# Patient Record
Sex: FEMALE | Race: OTHER | Employment: OTHER | URBAN - METROPOLITAN AREA
[De-identification: names, ages, dates, MRNs, and addresses within clinical notes are randomized per-mention and may not be internally consistent; named-entity substitution may affect disease eponyms.]

---

## 2017-01-26 PROBLEM — J18.9 MULTIFOCAL PNEUMONIA: Status: ACTIVE | Noted: 2017-01-26

## 2017-05-01 ENCOUNTER — HOSPITAL ENCOUNTER (INPATIENT)
Age: 37
LOS: 1 days | Discharge: PSYCHIATRIC OR SUBSTANCE USE FACILITY | DRG: 918 | End: 2017-05-03
Attending: EMERGENCY MEDICINE | Admitting: INTERNAL MEDICINE
Payer: COMMERCIAL

## 2017-05-01 ENCOUNTER — APPOINTMENT (OUTPATIENT)
Dept: GENERAL RADIOLOGY | Age: 37
DRG: 918 | End: 2017-05-01
Payer: COMMERCIAL

## 2017-05-01 DIAGNOSIS — T50.902A DRUG OVERDOSE, INTENTIONAL SELF-HARM, INITIAL ENCOUNTER (HCC): Primary | ICD-10-CM

## 2017-05-01 DIAGNOSIS — R45.89 SUICIDAL BEHAVIOR: ICD-10-CM

## 2017-05-01 LAB
-: ABNORMAL
ABSOLUTE EOS #: 0.2 K/UL (ref 0–0.4)
ABSOLUTE LYMPH #: 3.8 K/UL (ref 1–4.8)
ABSOLUTE MONO #: 0.9 K/UL (ref 0.1–1.3)
AMORPHOUS: ABNORMAL
AMPHETAMINE SCREEN URINE: NEGATIVE
BACTERIA: ABNORMAL
BARBITURATE SCREEN URINE: POSITIVE
BASOPHILS # BLD: 1 %
BASOPHILS ABSOLUTE: 0.1 K/UL (ref 0–0.2)
BENZODIAZEPINE SCREEN, URINE: NEGATIVE
BILIRUBIN URINE: NEGATIVE
BUPRENORPHINE URINE: ABNORMAL
CANNABINOID SCREEN URINE: POSITIVE
CASTS UA: ABNORMAL /LPF
COCAINE METABOLITE, URINE: NEGATIVE
COLOR: YELLOW
COMMENT UA: ABNORMAL
CRYSTALS, UA: ABNORMAL /HPF
DIFFERENTIAL TYPE: ABNORMAL
EOSINOPHILS RELATIVE PERCENT: 1 %
EPITHELIAL CELLS UA: ABNORMAL /HPF
GLUCOSE URINE: NEGATIVE
HCT VFR BLD CALC: 39.5 % (ref 36–46)
HEMOGLOBIN: 12.8 G/DL (ref 12–16)
KETONES, URINE: NEGATIVE
LEUKOCYTE ESTERASE, URINE: ABNORMAL
LYMPHOCYTES # BLD: 29 %
MCH RBC QN AUTO: 28.7 PG (ref 26–34)
MCHC RBC AUTO-ENTMCNC: 32.5 G/DL (ref 31–37)
MCV RBC AUTO: 88.3 FL (ref 80–100)
MDMA URINE: ABNORMAL
METHADONE SCREEN, URINE: NEGATIVE
METHAMPHETAMINE, URINE: ABNORMAL
MONOCYTES # BLD: 7 %
MUCUS: ABNORMAL
NITRITE, URINE: NEGATIVE
OPIATES, URINE: POSITIVE
OTHER OBSERVATIONS UA: ABNORMAL
OXYCODONE SCREEN URINE: NEGATIVE
PDW BLD-RTO: 15.6 % (ref 11.5–14.9)
PH UA: 6 (ref 5–8)
PHENCYCLIDINE, URINE: NEGATIVE
PLATELET # BLD: 307 K/UL (ref 150–450)
PLATELET ESTIMATE: ABNORMAL
PMV BLD AUTO: 7.3 FL (ref 6–12)
PROPOXYPHENE, URINE: ABNORMAL
PROTEIN UA: NEGATIVE
RBC # BLD: 4.47 M/UL (ref 4–5.2)
RBC # BLD: ABNORMAL 10*6/UL
RBC UA: ABNORMAL /HPF
RENAL EPITHELIAL, UA: ABNORMAL /HPF
SEG NEUTROPHILS: 62 %
SEGMENTED NEUTROPHILS ABSOLUTE COUNT: 8.2 K/UL (ref 1.3–9.1)
SPECIFIC GRAVITY UA: 1.01 (ref 1–1.03)
TEST INFORMATION: ABNORMAL
TRICHOMONAS: ABNORMAL
TRICYCLIC ANTIDEP,URINE: POSITIVE
TRICYCLIC ANTIDEPRESSANTS, UR: ABNORMAL
TURBIDITY: ABNORMAL
URINE HGB: NEGATIVE
UROBILINOGEN, URINE: NORMAL
WBC # BLD: 13.2 K/UL (ref 3.5–11)
WBC # BLD: ABNORMAL 10*3/UL
WBC UA: ABNORMAL /HPF
YEAST: ABNORMAL

## 2017-05-01 PROCEDURE — 93005 ELECTROCARDIOGRAM TRACING: CPT

## 2017-05-01 PROCEDURE — 6360000002 HC RX W HCPCS: Performed by: EMERGENCY MEDICINE

## 2017-05-01 PROCEDURE — 36415 COLL VENOUS BLD VENIPUNCTURE: CPT

## 2017-05-01 PROCEDURE — 80307 DRUG TEST PRSMV CHEM ANLYZR: CPT

## 2017-05-01 PROCEDURE — 83735 ASSAY OF MAGNESIUM: CPT

## 2017-05-01 PROCEDURE — 71010 XR CHEST PORTABLE: CPT

## 2017-05-01 PROCEDURE — 96374 THER/PROPH/DIAG INJ IV PUSH: CPT

## 2017-05-01 PROCEDURE — 82140 ASSAY OF AMMONIA: CPT

## 2017-05-01 PROCEDURE — 43753 TX GASTRO INTUB W/ASP: CPT

## 2017-05-01 PROCEDURE — 85025 COMPLETE CBC W/AUTO DIFF WBC: CPT

## 2017-05-01 PROCEDURE — 81001 URINALYSIS AUTO W/SCOPE: CPT

## 2017-05-01 PROCEDURE — 80053 COMPREHEN METABOLIC PANEL: CPT

## 2017-05-01 PROCEDURE — 84484 ASSAY OF TROPONIN QUANT: CPT

## 2017-05-01 PROCEDURE — 74022 RADEX COMPL AQT ABD SERIES: CPT

## 2017-05-01 PROCEDURE — G0480 DRUG TEST DEF 1-7 CLASSES: HCPCS

## 2017-05-01 PROCEDURE — 2580000003 HC RX 258: Performed by: EMERGENCY MEDICINE

## 2017-05-01 PROCEDURE — 99285 EMERGENCY DEPT VISIT HI MDM: CPT

## 2017-05-01 RX ORDER — 0.9 % SODIUM CHLORIDE 0.9 %
1000 INTRAVENOUS SOLUTION INTRAVENOUS ONCE
Status: COMPLETED | OUTPATIENT
Start: 2017-05-01 | End: 2017-05-02

## 2017-05-01 RX ORDER — ACTIVATED CHARCOAL 208 MG/ML
50 SUSPENSION ORAL ONCE
Status: COMPLETED | OUTPATIENT
Start: 2017-05-01 | End: 2017-05-02

## 2017-05-01 RX ORDER — ONDANSETRON 2 MG/ML
4 INJECTION INTRAMUSCULAR; INTRAVENOUS ONCE
Status: COMPLETED | OUTPATIENT
Start: 2017-05-01 | End: 2017-05-01

## 2017-05-01 RX ADMIN — SODIUM CHLORIDE 1000 ML: 9 INJECTION, SOLUTION INTRAVENOUS at 23:46

## 2017-05-01 RX ADMIN — ONDANSETRON 4 MG: 2 INJECTION INTRAMUSCULAR; INTRAVENOUS at 23:46

## 2017-05-01 ASSESSMENT — PAIN DESCRIPTION - ORIENTATION: ORIENTATION: RIGHT

## 2017-05-01 ASSESSMENT — ENCOUNTER SYMPTOMS
NAUSEA: 1
VOMITING: 0
COUGH: 0
RHINORRHEA: 0
TROUBLE SWALLOWING: 0
SORE THROAT: 0
PHOTOPHOBIA: 0
EYE DISCHARGE: 0
EYE PAIN: 0
DIARRHEA: 0
SHORTNESS OF BREATH: 0
ABDOMINAL PAIN: 0

## 2017-05-01 ASSESSMENT — PAIN DESCRIPTION - LOCATION: LOCATION: BACK;LEG

## 2017-05-02 PROBLEM — T50.902A INTENTIONAL DRUG OVERDOSE (HCC): Status: ACTIVE | Noted: 2017-05-02

## 2017-05-02 PROBLEM — T14.91XA SUICIDE ATTEMPT (HCC): Status: ACTIVE | Noted: 2017-05-02

## 2017-05-02 LAB
ABSOLUTE EOS #: 0.2 K/UL (ref 0–0.4)
ABSOLUTE LYMPH #: 2.9 K/UL (ref 1–4.8)
ABSOLUTE MONO #: 0.7 K/UL (ref 0.1–1.3)
ACETAMINOPHEN LEVEL: <10 UG/ML (ref 10–30)
ALBUMIN SERPL-MCNC: 3.4 G/DL (ref 3.5–5.2)
ALBUMIN/GLOBULIN RATIO: ABNORMAL (ref 1–2.5)
ALP BLD-CCNC: 99 U/L (ref 35–104)
ALT SERPL-CCNC: 23 U/L (ref 5–33)
AMMONIA: 44 UMOL/L (ref 11–51)
ANION GAP SERPL CALCULATED.3IONS-SCNC: 12 MMOL/L (ref 9–17)
ANION GAP SERPL CALCULATED.3IONS-SCNC: 15 MMOL/L (ref 9–17)
AST SERPL-CCNC: 14 U/L
BASOPHILS # BLD: 1 %
BASOPHILS ABSOLUTE: 0.1 K/UL (ref 0–0.2)
BILIRUB SERPL-MCNC: <0.15 MG/DL (ref 0.3–1.2)
BUN BLDV-MCNC: 9 MG/DL (ref 6–20)
BUN BLDV-MCNC: 9 MG/DL (ref 6–20)
BUN/CREAT BLD: ABNORMAL (ref 9–20)
BUN/CREAT BLD: ABNORMAL (ref 9–20)
CALCIUM SERPL-MCNC: 8.5 MG/DL (ref 8.6–10.4)
CALCIUM SERPL-MCNC: 8.6 MG/DL (ref 8.6–10.4)
CHLORIDE BLD-SCNC: 101 MMOL/L (ref 98–107)
CHLORIDE BLD-SCNC: 102 MMOL/L (ref 98–107)
CO2: 23 MMOL/L (ref 20–31)
CO2: 24 MMOL/L (ref 20–31)
CREAT SERPL-MCNC: 0.84 MG/DL (ref 0.5–0.9)
CREAT SERPL-MCNC: 0.9 MG/DL (ref 0.5–0.9)
DIFFERENTIAL TYPE: ABNORMAL
EKG ATRIAL RATE: 69 BPM
EKG P AXIS: 36 DEGREES
EKG P-R INTERVAL: 154 MS
EKG Q-T INTERVAL: 426 MS
EKG QRS DURATION: 98 MS
EKG QTC CALCULATION (BAZETT): 456 MS
EKG R AXIS: -19 DEGREES
EKG T AXIS: 6 DEGREES
EKG VENTRICULAR RATE: 69 BPM
EOSINOPHILS RELATIVE PERCENT: 2 %
ETHANOL PERCENT: <0.01 %
ETHANOL: <10 MG/DL
GFR AFRICAN AMERICAN: >60 ML/MIN
GFR AFRICAN AMERICAN: >60 ML/MIN
GFR NON-AFRICAN AMERICAN: >60 ML/MIN
GFR NON-AFRICAN AMERICAN: >60 ML/MIN
GFR SERPL CREATININE-BSD FRML MDRD: ABNORMAL ML/MIN/{1.73_M2}
GLUCOSE BLD-MCNC: 116 MG/DL (ref 70–99)
GLUCOSE BLD-MCNC: 125 MG/DL (ref 70–99)
HCT VFR BLD CALC: 40 % (ref 36–46)
HEMOGLOBIN: 13.1 G/DL (ref 12–16)
LYMPHOCYTES # BLD: 29 %
MAGNESIUM: 1.8 MG/DL (ref 1.6–2.6)
MCH RBC QN AUTO: 29.2 PG (ref 26–34)
MCHC RBC AUTO-ENTMCNC: 32.8 G/DL (ref 31–37)
MCV RBC AUTO: 88.8 FL (ref 80–100)
MONOCYTES # BLD: 7 %
PDW BLD-RTO: 15.2 % (ref 11.5–14.9)
PLATELET # BLD: 322 K/UL (ref 150–450)
PLATELET ESTIMATE: ABNORMAL
PMV BLD AUTO: 7.4 FL (ref 6–12)
POTASSIUM SERPL-SCNC: 3.5 MMOL/L (ref 3.7–5.3)
POTASSIUM SERPL-SCNC: 3.9 MMOL/L (ref 3.7–5.3)
RBC # BLD: 4.5 M/UL (ref 4–5.2)
RBC # BLD: ABNORMAL 10*6/UL
SALICYLATE LEVEL: <1 MG/DL (ref 3–10)
SEG NEUTROPHILS: 61 %
SEGMENTED NEUTROPHILS ABSOLUTE COUNT: 6.3 K/UL (ref 1.3–9.1)
SODIUM BLD-SCNC: 138 MMOL/L (ref 135–144)
SODIUM BLD-SCNC: 139 MMOL/L (ref 135–144)
TOTAL PROTEIN: 6.3 G/DL (ref 6.4–8.3)
TROPONIN INTERP: NORMAL
TROPONIN T: <0.03 NG/ML
WBC # BLD: 10.2 K/UL (ref 3.5–11)
WBC # BLD: ABNORMAL 10*3/UL

## 2017-05-02 PROCEDURE — 6360000002 HC RX W HCPCS: Performed by: NURSE PRACTITIONER

## 2017-05-02 PROCEDURE — 2500000003 HC RX 250 WO HCPCS: Performed by: NURSE PRACTITIONER

## 2017-05-02 PROCEDURE — 85025 COMPLETE CBC W/AUTO DIFF WBC: CPT

## 2017-05-02 PROCEDURE — 36415 COLL VENOUS BLD VENIPUNCTURE: CPT

## 2017-05-02 PROCEDURE — 93005 ELECTROCARDIOGRAM TRACING: CPT

## 2017-05-02 PROCEDURE — 6370000000 HC RX 637 (ALT 250 FOR IP): Performed by: INTERNAL MEDICINE

## 2017-05-02 PROCEDURE — 2060000000 HC ICU INTERMEDIATE R&B

## 2017-05-02 PROCEDURE — 6370000000 HC RX 637 (ALT 250 FOR IP): Performed by: EMERGENCY MEDICINE

## 2017-05-02 PROCEDURE — 99222 1ST HOSP IP/OBS MODERATE 55: CPT | Performed by: INTERNAL MEDICINE

## 2017-05-02 PROCEDURE — 6370000000 HC RX 637 (ALT 250 FOR IP): Performed by: NURSE PRACTITIONER

## 2017-05-02 PROCEDURE — 80048 BASIC METABOLIC PNL TOTAL CA: CPT

## 2017-05-02 PROCEDURE — 2580000003 HC RX 258: Performed by: NURSE PRACTITIONER

## 2017-05-02 RX ORDER — NADOLOL 20 MG/1
40 TABLET ORAL NIGHTLY
Status: DISCONTINUED | OUTPATIENT
Start: 2017-05-02 | End: 2017-05-03 | Stop reason: HOSPADM

## 2017-05-02 RX ORDER — ACETAMINOPHEN 325 MG/1
650 TABLET ORAL EVERY 4 HOURS PRN
Status: DISCONTINUED | OUTPATIENT
Start: 2017-05-02 | End: 2017-05-03 | Stop reason: HOSPADM

## 2017-05-02 RX ORDER — TOPIRAMATE 100 MG/1
100 TABLET, FILM COATED ORAL 2 TIMES DAILY
Status: DISCONTINUED | OUTPATIENT
Start: 2017-05-02 | End: 2017-05-03 | Stop reason: HOSPADM

## 2017-05-02 RX ORDER — POTASSIUM CITRATE 5 MEQ/1
15 TABLET, EXTENDED RELEASE ORAL 2 TIMES DAILY
Status: DISCONTINUED | OUTPATIENT
Start: 2017-05-02 | End: 2017-05-03 | Stop reason: HOSPADM

## 2017-05-02 RX ORDER — FLUTICASONE PROPIONATE 50 MCG
1 SPRAY, SUSPENSION (ML) NASAL DAILY
Status: DISCONTINUED | OUTPATIENT
Start: 2017-05-02 | End: 2017-05-03 | Stop reason: HOSPADM

## 2017-05-02 RX ORDER — NICOTINE POLACRILEX 2 MG
1 GUM BUCCAL DAILY
Status: DISCONTINUED | OUTPATIENT
Start: 2017-05-02 | End: 2017-05-02

## 2017-05-02 RX ORDER — SWAB
1 SWAB, NON-MEDICATED MISCELLANEOUS DAILY
Status: DISCONTINUED | OUTPATIENT
Start: 2017-05-02 | End: 2017-05-03 | Stop reason: HOSPADM

## 2017-05-02 RX ORDER — NICOTINE 21 MG/24HR
1 PATCH, TRANSDERMAL 24 HOURS TRANSDERMAL DAILY
Status: DISCONTINUED | OUTPATIENT
Start: 2017-05-02 | End: 2017-05-03 | Stop reason: HOSPADM

## 2017-05-02 RX ORDER — CLONIDINE HYDROCHLORIDE 0.1 MG/1
0.1 TABLET ORAL NIGHTLY
Status: DISCONTINUED | OUTPATIENT
Start: 2017-05-02 | End: 2017-05-03 | Stop reason: HOSPADM

## 2017-05-02 RX ORDER — SODIUM CHLORIDE 0.9 % (FLUSH) 0.9 %
10 SYRINGE (ML) INJECTION EVERY 12 HOURS SCHEDULED
Status: DISCONTINUED | OUTPATIENT
Start: 2017-05-02 | End: 2017-05-03 | Stop reason: HOSPADM

## 2017-05-02 RX ORDER — SODIUM CHLORIDE 0.9 % (FLUSH) 0.9 %
10 SYRINGE (ML) INJECTION PRN
Status: DISCONTINUED | OUTPATIENT
Start: 2017-05-02 | End: 2017-05-03 | Stop reason: HOSPADM

## 2017-05-02 RX ORDER — FUROSEMIDE 40 MG/1
40 TABLET ORAL DAILY
Status: DISCONTINUED | OUTPATIENT
Start: 2017-05-02 | End: 2017-05-03 | Stop reason: HOSPADM

## 2017-05-02 RX ORDER — SODIUM CHLORIDE 9 MG/ML
INJECTION, SOLUTION INTRAVENOUS CONTINUOUS
Status: DISCONTINUED | OUTPATIENT
Start: 2017-05-02 | End: 2017-05-03 | Stop reason: HOSPADM

## 2017-05-02 RX ORDER — ONDANSETRON 2 MG/ML
4 INJECTION INTRAMUSCULAR; INTRAVENOUS EVERY 6 HOURS PRN
Status: DISCONTINUED | OUTPATIENT
Start: 2017-05-02 | End: 2017-05-03 | Stop reason: HOSPADM

## 2017-05-02 RX ORDER — PANTOPRAZOLE SODIUM 40 MG/1
40 GRANULE, DELAYED RELEASE ORAL
Status: DISCONTINUED | OUTPATIENT
Start: 2017-05-02 | End: 2017-05-03

## 2017-05-02 RX ORDER — CETIRIZINE HYDROCHLORIDE 10 MG/1
10 TABLET ORAL DAILY
Status: DISCONTINUED | OUTPATIENT
Start: 2017-05-02 | End: 2017-05-03 | Stop reason: HOSPADM

## 2017-05-02 RX ORDER — LIDOCAINE 40 MG/G
CREAM TOPICAL PRN
Status: DISCONTINUED | OUTPATIENT
Start: 2017-05-02 | End: 2017-05-03 | Stop reason: HOSPADM

## 2017-05-02 RX ORDER — ALBUTEROL SULFATE 2.5 MG/3ML
2.5 SOLUTION RESPIRATORY (INHALATION) EVERY 6 HOURS PRN
Status: DISCONTINUED | OUTPATIENT
Start: 2017-05-02 | End: 2017-05-03 | Stop reason: HOSPADM

## 2017-05-02 RX ADMIN — SODIUM CHLORIDE: 9 INJECTION, SOLUTION INTRAVENOUS at 08:30

## 2017-05-02 RX ADMIN — TOPIRAMATE 100 MG: 100 TABLET, FILM COATED ORAL at 21:15

## 2017-05-02 RX ADMIN — MICONAZOLE NITRATE: 1.42 POWDER TOPICAL at 21:15

## 2017-05-02 RX ADMIN — ILOPERIDONE 12 MG: 6 TABLET ORAL at 21:14

## 2017-05-02 RX ADMIN — NADOLOL 40 MG: 20 TABLET ORAL at 23:27

## 2017-05-02 RX ADMIN — ENOXAPARIN SODIUM 30 MG: 30 INJECTION SUBCUTANEOUS at 21:15

## 2017-05-02 RX ADMIN — POTASSIUM CITRATE 15 MEQ: 5 TABLET ORAL at 21:15

## 2017-05-02 RX ADMIN — ACETAMINOPHEN 650 MG: 325 TABLET ORAL at 21:11

## 2017-05-02 RX ADMIN — POISON ADSORBENT 50 G: 50 SUSPENSION ORAL at 00:23

## 2017-05-02 RX ADMIN — CLONIDINE HYDROCHLORIDE 0.1 MG: 0.1 TABLET ORAL at 23:28

## 2017-05-02 ASSESSMENT — PAIN SCALES - GENERAL
PAINLEVEL_OUTOF10: 7
PAINLEVEL_OUTOF10: 0
PAINLEVEL_OUTOF10: 8

## 2017-05-02 ASSESSMENT — ENCOUNTER SYMPTOMS
WHEEZING: 0
ORTHOPNEA: 0
CONSTIPATION: 0
SORE THROAT: 0
SPUTUM PRODUCTION: 0
SHORTNESS OF BREATH: 0
ABDOMINAL PAIN: 0
DIARRHEA: 0
COUGH: 0
DOUBLE VISION: 0
NAUSEA: 1
BLURRED VISION: 0
VOMITING: 0
BACK PAIN: 0

## 2017-05-03 ENCOUNTER — HOSPITAL ENCOUNTER (INPATIENT)
Age: 37
LOS: 5 days | Discharge: HOME | DRG: 885 | End: 2017-05-08
Attending: PSYCHIATRY & NEUROLOGY | Admitting: PSYCHIATRY & NEUROLOGY
Payer: COMMERCIAL

## 2017-05-03 VITALS
DIASTOLIC BLOOD PRESSURE: 76 MMHG | SYSTOLIC BLOOD PRESSURE: 128 MMHG | WEIGHT: 293 LBS | OXYGEN SATURATION: 100 % | BODY MASS INDEX: 47.09 KG/M2 | TEMPERATURE: 97 F | HEART RATE: 71 BPM | RESPIRATION RATE: 18 BRPM | HEIGHT: 66 IN

## 2017-05-03 LAB
ABSOLUTE EOS #: 0.1 K/UL (ref 0–0.4)
ABSOLUTE LYMPH #: 2.4 K/UL (ref 1–4.8)
ABSOLUTE MONO #: 0.6 K/UL (ref 0.1–1.3)
ANION GAP SERPL CALCULATED.3IONS-SCNC: 10 MMOL/L (ref 9–17)
BASOPHILS # BLD: 1 %
BASOPHILS ABSOLUTE: 0.1 K/UL (ref 0–0.2)
BUN BLDV-MCNC: 9 MG/DL (ref 6–20)
BUN/CREAT BLD: ABNORMAL (ref 9–20)
CALCIUM SERPL-MCNC: 7.9 MG/DL (ref 8.6–10.4)
CHLORIDE BLD-SCNC: 105 MMOL/L (ref 98–107)
CO2: 26 MMOL/L (ref 20–31)
CREAT SERPL-MCNC: 0.6 MG/DL (ref 0.5–0.9)
DIFFERENTIAL TYPE: ABNORMAL
EKG ATRIAL RATE: 76 BPM
EKG P AXIS: 47 DEGREES
EKG P-R INTERVAL: 172 MS
EKG Q-T INTERVAL: 412 MS
EKG QRS DURATION: 96 MS
EKG QTC CALCULATION (BAZETT): 463 MS
EKG R AXIS: -17 DEGREES
EKG T AXIS: 24 DEGREES
EKG VENTRICULAR RATE: 76 BPM
EOSINOPHILS RELATIVE PERCENT: 1 %
GFR AFRICAN AMERICAN: >60 ML/MIN
GFR NON-AFRICAN AMERICAN: >60 ML/MIN
GFR SERPL CREATININE-BSD FRML MDRD: ABNORMAL ML/MIN/{1.73_M2}
GFR SERPL CREATININE-BSD FRML MDRD: ABNORMAL ML/MIN/{1.73_M2}
GLUCOSE BLD-MCNC: 123 MG/DL (ref 70–99)
HCT VFR BLD CALC: 36.8 % (ref 36–46)
HEMOGLOBIN: 12.2 G/DL (ref 12–16)
LYMPHOCYTES # BLD: 29 %
MCH RBC QN AUTO: 29.2 PG (ref 26–34)
MCHC RBC AUTO-ENTMCNC: 33.1 G/DL (ref 31–37)
MCV RBC AUTO: 88.2 FL (ref 80–100)
MONOCYTES # BLD: 7 %
PDW BLD-RTO: 15 % (ref 11.5–14.9)
PLATELET # BLD: 277 K/UL (ref 150–450)
PLATELET ESTIMATE: ABNORMAL
PMV BLD AUTO: 7.4 FL (ref 6–12)
POTASSIUM SERPL-SCNC: 3.9 MMOL/L (ref 3.7–5.3)
RBC # BLD: 4.17 M/UL (ref 4–5.2)
RBC # BLD: ABNORMAL 10*6/UL
SEG NEUTROPHILS: 62 %
SEGMENTED NEUTROPHILS ABSOLUTE COUNT: 5 K/UL (ref 1.3–9.1)
SODIUM BLD-SCNC: 141 MMOL/L (ref 135–144)
WBC # BLD: 8.2 K/UL (ref 3.5–11)
WBC # BLD: ABNORMAL 10*3/UL

## 2017-05-03 PROCEDURE — 6360000002 HC RX W HCPCS: Performed by: NURSE PRACTITIONER

## 2017-05-03 PROCEDURE — 6370000000 HC RX 637 (ALT 250 FOR IP): Performed by: NURSE PRACTITIONER

## 2017-05-03 PROCEDURE — 80048 BASIC METABOLIC PNL TOTAL CA: CPT

## 2017-05-03 PROCEDURE — 85025 COMPLETE CBC W/AUTO DIFF WBC: CPT

## 2017-05-03 PROCEDURE — 1240000000 HC EMOTIONAL WELLNESS R&B

## 2017-05-03 PROCEDURE — 99238 HOSP IP/OBS DSCHRG MGMT 30/<: CPT | Performed by: INTERNAL MEDICINE

## 2017-05-03 PROCEDURE — 36415 COLL VENOUS BLD VENIPUNCTURE: CPT

## 2017-05-03 PROCEDURE — 6370000000 HC RX 637 (ALT 250 FOR IP): Performed by: INTERNAL MEDICINE

## 2017-05-03 RX ORDER — FUROSEMIDE 40 MG/1
40 TABLET ORAL DAILY
Status: CANCELLED | OUTPATIENT
Start: 2017-05-04

## 2017-05-03 RX ORDER — LIDOCAINE 40 MG/G
CREAM TOPICAL PRN
Status: CANCELLED | OUTPATIENT
Start: 2017-05-03

## 2017-05-03 RX ORDER — TOPIRAMATE 100 MG/1
100 TABLET, FILM COATED ORAL 2 TIMES DAILY
Status: CANCELLED | OUTPATIENT
Start: 2017-05-03

## 2017-05-03 RX ORDER — CLONIDINE HYDROCHLORIDE 0.1 MG/1
0.1 TABLET ORAL NIGHTLY
Status: CANCELLED | OUTPATIENT
Start: 2017-05-03

## 2017-05-03 RX ORDER — FLUTICASONE PROPIONATE 50 MCG
1 SPRAY, SUSPENSION (ML) NASAL DAILY
Status: DISCONTINUED | OUTPATIENT
Start: 2017-05-04 | End: 2017-05-08 | Stop reason: HOSPADM

## 2017-05-03 RX ORDER — ACETAMINOPHEN 325 MG/1
650 TABLET ORAL EVERY 4 HOURS PRN
Status: DISCONTINUED | OUTPATIENT
Start: 2017-05-03 | End: 2017-05-08 | Stop reason: HOSPADM

## 2017-05-03 RX ORDER — PANTOPRAZOLE SODIUM 40 MG/1
40 TABLET, DELAYED RELEASE ORAL
Status: CANCELLED | OUTPATIENT
Start: 2017-05-04

## 2017-05-03 RX ORDER — PANTOPRAZOLE SODIUM 40 MG/1
40 TABLET, DELAYED RELEASE ORAL DAILY
COMMUNITY
End: 2020-11-13

## 2017-05-03 RX ORDER — PANTOPRAZOLE SODIUM 40 MG/1
40 TABLET, DELAYED RELEASE ORAL
Status: DISCONTINUED | OUTPATIENT
Start: 2017-05-04 | End: 2017-05-04

## 2017-05-03 RX ORDER — NADOLOL 20 MG/1
40 TABLET ORAL NIGHTLY
Status: DISCONTINUED | OUTPATIENT
Start: 2017-05-03 | End: 2017-05-08 | Stop reason: HOSPADM

## 2017-05-03 RX ORDER — CETIRIZINE HYDROCHLORIDE 10 MG/1
10 TABLET ORAL DAILY
Status: CANCELLED | OUTPATIENT
Start: 2017-05-04

## 2017-05-03 RX ORDER — NADOLOL 40 MG/1
40 TABLET ORAL NIGHTLY
Status: CANCELLED | OUTPATIENT
Start: 2017-05-03

## 2017-05-03 RX ORDER — TOPIRAMATE 100 MG/1
100 TABLET, FILM COATED ORAL 2 TIMES DAILY
Status: DISCONTINUED | OUTPATIENT
Start: 2017-05-03 | End: 2017-05-08 | Stop reason: HOSPADM

## 2017-05-03 RX ORDER — CLONIDINE HYDROCHLORIDE 0.1 MG/1
0.1 TABLET ORAL NIGHTLY
Status: DISCONTINUED | OUTPATIENT
Start: 2017-05-03 | End: 2017-05-08 | Stop reason: HOSPADM

## 2017-05-03 RX ORDER — PANTOPRAZOLE SODIUM 40 MG/1
40 TABLET, DELAYED RELEASE ORAL
Status: DISCONTINUED | OUTPATIENT
Start: 2017-05-04 | End: 2017-05-03 | Stop reason: HOSPADM

## 2017-05-03 RX ORDER — LIDOCAINE 40 MG/G
CREAM TOPICAL PRN
Status: DISCONTINUED | OUTPATIENT
Start: 2017-05-03 | End: 2017-05-08 | Stop reason: HOSPADM

## 2017-05-03 RX ORDER — FUROSEMIDE 40 MG/1
40 TABLET ORAL DAILY
Status: DISCONTINUED | OUTPATIENT
Start: 2017-05-04 | End: 2017-05-08 | Stop reason: HOSPADM

## 2017-05-03 RX ORDER — FLUTICASONE PROPIONATE 50 MCG
1 SPRAY, SUSPENSION (ML) NASAL DAILY
Status: CANCELLED | OUTPATIENT
Start: 2017-05-04

## 2017-05-03 RX ORDER — CETIRIZINE HYDROCHLORIDE 10 MG/1
10 TABLET ORAL DAILY
Status: DISCONTINUED | OUTPATIENT
Start: 2017-05-04 | End: 2017-05-08 | Stop reason: HOSPADM

## 2017-05-03 RX ORDER — ACETAMINOPHEN 325 MG/1
650 TABLET ORAL EVERY 4 HOURS PRN
Status: CANCELLED | OUTPATIENT
Start: 2017-05-03

## 2017-05-03 RX ADMIN — TOPIRAMATE 100 MG: 100 TABLET, FILM COATED ORAL at 21:27

## 2017-05-03 RX ADMIN — CETIRIZINE HYDROCHLORIDE 10 MG: 10 TABLET, FILM COATED ORAL at 09:36

## 2017-05-03 RX ADMIN — FLUTICASONE PROPIONATE 1 SPRAY: 50 SPRAY, METERED NASAL at 09:35

## 2017-05-03 RX ADMIN — Medication 1 TABLET: at 09:36

## 2017-05-03 RX ADMIN — ENOXAPARIN SODIUM 30 MG: 30 INJECTION SUBCUTANEOUS at 09:35

## 2017-05-03 RX ADMIN — TOPIRAMATE 100 MG: 100 TABLET, FILM COATED ORAL at 09:37

## 2017-05-03 RX ADMIN — POTASSIUM CITRATE 15 MEQ: 5 TABLET ORAL at 09:36

## 2017-05-03 RX ADMIN — CLONIDINE HYDROCHLORIDE 0.1 MG: 0.1 TABLET ORAL at 21:27

## 2017-05-03 RX ADMIN — ILOPERIDONE 12 MG: 6 TABLET ORAL at 09:36

## 2017-05-03 RX ADMIN — PANTOPRAZOLE SODIUM 40 MG: 40 GRANULE, DELAYED RELEASE ORAL at 09:35

## 2017-05-03 ASSESSMENT — SLEEP AND FATIGUE QUESTIONNAIRES
DIFFICULTY STAYING ASLEEP: YES
DO YOU USE A SLEEP AID: YES
SLEEP PATTERN: DIFFICULTY FALLING ASLEEP
AVERAGE NUMBER OF SLEEP HOURS: 5
RESTFUL SLEEP: NO
DO YOU HAVE DIFFICULTY SLEEPING: YES
DIFFICULTY FALLING ASLEEP: YES
DIFFICULTY ARISING: NO

## 2017-05-03 ASSESSMENT — PAIN SCALES - GENERAL: PAINLEVEL_OUTOF10: 7

## 2017-05-03 ASSESSMENT — LIFESTYLE VARIABLES: HISTORY_ALCOHOL_USE: NO

## 2017-05-03 ASSESSMENT — PATIENT HEALTH QUESTIONNAIRE - PHQ9: SUM OF ALL RESPONSES TO PHQ QUESTIONS 1-9: 27

## 2017-05-03 ASSESSMENT — PAIN DESCRIPTION - PAIN TYPE: TYPE: CHRONIC PAIN

## 2017-05-04 PROCEDURE — 6370000000 HC RX 637 (ALT 250 FOR IP): Performed by: PSYCHIATRY & NEUROLOGY

## 2017-05-04 PROCEDURE — 2500000003 HC RX 250 WO HCPCS: Performed by: INTERNAL MEDICINE

## 2017-05-04 PROCEDURE — 1240000000 HC EMOTIONAL WELLNESS R&B

## 2017-05-04 PROCEDURE — 6370000000 HC RX 637 (ALT 250 FOR IP): Performed by: INTERNAL MEDICINE

## 2017-05-04 RX ORDER — PANTOPRAZOLE SODIUM 40 MG/1
40 TABLET, DELAYED RELEASE ORAL
Status: DISCONTINUED | OUTPATIENT
Start: 2017-05-04 | End: 2017-05-08 | Stop reason: HOSPADM

## 2017-05-04 RX ADMIN — PANTOPRAZOLE SODIUM 40 MG: 40 TABLET, DELAYED RELEASE ORAL at 11:17

## 2017-05-04 RX ADMIN — CETIRIZINE HYDROCHLORIDE 10 MG: 10 TABLET, FILM COATED ORAL at 11:17

## 2017-05-04 RX ADMIN — MICONAZOLE NITRATE: 1.42 POWDER TOPICAL at 21:09

## 2017-05-04 RX ADMIN — TOPIRAMATE 100 MG: 100 TABLET, FILM COATED ORAL at 11:17

## 2017-05-04 RX ADMIN — CLONIDINE HYDROCHLORIDE 0.1 MG: 0.1 TABLET ORAL at 21:07

## 2017-05-04 RX ADMIN — TOPIRAMATE 100 MG: 100 TABLET, FILM COATED ORAL at 21:08

## 2017-05-04 RX ADMIN — FLUTICASONE PROPIONATE 1 SPRAY: 50 SPRAY, METERED NASAL at 11:20

## 2017-05-04 RX ADMIN — FUROSEMIDE 40 MG: 40 TABLET ORAL at 11:18

## 2017-05-04 RX ADMIN — NADOLOL 40 MG: 20 TABLET ORAL at 21:07

## 2017-05-04 ASSESSMENT — PAIN SCALES - GENERAL
PAINLEVEL_OUTOF10: 8
PAINLEVEL_OUTOF10: 8

## 2017-05-04 ASSESSMENT — PAIN DESCRIPTION - PAIN TYPE: TYPE: CHRONIC PAIN

## 2017-05-05 PROCEDURE — 6370000000 HC RX 637 (ALT 250 FOR IP): Performed by: INTERNAL MEDICINE

## 2017-05-05 PROCEDURE — 1240000000 HC EMOTIONAL WELLNESS R&B

## 2017-05-05 PROCEDURE — 6370000000 HC RX 637 (ALT 250 FOR IP): Performed by: PSYCHIATRY & NEUROLOGY

## 2017-05-05 RX ORDER — NICOTINE 21 MG/24HR
1 PATCH, TRANSDERMAL 24 HOURS TRANSDERMAL DAILY
Status: DISCONTINUED | OUTPATIENT
Start: 2017-05-05 | End: 2017-05-08 | Stop reason: HOSPADM

## 2017-05-05 RX ADMIN — FUROSEMIDE 40 MG: 40 TABLET ORAL at 08:45

## 2017-05-05 RX ADMIN — TOPIRAMATE 100 MG: 100 TABLET, FILM COATED ORAL at 21:09

## 2017-05-05 RX ADMIN — TOPIRAMATE 100 MG: 100 TABLET, FILM COATED ORAL at 08:45

## 2017-05-05 RX ADMIN — MICONAZOLE NITRATE: 1.42 POWDER TOPICAL at 21:50

## 2017-05-05 RX ADMIN — CETIRIZINE HYDROCHLORIDE 10 MG: 10 TABLET, FILM COATED ORAL at 08:45

## 2017-05-05 RX ADMIN — CLONIDINE HYDROCHLORIDE 0.1 MG: 0.1 TABLET ORAL at 21:09

## 2017-05-05 RX ADMIN — ACETAMINOPHEN 650 MG: 325 TABLET, FILM COATED ORAL at 19:38

## 2017-05-05 RX ADMIN — ACETAMINOPHEN 650 MG: 325 TABLET, FILM COATED ORAL at 14:24

## 2017-05-05 RX ADMIN — PANTOPRAZOLE SODIUM 40 MG: 40 TABLET, DELAYED RELEASE ORAL at 08:45

## 2017-05-05 RX ADMIN — FLUTICASONE PROPIONATE 1 SPRAY: 50 SPRAY, METERED NASAL at 08:45

## 2017-05-05 ASSESSMENT — PAIN SCALES - GENERAL
PAINLEVEL_OUTOF10: 8
PAINLEVEL_OUTOF10: 6
PAINLEVEL_OUTOF10: 3
PAINLEVEL_OUTOF10: 3

## 2017-05-06 PROCEDURE — 1240000000 HC EMOTIONAL WELLNESS R&B

## 2017-05-06 PROCEDURE — 6370000000 HC RX 637 (ALT 250 FOR IP): Performed by: PSYCHIATRY & NEUROLOGY

## 2017-05-06 PROCEDURE — 6370000000 HC RX 637 (ALT 250 FOR IP): Performed by: INTERNAL MEDICINE

## 2017-05-06 RX ADMIN — FLUTICASONE PROPIONATE 1 SPRAY: 50 SPRAY, METERED NASAL at 10:21

## 2017-05-06 RX ADMIN — TOPIRAMATE 100 MG: 100 TABLET, FILM COATED ORAL at 21:56

## 2017-05-06 RX ADMIN — ILOPERIDONE 6 MG: 2 TABLET ORAL at 21:56

## 2017-05-06 RX ADMIN — MICONAZOLE NITRATE: 1.42 POWDER TOPICAL at 22:04

## 2017-05-06 RX ADMIN — ACETAMINOPHEN 650 MG: 325 TABLET, FILM COATED ORAL at 14:45

## 2017-05-06 RX ADMIN — NADOLOL 40 MG: 20 TABLET ORAL at 21:56

## 2017-05-06 RX ADMIN — CETIRIZINE HYDROCHLORIDE 10 MG: 10 TABLET, FILM COATED ORAL at 10:03

## 2017-05-06 RX ADMIN — ACETAMINOPHEN 650 MG: 325 TABLET, FILM COATED ORAL at 18:56

## 2017-05-06 RX ADMIN — TOPIRAMATE 100 MG: 100 TABLET, FILM COATED ORAL at 10:04

## 2017-05-06 RX ADMIN — ILOPERIDONE 6 MG: 2 TABLET ORAL at 10:03

## 2017-05-06 RX ADMIN — FUROSEMIDE 40 MG: 40 TABLET ORAL at 10:03

## 2017-05-06 RX ADMIN — LIDOCAINE: 40 CREAM TOPICAL at 17:13

## 2017-05-06 RX ADMIN — CLONIDINE HYDROCHLORIDE 0.1 MG: 0.1 TABLET ORAL at 21:56

## 2017-05-06 RX ADMIN — PANTOPRAZOLE SODIUM 40 MG: 40 TABLET, DELAYED RELEASE ORAL at 10:03

## 2017-05-06 ASSESSMENT — PAIN DESCRIPTION - LOCATION: LOCATION: BACK

## 2017-05-06 ASSESSMENT — PAIN SCALES - GENERAL
PAINLEVEL_OUTOF10: 9
PAINLEVEL_OUTOF10: 4
PAINLEVEL_OUTOF10: 8
PAINLEVEL_OUTOF10: 9
PAINLEVEL_OUTOF10: 8

## 2017-05-07 PROCEDURE — 6370000000 HC RX 637 (ALT 250 FOR IP): Performed by: INTERNAL MEDICINE

## 2017-05-07 PROCEDURE — 6370000000 HC RX 637 (ALT 250 FOR IP): Performed by: PSYCHIATRY & NEUROLOGY

## 2017-05-07 PROCEDURE — 1240000000 HC EMOTIONAL WELLNESS R&B

## 2017-05-07 RX ORDER — SUMATRIPTAN 25 MG/1
25 TABLET, FILM COATED ORAL 3 TIMES DAILY PRN
Status: DISCONTINUED | OUTPATIENT
Start: 2017-05-07 | End: 2017-05-08 | Stop reason: HOSPADM

## 2017-05-07 RX ORDER — HYDROXYZINE 50 MG/1
50 TABLET, FILM COATED ORAL 3 TIMES DAILY PRN
Qty: 45 TABLET | Refills: 0 | Status: SHIPPED | OUTPATIENT
Start: 2017-05-07 | End: 2017-05-10 | Stop reason: ALTCHOICE

## 2017-05-07 RX ORDER — NICOTINE 21 MG/24HR
1 PATCH, TRANSDERMAL 24 HOURS TRANSDERMAL DAILY
Qty: 30 PATCH | Refills: 0
Start: 2017-05-07 | End: 2017-06-12 | Stop reason: ALTCHOICE

## 2017-05-07 RX ORDER — HYDROXYZINE HYDROCHLORIDE 25 MG/1
50 TABLET, FILM COATED ORAL 3 TIMES DAILY PRN
Status: DISCONTINUED | OUTPATIENT
Start: 2017-05-07 | End: 2017-05-08 | Stop reason: HOSPADM

## 2017-05-07 RX ADMIN — PANTOPRAZOLE SODIUM 40 MG: 40 TABLET, DELAYED RELEASE ORAL at 10:36

## 2017-05-07 RX ADMIN — CLONIDINE HYDROCHLORIDE 0.1 MG: 0.1 TABLET ORAL at 22:41

## 2017-05-07 RX ADMIN — NADOLOL 40 MG: 20 TABLET ORAL at 22:41

## 2017-05-07 RX ADMIN — FUROSEMIDE 40 MG: 40 TABLET ORAL at 10:36

## 2017-05-07 RX ADMIN — HYDROXYZINE HYDROCHLORIDE 50 MG: 25 TABLET, FILM COATED ORAL at 20:23

## 2017-05-07 RX ADMIN — CETIRIZINE HYDROCHLORIDE 10 MG: 10 TABLET, FILM COATED ORAL at 10:36

## 2017-05-07 RX ADMIN — ILOPERIDONE 6 MG: 2 TABLET ORAL at 10:36

## 2017-05-07 RX ADMIN — ACETAMINOPHEN 650 MG: 325 TABLET, FILM COATED ORAL at 10:36

## 2017-05-07 RX ADMIN — TOPIRAMATE 100 MG: 100 TABLET, FILM COATED ORAL at 22:41

## 2017-05-07 RX ADMIN — MICONAZOLE NITRATE: 1.42 POWDER TOPICAL at 22:44

## 2017-05-07 RX ADMIN — FLUTICASONE PROPIONATE 1 SPRAY: 50 SPRAY, METERED NASAL at 10:36

## 2017-05-07 RX ADMIN — ACETAMINOPHEN 650 MG: 325 TABLET, FILM COATED ORAL at 20:23

## 2017-05-07 RX ADMIN — TOPIRAMATE 100 MG: 100 TABLET, FILM COATED ORAL at 10:36

## 2017-05-07 RX ADMIN — ILOPERIDONE 6 MG: 2 TABLET ORAL at 22:41

## 2017-05-07 RX ADMIN — SUMATRIPTAN SUCCINATE 25 MG: 25 TABLET ORAL at 20:23

## 2017-05-07 ASSESSMENT — PAIN SCALES - GENERAL
PAINLEVEL_OUTOF10: 10
PAINLEVEL_OUTOF10: 5
PAINLEVEL_OUTOF10: 3
PAINLEVEL_OUTOF10: 1

## 2017-05-07 ASSESSMENT — PAIN DESCRIPTION - PAIN TYPE: TYPE: CHRONIC PAIN

## 2017-05-08 VITALS
SYSTOLIC BLOOD PRESSURE: 117 MMHG | DIASTOLIC BLOOD PRESSURE: 81 MMHG | BODY MASS INDEX: 47.09 KG/M2 | TEMPERATURE: 97.8 F | WEIGHT: 293 LBS | OXYGEN SATURATION: 97 % | HEIGHT: 66 IN | HEART RATE: 95 BPM | RESPIRATION RATE: 14 BRPM

## 2017-05-08 PROCEDURE — 6370000000 HC RX 637 (ALT 250 FOR IP): Performed by: INTERNAL MEDICINE

## 2017-05-08 PROCEDURE — 6370000000 HC RX 637 (ALT 250 FOR IP): Performed by: PSYCHIATRY & NEUROLOGY

## 2017-05-08 RX ADMIN — CETIRIZINE HYDROCHLORIDE 10 MG: 10 TABLET, FILM COATED ORAL at 09:34

## 2017-05-08 RX ADMIN — ILOPERIDONE 6 MG: 2 TABLET ORAL at 09:34

## 2017-05-08 RX ADMIN — TOPIRAMATE 100 MG: 100 TABLET, FILM COATED ORAL at 09:34

## 2017-05-08 RX ADMIN — PANTOPRAZOLE SODIUM 40 MG: 40 TABLET, DELAYED RELEASE ORAL at 09:35

## 2017-05-08 RX ADMIN — FUROSEMIDE 40 MG: 40 TABLET ORAL at 09:34

## 2017-05-08 RX ADMIN — FLUTICASONE PROPIONATE 1 SPRAY: 50 SPRAY, METERED NASAL at 09:35

## 2017-05-10 ENCOUNTER — OFFICE VISIT (OUTPATIENT)
Dept: UROLOGY | Age: 37
End: 2017-05-10
Payer: COMMERCIAL

## 2017-05-10 VITALS
HEART RATE: 88 BPM | WEIGHT: 293 LBS | TEMPERATURE: 98 F | DIASTOLIC BLOOD PRESSURE: 81 MMHG | BODY MASS INDEX: 47.09 KG/M2 | HEIGHT: 66 IN | OXYGEN SATURATION: 95 % | SYSTOLIC BLOOD PRESSURE: 122 MMHG

## 2017-05-10 DIAGNOSIS — R35.0 FREQUENCY OF MICTURITION: ICD-10-CM

## 2017-05-10 DIAGNOSIS — R35.1 NOCTURIA: ICD-10-CM

## 2017-05-10 DIAGNOSIS — N39.41 URGE INCONTINENCE: Primary | ICD-10-CM

## 2017-05-10 PROCEDURE — 99214 OFFICE O/P EST MOD 30 MIN: CPT | Performed by: UROLOGY

## 2017-05-10 RX ORDER — HYDROXYZINE PAMOATE 25 MG/1
50 CAPSULE ORAL 3 TIMES DAILY
COMMUNITY
Start: 2017-03-27 | End: 2017-11-27

## 2017-05-10 RX ORDER — RIZATRIPTAN BENZOATE 10 MG/1
10 TABLET, ORALLY DISINTEGRATING ORAL
Status: ON HOLD | COMMUNITY
Start: 2017-03-14 | End: 2018-01-29 | Stop reason: HOSPADM

## 2017-05-10 RX ORDER — CHLORPROMAZINE HYDROCHLORIDE 25 MG/1
TABLET, FILM COATED ORAL 3 TIMES DAILY
Status: ON HOLD | COMMUNITY
End: 2018-01-29 | Stop reason: HOSPADM

## 2017-05-10 RX ORDER — HYDROXYZINE PAMOATE 50 MG/1
CAPSULE ORAL
COMMUNITY
Start: 2017-04-30 | End: 2017-05-10 | Stop reason: SDUPTHER

## 2017-05-10 RX ORDER — HYDROCODONE BITARTRATE AND ACETAMINOPHEN 5; 325 MG/1; MG/1
1 TABLET ORAL EVERY 6 HOURS PRN
COMMUNITY
End: 2017-06-12 | Stop reason: ALTCHOICE

## 2017-05-10 RX ORDER — BUSPIRONE HYDROCHLORIDE 15 MG/1
15 TABLET ORAL 4 TIMES DAILY
Status: ON HOLD | COMMUNITY
End: 2018-01-29

## 2017-05-10 RX ORDER — CLONAZEPAM 0.5 MG/1
TABLET ORAL 2 TIMES DAILY
COMMUNITY
End: 2017-11-27

## 2017-05-10 RX ORDER — DULOXETIN HYDROCHLORIDE 30 MG/1
60 CAPSULE, DELAYED RELEASE ORAL NIGHTLY
Status: ON HOLD | COMMUNITY
End: 2018-01-29

## 2017-05-10 ASSESSMENT — ENCOUNTER SYMPTOMS
DIARRHEA: 0
SORE THROAT: 0
RHINORRHEA: 0
COUGH: 0
WHEEZING: 0
VOMITING: 0
BACK PAIN: 0
ABDOMINAL PAIN: 0
EYE REDNESS: 0
EYE PAIN: 0
COLOR CHANGE: 0
SHORTNESS OF BREATH: 0

## 2017-05-11 ENCOUNTER — HOSPITAL ENCOUNTER (EMERGENCY)
Facility: CLINIC | Age: 37
Discharge: HOME | End: 2017-05-11
Attending: EMERGENCY MEDICINE
Payer: COMMERCIAL

## 2017-05-11 VITALS
OXYGEN SATURATION: 95 % | RESPIRATION RATE: 19 BRPM | SYSTOLIC BLOOD PRESSURE: 143 MMHG | DIASTOLIC BLOOD PRESSURE: 101 MMHG | BODY MASS INDEX: 47.09 KG/M2 | HEIGHT: 66 IN | HEART RATE: 115 BPM | WEIGHT: 293 LBS | TEMPERATURE: 97.9 F

## 2017-05-11 DIAGNOSIS — L73.9 FOLLICULITIS: Primary | ICD-10-CM

## 2017-05-11 PROCEDURE — 6360000002 HC RX W HCPCS: Performed by: EMERGENCY MEDICINE

## 2017-05-11 PROCEDURE — 10060 I&D ABSCESS SIMPLE/SINGLE: CPT

## 2017-05-11 PROCEDURE — 99282 EMERGENCY DEPT VISIT SF MDM: CPT

## 2017-05-11 PROCEDURE — 96372 THER/PROPH/DIAG INJ SC/IM: CPT

## 2017-05-11 RX ORDER — CEPHALEXIN 500 MG/1
500 CAPSULE ORAL 4 TIMES DAILY
Qty: 27 CAPSULE | Refills: 0 | Status: SHIPPED | OUTPATIENT
Start: 2017-05-11 | End: 2017-06-06

## 2017-05-11 RX ORDER — SULFAMETHOXAZOLE AND TRIMETHOPRIM 800; 160 MG/1; MG/1
1 TABLET ORAL 2 TIMES DAILY
Qty: 14 TABLET | Refills: 0 | Status: SHIPPED | OUTPATIENT
Start: 2017-05-11 | End: 2017-05-18

## 2017-05-11 RX ORDER — KETOROLAC TROMETHAMINE 30 MG/ML
30 INJECTION, SOLUTION INTRAMUSCULAR; INTRAVENOUS ONCE
Status: COMPLETED | OUTPATIENT
Start: 2017-05-11 | End: 2017-05-11

## 2017-05-11 RX ORDER — FLUCONAZOLE 150 MG/1
150 TABLET ORAL ONCE
Qty: 1 TABLET | Refills: 0 | Status: SHIPPED | OUTPATIENT
Start: 2017-05-11 | End: 2017-05-11

## 2017-05-11 RX ADMIN — KETOROLAC TROMETHAMINE 30 MG: 30 INJECTION, SOLUTION INTRAMUSCULAR at 20:12

## 2017-05-11 ASSESSMENT — ENCOUNTER SYMPTOMS
DIARRHEA: 0
VOMITING: 0

## 2017-05-11 ASSESSMENT — PAIN DESCRIPTION - FREQUENCY: FREQUENCY: CONTINUOUS

## 2017-05-11 ASSESSMENT — PAIN DESCRIPTION - PROGRESSION: CLINICAL_PROGRESSION: GRADUALLY WORSENING

## 2017-05-11 ASSESSMENT — PAIN SCALES - GENERAL
PAINLEVEL_OUTOF10: 10
PAINLEVEL_OUTOF10: 10

## 2017-05-11 ASSESSMENT — PAIN DESCRIPTION - ORIENTATION: ORIENTATION: LOWER

## 2017-05-11 ASSESSMENT — PAIN DESCRIPTION - PAIN TYPE: TYPE: ACUTE PAIN

## 2017-05-11 ASSESSMENT — PAIN DESCRIPTION - LOCATION: LOCATION: ABDOMEN

## 2017-05-11 ASSESSMENT — PAIN DESCRIPTION - ONSET: ONSET: GRADUAL

## 2017-05-15 ENCOUNTER — HOSPITAL ENCOUNTER (EMERGENCY)
Facility: CLINIC | Age: 37
Discharge: HOME | End: 2017-05-15
Attending: EMERGENCY MEDICINE
Payer: COMMERCIAL

## 2017-05-15 VITALS
HEART RATE: 87 BPM | DIASTOLIC BLOOD PRESSURE: 74 MMHG | WEIGHT: 293 LBS | BODY MASS INDEX: 47.09 KG/M2 | TEMPERATURE: 97.8 F | HEIGHT: 66 IN | OXYGEN SATURATION: 94 % | SYSTOLIC BLOOD PRESSURE: 98 MMHG | RESPIRATION RATE: 16 BRPM

## 2017-05-15 DIAGNOSIS — L02.91 ABSCESS: Primary | ICD-10-CM

## 2017-05-15 LAB
ABSOLUTE EOS #: 0.1 K/UL (ref 0–0.4)
ABSOLUTE LYMPH #: 2.7 K/UL (ref 1–4.8)
ABSOLUTE MONO #: 0.6 K/UL (ref 0.1–1.2)
ANION GAP SERPL CALCULATED.3IONS-SCNC: 15 MMOL/L (ref 9–17)
BASOPHILS # BLD: 1 %
BASOPHILS ABSOLUTE: 0.1 K/UL (ref 0–0.2)
BUN BLDV-MCNC: 8 MG/DL (ref 6–20)
BUN/CREAT BLD: ABNORMAL (ref 9–20)
CALCIUM SERPL-MCNC: 8.8 MG/DL (ref 8.6–10.4)
CHLORIDE BLD-SCNC: 103 MMOL/L (ref 98–107)
CO2: 21 MMOL/L (ref 20–31)
CREAT SERPL-MCNC: 0.9 MG/DL (ref 0.5–0.9)
DIFFERENTIAL TYPE: NORMAL
EOSINOPHILS RELATIVE PERCENT: 1 %
GFR AFRICAN AMERICAN: >60 ML/MIN
GFR NON-AFRICAN AMERICAN: >60 ML/MIN
GFR SERPL CREATININE-BSD FRML MDRD: ABNORMAL ML/MIN/{1.73_M2}
GFR SERPL CREATININE-BSD FRML MDRD: ABNORMAL ML/MIN/{1.73_M2}
GLUCOSE BLD-MCNC: 103 MG/DL (ref 70–99)
HCG QUALITATIVE: NEGATIVE
HCT VFR BLD CALC: 38.8 % (ref 36–46)
HEMOGLOBIN: 12.6 G/DL (ref 12–16)
LIPASE: 14 U/L (ref 13–60)
LYMPHOCYTES # BLD: 27 %
MCH RBC QN AUTO: 28.6 PG (ref 26–34)
MCHC RBC AUTO-ENTMCNC: 32.5 G/DL (ref 31–37)
MCV RBC AUTO: 88 FL (ref 80–100)
MONOCYTES # BLD: 6 %
PDW BLD-RTO: 14.5 % (ref 12.5–15.4)
PLATELET # BLD: 337 K/UL (ref 140–450)
PLATELET ESTIMATE: NORMAL
PMV BLD AUTO: 7.5 FL (ref 6–12)
POTASSIUM SERPL-SCNC: 3.8 MMOL/L (ref 3.7–5.3)
RBC # BLD: 4.41 M/UL (ref 4–5.2)
RBC # BLD: NORMAL 10*6/UL
SEG NEUTROPHILS: 65 %
SEGMENTED NEUTROPHILS ABSOLUTE COUNT: 6.5 K/UL (ref 1.8–7.7)
SODIUM BLD-SCNC: 139 MMOL/L (ref 135–144)
WBC # BLD: 10.1 K/UL (ref 3.5–11)
WBC # BLD: NORMAL 10*3/UL

## 2017-05-15 PROCEDURE — 36415 COLL VENOUS BLD VENIPUNCTURE: CPT

## 2017-05-15 PROCEDURE — 85025 COMPLETE CBC W/AUTO DIFF WBC: CPT

## 2017-05-15 PROCEDURE — 6360000002 HC RX W HCPCS: Performed by: EMERGENCY MEDICINE

## 2017-05-15 PROCEDURE — 6370000000 HC RX 637 (ALT 250 FOR IP): Performed by: EMERGENCY MEDICINE

## 2017-05-15 PROCEDURE — 83690 ASSAY OF LIPASE: CPT

## 2017-05-15 PROCEDURE — 99283 EMERGENCY DEPT VISIT LOW MDM: CPT

## 2017-05-15 PROCEDURE — 10060 I&D ABSCESS SIMPLE/SINGLE: CPT

## 2017-05-15 PROCEDURE — 80048 BASIC METABOLIC PNL TOTAL CA: CPT

## 2017-05-15 PROCEDURE — 2580000003 HC RX 258: Performed by: EMERGENCY MEDICINE

## 2017-05-15 PROCEDURE — 96375 TX/PRO/DX INJ NEW DRUG ADDON: CPT

## 2017-05-15 PROCEDURE — 96374 THER/PROPH/DIAG INJ IV PUSH: CPT

## 2017-05-15 PROCEDURE — 96361 HYDRATE IV INFUSION ADD-ON: CPT

## 2017-05-15 PROCEDURE — 84703 CHORIONIC GONADOTROPIN ASSAY: CPT

## 2017-05-15 RX ORDER — FENTANYL CITRATE 50 UG/ML
25 INJECTION, SOLUTION INTRAMUSCULAR; INTRAVENOUS ONCE
Status: COMPLETED | OUTPATIENT
Start: 2017-05-15 | End: 2017-05-15

## 2017-05-15 RX ORDER — 0.9 % SODIUM CHLORIDE 0.9 %
1000 INTRAVENOUS SOLUTION INTRAVENOUS ONCE
Status: COMPLETED | OUTPATIENT
Start: 2017-05-15 | End: 2017-05-15

## 2017-05-15 RX ORDER — GINSENG 100 MG
CAPSULE ORAL 3 TIMES DAILY
Status: DISCONTINUED | OUTPATIENT
Start: 2017-05-16 | End: 2017-05-16 | Stop reason: HOSPADM

## 2017-05-15 RX ORDER — ONDANSETRON 2 MG/ML
4 INJECTION INTRAMUSCULAR; INTRAVENOUS ONCE
Status: COMPLETED | OUTPATIENT
Start: 2017-05-15 | End: 2017-05-15

## 2017-05-15 RX ADMIN — ONDANSETRON 4 MG: 2 INJECTION INTRAMUSCULAR; INTRAVENOUS at 21:51

## 2017-05-15 RX ADMIN — BACITRACIN: 500 OINTMENT TOPICAL at 23:37

## 2017-05-15 RX ADMIN — FENTANYL CITRATE 25 MCG: 50 INJECTION INTRAMUSCULAR; INTRAVENOUS at 21:51

## 2017-05-15 RX ADMIN — SODIUM CHLORIDE 1000 ML: 9 INJECTION, SOLUTION INTRAVENOUS at 21:51

## 2017-05-15 ASSESSMENT — PAIN SCALES - GENERAL
PAINLEVEL_OUTOF10: 8
PAINLEVEL_OUTOF10: 8
PAINLEVEL_OUTOF10: 7
PAINLEVEL_OUTOF10: 0

## 2017-05-15 ASSESSMENT — PAIN DESCRIPTION - PAIN TYPE: TYPE: ACUTE PAIN

## 2017-05-15 ASSESSMENT — PAIN DESCRIPTION - LOCATION: LOCATION: ABDOMEN;HEAD;VAGINA

## 2017-05-16 ENCOUNTER — HOSPITAL ENCOUNTER (EMERGENCY)
Facility: CLINIC | Age: 37
Discharge: HOME | End: 2017-05-16
Attending: EMERGENCY MEDICINE
Payer: COMMERCIAL

## 2017-05-16 VITALS
HEART RATE: 84 BPM | SYSTOLIC BLOOD PRESSURE: 133 MMHG | TEMPERATURE: 98.5 F | DIASTOLIC BLOOD PRESSURE: 88 MMHG | WEIGHT: 293 LBS | HEIGHT: 66 IN | OXYGEN SATURATION: 99 % | RESPIRATION RATE: 16 BRPM | BODY MASS INDEX: 47.09 KG/M2

## 2017-05-16 DIAGNOSIS — L73.9 FOLLICULITIS: Primary | ICD-10-CM

## 2017-05-16 LAB
ABSOLUTE EOS #: 0.2 K/UL (ref 0–0.4)
ABSOLUTE LYMPH #: 2.4 K/UL (ref 1–4.8)
ABSOLUTE MONO #: 0.7 K/UL (ref 0.1–1.2)
ANION GAP SERPL CALCULATED.3IONS-SCNC: 15 MMOL/L (ref 9–17)
BASOPHILS # BLD: 1 %
BASOPHILS ABSOLUTE: 0.1 K/UL (ref 0–0.2)
BUN BLDV-MCNC: 6 MG/DL (ref 6–20)
BUN/CREAT BLD: ABNORMAL (ref 9–20)
CALCIUM SERPL-MCNC: 8.8 MG/DL (ref 8.6–10.4)
CHLORIDE BLD-SCNC: 102 MMOL/L (ref 98–107)
CO2: 21 MMOL/L (ref 20–31)
CREAT SERPL-MCNC: 0.8 MG/DL (ref 0.5–0.9)
DIFFERENTIAL TYPE: NORMAL
EOSINOPHILS RELATIVE PERCENT: 2 %
GFR AFRICAN AMERICAN: >60 ML/MIN
GFR NON-AFRICAN AMERICAN: >60 ML/MIN
GFR SERPL CREATININE-BSD FRML MDRD: ABNORMAL ML/MIN/{1.73_M2}
GFR SERPL CREATININE-BSD FRML MDRD: ABNORMAL ML/MIN/{1.73_M2}
GLUCOSE BLD-MCNC: 102 MG/DL (ref 70–99)
HCT VFR BLD CALC: 37.6 % (ref 36–46)
HEMOGLOBIN: 12.3 G/DL (ref 12–16)
LYMPHOCYTES # BLD: 25 %
MCH RBC QN AUTO: 28.7 PG (ref 26–34)
MCHC RBC AUTO-ENTMCNC: 32.8 G/DL (ref 31–37)
MCV RBC AUTO: 87.6 FL (ref 80–100)
MONOCYTES # BLD: 7 %
PDW BLD-RTO: 14.4 % (ref 12.5–15.4)
PLATELET # BLD: 349 K/UL (ref 140–450)
PLATELET ESTIMATE: NORMAL
PMV BLD AUTO: 7.3 FL (ref 6–12)
POTASSIUM SERPL-SCNC: 3.8 MMOL/L (ref 3.7–5.3)
RBC # BLD: 4.29 M/UL (ref 4–5.2)
RBC # BLD: NORMAL 10*6/UL
SEG NEUTROPHILS: 65 %
SEGMENTED NEUTROPHILS ABSOLUTE COUNT: 6.3 K/UL (ref 1.8–7.7)
SODIUM BLD-SCNC: 138 MMOL/L (ref 135–144)
WBC # BLD: 9.7 K/UL (ref 3.5–11)
WBC # BLD: NORMAL 10*3/UL

## 2017-05-16 PROCEDURE — 2580000003 HC RX 258: Performed by: EMERGENCY MEDICINE

## 2017-05-16 PROCEDURE — 96375 TX/PRO/DX INJ NEW DRUG ADDON: CPT

## 2017-05-16 PROCEDURE — 96365 THER/PROPH/DIAG IV INF INIT: CPT

## 2017-05-16 PROCEDURE — 36415 COLL VENOUS BLD VENIPUNCTURE: CPT

## 2017-05-16 PROCEDURE — 80048 BASIC METABOLIC PNL TOTAL CA: CPT

## 2017-05-16 PROCEDURE — 99283 EMERGENCY DEPT VISIT LOW MDM: CPT

## 2017-05-16 PROCEDURE — 85025 COMPLETE CBC W/AUTO DIFF WBC: CPT

## 2017-05-16 PROCEDURE — 6360000002 HC RX W HCPCS: Performed by: EMERGENCY MEDICINE

## 2017-05-16 RX ORDER — DIPHENHYDRAMINE HYDROCHLORIDE 50 MG/ML
25 INJECTION INTRAMUSCULAR; INTRAVENOUS ONCE
Status: COMPLETED | OUTPATIENT
Start: 2017-05-16 | End: 2017-05-16

## 2017-05-16 RX ORDER — ONDANSETRON 2 MG/ML
4 INJECTION INTRAMUSCULAR; INTRAVENOUS ONCE
Status: COMPLETED | OUTPATIENT
Start: 2017-05-16 | End: 2017-05-16

## 2017-05-16 RX ORDER — KETOROLAC TROMETHAMINE 15 MG/ML
15 INJECTION, SOLUTION INTRAMUSCULAR; INTRAVENOUS ONCE
Status: COMPLETED | OUTPATIENT
Start: 2017-05-16 | End: 2017-05-16

## 2017-05-16 RX ADMIN — KETOROLAC TROMETHAMINE 15 MG: 15 INJECTION, SOLUTION INTRAMUSCULAR; INTRAVENOUS at 19:28

## 2017-05-16 RX ADMIN — CEFTRIAXONE SODIUM 1 G: 1 INJECTION, POWDER, FOR SOLUTION INTRAMUSCULAR; INTRAVENOUS at 19:17

## 2017-05-16 RX ADMIN — ONDANSETRON 4 MG: 2 INJECTION INTRAMUSCULAR; INTRAVENOUS at 19:17

## 2017-05-16 RX ADMIN — DIPHENHYDRAMINE HYDROCHLORIDE 25 MG: 50 INJECTION, SOLUTION INTRAMUSCULAR; INTRAVENOUS at 19:29

## 2017-05-16 ASSESSMENT — PAIN SCALES - GENERAL
PAINLEVEL_OUTOF10: 9

## 2017-05-16 ASSESSMENT — PAIN DESCRIPTION - PAIN TYPE
TYPE: ACUTE PAIN
TYPE: ACUTE PAIN

## 2017-05-16 ASSESSMENT — PAIN DESCRIPTION - FREQUENCY
FREQUENCY: CONTINUOUS
FREQUENCY: CONTINUOUS

## 2017-05-16 ASSESSMENT — PAIN DESCRIPTION - PROGRESSION
CLINICAL_PROGRESSION: NOT CHANGED

## 2017-05-16 ASSESSMENT — ENCOUNTER SYMPTOMS
NAUSEA: 1
ABDOMINAL PAIN: 1

## 2017-05-16 ASSESSMENT — PAIN - FUNCTIONAL ASSESSMENT: PAIN_FUNCTIONAL_ASSESSMENT: 0-10

## 2017-05-16 ASSESSMENT — PAIN DESCRIPTION - LOCATION
LOCATION: ABDOMEN
LOCATION: ABDOMEN

## 2017-05-22 ENCOUNTER — TELEPHONE (OUTPATIENT)
Dept: UROLOGY | Age: 37
End: 2017-05-22

## 2017-06-01 ENCOUNTER — HOSPITAL ENCOUNTER (OUTPATIENT)
Age: 37
Setting detail: SPECIMEN
Discharge: HOME | End: 2017-06-01
Payer: COMMERCIAL

## 2017-06-01 DIAGNOSIS — N18.2 CKD (CHRONIC KIDNEY DISEASE) STAGE 2, GFR 60-89 ML/MIN: ICD-10-CM

## 2017-06-01 DIAGNOSIS — N28.1 CYST OF LEFT KIDNEY: ICD-10-CM

## 2017-06-01 LAB
ABSOLUTE EOS #: 0.1 K/UL (ref 0–0.4)
ABSOLUTE LYMPH #: 2.7 K/UL (ref 1–4.8)
ABSOLUTE MONO #: 0.6 K/UL (ref 0.1–1.2)
ANION GAP SERPL CALCULATED.3IONS-SCNC: 15 MMOL/L (ref 9–17)
BASOPHILS # BLD: 1 %
BASOPHILS ABSOLUTE: 0 K/UL (ref 0–0.2)
BUN BLDV-MCNC: 10 MG/DL (ref 6–20)
BUN/CREAT BLD: ABNORMAL (ref 9–20)
CALCIUM SERPL-MCNC: 9.4 MG/DL (ref 8.6–10.4)
CHLORIDE BLD-SCNC: 104 MMOL/L (ref 98–107)
CO2: 22 MMOL/L (ref 20–31)
CREAT SERPL-MCNC: 0.78 MG/DL (ref 0.5–0.9)
DIFFERENTIAL TYPE: NORMAL
EOSINOPHILS RELATIVE PERCENT: 1 %
GFR AFRICAN AMERICAN: >60 ML/MIN
GFR NON-AFRICAN AMERICAN: >60 ML/MIN
GFR SERPL CREATININE-BSD FRML MDRD: ABNORMAL ML/MIN/{1.73_M2}
GFR SERPL CREATININE-BSD FRML MDRD: ABNORMAL ML/MIN/{1.73_M2}
GLUCOSE BLD-MCNC: 102 MG/DL (ref 70–99)
HCT VFR BLD CALC: 40.3 % (ref 36–46)
HEMOGLOBIN: 13.3 G/DL (ref 12–16)
LYMPHOCYTES # BLD: 30 %
MAGNESIUM: 2 MG/DL (ref 1.6–2.6)
MCH RBC QN AUTO: 28.9 PG (ref 26–34)
MCHC RBC AUTO-ENTMCNC: 33 G/DL (ref 31–37)
MCV RBC AUTO: 87.5 FL (ref 80–100)
MONOCYTES # BLD: 7 %
PDW BLD-RTO: 14.7 % (ref 12.5–15.4)
PHOSPHORUS: 2.9 MG/DL (ref 2.6–4.5)
PLATELET # BLD: 372 K/UL (ref 140–450)
PLATELET ESTIMATE: NORMAL
PMV BLD AUTO: 8.3 FL (ref 6–12)
POTASSIUM SERPL-SCNC: 3.8 MMOL/L (ref 3.7–5.3)
RBC # BLD: 4.6 M/UL (ref 4–5.2)
RBC # BLD: NORMAL 10*6/UL
SEG NEUTROPHILS: 61 %
SEGMENTED NEUTROPHILS ABSOLUTE COUNT: 5.6 K/UL (ref 1.8–7.7)
SODIUM BLD-SCNC: 141 MMOL/L (ref 135–144)
WBC # BLD: 9.1 K/UL (ref 3.5–11)
WBC # BLD: NORMAL 10*3/UL

## 2017-06-05 ENCOUNTER — HOSPITAL ENCOUNTER (EMERGENCY)
Age: 37
Discharge: HOME | End: 2017-06-06
Attending: EMERGENCY MEDICINE
Payer: COMMERCIAL

## 2017-06-05 ENCOUNTER — APPOINTMENT (OUTPATIENT)
Dept: GENERAL RADIOLOGY | Age: 37
End: 2017-06-05
Payer: COMMERCIAL

## 2017-06-05 VITALS
SYSTOLIC BLOOD PRESSURE: 98 MMHG | OXYGEN SATURATION: 98 % | RESPIRATION RATE: 16 BRPM | TEMPERATURE: 98.2 F | BODY MASS INDEX: 47.09 KG/M2 | HEIGHT: 66 IN | WEIGHT: 293 LBS | DIASTOLIC BLOOD PRESSURE: 57 MMHG | HEART RATE: 106 BPM

## 2017-06-05 DIAGNOSIS — J44.1 COPD EXACERBATION (HCC): Primary | ICD-10-CM

## 2017-06-05 PROCEDURE — 71010 XR CHEST PORTABLE: CPT

## 2017-06-05 PROCEDURE — 6370000000 HC RX 637 (ALT 250 FOR IP): Performed by: NURSE PRACTITIONER

## 2017-06-05 PROCEDURE — 6360000002 HC RX W HCPCS: Performed by: NURSE PRACTITIONER

## 2017-06-05 PROCEDURE — 94640 AIRWAY INHALATION TREATMENT: CPT

## 2017-06-05 PROCEDURE — 94150 VITAL CAPACITY TEST: CPT

## 2017-06-05 PROCEDURE — 94760 N-INVAS EAR/PLS OXIMETRY 1: CPT

## 2017-06-05 PROCEDURE — 99284 EMERGENCY DEPT VISIT MOD MDM: CPT

## 2017-06-05 PROCEDURE — 2700000000 HC OXYGEN THERAPY PER DAY

## 2017-06-05 PROCEDURE — 96372 THER/PROPH/DIAG INJ SC/IM: CPT

## 2017-06-05 RX ORDER — OXYCODONE HYDROCHLORIDE AND ACETAMINOPHEN 5; 325 MG/1; MG/1
1 TABLET ORAL ONCE
Status: COMPLETED | OUTPATIENT
Start: 2017-06-05 | End: 2017-06-05

## 2017-06-05 RX ORDER — IPRATROPIUM BROMIDE AND ALBUTEROL SULFATE 2.5; .5 MG/3ML; MG/3ML
1 SOLUTION RESPIRATORY (INHALATION)
Status: DISCONTINUED | OUTPATIENT
Start: 2017-06-05 | End: 2017-06-05

## 2017-06-05 RX ORDER — METHYLPREDNISOLONE SODIUM SUCCINATE 125 MG/2ML
125 INJECTION, POWDER, LYOPHILIZED, FOR SOLUTION INTRAMUSCULAR; INTRAVENOUS ONCE
Status: DISCONTINUED | OUTPATIENT
Start: 2017-06-05 | End: 2017-06-05

## 2017-06-05 RX ORDER — ALBUTEROL SULFATE 90 UG/1
2 AEROSOL, METERED RESPIRATORY (INHALATION)
Status: DISCONTINUED | OUTPATIENT
Start: 2017-06-05 | End: 2017-06-05

## 2017-06-05 RX ORDER — METHYLPREDNISOLONE SODIUM SUCCINATE 125 MG/2ML
125 INJECTION, POWDER, LYOPHILIZED, FOR SOLUTION INTRAMUSCULAR; INTRAVENOUS ONCE
Status: COMPLETED | OUTPATIENT
Start: 2017-06-05 | End: 2017-06-05

## 2017-06-05 RX ORDER — ALBUTEROL SULFATE 2.5 MG/3ML
5 SOLUTION RESPIRATORY (INHALATION)
Status: DISCONTINUED | OUTPATIENT
Start: 2017-06-05 | End: 2017-06-05

## 2017-06-05 RX ADMIN — OXYCODONE HYDROCHLORIDE AND ACETAMINOPHEN 1 TABLET: 5; 325 TABLET ORAL at 22:58

## 2017-06-05 RX ADMIN — ALBUTEROL SULFATE 5 MG: 5 SOLUTION RESPIRATORY (INHALATION) at 22:50

## 2017-06-05 RX ADMIN — METHYLPREDNISOLONE SODIUM SUCCINATE 125 MG: 125 INJECTION, POWDER, FOR SOLUTION INTRAMUSCULAR; INTRAVENOUS at 22:58

## 2017-06-05 ASSESSMENT — PAIN SCALES - GENERAL
PAINLEVEL_OUTOF10: 8
PAINLEVEL_OUTOF10: 8

## 2017-06-05 ASSESSMENT — PAIN DESCRIPTION - PAIN TYPE: TYPE: ACUTE PAIN

## 2017-06-05 ASSESSMENT — PAIN DESCRIPTION - DESCRIPTORS: DESCRIPTORS: BURNING

## 2017-06-05 ASSESSMENT — PAIN DESCRIPTION - ORIENTATION: ORIENTATION: LOWER;LEFT

## 2017-06-05 ASSESSMENT — PAIN DESCRIPTION - FREQUENCY: FREQUENCY: CONTINUOUS

## 2017-06-05 ASSESSMENT — PAIN DESCRIPTION - LOCATION: LOCATION: BACK

## 2017-06-06 PROCEDURE — 6360000002 HC RX W HCPCS: Performed by: EMERGENCY MEDICINE

## 2017-06-06 RX ORDER — KETOROLAC TROMETHAMINE 30 MG/ML
30 INJECTION, SOLUTION INTRAMUSCULAR; INTRAVENOUS ONCE
Status: COMPLETED | OUTPATIENT
Start: 2017-06-06 | End: 2017-06-06

## 2017-06-06 RX ORDER — CEFDINIR 300 MG/1
300 CAPSULE ORAL 2 TIMES DAILY
Qty: 14 CAPSULE | Refills: 0 | Status: SHIPPED | OUTPATIENT
Start: 2017-06-06 | End: 2017-06-12 | Stop reason: ALTCHOICE

## 2017-06-06 RX ORDER — PREDNISONE 20 MG/1
TABLET ORAL
Qty: 10 TABLET | Refills: 0 | Status: SHIPPED | OUTPATIENT
Start: 2017-06-06 | End: 2017-06-12 | Stop reason: ALTCHOICE

## 2017-06-06 RX ADMIN — KETOROLAC TROMETHAMINE 30 MG: 30 INJECTION, SOLUTION INTRAMUSCULAR at 00:22

## 2017-06-06 ASSESSMENT — ENCOUNTER SYMPTOMS
CONSTIPATION: 0
SHORTNESS OF BREATH: 1
DIARRHEA: 0
COLOR CHANGE: 0
COUGH: 1
RHINORRHEA: 0
ABDOMINAL PAIN: 0
SORE THROAT: 0
WHEEZING: 1
VOMITING: 0
SINUS PRESSURE: 0
NAUSEA: 0

## 2017-06-06 ASSESSMENT — PAIN SCALES - GENERAL: PAINLEVEL_OUTOF10: 8

## 2017-06-12 ENCOUNTER — HOSPITAL ENCOUNTER (OUTPATIENT)
Dept: PREADMISSION TESTING | Age: 37
Discharge: HOME | End: 2017-06-12
Payer: COMMERCIAL

## 2017-06-12 VITALS
HEIGHT: 66 IN | TEMPERATURE: 98.5 F | DIASTOLIC BLOOD PRESSURE: 82 MMHG | HEART RATE: 84 BPM | RESPIRATION RATE: 16 BRPM | WEIGHT: 293 LBS | OXYGEN SATURATION: 95 % | BODY MASS INDEX: 47.09 KG/M2 | SYSTOLIC BLOOD PRESSURE: 116 MMHG

## 2017-06-12 PROCEDURE — 87086 URINE CULTURE/COLONY COUNT: CPT

## 2017-06-12 RX ORDER — SODIUM CHLORIDE, SODIUM LACTATE, POTASSIUM CHLORIDE, CALCIUM CHLORIDE 600; 310; 30; 20 MG/100ML; MG/100ML; MG/100ML; MG/100ML
1000 INJECTION, SOLUTION INTRAVENOUS CONTINUOUS
Status: CANCELLED | OUTPATIENT
Start: 2017-06-12

## 2017-06-12 RX ORDER — TRAZODONE HYDROCHLORIDE 100 MG/1
150 TABLET ORAL NIGHTLY
Status: ON HOLD | COMMUNITY
End: 2018-01-29

## 2017-06-12 RX ORDER — BUDESONIDE AND FORMOTEROL FUMARATE DIHYDRATE 160; 4.5 UG/1; UG/1
2 AEROSOL RESPIRATORY (INHALATION) 2 TIMES DAILY
COMMUNITY
End: 2020-10-05

## 2017-06-12 RX ORDER — CYCLOBENZAPRINE HCL 10 MG
10 TABLET ORAL 3 TIMES DAILY PRN
COMMUNITY
End: 2017-11-27

## 2017-06-12 RX ORDER — PHENAZOPYRIDINE HYDROCHLORIDE 100 MG/1
100 TABLET, FILM COATED ORAL 3 TIMES DAILY PRN
Status: ON HOLD | COMMUNITY
End: 2018-01-29 | Stop reason: HOSPADM

## 2017-06-12 RX ORDER — MIRTAZAPINE 30 MG/1
30 TABLET, FILM COATED ORAL NIGHTLY
Status: ON HOLD | COMMUNITY
End: 2018-01-29

## 2017-06-12 RX ORDER — BUTALBITAL, ACETAMINOPHEN, CAFFEINE AND CODEINE PHOSPHATE 300; 50; 40; 30 MG/1; MG/1; MG/1; MG/1
1 CAPSULE ORAL EVERY 8 HOURS PRN
Refills: 0 | Status: ON HOLD | COMMUNITY
Start: 2017-03-15 | End: 2018-01-21

## 2017-06-12 ASSESSMENT — PAIN DESCRIPTION - ORIENTATION: ORIENTATION: POSTERIOR;LOWER

## 2017-06-12 ASSESSMENT — PAIN DESCRIPTION - PROGRESSION: CLINICAL_PROGRESSION: GRADUALLY WORSENING

## 2017-06-12 ASSESSMENT — PAIN DESCRIPTION - DESCRIPTORS: DESCRIPTORS: STABBING

## 2017-06-12 ASSESSMENT — PAIN DESCRIPTION - PAIN TYPE: TYPE: CHRONIC PAIN

## 2017-06-12 ASSESSMENT — PAIN DESCRIPTION - FREQUENCY: FREQUENCY: CONTINUOUS

## 2017-06-12 ASSESSMENT — PAIN DESCRIPTION - LOCATION: LOCATION: BACK

## 2017-06-12 ASSESSMENT — PAIN SCALES - GENERAL: PAINLEVEL_OUTOF10: 7

## 2017-06-12 ASSESSMENT — PAIN DESCRIPTION - ONSET: ONSET: ON-GOING

## 2017-06-13 LAB
CULTURE: NORMAL
CULTURE: NORMAL
Lab: NORMAL
SPECIMEN DESCRIPTION: NORMAL
STATUS: NORMAL

## 2017-06-20 ENCOUNTER — TELEPHONE (OUTPATIENT)
Dept: UROLOGY | Age: 37
End: 2017-06-20

## 2017-06-21 ENCOUNTER — HOSPITAL ENCOUNTER (OUTPATIENT)
Age: 37
Setting detail: OUTPATIENT SURGERY
Discharge: HOME | End: 2017-06-21
Attending: UROLOGY | Admitting: UROLOGY
Payer: COMMERCIAL

## 2017-06-21 ENCOUNTER — ANESTHESIA (OUTPATIENT)
Dept: OPERATING ROOM | Age: 37
End: 2017-06-21
Payer: COMMERCIAL

## 2017-06-21 ENCOUNTER — APPOINTMENT (OUTPATIENT)
Dept: GENERAL RADIOLOGY | Age: 37
End: 2017-06-21
Attending: UROLOGY
Payer: COMMERCIAL

## 2017-06-21 ENCOUNTER — ANESTHESIA EVENT (OUTPATIENT)
Dept: OPERATING ROOM | Age: 37
End: 2017-06-21
Payer: COMMERCIAL

## 2017-06-21 VITALS — SYSTOLIC BLOOD PRESSURE: 118 MMHG | TEMPERATURE: 87.4 F | DIASTOLIC BLOOD PRESSURE: 88 MMHG | OXYGEN SATURATION: 100 %

## 2017-06-21 VITALS
SYSTOLIC BLOOD PRESSURE: 118 MMHG | HEART RATE: 90 BPM | DIASTOLIC BLOOD PRESSURE: 70 MMHG | BODY MASS INDEX: 47.09 KG/M2 | WEIGHT: 293 LBS | HEIGHT: 66 IN | RESPIRATION RATE: 18 BRPM | TEMPERATURE: 98.2 F | OXYGEN SATURATION: 98 %

## 2017-06-21 LAB — POC POTASSIUM: 4.9 MMOL/L (ref 3.5–4.5)

## 2017-06-21 PROCEDURE — 6360000002 HC RX W HCPCS: Performed by: UROLOGY

## 2017-06-21 PROCEDURE — C1883 ADAPT/EXT, PACING/NEURO LEAD: HCPCS | Performed by: UROLOGY

## 2017-06-21 PROCEDURE — 7100000001 HC PACU RECOVERY - ADDTL 15 MIN: Performed by: UROLOGY

## 2017-06-21 PROCEDURE — 6370000000 HC RX 637 (ALT 250 FOR IP): Performed by: NURSE ANESTHETIST, CERTIFIED REGISTERED

## 2017-06-21 PROCEDURE — 3600000004 HC SURGERY LEVEL 4 BASE: Performed by: UROLOGY

## 2017-06-21 PROCEDURE — 2500000003 HC RX 250 WO HCPCS: Performed by: UROLOGY

## 2017-06-21 PROCEDURE — 84132 ASSAY OF SERUM POTASSIUM: CPT

## 2017-06-21 PROCEDURE — C1778 LEAD, NEUROSTIMULATOR: HCPCS | Performed by: UROLOGY

## 2017-06-21 PROCEDURE — 6360000002 HC RX W HCPCS

## 2017-06-21 PROCEDURE — 6360000002 HC RX W HCPCS: Performed by: NURSE ANESTHETIST, CERTIFIED REGISTERED

## 2017-06-21 PROCEDURE — 72220 X-RAY EXAM SACRUM TAILBONE: CPT

## 2017-06-21 PROCEDURE — 6360000002 HC RX W HCPCS: Performed by: ANESTHESIOLOGY

## 2017-06-21 PROCEDURE — 3700000001 HC ADD 15 MINUTES (ANESTHESIA): Performed by: UROLOGY

## 2017-06-21 PROCEDURE — 3700000000 HC ANESTHESIA ATTENDED CARE: Performed by: UROLOGY

## 2017-06-21 PROCEDURE — C1894 INTRO/SHEATH, NON-LASER: HCPCS | Performed by: UROLOGY

## 2017-06-21 PROCEDURE — 6370000000 HC RX 637 (ALT 250 FOR IP): Performed by: ANESTHESIOLOGY

## 2017-06-21 PROCEDURE — 3600000014 HC SURGERY LEVEL 4 ADDTL 15MIN: Performed by: UROLOGY

## 2017-06-21 PROCEDURE — 7100000000 HC PACU RECOVERY - FIRST 15 MIN: Performed by: UROLOGY

## 2017-06-21 PROCEDURE — 7100000011 HC PHASE II RECOVERY - ADDTL 15 MIN: Performed by: UROLOGY

## 2017-06-21 PROCEDURE — 7100000010 HC PHASE II RECOVERY - FIRST 15 MIN: Performed by: UROLOGY

## 2017-06-21 PROCEDURE — 2500000003 HC RX 250 WO HCPCS: Performed by: NURSE ANESTHETIST, CERTIFIED REGISTERED

## 2017-06-21 PROCEDURE — C1767 GENERATOR, NEURO NON-RECHARG: HCPCS | Performed by: UROLOGY

## 2017-06-21 PROCEDURE — 2580000003 HC RX 258: Performed by: ANESTHESIOLOGY

## 2017-06-21 DEVICE — KIT NEUROSTIMULATOR LD L28CM DIA1.27MM ELECTRD SPC 1.5MM: Type: IMPLANTABLE DEVICE | Site: BACK | Status: FUNCTIONAL

## 2017-06-21 DEVICE — IMPLANTABLE DEVICE: Type: IMPLANTABLE DEVICE | Site: BACK | Status: FUNCTIONAL

## 2017-06-21 RX ORDER — FENTANYL CITRATE 50 UG/ML
25 INJECTION, SOLUTION INTRAMUSCULAR; INTRAVENOUS EVERY 5 MIN PRN
Status: DISCONTINUED | OUTPATIENT
Start: 2017-06-21 | End: 2017-06-21 | Stop reason: HOSPADM

## 2017-06-21 RX ORDER — WATER 1000 ML/1000ML
INJECTION, SOLUTION INTRAVENOUS PRN
Status: DISCONTINUED | OUTPATIENT
Start: 2017-06-21 | End: 2017-06-21 | Stop reason: HOSPADM

## 2017-06-21 RX ORDER — PROPOFOL 10 MG/ML
INJECTION, EMULSION INTRAVENOUS PRN
Status: DISCONTINUED | OUTPATIENT
Start: 2017-06-21 | End: 2017-06-21 | Stop reason: SDUPTHER

## 2017-06-21 RX ORDER — SODIUM CHLORIDE, SODIUM LACTATE, POTASSIUM CHLORIDE, CALCIUM CHLORIDE 600; 310; 30; 20 MG/100ML; MG/100ML; MG/100ML; MG/100ML
1000 INJECTION, SOLUTION INTRAVENOUS CONTINUOUS
Status: DISCONTINUED | OUTPATIENT
Start: 2017-06-21 | End: 2017-06-21 | Stop reason: HOSPADM

## 2017-06-21 RX ORDER — CEPHALEXIN 500 MG/1
500 CAPSULE ORAL 3 TIMES DAILY
Qty: 15 CAPSULE | Refills: 0 | Status: SHIPPED | OUTPATIENT
Start: 2017-06-21 | End: 2017-06-26

## 2017-06-21 RX ORDER — ALBUTEROL SULFATE 2.5 MG/3ML
2.5 SOLUTION RESPIRATORY (INHALATION) EVERY 6 HOURS PRN
Status: COMPLETED | OUTPATIENT
Start: 2017-06-21 | End: 2017-06-21

## 2017-06-21 RX ORDER — MIDAZOLAM HYDROCHLORIDE 1 MG/ML
2 INJECTION INTRAMUSCULAR; INTRAVENOUS ONCE
Status: COMPLETED | OUTPATIENT
Start: 2017-06-21 | End: 2017-06-21

## 2017-06-21 RX ORDER — BUPIVACAINE HYDROCHLORIDE AND EPINEPHRINE 2.5; 5 MG/ML; UG/ML
INJECTION, SOLUTION EPIDURAL; INFILTRATION; INTRACAUDAL; PERINEURAL PRN
Status: DISCONTINUED | OUTPATIENT
Start: 2017-06-21 | End: 2017-06-21 | Stop reason: HOSPADM

## 2017-06-21 RX ORDER — FENTANYL CITRATE 50 UG/ML
50 INJECTION, SOLUTION INTRAMUSCULAR; INTRAVENOUS EVERY 5 MIN PRN
Status: DISCONTINUED | OUTPATIENT
Start: 2017-06-21 | End: 2017-06-21 | Stop reason: HOSPADM

## 2017-06-21 RX ORDER — MORPHINE SULFATE 2 MG/ML
2 INJECTION, SOLUTION INTRAMUSCULAR; INTRAVENOUS EVERY 5 MIN PRN
Status: DISCONTINUED | OUTPATIENT
Start: 2017-06-21 | End: 2017-06-21 | Stop reason: HOSPADM

## 2017-06-21 RX ORDER — OXYCODONE HYDROCHLORIDE AND ACETAMINOPHEN 5; 325 MG/1; MG/1
2 TABLET ORAL PRN
Status: COMPLETED | OUTPATIENT
Start: 2017-06-21 | End: 2017-06-21

## 2017-06-21 RX ORDER — GENTAMICIN SULFATE 80 MG/50ML
80 INJECTION, SOLUTION INTRAVENOUS
Status: COMPLETED | OUTPATIENT
Start: 2017-06-21 | End: 2017-06-21

## 2017-06-21 RX ORDER — FLUCONAZOLE 100 MG/1
100 TABLET ORAL DAILY
Qty: 5 TABLET | Refills: 0 | Status: SHIPPED | OUTPATIENT
Start: 2017-06-21 | End: 2017-06-26

## 2017-06-21 RX ORDER — FENTANYL CITRATE 50 UG/ML
INJECTION, SOLUTION INTRAMUSCULAR; INTRAVENOUS PRN
Status: DISCONTINUED | OUTPATIENT
Start: 2017-06-21 | End: 2017-06-21 | Stop reason: SDUPTHER

## 2017-06-21 RX ORDER — MEPERIDINE HYDROCHLORIDE 50 MG/ML
12.5 INJECTION INTRAMUSCULAR; INTRAVENOUS; SUBCUTANEOUS EVERY 5 MIN PRN
Status: DISCONTINUED | OUTPATIENT
Start: 2017-06-21 | End: 2017-06-21 | Stop reason: HOSPADM

## 2017-06-21 RX ORDER — DEXAMETHASONE SODIUM PHOSPHATE 10 MG/ML
INJECTION INTRAMUSCULAR; INTRAVENOUS PRN
Status: DISCONTINUED | OUTPATIENT
Start: 2017-06-21 | End: 2017-06-21 | Stop reason: SDUPTHER

## 2017-06-21 RX ORDER — DIPHENHYDRAMINE HYDROCHLORIDE 50 MG/ML
12.5 INJECTION INTRAMUSCULAR; INTRAVENOUS
Status: DISCONTINUED | OUTPATIENT
Start: 2017-06-21 | End: 2017-06-21 | Stop reason: HOSPADM

## 2017-06-21 RX ORDER — LABETALOL HYDROCHLORIDE 5 MG/ML
5 INJECTION, SOLUTION INTRAVENOUS EVERY 10 MIN PRN
Status: DISCONTINUED | OUTPATIENT
Start: 2017-06-21 | End: 2017-06-21 | Stop reason: HOSPADM

## 2017-06-21 RX ORDER — SODIUM CHLORIDE, SODIUM LACTATE, POTASSIUM CHLORIDE, CALCIUM CHLORIDE 600; 310; 30; 20 MG/100ML; MG/100ML; MG/100ML; MG/100ML
INJECTION, SOLUTION INTRAVENOUS CONTINUOUS
Status: CANCELLED | OUTPATIENT
Start: 2017-06-21

## 2017-06-21 RX ORDER — OXYCODONE HYDROCHLORIDE AND ACETAMINOPHEN 5; 325 MG/1; MG/1
1 TABLET ORAL PRN
Status: COMPLETED | OUTPATIENT
Start: 2017-06-21 | End: 2017-06-21

## 2017-06-21 RX ORDER — ONDANSETRON 2 MG/ML
INJECTION INTRAMUSCULAR; INTRAVENOUS PRN
Status: DISCONTINUED | OUTPATIENT
Start: 2017-06-21 | End: 2017-06-21 | Stop reason: SDUPTHER

## 2017-06-21 RX ORDER — SUCCINYLCHOLINE CHLORIDE 20 MG/ML
INJECTION INTRAMUSCULAR; INTRAVENOUS PRN
Status: DISCONTINUED | OUTPATIENT
Start: 2017-06-21 | End: 2017-06-21 | Stop reason: SDUPTHER

## 2017-06-21 RX ORDER — ONDANSETRON 2 MG/ML
4 INJECTION INTRAMUSCULAR; INTRAVENOUS
Status: DISCONTINUED | OUTPATIENT
Start: 2017-06-21 | End: 2017-06-21 | Stop reason: HOSPADM

## 2017-06-21 RX ORDER — ALBUTEROL SULFATE 2.5 MG/3ML
SOLUTION RESPIRATORY (INHALATION)
Status: COMPLETED
Start: 2017-06-21 | End: 2017-06-21

## 2017-06-21 RX ORDER — DOCUSATE SODIUM 100 MG/1
100 CAPSULE, LIQUID FILLED ORAL 2 TIMES DAILY PRN
Qty: 30 CAPSULE | Refills: 1 | Status: ON HOLD | OUTPATIENT
Start: 2017-06-21 | End: 2018-01-29 | Stop reason: HOSPADM

## 2017-06-21 RX ORDER — LIDOCAINE HYDROCHLORIDE 10 MG/ML
INJECTION, SOLUTION INFILTRATION; PERINEURAL PRN
Status: DISCONTINUED | OUTPATIENT
Start: 2017-06-21 | End: 2017-06-21 | Stop reason: SDUPTHER

## 2017-06-21 RX ORDER — OXYCODONE HYDROCHLORIDE AND ACETAMINOPHEN 5; 325 MG/1; MG/1
1-2 TABLET ORAL EVERY 4 HOURS PRN
Qty: 30 TABLET | Refills: 0 | Status: SHIPPED | OUTPATIENT
Start: 2017-06-21 | End: 2017-07-25

## 2017-06-21 RX ORDER — HYDRALAZINE HYDROCHLORIDE 20 MG/ML
5 INJECTION INTRAMUSCULAR; INTRAVENOUS EVERY 10 MIN PRN
Status: DISCONTINUED | OUTPATIENT
Start: 2017-06-21 | End: 2017-06-21 | Stop reason: HOSPADM

## 2017-06-21 RX ADMIN — SUCCINYLCHOLINE CHLORIDE 100 MG: 20 INJECTION, SOLUTION INTRAMUSCULAR; INTRAVENOUS at 09:52

## 2017-06-21 RX ADMIN — MIDAZOLAM HYDROCHLORIDE 1 MG: 1 INJECTION, SOLUTION INTRAMUSCULAR; INTRAVENOUS at 09:37

## 2017-06-21 RX ADMIN — FENTANYL CITRATE 25 MCG: 50 INJECTION, SOLUTION INTRAMUSCULAR; INTRAVENOUS at 11:51

## 2017-06-21 RX ADMIN — Medication 3 G: at 10:04

## 2017-06-21 RX ADMIN — ALBUTEROL SULFATE 2.5 MG: 2.5 SOLUTION RESPIRATORY (INHALATION) at 12:13

## 2017-06-21 RX ADMIN — FENTANYL CITRATE 50 MCG: 50 INJECTION INTRAMUSCULAR; INTRAVENOUS at 10:51

## 2017-06-21 RX ADMIN — LIDOCAINE HYDROCHLORIDE 50 MG: 10 INJECTION, SOLUTION INFILTRATION; PERINEURAL at 09:51

## 2017-06-21 RX ADMIN — ONDANSETRON 4 MG: 2 INJECTION, SOLUTION INTRAMUSCULAR; INTRAVENOUS at 10:24

## 2017-06-21 RX ADMIN — OXYCODONE HYDROCHLORIDE AND ACETAMINOPHEN 2 TABLET: 5; 325 TABLET ORAL at 11:51

## 2017-06-21 RX ADMIN — LIDOCAINE HYDROCHLORIDE 30 MG: 20 JELLY TOPICAL at 09:52

## 2017-06-21 RX ADMIN — SODIUM CHLORIDE, POTASSIUM CHLORIDE, SODIUM LACTATE AND CALCIUM CHLORIDE 1000 ML: 600; 310; 30; 20 INJECTION, SOLUTION INTRAVENOUS at 09:27

## 2017-06-21 RX ADMIN — FENTANYL CITRATE 50 MCG: 50 INJECTION, SOLUTION INTRAMUSCULAR; INTRAVENOUS at 11:20

## 2017-06-21 RX ADMIN — DEXAMETHASONE SODIUM PHOSPHATE 10 MG: 10 INJECTION INTRAMUSCULAR; INTRAVENOUS at 10:12

## 2017-06-21 RX ADMIN — GENTAMICIN SULFATE 80 MG: 80 INJECTION, SOLUTION INTRAVENOUS at 10:17

## 2017-06-21 RX ADMIN — FENTANYL CITRATE 50 MCG: 50 INJECTION INTRAMUSCULAR; INTRAVENOUS at 09:51

## 2017-06-21 RX ADMIN — PROPOFOL 200 MG: 10 INJECTION, EMULSION INTRAVENOUS at 09:51

## 2017-06-21 RX ADMIN — FENTANYL CITRATE 25 MCG: 50 INJECTION, SOLUTION INTRAMUSCULAR; INTRAVENOUS at 12:23

## 2017-06-21 ASSESSMENT — PAIN SCALES - GENERAL
PAINLEVEL_OUTOF10: 9
PAINLEVEL_OUTOF10: 6
PAINLEVEL_OUTOF10: 8
PAINLEVEL_OUTOF10: 9
PAINLEVEL_OUTOF10: 8
PAINLEVEL_OUTOF10: 9
PAINLEVEL_OUTOF10: 5

## 2017-06-21 ASSESSMENT — PAIN DESCRIPTION - LOCATION
LOCATION: BACK

## 2017-06-21 ASSESSMENT — PAIN DESCRIPTION - DESCRIPTORS
DESCRIPTORS: BURNING;THROBBING
DESCRIPTORS: STABBING
DESCRIPTORS: STABBING

## 2017-06-21 ASSESSMENT — PAIN DESCRIPTION - ORIENTATION
ORIENTATION: LOWER
ORIENTATION: LOWER

## 2017-06-21 ASSESSMENT — COPD QUESTIONNAIRES: CAT_SEVERITY: MILD

## 2017-06-21 ASSESSMENT — PAIN DESCRIPTION - PAIN TYPE
TYPE: SURGICAL PAIN

## 2017-06-21 ASSESSMENT — PAIN - FUNCTIONAL ASSESSMENT: PAIN_FUNCTIONAL_ASSESSMENT: 0-10

## 2017-06-26 ENCOUNTER — TELEPHONE (OUTPATIENT)
Dept: UROLOGY | Age: 37
End: 2017-06-26

## 2017-06-27 ENCOUNTER — TELEPHONE (OUTPATIENT)
Dept: UROLOGY | Age: 37
End: 2017-06-27

## 2017-06-30 ENCOUNTER — TELEPHONE (OUTPATIENT)
Dept: UROLOGY | Age: 37
End: 2017-06-30

## 2017-07-05 ENCOUNTER — TELEPHONE (OUTPATIENT)
Dept: UROLOGY | Age: 37
End: 2017-07-05

## 2017-07-05 ENCOUNTER — ANESTHESIA (OUTPATIENT)
Dept: OPERATING ROOM | Age: 37
End: 2017-07-05
Payer: COMMERCIAL

## 2017-07-05 ENCOUNTER — HOSPITAL ENCOUNTER (EMERGENCY)
Facility: CLINIC | Age: 37
Discharge: HOME | End: 2017-07-05
Attending: EMERGENCY MEDICINE
Payer: COMMERCIAL

## 2017-07-05 ENCOUNTER — ANESTHESIA EVENT (OUTPATIENT)
Dept: OPERATING ROOM | Age: 37
End: 2017-07-05
Payer: COMMERCIAL

## 2017-07-05 ENCOUNTER — APPOINTMENT (OUTPATIENT)
Dept: GENERAL RADIOLOGY | Facility: CLINIC | Age: 37
End: 2017-07-05
Payer: COMMERCIAL

## 2017-07-05 ENCOUNTER — HOSPITAL ENCOUNTER (OUTPATIENT)
Age: 37
Setting detail: OUTPATIENT SURGERY
Discharge: HOME | End: 2017-07-05
Attending: UROLOGY | Admitting: UROLOGY
Payer: COMMERCIAL

## 2017-07-05 VITALS
TEMPERATURE: 97.9 F | DIASTOLIC BLOOD PRESSURE: 64 MMHG | HEART RATE: 89 BPM | SYSTOLIC BLOOD PRESSURE: 96 MMHG | BODY MASS INDEX: 47.09 KG/M2 | OXYGEN SATURATION: 92 % | WEIGHT: 293 LBS | HEIGHT: 66 IN | RESPIRATION RATE: 18 BRPM

## 2017-07-05 VITALS
BODY MASS INDEX: 47.09 KG/M2 | OXYGEN SATURATION: 95 % | DIASTOLIC BLOOD PRESSURE: 70 MMHG | HEART RATE: 79 BPM | RESPIRATION RATE: 20 BRPM | TEMPERATURE: 97.5 F | HEIGHT: 66 IN | WEIGHT: 293 LBS | SYSTOLIC BLOOD PRESSURE: 130 MMHG

## 2017-07-05 VITALS — SYSTOLIC BLOOD PRESSURE: 133 MMHG | DIASTOLIC BLOOD PRESSURE: 77 MMHG | OXYGEN SATURATION: 94 %

## 2017-07-05 DIAGNOSIS — Z48.89 ENCOUNTER FOR POSTOPERATIVE WOUND CHECK: Primary | ICD-10-CM

## 2017-07-05 LAB — POC POTASSIUM: 3.7 MMOL/L (ref 3.5–4.5)

## 2017-07-05 PROCEDURE — 3600000013 HC SURGERY LEVEL 3 ADDTL 15MIN: Performed by: UROLOGY

## 2017-07-05 PROCEDURE — 2580000003 HC RX 258: Performed by: ANESTHESIOLOGY

## 2017-07-05 PROCEDURE — 6360000002 HC RX W HCPCS: Performed by: EMERGENCY MEDICINE

## 2017-07-05 PROCEDURE — 3700000001 HC ADD 15 MINUTES (ANESTHESIA): Performed by: UROLOGY

## 2017-07-05 PROCEDURE — 6360000002 HC RX W HCPCS: Performed by: UROLOGY

## 2017-07-05 PROCEDURE — 3700000000 HC ANESTHESIA ATTENDED CARE: Performed by: UROLOGY

## 2017-07-05 PROCEDURE — 7100000011 HC PHASE II RECOVERY - ADDTL 15 MIN: Performed by: UROLOGY

## 2017-07-05 PROCEDURE — 84132 ASSAY OF SERUM POTASSIUM: CPT

## 2017-07-05 PROCEDURE — 6370000000 HC RX 637 (ALT 250 FOR IP): Performed by: ANESTHESIOLOGY

## 2017-07-05 PROCEDURE — 3600000003 HC SURGERY LEVEL 3 BASE: Performed by: UROLOGY

## 2017-07-05 PROCEDURE — 2500000003 HC RX 250 WO HCPCS: Performed by: NURSE ANESTHETIST, CERTIFIED REGISTERED

## 2017-07-05 PROCEDURE — 99284 EMERGENCY DEPT VISIT MOD MDM: CPT

## 2017-07-05 PROCEDURE — 96372 THER/PROPH/DIAG INJ SC/IM: CPT

## 2017-07-05 PROCEDURE — C1767 GENERATOR, NEURO NON-RECHARG: HCPCS | Performed by: UROLOGY

## 2017-07-05 PROCEDURE — 7100000010 HC PHASE II RECOVERY - FIRST 15 MIN: Performed by: UROLOGY

## 2017-07-05 PROCEDURE — 6360000002 HC RX W HCPCS: Performed by: ANESTHESIOLOGY

## 2017-07-05 PROCEDURE — 2500000003 HC RX 250 WO HCPCS: Performed by: UROLOGY

## 2017-07-05 PROCEDURE — 6360000002 HC RX W HCPCS: Performed by: NURSE ANESTHETIST, CERTIFIED REGISTERED

## 2017-07-05 PROCEDURE — 71101 X-RAY EXAM UNILAT RIBS/CHEST: CPT

## 2017-07-05 PROCEDURE — 2580000003 HC RX 258: Performed by: NURSE ANESTHETIST, CERTIFIED REGISTERED

## 2017-07-05 DEVICE — PROGRAMMER NEUROSTIMULATOR PT FOR URIN CTRL INTERSTIM ICON: Type: IMPLANTABLE DEVICE | Status: FUNCTIONAL

## 2017-07-05 DEVICE — Z DUP USE 2628873 GENERATOR NEUROSTIMULATOR H1.7XL2IN THK3IN TORQ WRNCH PROD: Type: IMPLANTABLE DEVICE | Status: FUNCTIONAL

## 2017-07-05 DEVICE — ANTENNA PT PRGMR EXT DETACH RECHRG DBS LEGEND: Type: IMPLANTABLE DEVICE | Status: FUNCTIONAL

## 2017-07-05 RX ORDER — CEPHALEXIN 500 MG/1
500 CAPSULE ORAL 3 TIMES DAILY
Qty: 15 CAPSULE | Refills: 0 | Status: SHIPPED | OUTPATIENT
Start: 2017-07-05 | End: 2017-07-10

## 2017-07-05 RX ORDER — GENTAMICIN SULFATE 80 MG/50ML
80 INJECTION, SOLUTION INTRAVENOUS
Status: COMPLETED | OUTPATIENT
Start: 2017-07-05 | End: 2017-07-05

## 2017-07-05 RX ORDER — LABETALOL HYDROCHLORIDE 5 MG/ML
5 INJECTION, SOLUTION INTRAVENOUS EVERY 10 MIN PRN
Status: DISCONTINUED | OUTPATIENT
Start: 2017-07-05 | End: 2017-07-05 | Stop reason: HOSPADM

## 2017-07-05 RX ORDER — ONDANSETRON 2 MG/ML
4 INJECTION INTRAMUSCULAR; INTRAVENOUS
Status: DISCONTINUED | OUTPATIENT
Start: 2017-07-05 | End: 2017-07-05 | Stop reason: HOSPADM

## 2017-07-05 RX ORDER — MIDAZOLAM HYDROCHLORIDE 1 MG/ML
INJECTION INTRAMUSCULAR; INTRAVENOUS PRN
Status: DISCONTINUED | OUTPATIENT
Start: 2017-07-05 | End: 2017-07-05 | Stop reason: SDUPTHER

## 2017-07-05 RX ORDER — LIDOCAINE HYDROCHLORIDE 10 MG/ML
INJECTION, SOLUTION EPIDURAL; INFILTRATION; INTRACAUDAL; PERINEURAL PRN
Status: DISCONTINUED | OUTPATIENT
Start: 2017-07-05 | End: 2017-07-05 | Stop reason: SDUPTHER

## 2017-07-05 RX ORDER — PROMETHAZINE HYDROCHLORIDE 25 MG/1
TABLET ORAL
Refills: 0 | COMMUNITY
Start: 2017-05-31 | End: 2020-11-13

## 2017-07-05 RX ORDER — FENTANYL CITRATE 50 UG/ML
INJECTION, SOLUTION INTRAMUSCULAR; INTRAVENOUS PRN
Status: DISCONTINUED | OUTPATIENT
Start: 2017-07-05 | End: 2017-07-05 | Stop reason: SDUPTHER

## 2017-07-05 RX ORDER — FENTANYL CITRATE 50 UG/ML
25 INJECTION, SOLUTION INTRAMUSCULAR; INTRAVENOUS EVERY 5 MIN PRN
Status: DISCONTINUED | OUTPATIENT
Start: 2017-07-05 | End: 2017-07-05 | Stop reason: HOSPADM

## 2017-07-05 RX ORDER — KETAMINE HYDROCHLORIDE 10 MG/ML
INJECTION, SOLUTION INTRAMUSCULAR; INTRAVENOUS PRN
Status: DISCONTINUED | OUTPATIENT
Start: 2017-07-05 | End: 2017-07-05 | Stop reason: SDUPTHER

## 2017-07-05 RX ORDER — FLUCONAZOLE 100 MG/1
100 TABLET ORAL DAILY
Qty: 5 TABLET | Refills: 0 | Status: SHIPPED | OUTPATIENT
Start: 2017-07-05 | End: 2017-07-12

## 2017-07-05 RX ORDER — ONDANSETRON 2 MG/ML
INJECTION INTRAMUSCULAR; INTRAVENOUS PRN
Status: DISCONTINUED | OUTPATIENT
Start: 2017-07-05 | End: 2017-07-05 | Stop reason: SDUPTHER

## 2017-07-05 RX ORDER — OXYCODONE HYDROCHLORIDE AND ACETAMINOPHEN 5; 325 MG/1; MG/1
1 TABLET ORAL
Status: COMPLETED | OUTPATIENT
Start: 2017-07-05 | End: 2017-07-05

## 2017-07-05 RX ORDER — SODIUM CHLORIDE, SODIUM LACTATE, POTASSIUM CHLORIDE, CALCIUM CHLORIDE 600; 310; 30; 20 MG/100ML; MG/100ML; MG/100ML; MG/100ML
INJECTION, SOLUTION INTRAVENOUS CONTINUOUS
Status: DISCONTINUED | OUTPATIENT
Start: 2017-07-05 | End: 2017-07-05 | Stop reason: HOSPADM

## 2017-07-05 RX ORDER — FENTANYL CITRATE 50 UG/ML
50 INJECTION, SOLUTION INTRAMUSCULAR; INTRAVENOUS ONCE
Status: COMPLETED | OUTPATIENT
Start: 2017-07-05 | End: 2017-07-05

## 2017-07-05 RX ORDER — OXYCODONE HYDROCHLORIDE AND ACETAMINOPHEN 5; 325 MG/1; MG/1
1-2 TABLET ORAL EVERY 4 HOURS PRN
Qty: 45 TABLET | Refills: 0 | Status: SHIPPED | OUTPATIENT
Start: 2017-07-05 | End: 2017-07-25

## 2017-07-05 RX ORDER — PROPOFOL 10 MG/ML
INJECTION, EMULSION INTRAVENOUS PRN
Status: DISCONTINUED | OUTPATIENT
Start: 2017-07-05 | End: 2017-07-05 | Stop reason: SDUPTHER

## 2017-07-05 RX ORDER — MIDAZOLAM HYDROCHLORIDE 1 MG/ML
2 INJECTION INTRAMUSCULAR; INTRAVENOUS
Status: DISCONTINUED | OUTPATIENT
Start: 2017-07-05 | End: 2017-07-05 | Stop reason: HOSPADM

## 2017-07-05 RX ORDER — SODIUM CHLORIDE, SODIUM LACTATE, POTASSIUM CHLORIDE, CALCIUM CHLORIDE 600; 310; 30; 20 MG/100ML; MG/100ML; MG/100ML; MG/100ML
1000 INJECTION, SOLUTION INTRAVENOUS CONTINUOUS
Status: DISCONTINUED | OUTPATIENT
Start: 2017-07-05 | End: 2017-07-05 | Stop reason: HOSPADM

## 2017-07-05 RX ORDER — WATER 1000 ML/1000ML
INJECTION, SOLUTION INTRAVENOUS PRN
Status: DISCONTINUED | OUTPATIENT
Start: 2017-07-05 | End: 2017-07-05 | Stop reason: HOSPADM

## 2017-07-05 RX ORDER — BUPIVACAINE HYDROCHLORIDE AND EPINEPHRINE 2.5; 5 MG/ML; UG/ML
INJECTION, SOLUTION EPIDURAL; INFILTRATION; INTRACAUDAL; PERINEURAL PRN
Status: DISCONTINUED | OUTPATIENT
Start: 2017-07-05 | End: 2017-07-05 | Stop reason: HOSPADM

## 2017-07-05 RX ORDER — SODIUM CHLORIDE, SODIUM LACTATE, POTASSIUM CHLORIDE, CALCIUM CHLORIDE 600; 310; 30; 20 MG/100ML; MG/100ML; MG/100ML; MG/100ML
INJECTION, SOLUTION INTRAVENOUS CONTINUOUS PRN
Status: DISCONTINUED | OUTPATIENT
Start: 2017-07-05 | End: 2017-07-05 | Stop reason: SDUPTHER

## 2017-07-05 RX ADMIN — GENTAMICIN SULFATE 80 MG: 80 INJECTION, SOLUTION INTRAVENOUS at 08:33

## 2017-07-05 RX ADMIN — LIDOCAINE HYDROCHLORIDE 40 MG: 10 INJECTION, SOLUTION EPIDURAL; INFILTRATION; INTRACAUDAL; PERINEURAL at 08:17

## 2017-07-05 RX ADMIN — OXYCODONE HYDROCHLORIDE AND ACETAMINOPHEN 1 TABLET: 5; 325 TABLET ORAL at 09:13

## 2017-07-05 RX ADMIN — FENTANYL CITRATE 25 MCG: 50 INJECTION INTRAMUSCULAR; INTRAVENOUS at 08:25

## 2017-07-05 RX ADMIN — KETAMINE HYDROCHLORIDE 25 MG: 10 INJECTION INTRAMUSCULAR; INTRAVENOUS at 08:21

## 2017-07-05 RX ADMIN — MIDAZOLAM HYDROCHLORIDE 1 MG: 1 INJECTION, SOLUTION INTRAMUSCULAR; INTRAVENOUS at 08:21

## 2017-07-05 RX ADMIN — PROPOFOL 20 MG: 10 INJECTION, EMULSION INTRAVENOUS at 08:36

## 2017-07-05 RX ADMIN — SODIUM CHLORIDE, POTASSIUM CHLORIDE, SODIUM LACTATE AND CALCIUM CHLORIDE: 600; 310; 30; 20 INJECTION, SOLUTION INTRAVENOUS at 08:08

## 2017-07-05 RX ADMIN — PROPOFOL 20 MG: 10 INJECTION, EMULSION INTRAVENOUS at 08:15

## 2017-07-05 RX ADMIN — MIDAZOLAM HYDROCHLORIDE 2 MG: 1 INJECTION, SOLUTION INTRAMUSCULAR; INTRAVENOUS at 08:12

## 2017-07-05 RX ADMIN — Medication 3 G: at 08:22

## 2017-07-05 RX ADMIN — FENTANYL CITRATE 25 MCG: 50 INJECTION INTRAMUSCULAR; INTRAVENOUS at 08:30

## 2017-07-05 RX ADMIN — PROPOFOL 20 MG: 10 INJECTION, EMULSION INTRAVENOUS at 08:30

## 2017-07-05 RX ADMIN — PROPOFOL 20 MG: 10 INJECTION, EMULSION INTRAVENOUS at 08:24

## 2017-07-05 RX ADMIN — SODIUM CHLORIDE, POTASSIUM CHLORIDE, SODIUM LACTATE AND CALCIUM CHLORIDE: 600; 310; 30; 20 INJECTION, SOLUTION INTRAVENOUS at 07:58

## 2017-07-05 RX ADMIN — FENTANYL CITRATE 50 MCG: 50 INJECTION INTRAMUSCULAR; INTRAVENOUS at 08:20

## 2017-07-05 RX ADMIN — FENTANYL CITRATE 25 MCG: 50 INJECTION INTRAMUSCULAR; INTRAVENOUS at 08:35

## 2017-07-05 RX ADMIN — MIDAZOLAM HYDROCHLORIDE 1 MG: 1 INJECTION, SOLUTION INTRAMUSCULAR; INTRAVENOUS at 08:18

## 2017-07-05 RX ADMIN — FENTANYL CITRATE 50 MCG: 50 INJECTION INTRAMUSCULAR; INTRAVENOUS at 20:44

## 2017-07-05 RX ADMIN — FENTANYL CITRATE 25 MCG: 50 INJECTION, SOLUTION INTRAMUSCULAR; INTRAVENOUS at 08:55

## 2017-07-05 RX ADMIN — GENTAMICIN SULFATE 80 MG: 80 INJECTION, SOLUTION INTRAVENOUS at 08:24

## 2017-07-05 RX ADMIN — ONDANSETRON 4 MG: 2 INJECTION, SOLUTION INTRAMUSCULAR; INTRAVENOUS at 08:40

## 2017-07-05 ASSESSMENT — PAIN DESCRIPTION - ORIENTATION
ORIENTATION_2: LEFT
ORIENTATION: RIGHT
ORIENTATION_2: LEFT
ORIENTATION: RIGHT

## 2017-07-05 ASSESSMENT — PAIN DESCRIPTION - INTENSITY
RATING_2: 10
RATING_2: 10
RATING_2: 9

## 2017-07-05 ASSESSMENT — PAIN DESCRIPTION - PAIN TYPE
TYPE_2: ACUTE PAIN
TYPE: SURGICAL PAIN
TYPE_2: ACUTE PAIN
TYPE: ACUTE PAIN
TYPE: ACUTE PAIN

## 2017-07-05 ASSESSMENT — ENCOUNTER SYMPTOMS
EYES NEGATIVE: 1
RESPIRATORY NEGATIVE: 1
GASTROINTESTINAL NEGATIVE: 1

## 2017-07-05 ASSESSMENT — PAIN SCALES - GENERAL
PAINLEVEL_OUTOF10: 6
PAINLEVEL_OUTOF10: 9
PAINLEVEL_OUTOF10: 5
PAINLEVEL_OUTOF10: 5
PAINLEVEL_OUTOF10: 6
PAINLEVEL_OUTOF10: 9
PAINLEVEL_OUTOF10: 5
PAINLEVEL_OUTOF10: 10
PAINLEVEL_OUTOF10: 10

## 2017-07-05 ASSESSMENT — PAIN DESCRIPTION - LOCATION
LOCATION_2: RIB CAGE
LOCATION: BUTTOCKS
LOCATION: BUTTOCKS
LOCATION_2: RIB CAGE

## 2017-07-05 ASSESSMENT — PAIN DESCRIPTION - DURATION: DURATION_2: CONTINUOUS

## 2017-07-05 ASSESSMENT — PAIN DESCRIPTION - DESCRIPTORS
DESCRIPTORS: ACHING
DESCRIPTORS_2: TENDER;THROBBING
DESCRIPTORS: BURNING;STABBING;THROBBING

## 2017-07-05 ASSESSMENT — PAIN - FUNCTIONAL ASSESSMENT: PAIN_FUNCTIONAL_ASSESSMENT: 0-10

## 2017-07-05 ASSESSMENT — PAIN DESCRIPTION - FREQUENCY: FREQUENCY: CONTINUOUS

## 2017-07-12 ENCOUNTER — OFFICE VISIT (OUTPATIENT)
Dept: UROLOGY | Age: 37
End: 2017-07-12

## 2017-07-12 VITALS
HEIGHT: 66 IN | HEART RATE: 89 BPM | DIASTOLIC BLOOD PRESSURE: 65 MMHG | WEIGHT: 293 LBS | SYSTOLIC BLOOD PRESSURE: 101 MMHG | TEMPERATURE: 97.9 F | BODY MASS INDEX: 47.09 KG/M2

## 2017-07-12 DIAGNOSIS — N39.41 URGE INCONTINENCE: Primary | ICD-10-CM

## 2017-07-12 PROCEDURE — 99024 POSTOP FOLLOW-UP VISIT: CPT | Performed by: UROLOGY

## 2017-07-12 ASSESSMENT — ENCOUNTER SYMPTOMS
COUGH: 1
WHEEZING: 1
ABDOMINAL PAIN: 1
VOMITING: 1
NAUSEA: 1
EYE REDNESS: 0
BACK PAIN: 1
COLOR CHANGE: 0
EYE PAIN: 0
SHORTNESS OF BREATH: 1

## 2017-07-19 ENCOUNTER — TELEPHONE (OUTPATIENT)
Dept: UROLOGY | Age: 37
End: 2017-07-19

## 2017-07-25 ENCOUNTER — TELEPHONE (OUTPATIENT)
Dept: UROLOGY | Age: 37
End: 2017-07-25

## 2017-07-25 ENCOUNTER — HOSPITAL ENCOUNTER (EMERGENCY)
Age: 37
Discharge: HOME | End: 2017-07-25
Attending: EMERGENCY MEDICINE
Payer: COMMERCIAL

## 2017-07-25 ENCOUNTER — APPOINTMENT (OUTPATIENT)
Dept: ULTRASOUND IMAGING | Age: 37
End: 2017-07-25
Payer: COMMERCIAL

## 2017-07-25 VITALS
HEIGHT: 66 IN | WEIGHT: 293 LBS | HEART RATE: 90 BPM | RESPIRATION RATE: 18 BRPM | BODY MASS INDEX: 47.09 KG/M2 | TEMPERATURE: 98.2 F | DIASTOLIC BLOOD PRESSURE: 86 MMHG | SYSTOLIC BLOOD PRESSURE: 148 MMHG | OXYGEN SATURATION: 96 %

## 2017-07-25 DIAGNOSIS — B49 FUNGAL INFECTION: ICD-10-CM

## 2017-07-25 DIAGNOSIS — M54.50 RIGHT-SIDED LOW BACK PAIN WITHOUT SCIATICA, UNSPECIFIED CHRONICITY: Primary | ICD-10-CM

## 2017-07-25 LAB
-: ABNORMAL
ABSOLUTE EOS #: 0.1 K/UL (ref 0–0.4)
ABSOLUTE LYMPH #: 2.4 K/UL (ref 1–4.8)
ABSOLUTE MONO #: 0.7 K/UL (ref 0.1–1.2)
ALBUMIN SERPL-MCNC: 4 G/DL (ref 3.5–5.2)
ALBUMIN SERPL-MCNC: NORMAL G/DL (ref 3.5–5.2)
ALBUMIN/GLOBULIN RATIO: 1.3 (ref 1–2.5)
ALBUMIN/GLOBULIN RATIO: NORMAL (ref 1–2.5)
ALP BLD-CCNC: 88 U/L (ref 35–104)
ALP BLD-CCNC: NORMAL U/L (ref 35–104)
ALT SERPL-CCNC: 24 U/L (ref 5–33)
ALT SERPL-CCNC: NORMAL U/L (ref 5–33)
AMORPHOUS: ABNORMAL
ANION GAP SERPL CALCULATED.3IONS-SCNC: 16 MMOL/L (ref 9–17)
AST SERPL-CCNC: 24 U/L
AST SERPL-CCNC: NORMAL U/L
BACTERIA: ABNORMAL
BASOPHILS # BLD: 1 %
BASOPHILS ABSOLUTE: 0.1 K/UL (ref 0–0.2)
BILIRUB SERPL-MCNC: 0.2 MG/DL (ref 0.3–1.2)
BILIRUB SERPL-MCNC: NORMAL MG/DL (ref 0.3–1.2)
BILIRUBIN DIRECT: <0.08 MG/DL
BILIRUBIN DIRECT: NORMAL MG/DL
BILIRUBIN URINE: NEGATIVE
BILIRUBIN, INDIRECT: ABNORMAL MG/DL (ref 0–1)
BILIRUBIN, INDIRECT: NORMAL MG/DL (ref 0–1)
BUN BLDV-MCNC: 7 MG/DL (ref 6–20)
BUN/CREAT BLD: NORMAL (ref 9–20)
CALCIUM SERPL-MCNC: 8.8 MG/DL (ref 8.6–10.4)
CASTS UA: ABNORMAL /LPF (ref 0–8)
CHLORIDE BLD-SCNC: 105 MMOL/L (ref 98–107)
CO2: 22 MMOL/L (ref 20–31)
COLOR: YELLOW
CREAT SERPL-MCNC: 0.72 MG/DL (ref 0.5–0.9)
CRYSTALS, UA: ABNORMAL /HPF
DIFFERENTIAL TYPE: NORMAL
EOSINOPHILS RELATIVE PERCENT: 2 %
EPITHELIAL CELLS UA: ABNORMAL /HPF (ref 0–5)
GFR AFRICAN AMERICAN: >60 ML/MIN
GFR NON-AFRICAN AMERICAN: >60 ML/MIN
GFR SERPL CREATININE-BSD FRML MDRD: NORMAL ML/MIN/{1.73_M2}
GFR SERPL CREATININE-BSD FRML MDRD: NORMAL ML/MIN/{1.73_M2}
GLOBULIN: ABNORMAL G/DL (ref 1.5–3.8)
GLOBULIN: NORMAL G/DL (ref 1.5–3.8)
GLUCOSE BLD-MCNC: 79 MG/DL (ref 70–99)
GLUCOSE URINE: NEGATIVE
HCG QUALITATIVE: NEGATIVE
HCT VFR BLD CALC: 43.1 % (ref 36–46)
HEMOGLOBIN: 14.4 G/DL (ref 12–16)
KETONES, URINE: NEGATIVE
LEUKOCYTE ESTERASE, URINE: ABNORMAL
LIPASE: 18 U/L (ref 13–60)
LIPASE: NORMAL U/L (ref 13–60)
LYMPHOCYTES # BLD: 24 %
MCH RBC QN AUTO: 28.2 PG (ref 26–34)
MCHC RBC AUTO-ENTMCNC: 33.3 G/DL (ref 31–37)
MCV RBC AUTO: 84.6 FL (ref 80–100)
MONOCYTES # BLD: 7 %
MUCUS: ABNORMAL
NITRITE, URINE: NEGATIVE
OTHER OBSERVATIONS UA: ABNORMAL
PDW BLD-RTO: 15.3 % (ref 12.5–15.4)
PH UA: 7.5 (ref 5–8)
PLATELET # BLD: 346 K/UL (ref 140–450)
PLATELET ESTIMATE: NORMAL
PMV BLD AUTO: 8.6 FL (ref 6–12)
POTASSIUM SERPL-SCNC: 3.7 MMOL/L (ref 3.7–5.3)
PROTEIN UA: NEGATIVE
RBC # BLD: 5.09 M/UL (ref 4–5.2)
RBC # BLD: NORMAL 10*6/UL
RBC UA: ABNORMAL /HPF (ref 0–4)
RENAL EPITHELIAL, UA: ABNORMAL /HPF
SEG NEUTROPHILS: 66 %
SEGMENTED NEUTROPHILS ABSOLUTE COUNT: 6.5 K/UL (ref 1.8–7.7)
SODIUM BLD-SCNC: 143 MMOL/L (ref 135–144)
SPECIFIC GRAVITY UA: 1.01 (ref 1–1.03)
TOTAL PROTEIN: 7.2 G/DL (ref 6.4–8.3)
TOTAL PROTEIN: NORMAL G/DL (ref 6.4–8.3)
TRICHOMONAS: ABNORMAL
TURBIDITY: CLEAR
URINE HGB: NEGATIVE
UROBILINOGEN, URINE: NORMAL
WBC # BLD: 9.7 K/UL (ref 3.5–11)
WBC # BLD: NORMAL 10*3/UL
WBC UA: ABNORMAL /HPF (ref 0–5)
YEAST: ABNORMAL

## 2017-07-25 PROCEDURE — 80048 BASIC METABOLIC PNL TOTAL CA: CPT

## 2017-07-25 PROCEDURE — 84703 CHORIONIC GONADOTROPIN ASSAY: CPT

## 2017-07-25 PROCEDURE — 76705 ECHO EXAM OF ABDOMEN: CPT

## 2017-07-25 PROCEDURE — 85025 COMPLETE CBC W/AUTO DIFF WBC: CPT

## 2017-07-25 PROCEDURE — 80076 HEPATIC FUNCTION PANEL: CPT

## 2017-07-25 PROCEDURE — 81001 URINALYSIS AUTO W/SCOPE: CPT

## 2017-07-25 PROCEDURE — 96374 THER/PROPH/DIAG INJ IV PUSH: CPT

## 2017-07-25 PROCEDURE — 2580000003 HC RX 258: Performed by: PHYSICIAN ASSISTANT

## 2017-07-25 PROCEDURE — 6370000000 HC RX 637 (ALT 250 FOR IP): Performed by: PHYSICIAN ASSISTANT

## 2017-07-25 PROCEDURE — 83690 ASSAY OF LIPASE: CPT

## 2017-07-25 PROCEDURE — 99284 EMERGENCY DEPT VISIT MOD MDM: CPT

## 2017-07-25 PROCEDURE — 96375 TX/PRO/DX INJ NEW DRUG ADDON: CPT

## 2017-07-25 PROCEDURE — 6360000002 HC RX W HCPCS: Performed by: PHYSICIAN ASSISTANT

## 2017-07-25 RX ORDER — ONDANSETRON 2 MG/ML
4 INJECTION INTRAMUSCULAR; INTRAVENOUS ONCE
Status: COMPLETED | OUTPATIENT
Start: 2017-07-25 | End: 2017-07-25

## 2017-07-25 RX ORDER — OXYCODONE HYDROCHLORIDE AND ACETAMINOPHEN 5; 325 MG/1; MG/1
1-2 TABLET ORAL EVERY 6 HOURS PRN
Qty: 7 TABLET | Refills: 0 | Status: SHIPPED | OUTPATIENT
Start: 2017-07-25 | End: 2017-11-27

## 2017-07-25 RX ORDER — FLUCONAZOLE 50 MG/1
150 TABLET ORAL ONCE
Status: COMPLETED | OUTPATIENT
Start: 2017-07-25 | End: 2017-07-25

## 2017-07-25 RX ORDER — OXYCODONE HYDROCHLORIDE AND ACETAMINOPHEN 5; 325 MG/1; MG/1
2 TABLET ORAL ONCE
Status: COMPLETED | OUTPATIENT
Start: 2017-07-25 | End: 2017-07-25

## 2017-07-25 RX ORDER — 0.9 % SODIUM CHLORIDE 0.9 %
1000 INTRAVENOUS SOLUTION INTRAVENOUS ONCE
Status: COMPLETED | OUTPATIENT
Start: 2017-07-25 | End: 2017-07-25

## 2017-07-25 RX ORDER — OXYCODONE HYDROCHLORIDE AND ACETAMINOPHEN 5; 325 MG/1; MG/1
1 TABLET ORAL ONCE
Status: COMPLETED | OUTPATIENT
Start: 2017-07-25 | End: 2017-07-25

## 2017-07-25 RX ADMIN — OXYCODONE HYDROCHLORIDE AND ACETAMINOPHEN 1 TABLET: 5; 325 TABLET ORAL at 20:56

## 2017-07-25 RX ADMIN — SODIUM CHLORIDE 1000 ML: 9 INJECTION, SOLUTION INTRAVENOUS at 15:59

## 2017-07-25 RX ADMIN — OXYCODONE HYDROCHLORIDE AND ACETAMINOPHEN 2 TABLET: 5; 325 TABLET ORAL at 17:13

## 2017-07-25 RX ADMIN — HYDROMORPHONE HYDROCHLORIDE 1 MG: 1 INJECTION, SOLUTION INTRAMUSCULAR; INTRAVENOUS; SUBCUTANEOUS at 15:57

## 2017-07-25 RX ADMIN — ONDANSETRON 4 MG: 2 INJECTION INTRAMUSCULAR; INTRAVENOUS at 15:58

## 2017-07-25 RX ADMIN — FLUCONAZOLE 150 MG: 50 TABLET ORAL at 20:55

## 2017-07-25 ASSESSMENT — ENCOUNTER SYMPTOMS
SHORTNESS OF BREATH: 0
BACK PAIN: 1
COUGH: 0
VOMITING: 1
COLOR CHANGE: 0
DIARRHEA: 1
ABDOMINAL PAIN: 1
NAUSEA: 1

## 2017-07-25 ASSESSMENT — PAIN DESCRIPTION - FREQUENCY: FREQUENCY: CONTINUOUS

## 2017-07-25 ASSESSMENT — PAIN DESCRIPTION - LOCATION: LOCATION: ABDOMEN;BACK

## 2017-07-25 ASSESSMENT — PAIN SCALES - GENERAL
PAINLEVEL_OUTOF10: 10

## 2017-07-25 ASSESSMENT — PAIN DESCRIPTION - DESCRIPTORS: DESCRIPTORS: CRAMPING

## 2017-07-25 ASSESSMENT — PAIN DESCRIPTION - ORIENTATION: ORIENTATION: RIGHT

## 2017-07-25 ASSESSMENT — PAIN DESCRIPTION - PAIN TYPE: TYPE: ACUTE PAIN

## 2017-08-01 ENCOUNTER — HOSPITAL ENCOUNTER (EMERGENCY)
Facility: CLINIC | Age: 37
Discharge: HOME | End: 2017-08-01
Attending: EMERGENCY MEDICINE
Payer: COMMERCIAL

## 2017-08-01 VITALS
HEART RATE: 118 BPM | SYSTOLIC BLOOD PRESSURE: 138 MMHG | TEMPERATURE: 97.7 F | OXYGEN SATURATION: 94 % | BODY MASS INDEX: 47.09 KG/M2 | HEIGHT: 66 IN | RESPIRATION RATE: 18 BRPM | DIASTOLIC BLOOD PRESSURE: 88 MMHG | WEIGHT: 293 LBS

## 2017-08-01 DIAGNOSIS — R10.11 ABDOMINAL PAIN, RIGHT UPPER QUADRANT: Primary | ICD-10-CM

## 2017-08-01 LAB
-: ABNORMAL
ABSOLUTE EOS #: 0.2 K/UL (ref 0–0.4)
ABSOLUTE LYMPH #: 2.4 K/UL (ref 1–4.8)
ABSOLUTE MONO #: 0.8 K/UL (ref 0.1–1.2)
ALBUMIN SERPL-MCNC: 3.3 G/DL (ref 3.5–5.2)
ALBUMIN/GLOBULIN RATIO: 1 (ref 1–2.5)
ALP BLD-CCNC: 87 U/L (ref 35–104)
ALT SERPL-CCNC: 24 U/L (ref 5–33)
AMORPHOUS: ABNORMAL
ANION GAP SERPL CALCULATED.3IONS-SCNC: 14 MMOL/L (ref 9–17)
AST SERPL-CCNC: 22 U/L
BACTERIA: ABNORMAL
BASOPHILS # BLD: 1 %
BASOPHILS ABSOLUTE: 0.1 K/UL (ref 0–0.2)
BILIRUB SERPL-MCNC: 0.2 MG/DL (ref 0.3–1.2)
BILIRUBIN URINE: NEGATIVE
BUN BLDV-MCNC: 9 MG/DL (ref 6–20)
BUN/CREAT BLD: ABNORMAL (ref 9–20)
CALCIUM SERPL-MCNC: 8.6 MG/DL (ref 8.6–10.4)
CASTS UA: ABNORMAL /LPF (ref 0–2)
CHLORIDE BLD-SCNC: 106 MMOL/L (ref 98–107)
CO2: 22 MMOL/L (ref 20–31)
COLOR: YELLOW
COMMENT UA: ABNORMAL
CREAT SERPL-MCNC: 0.7 MG/DL (ref 0.5–0.9)
CRYSTALS, UA: ABNORMAL /HPF
DIFFERENTIAL TYPE: NORMAL
EOSINOPHILS RELATIVE PERCENT: 2 %
EPITHELIAL CELLS UA: ABNORMAL /HPF (ref 0–5)
GFR AFRICAN AMERICAN: >60 ML/MIN
GFR NON-AFRICAN AMERICAN: >60 ML/MIN
GFR SERPL CREATININE-BSD FRML MDRD: ABNORMAL ML/MIN/{1.73_M2}
GFR SERPL CREATININE-BSD FRML MDRD: ABNORMAL ML/MIN/{1.73_M2}
GLUCOSE BLD-MCNC: 101 MG/DL (ref 70–99)
GLUCOSE URINE: NEGATIVE
HCT VFR BLD CALC: 38.7 % (ref 36–46)
HEMOGLOBIN: 12.6 G/DL (ref 12–16)
KETONES, URINE: NEGATIVE
LEUKOCYTE ESTERASE, URINE: ABNORMAL
LIPASE: 19 U/L (ref 13–60)
LYMPHOCYTES # BLD: 22 %
MCH RBC QN AUTO: 28.1 PG (ref 26–34)
MCHC RBC AUTO-ENTMCNC: 32.6 G/DL (ref 31–37)
MCV RBC AUTO: 86.2 FL (ref 80–100)
MONOCYTES # BLD: 7 %
MUCUS: ABNORMAL
NITRITE, URINE: NEGATIVE
OTHER OBSERVATIONS UA: ABNORMAL
PDW BLD-RTO: 15 % (ref 12.5–15.4)
PH UA: 5.5 (ref 5–8)
PLATELET # BLD: 297 K/UL (ref 140–450)
PLATELET ESTIMATE: NORMAL
PMV BLD AUTO: 7.6 FL (ref 6–12)
POTASSIUM SERPL-SCNC: 3.4 MMOL/L (ref 3.7–5.3)
PROTEIN UA: NEGATIVE
RBC # BLD: 4.49 M/UL (ref 4–5.2)
RBC # BLD: NORMAL 10*6/UL
RBC UA: ABNORMAL /HPF (ref 0–2)
RENAL EPITHELIAL, UA: ABNORMAL /HPF
SEG NEUTROPHILS: 68 %
SEGMENTED NEUTROPHILS ABSOLUTE COUNT: 7.5 K/UL (ref 1.8–7.7)
SODIUM BLD-SCNC: 142 MMOL/L (ref 135–144)
SPECIFIC GRAVITY UA: 1.01 (ref 1–1.03)
TOTAL PROTEIN: 6.6 G/DL (ref 6.4–8.3)
TRICHOMONAS: ABNORMAL
TURBIDITY: ABNORMAL
URINE HGB: NEGATIVE
UROBILINOGEN, URINE: NORMAL
WBC # BLD: 11 K/UL (ref 3.5–11)
WBC # BLD: NORMAL 10*3/UL
WBC UA: ABNORMAL /HPF (ref 0–5)
YEAST: ABNORMAL

## 2017-08-01 PROCEDURE — 80053 COMPREHEN METABOLIC PANEL: CPT

## 2017-08-01 PROCEDURE — 6360000002 HC RX W HCPCS: Performed by: EMERGENCY MEDICINE

## 2017-08-01 PROCEDURE — 99284 EMERGENCY DEPT VISIT MOD MDM: CPT

## 2017-08-01 PROCEDURE — 36415 COLL VENOUS BLD VENIPUNCTURE: CPT

## 2017-08-01 PROCEDURE — 6370000000 HC RX 637 (ALT 250 FOR IP): Performed by: EMERGENCY MEDICINE

## 2017-08-01 PROCEDURE — 96372 THER/PROPH/DIAG INJ SC/IM: CPT

## 2017-08-01 PROCEDURE — 81001 URINALYSIS AUTO W/SCOPE: CPT

## 2017-08-01 PROCEDURE — 87086 URINE CULTURE/COLONY COUNT: CPT

## 2017-08-01 PROCEDURE — 85025 COMPLETE CBC W/AUTO DIFF WBC: CPT

## 2017-08-01 PROCEDURE — 83690 ASSAY OF LIPASE: CPT

## 2017-08-01 RX ORDER — DICYCLOMINE HYDROCHLORIDE 10 MG/ML
20 INJECTION INTRAMUSCULAR ONCE
Status: COMPLETED | OUTPATIENT
Start: 2017-08-01 | End: 2017-08-01

## 2017-08-01 RX ORDER — ONDANSETRON 4 MG/1
4 TABLET, ORALLY DISINTEGRATING ORAL ONCE
Status: COMPLETED | OUTPATIENT
Start: 2017-08-01 | End: 2017-08-01

## 2017-08-01 RX ORDER — SULFAMETHOXAZOLE AND TRIMETHOPRIM 800; 160 MG/1; MG/1
1 TABLET ORAL 2 TIMES DAILY
Qty: 6 TABLET | Refills: 0 | Status: SHIPPED | OUTPATIENT
Start: 2017-08-01 | End: 2017-08-04

## 2017-08-01 RX ORDER — KETOROLAC TROMETHAMINE 30 MG/ML
30 INJECTION, SOLUTION INTRAMUSCULAR; INTRAVENOUS ONCE
Status: COMPLETED | OUTPATIENT
Start: 2017-08-01 | End: 2017-08-01

## 2017-08-01 RX ORDER — SULFAMETHOXAZOLE AND TRIMETHOPRIM 800; 160 MG/1; MG/1
1 TABLET ORAL ONCE
Status: COMPLETED | OUTPATIENT
Start: 2017-08-01 | End: 2017-08-01

## 2017-08-01 RX ORDER — ONDANSETRON 4 MG/1
4 TABLET, FILM COATED ORAL ONCE
Status: DISCONTINUED | OUTPATIENT
Start: 2017-08-01 | End: 2017-08-01

## 2017-08-01 RX ADMIN — KETOROLAC TROMETHAMINE 30 MG: 30 INJECTION, SOLUTION INTRAMUSCULAR at 22:29

## 2017-08-01 RX ADMIN — ONDANSETRON 4 MG: 4 TABLET, ORALLY DISINTEGRATING ORAL at 22:28

## 2017-08-01 RX ADMIN — DICYCLOMINE HYDROCHLORIDE 20 MG: 20 INJECTION, SOLUTION INTRAMUSCULAR at 23:13

## 2017-08-01 RX ADMIN — SULFAMETHOXAZOLE AND TRIMETHOPRIM 1 TABLET: 800; 160 TABLET ORAL at 23:27

## 2017-08-01 RX ADMIN — FLUCONAZOLE 150 MG: 100 TABLET ORAL at 23:27

## 2017-08-01 ASSESSMENT — PAIN DESCRIPTION - LOCATION
LOCATION: FLANK;ABDOMEN
LOCATION: FLANK

## 2017-08-01 ASSESSMENT — PAIN SCALES - GENERAL
PAINLEVEL_OUTOF10: 9
PAINLEVEL_OUTOF10: 9
PAINLEVEL_OUTOF10: 8

## 2017-08-01 ASSESSMENT — PAIN DESCRIPTION - ONSET: ONSET: SUDDEN

## 2017-08-01 ASSESSMENT — PAIN DESCRIPTION - ORIENTATION: ORIENTATION: RIGHT;LOWER;UPPER

## 2017-08-01 ASSESSMENT — PAIN DESCRIPTION - PROGRESSION: CLINICAL_PROGRESSION: GRADUALLY WORSENING

## 2017-08-01 ASSESSMENT — PAIN DESCRIPTION - PAIN TYPE
TYPE: ACUTE PAIN
TYPE: ACUTE PAIN

## 2017-08-01 ASSESSMENT — PAIN DESCRIPTION - FREQUENCY: FREQUENCY: CONTINUOUS

## 2017-08-01 ASSESSMENT — PAIN DESCRIPTION - DESCRIPTORS: DESCRIPTORS: STABBING

## 2017-08-03 LAB
CULTURE: NO GROWTH
CULTURE: NORMAL
Lab: NORMAL
SPECIMEN DESCRIPTION: NORMAL
SPECIMEN DESCRIPTION: NORMAL
STATUS: NORMAL

## 2017-08-08 ENCOUNTER — HOSPITAL ENCOUNTER (EMERGENCY)
Facility: CLINIC | Age: 37
Discharge: HOME | End: 2017-08-08
Attending: EMERGENCY MEDICINE
Payer: COMMERCIAL

## 2017-08-08 VITALS
SYSTOLIC BLOOD PRESSURE: 123 MMHG | BODY MASS INDEX: 47.09 KG/M2 | OXYGEN SATURATION: 99 % | HEIGHT: 66 IN | DIASTOLIC BLOOD PRESSURE: 85 MMHG | HEART RATE: 79 BPM | TEMPERATURE: 98.7 F | RESPIRATION RATE: 20 BRPM | WEIGHT: 293 LBS
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
## 2017-08-08 PROCEDURE — 6360000002 HC RX W HCPCS: Performed by: EMERGENCY MEDICINE

## 2017-08-08 PROCEDURE — 6370000000 HC RX 637 (ALT 250 FOR IP): Performed by: EMERGENCY MEDICINE

## 2017-08-08 PROCEDURE — 99282 EMERGENCY DEPT VISIT SF MDM: CPT

## 2017-08-08 PROCEDURE — 90471 IMMUNIZATION ADMIN: CPT | Performed by: EMERGENCY MEDICINE

## 2017-08-08 PROCEDURE — 90715 TDAP VACCINE 7 YRS/> IM: CPT | Performed by: EMERGENCY MEDICINE

## 2017-08-08 RX ORDER — GINSENG 100 MG
CAPSULE ORAL 3 TIMES DAILY
Status: DISCONTINUED | OUTPATIENT
Start: 2017-08-08 | End: 2017-08-08 | Stop reason: HOSPADM

## 2017-08-08 RX ADMIN — BACITRACIN: 500 OINTMENT TOPICAL at 15:10

## 2017-08-08 RX ADMIN — TETANUS TOXOID, REDUCED DIPHTHERIA TOXOID AND ACELLULAR PERTUSSIS VACCINE, ADSORBED 0.5 ML: 5; 2.5; 8; 8; 2.5 SUSPENSION INTRAMUSCULAR at 15:08

## 2017-08-08 ASSESSMENT — PAIN SCALES - GENERAL: PAINLEVEL_OUTOF10: 8

## 2017-08-08 ASSESSMENT — PAIN DESCRIPTION - LOCATION: LOCATION: FINGER (COMMENT WHICH ONE)

## 2017-08-08 ASSESSMENT — PAIN DESCRIPTION - PAIN TYPE: TYPE: ACUTE PAIN

## 2017-08-09 ENCOUNTER — APPOINTMENT (OUTPATIENT)
Dept: GENERAL RADIOLOGY | Facility: CLINIC | Age: 37
End: 2017-08-09
Payer: COMMERCIAL

## 2017-08-09 ENCOUNTER — HOSPITAL ENCOUNTER (EMERGENCY)
Facility: CLINIC | Age: 37
Discharge: HOME | End: 2017-08-09
Attending: EMERGENCY MEDICINE
Payer: COMMERCIAL

## 2017-08-09 ENCOUNTER — APPOINTMENT (OUTPATIENT)
Dept: CT IMAGING | Facility: CLINIC | Age: 37
End: 2017-08-09
Payer: COMMERCIAL

## 2017-08-09 ENCOUNTER — OFFICE VISIT (OUTPATIENT)
Dept: UROLOGY | Age: 37
End: 2017-08-09

## 2017-08-09 VITALS
WEIGHT: 293 LBS | DIASTOLIC BLOOD PRESSURE: 65 MMHG | SYSTOLIC BLOOD PRESSURE: 97 MMHG | TEMPERATURE: 99.2 F | BODY MASS INDEX: 47.09 KG/M2 | HEIGHT: 66 IN | HEART RATE: 102 BPM

## 2017-08-09 VITALS
TEMPERATURE: 98 F | DIASTOLIC BLOOD PRESSURE: 73 MMHG | WEIGHT: 293 LBS | HEART RATE: 74 BPM | BODY MASS INDEX: 47.09 KG/M2 | RESPIRATION RATE: 17 BRPM | HEIGHT: 66 IN | SYSTOLIC BLOOD PRESSURE: 120 MMHG | OXYGEN SATURATION: 99 %

## 2017-08-09 DIAGNOSIS — R06.89 DYSPNEA AND RESPIRATORY ABNORMALITIES: ICD-10-CM

## 2017-08-09 DIAGNOSIS — R35.1 NOCTURIA: ICD-10-CM

## 2017-08-09 DIAGNOSIS — R79.89 ELEVATED D-DIMER: ICD-10-CM

## 2017-08-09 DIAGNOSIS — R35.0 FREQUENCY OF MICTURITION: ICD-10-CM

## 2017-08-09 DIAGNOSIS — R39.89 BLADDER PAIN: ICD-10-CM

## 2017-08-09 DIAGNOSIS — N39.0 URINARY TRACT INFECTION WITH HEMATURIA, SITE UNSPECIFIED: Primary | ICD-10-CM

## 2017-08-09 DIAGNOSIS — R06.00 DYSPNEA AND RESPIRATORY ABNORMALITIES: ICD-10-CM

## 2017-08-09 DIAGNOSIS — R31.9 URINARY TRACT INFECTION WITH HEMATURIA, SITE UNSPECIFIED: Primary | ICD-10-CM

## 2017-08-09 DIAGNOSIS — N39.41 URGE INCONTINENCE: Primary | ICD-10-CM

## 2017-08-09 DIAGNOSIS — R07.9 CHEST PAIN, UNSPECIFIED TYPE: ICD-10-CM

## 2017-08-09 LAB
-: ABNORMAL
ABSOLUTE EOS #: 0.2 K/UL (ref 0–0.4)
ABSOLUTE LYMPH #: 2.4 K/UL (ref 1–4.8)
ABSOLUTE MONO #: 0.6 K/UL (ref 0.1–1.2)
ALBUMIN SERPL-MCNC: 3.6 G/DL (ref 3.5–5.2)
ALBUMIN/GLOBULIN RATIO: 1.1 (ref 1–2.5)
ALP BLD-CCNC: 79 U/L (ref 35–104)
ALT SERPL-CCNC: 33 U/L (ref 5–33)
AMORPHOUS: ABNORMAL
ANION GAP SERPL CALCULATED.3IONS-SCNC: 15 MMOL/L (ref 9–17)
AST SERPL-CCNC: 28 U/L
BACTERIA: ABNORMAL
BASOPHILS # BLD: 1 %
BASOPHILS ABSOLUTE: 0.1 K/UL (ref 0–0.2)
BILIRUB SERPL-MCNC: 0.3 MG/DL (ref 0.3–1.2)
BILIRUBIN URINE: ABNORMAL
BNP INTERPRETATION: NORMAL
BUN BLDV-MCNC: 11 MG/DL (ref 6–20)
BUN/CREAT BLD: ABNORMAL (ref 9–20)
CALCIUM SERPL-MCNC: 8.6 MG/DL (ref 8.6–10.4)
CASTS UA: ABNORMAL /LPF (ref 0–2)
CHLORIDE BLD-SCNC: 105 MMOL/L (ref 98–107)
CK MB: <1 NG/ML
CO2: 20 MMOL/L (ref 20–31)
COLOR: YELLOW
COMMENT UA: ABNORMAL
CREAT SERPL-MCNC: 0.7 MG/DL (ref 0.5–0.9)
CRYSTALS, UA: ABNORMAL /HPF
D-DIMER QUANTITATIVE: 1.39 MG/L FEU
DIFFERENTIAL TYPE: NORMAL
EOSINOPHILS RELATIVE PERCENT: 3 %
EPITHELIAL CELLS UA: ABNORMAL /HPF (ref 0–5)
GFR AFRICAN AMERICAN: >60 ML/MIN
GFR NON-AFRICAN AMERICAN: >60 ML/MIN
GFR SERPL CREATININE-BSD FRML MDRD: ABNORMAL ML/MIN/{1.73_M2}
GFR SERPL CREATININE-BSD FRML MDRD: ABNORMAL ML/MIN/{1.73_M2}
GLUCOSE BLD-MCNC: 109 MG/DL (ref 70–99)
GLUCOSE URINE: NEGATIVE
HCG QUALITATIVE: NEGATIVE
HCT VFR BLD CALC: 40.6 % (ref 36–46)
HEMOGLOBIN: 13.1 G/DL (ref 12–16)
INR BLD: 1
KETONES, URINE: ABNORMAL
LACTIC ACID: 0.6 MMOL/L (ref 0.5–2.2)
LEUKOCYTE ESTERASE, URINE: ABNORMAL
LIPASE: 15 U/L (ref 13–60)
LYMPHOCYTES # BLD: 29 %
MAGNESIUM: 1.7 MG/DL (ref 1.6–2.6)
MCH RBC QN AUTO: 27.8 PG (ref 26–34)
MCHC RBC AUTO-ENTMCNC: 32.1 G/DL (ref 31–37)
MCV RBC AUTO: 86.5 FL (ref 80–100)
MONOCYTES # BLD: 7 %
MUCUS: ABNORMAL
MYOGLOBIN: 22 NG/ML (ref 25–58)
NITRITE, URINE: NEGATIVE
OTHER OBSERVATIONS UA: ABNORMAL
PARTIAL THROMBOPLASTIN TIME: 28.8 SEC (ref 21.3–31.3)
PDW BLD-RTO: 15 % (ref 12.5–15.4)
PH UA: 6 (ref 5–8)
PLATELET # BLD: 325 K/UL (ref 140–450)
PLATELET ESTIMATE: NORMAL
PMV BLD AUTO: 7.5 FL (ref 6–12)
POTASSIUM SERPL-SCNC: 3.6 MMOL/L (ref 3.7–5.3)
PRO-BNP: 88 PG/ML
PROTEIN UA: ABNORMAL
PROTHROMBIN TIME: 10.4 SEC (ref 9.4–12.6)
RBC # BLD: 4.7 M/UL (ref 4–5.2)
RBC # BLD: NORMAL 10*6/UL
RBC UA: ABNORMAL /HPF (ref 0–2)
RENAL EPITHELIAL, UA: ABNORMAL /HPF
SEG NEUTROPHILS: 60 %
SEGMENTED NEUTROPHILS ABSOLUTE COUNT: 5.1 K/UL (ref 1.8–7.7)
SODIUM BLD-SCNC: 140 MMOL/L (ref 135–144)
SPECIFIC GRAVITY UA: 1.02 (ref 1–1.03)
TOTAL CK: 69 U/L (ref 26–192)
TOTAL PROTEIN: 6.9 G/DL (ref 6.4–8.3)
TRICHOMONAS: ABNORMAL
TROPONIN INTERP: NORMAL
TROPONIN T: <0.03 NG/ML
TURBIDITY: ABNORMAL
URINE HGB: NEGATIVE
UROBILINOGEN, URINE: NORMAL
WBC # BLD: 8.5 K/UL (ref 3.5–11)
WBC # BLD: NORMAL 10*3/UL
WBC UA: ABNORMAL /HPF (ref 0–5)
YEAST: ABNORMAL

## 2017-08-09 PROCEDURE — 2580000003 HC RX 258: Performed by: EMERGENCY MEDICINE

## 2017-08-09 PROCEDURE — 80053 COMPREHEN METABOLIC PANEL: CPT

## 2017-08-09 PROCEDURE — 85730 THROMBOPLASTIN TIME PARTIAL: CPT

## 2017-08-09 PROCEDURE — 87086 URINE CULTURE/COLONY COUNT: CPT

## 2017-08-09 PROCEDURE — 82553 CREATINE MB FRACTION: CPT

## 2017-08-09 PROCEDURE — 81001 URINALYSIS AUTO W/SCOPE: CPT

## 2017-08-09 PROCEDURE — 83880 ASSAY OF NATRIURETIC PEPTIDE: CPT

## 2017-08-09 PROCEDURE — 96365 THER/PROPH/DIAG IV INF INIT: CPT

## 2017-08-09 PROCEDURE — 96361 HYDRATE IV INFUSION ADD-ON: CPT

## 2017-08-09 PROCEDURE — 85025 COMPLETE CBC W/AUTO DIFF WBC: CPT

## 2017-08-09 PROCEDURE — 36415 COLL VENOUS BLD VENIPUNCTURE: CPT

## 2017-08-09 PROCEDURE — 93005 ELECTROCARDIOGRAM TRACING: CPT

## 2017-08-09 PROCEDURE — 85379 FIBRIN DEGRADATION QUANT: CPT

## 2017-08-09 PROCEDURE — 83605 ASSAY OF LACTIC ACID: CPT

## 2017-08-09 PROCEDURE — 83874 ASSAY OF MYOGLOBIN: CPT

## 2017-08-09 PROCEDURE — 85610 PROTHROMBIN TIME: CPT

## 2017-08-09 PROCEDURE — 96375 TX/PRO/DX INJ NEW DRUG ADDON: CPT

## 2017-08-09 PROCEDURE — 87040 BLOOD CULTURE FOR BACTERIA: CPT

## 2017-08-09 PROCEDURE — 6360000002 HC RX W HCPCS: Performed by: EMERGENCY MEDICINE

## 2017-08-09 PROCEDURE — 83690 ASSAY OF LIPASE: CPT

## 2017-08-09 PROCEDURE — 82550 ASSAY OF CK (CPK): CPT

## 2017-08-09 PROCEDURE — 84703 CHORIONIC GONADOTROPIN ASSAY: CPT

## 2017-08-09 PROCEDURE — 99024 POSTOP FOLLOW-UP VISIT: CPT | Performed by: NURSE PRACTITIONER

## 2017-08-09 PROCEDURE — 83735 ASSAY OF MAGNESIUM: CPT

## 2017-08-09 PROCEDURE — 99285 EMERGENCY DEPT VISIT HI MDM: CPT

## 2017-08-09 PROCEDURE — 71020 XR CHEST STANDARD TWO VW: CPT

## 2017-08-09 PROCEDURE — 6370000000 HC RX 637 (ALT 250 FOR IP): Performed by: EMERGENCY MEDICINE

## 2017-08-09 PROCEDURE — 84484 ASSAY OF TROPONIN QUANT: CPT

## 2017-08-09 RX ORDER — KETOROLAC TROMETHAMINE 15 MG/ML
15 INJECTION, SOLUTION INTRAMUSCULAR; INTRAVENOUS ONCE
Status: COMPLETED | OUTPATIENT
Start: 2017-08-09 | End: 2017-08-09

## 2017-08-09 RX ORDER — FLUCONAZOLE 150 MG/1
150 TABLET ORAL DAILY
Qty: 5 TABLET | Refills: 0 | Status: SHIPPED | OUTPATIENT
Start: 2017-08-09 | End: 2017-10-01

## 2017-08-09 RX ORDER — 0.9 % SODIUM CHLORIDE 0.9 %
70 INTRAVENOUS SOLUTION INTRAVENOUS ONCE
Status: DISCONTINUED | OUTPATIENT
Start: 2017-08-09 | End: 2017-08-10 | Stop reason: HOSPADM

## 2017-08-09 RX ORDER — IPRATROPIUM BROMIDE AND ALBUTEROL SULFATE 2.5; .5 MG/3ML; MG/3ML
1 SOLUTION RESPIRATORY (INHALATION) ONCE
Status: COMPLETED | OUTPATIENT
Start: 2017-08-09 | End: 2017-08-09

## 2017-08-09 RX ORDER — NITROFURANTOIN 25; 75 MG/1; MG/1
100 CAPSULE ORAL 2 TIMES DAILY
Qty: 14 CAPSULE | Refills: 0 | Status: SHIPPED | OUTPATIENT
Start: 2017-08-09 | End: 2017-08-16

## 2017-08-09 RX ORDER — 0.9 % SODIUM CHLORIDE 0.9 %
1000 INTRAVENOUS SOLUTION INTRAVENOUS ONCE
Status: COMPLETED | OUTPATIENT
Start: 2017-08-09 | End: 2017-08-09

## 2017-08-09 RX ORDER — SODIUM CHLORIDE 0.9 % (FLUSH) 0.9 %
10 SYRINGE (ML) INJECTION PRN
Status: DISCONTINUED | OUTPATIENT
Start: 2017-08-09 | End: 2017-08-10 | Stop reason: HOSPADM

## 2017-08-09 RX ADMIN — IPRATROPIUM BROMIDE AND ALBUTEROL SULFATE 1 AMPULE: .5; 3 SOLUTION RESPIRATORY (INHALATION) at 19:59

## 2017-08-09 RX ADMIN — SODIUM CHLORIDE 1000 ML: 9 INJECTION, SOLUTION INTRAVENOUS at 21:08

## 2017-08-09 RX ADMIN — FLUCONAZOLE 150 MG: 50 TABLET ORAL at 22:03

## 2017-08-09 RX ADMIN — CEFTRIAXONE SODIUM 1 G: 1 INJECTION, POWDER, FOR SOLUTION INTRAMUSCULAR; INTRAVENOUS at 21:55

## 2017-08-09 RX ADMIN — KETOROLAC TROMETHAMINE 15 MG: 15 INJECTION, SOLUTION INTRAMUSCULAR; INTRAVENOUS at 21:55

## 2017-08-09 ASSESSMENT — ENCOUNTER SYMPTOMS
SHORTNESS OF BREATH: 0
VOMITING: 0
NAUSEA: 0
EYE PAIN: 0
COLOR CHANGE: 0
COUGH: 0
ABDOMINAL PAIN: 0
ABDOMINAL PAIN: 0
BACK PAIN: 1
DIARRHEA: 0
WHEEZING: 0
EYE PAIN: 0
SHORTNESS OF BREATH: 0
SORE THROAT: 0
RHINORRHEA: 0
COUGH: 0
EYE REDNESS: 0
VOMITING: 0
NAUSEA: 1
BACK PAIN: 1

## 2017-08-09 ASSESSMENT — PAIN SCALES - GENERAL
PAINLEVEL_OUTOF10: 3
PAINLEVEL_OUTOF10: 10
PAINLEVEL_OUTOF10: 5

## 2017-08-09 ASSESSMENT — PAIN DESCRIPTION - PAIN TYPE: TYPE: ACUTE PAIN

## 2017-08-09 ASSESSMENT — PAIN DESCRIPTION - FREQUENCY: FREQUENCY: CONTINUOUS

## 2017-08-09 ASSESSMENT — PAIN DESCRIPTION - PROGRESSION: CLINICAL_PROGRESSION: GRADUALLY IMPROVING

## 2017-08-09 ASSESSMENT — PAIN DESCRIPTION - ORIENTATION: ORIENTATION: RIGHT;LEFT

## 2017-08-10 ASSESSMENT — ENCOUNTER SYMPTOMS
EYE PAIN: 0
SHORTNESS OF BREATH: 0
SORE THROAT: 0
NAUSEA: 1
DIARRHEA: 0
ABDOMINAL PAIN: 0
RHINORRHEA: 0
COUGH: 0
VOMITING: 0
BACK PAIN: 1

## 2017-08-11 LAB
EKG ATRIAL RATE: 77 BPM
EKG P AXIS: 18 DEGREES
EKG P-R INTERVAL: 166 MS
EKG Q-T INTERVAL: 412 MS
EKG QRS DURATION: 90 MS
EKG QTC CALCULATION (BAZETT): 466 MS
EKG R AXIS: -36 DEGREES
EKG T AXIS: 10 DEGREES
EKG VENTRICULAR RATE: 77 BPM

## 2017-08-12 LAB
CULTURE: NORMAL
CULTURE: NORMAL
Lab: NORMAL
SPECIMEN DESCRIPTION: NORMAL
SPECIMEN DESCRIPTION: NORMAL
STATUS: NORMAL

## 2017-08-15 LAB
CULTURE: NORMAL
Lab: NORMAL
SPECIMEN DESCRIPTION: NORMAL
STATUS: NORMAL
STATUS: NORMAL

## 2017-08-17 ENCOUNTER — TELEPHONE (OUTPATIENT)
Dept: UROLOGY | Age: 37
End: 2017-08-17

## 2017-10-01 ENCOUNTER — HOSPITAL ENCOUNTER (EMERGENCY)
Facility: CLINIC | Age: 37
Discharge: HOME | End: 2017-10-01
Attending: EMERGENCY MEDICINE
Payer: COMMERCIAL

## 2017-10-01 ENCOUNTER — APPOINTMENT (OUTPATIENT)
Dept: CT IMAGING | Facility: CLINIC | Age: 37
End: 2017-10-01
Payer: COMMERCIAL

## 2017-10-01 VITALS
TEMPERATURE: 98.2 F | RESPIRATION RATE: 18 BRPM | BODY MASS INDEX: 47.09 KG/M2 | OXYGEN SATURATION: 98 % | HEIGHT: 66 IN | WEIGHT: 293 LBS | HEART RATE: 97 BPM | DIASTOLIC BLOOD PRESSURE: 69 MMHG | SYSTOLIC BLOOD PRESSURE: 100 MMHG

## 2017-10-01 DIAGNOSIS — N76.0 BACTERIAL VAGINOSIS: ICD-10-CM

## 2017-10-01 DIAGNOSIS — B37.31 YEAST VAGINITIS: Primary | ICD-10-CM

## 2017-10-01 DIAGNOSIS — R10.30 LOWER ABDOMINAL PAIN: ICD-10-CM

## 2017-10-01 DIAGNOSIS — B96.89 BACTERIAL VAGINOSIS: ICD-10-CM

## 2017-10-01 DIAGNOSIS — N39.0 URINARY TRACT INFECTION WITHOUT HEMATURIA, SITE UNSPECIFIED: ICD-10-CM

## 2017-10-01 LAB
-: ABNORMAL
ABSOLUTE EOS #: 0.2 K/UL (ref 0–0.4)
ABSOLUTE LYMPH #: 2.7 K/UL (ref 1–4.8)
ABSOLUTE MONO #: 0.8 K/UL (ref 0.1–1.2)
ALBUMIN SERPL-MCNC: 3.5 G/DL (ref 3.5–5.2)
ALBUMIN/GLOBULIN RATIO: 1.1 (ref 1–2.5)
ALP BLD-CCNC: 105 U/L (ref 35–104)
ALT SERPL-CCNC: 24 U/L (ref 5–33)
AMORPHOUS: ABNORMAL
ANION GAP SERPL CALCULATED.3IONS-SCNC: 14 MMOL/L (ref 9–17)
AST SERPL-CCNC: 20 U/L
BACTERIA: ABNORMAL
BASOPHILS # BLD: 1 %
BASOPHILS ABSOLUTE: 0.1 K/UL (ref 0–0.2)
BILIRUB SERPL-MCNC: 0.2 MG/DL (ref 0.3–1.2)
BILIRUBIN URINE: NEGATIVE
BUN BLDV-MCNC: 9 MG/DL (ref 6–20)
BUN/CREAT BLD: ABNORMAL (ref 9–20)
CALCIUM SERPL-MCNC: 8.7 MG/DL (ref 8.6–10.4)
CASTS UA: ABNORMAL /LPF (ref 0–2)
CHLORIDE BLD-SCNC: 104 MMOL/L (ref 98–107)
CO2: 21 MMOL/L (ref 20–31)
COLOR: YELLOW
COMMENT UA: ABNORMAL
CREAT SERPL-MCNC: 0.7 MG/DL (ref 0.5–0.9)
CRYSTALS, UA: ABNORMAL /HPF
DIFFERENTIAL TYPE: ABNORMAL
DIRECT EXAM: ABNORMAL
EOSINOPHILS RELATIVE PERCENT: 2 %
EPITHELIAL CELLS UA: ABNORMAL /HPF (ref 0–5)
GFR AFRICAN AMERICAN: >60 ML/MIN
GFR NON-AFRICAN AMERICAN: >60 ML/MIN
GFR SERPL CREATININE-BSD FRML MDRD: ABNORMAL ML/MIN/{1.73_M2}
GFR SERPL CREATININE-BSD FRML MDRD: ABNORMAL ML/MIN/{1.73_M2}
GLUCOSE BLD-MCNC: 123 MG/DL (ref 70–99)
GLUCOSE URINE: NEGATIVE
HCT VFR BLD CALC: 39.6 % (ref 36–46)
HEMOGLOBIN: 13.1 G/DL (ref 12–16)
KETONES, URINE: NEGATIVE
LEUKOCYTE ESTERASE, URINE: ABNORMAL
LIPASE: 23 U/L (ref 13–60)
LYMPHOCYTES # BLD: 25 %
Lab: ABNORMAL
MAGNESIUM: 1.8 MG/DL (ref 1.6–2.6)
MCH RBC QN AUTO: 28 PG (ref 26–34)
MCHC RBC AUTO-ENTMCNC: 33.2 G/DL (ref 31–37)
MCV RBC AUTO: 84.3 FL (ref 80–100)
MONOCYTES # BLD: 7 %
MUCUS: ABNORMAL
NITRITE, URINE: NEGATIVE
OTHER OBSERVATIONS UA: ABNORMAL
PDW BLD-RTO: 15.5 % (ref 12.5–15.4)
PH UA: 6 (ref 5–8)
PLATELET # BLD: 340 K/UL (ref 140–450)
PLATELET ESTIMATE: ABNORMAL
PMV BLD AUTO: 7.8 FL (ref 6–12)
POTASSIUM SERPL-SCNC: 3.6 MMOL/L (ref 3.7–5.3)
PROTEIN UA: NEGATIVE
RBC # BLD: 4.7 M/UL (ref 4–5.2)
RBC # BLD: ABNORMAL 10*6/UL
RBC UA: ABNORMAL /HPF (ref 0–2)
RENAL EPITHELIAL, UA: ABNORMAL /HPF
SEG NEUTROPHILS: 65 %
SEGMENTED NEUTROPHILS ABSOLUTE COUNT: 7.1 K/UL (ref 1.8–7.7)
SODIUM BLD-SCNC: 139 MMOL/L (ref 135–144)
SPECIFIC GRAVITY UA: 1.01 (ref 1–1.03)
SPECIMEN DESCRIPTION: ABNORMAL
STATUS: ABNORMAL
TOTAL PROTEIN: 6.8 G/DL (ref 6.4–8.3)
TRICHOMONAS: ABNORMAL
TURBIDITY: ABNORMAL
URINE HGB: NEGATIVE
UROBILINOGEN, URINE: NORMAL
WBC # BLD: 10.9 K/UL (ref 3.5–11)
WBC # BLD: ABNORMAL 10*3/UL
WBC UA: ABNORMAL /HPF (ref 0–5)
YEAST: ABNORMAL

## 2017-10-01 PROCEDURE — 83690 ASSAY OF LIPASE: CPT

## 2017-10-01 PROCEDURE — 87510 GARDNER VAG DNA DIR PROBE: CPT

## 2017-10-01 PROCEDURE — 87591 N.GONORRHOEAE DNA AMP PROB: CPT

## 2017-10-01 PROCEDURE — 83735 ASSAY OF MAGNESIUM: CPT

## 2017-10-01 PROCEDURE — 87480 CANDIDA DNA DIR PROBE: CPT

## 2017-10-01 PROCEDURE — 36415 COLL VENOUS BLD VENIPUNCTURE: CPT

## 2017-10-01 PROCEDURE — 6360000002 HC RX W HCPCS: Performed by: EMERGENCY MEDICINE

## 2017-10-01 PROCEDURE — 87660 TRICHOMONAS VAGIN DIR PROBE: CPT

## 2017-10-01 PROCEDURE — 6370000000 HC RX 637 (ALT 250 FOR IP): Performed by: EMERGENCY MEDICINE

## 2017-10-01 PROCEDURE — 81001 URINALYSIS AUTO W/SCOPE: CPT

## 2017-10-01 PROCEDURE — 6360000004 HC RX CONTRAST MEDICATION: Performed by: EMERGENCY MEDICINE

## 2017-10-01 PROCEDURE — 74177 CT ABD & PELVIS W/CONTRAST: CPT

## 2017-10-01 PROCEDURE — 87086 URINE CULTURE/COLONY COUNT: CPT

## 2017-10-01 PROCEDURE — 99284 EMERGENCY DEPT VISIT MOD MDM: CPT

## 2017-10-01 PROCEDURE — 87491 CHLMYD TRACH DNA AMP PROBE: CPT

## 2017-10-01 PROCEDURE — 80053 COMPREHEN METABOLIC PANEL: CPT

## 2017-10-01 PROCEDURE — 85025 COMPLETE CBC W/AUTO DIFF WBC: CPT

## 2017-10-01 PROCEDURE — 96374 THER/PROPH/DIAG INJ IV PUSH: CPT

## 2017-10-01 PROCEDURE — 2580000003 HC RX 258: Performed by: EMERGENCY MEDICINE

## 2017-10-01 RX ORDER — SODIUM CHLORIDE 0.9 % (FLUSH) 0.9 %
10 SYRINGE (ML) INJECTION PRN
Status: DISCONTINUED | OUTPATIENT
Start: 2017-10-01 | End: 2017-10-01 | Stop reason: HOSPADM

## 2017-10-01 RX ORDER — 0.9 % SODIUM CHLORIDE 0.9 %
100 INTRAVENOUS SOLUTION INTRAVENOUS ONCE
Status: COMPLETED | OUTPATIENT
Start: 2017-10-01 | End: 2017-10-01

## 2017-10-01 RX ORDER — 0.9 % SODIUM CHLORIDE 0.9 %
1000 INTRAVENOUS SOLUTION INTRAVENOUS ONCE
Status: COMPLETED | OUTPATIENT
Start: 2017-10-01 | End: 2017-10-01

## 2017-10-01 RX ORDER — METRONIDAZOLE 500 MG/1
500 TABLET ORAL 2 TIMES DAILY
Qty: 14 TABLET | Refills: 0 | Status: SHIPPED | OUTPATIENT
Start: 2017-10-01 | End: 2017-10-08

## 2017-10-01 RX ORDER — KETOROLAC TROMETHAMINE 10 MG/1
10 TABLET, FILM COATED ORAL EVERY 8 HOURS PRN
Qty: 14 TABLET | Refills: 0 | Status: ON HOLD | OUTPATIENT
Start: 2017-10-01 | End: 2018-01-29 | Stop reason: HOSPADM

## 2017-10-01 RX ORDER — FLUCONAZOLE 100 MG/1
100 TABLET ORAL DAILY
Qty: 5 TABLET | Refills: 0 | Status: SHIPPED | OUTPATIENT
Start: 2017-10-01 | End: 2017-10-06

## 2017-10-01 RX ORDER — NITROFURANTOIN 25; 75 MG/1; MG/1
100 CAPSULE ORAL ONCE
Status: COMPLETED | OUTPATIENT
Start: 2017-10-01 | End: 2017-10-01

## 2017-10-01 RX ORDER — METRONIDAZOLE 500 MG/1
500 TABLET ORAL ONCE
Status: COMPLETED | OUTPATIENT
Start: 2017-10-01 | End: 2017-10-01

## 2017-10-01 RX ORDER — 0.9 % SODIUM CHLORIDE 0.9 %
100 INTRAVENOUS SOLUTION INTRAVENOUS ONCE
Status: DISCONTINUED | OUTPATIENT
Start: 2017-10-01 | End: 2017-10-01

## 2017-10-01 RX ORDER — SODIUM CHLORIDE 0.9 % (FLUSH) 0.9 %
10 SYRINGE (ML) INJECTION PRN
Status: DISCONTINUED | OUTPATIENT
Start: 2017-10-01 | End: 2017-10-01

## 2017-10-01 RX ORDER — KETOROLAC TROMETHAMINE 30 MG/ML
30 INJECTION, SOLUTION INTRAMUSCULAR; INTRAVENOUS ONCE
Status: COMPLETED | OUTPATIENT
Start: 2017-10-01 | End: 2017-10-01

## 2017-10-01 RX ORDER — OXYCODONE HYDROCHLORIDE AND ACETAMINOPHEN 5; 325 MG/1; MG/1
2 TABLET ORAL ONCE
Status: COMPLETED | OUTPATIENT
Start: 2017-10-01 | End: 2017-10-01

## 2017-10-01 RX ORDER — NITROFURANTOIN 25; 75 MG/1; MG/1
100 CAPSULE ORAL 2 TIMES DAILY
Qty: 14 CAPSULE | Refills: 0 | Status: SHIPPED | OUTPATIENT
Start: 2017-10-01 | End: 2017-10-08

## 2017-10-01 RX ADMIN — IOVERSOL 75 ML: 741 INJECTION INTRA-ARTERIAL; INTRAVENOUS at 20:54

## 2017-10-01 RX ADMIN — SODIUM CHLORIDE 100 ML: 9 INJECTION, SOLUTION INTRAVENOUS at 20:55

## 2017-10-01 RX ADMIN — FLUCONAZOLE 150 MG: 50 TABLET ORAL at 21:24

## 2017-10-01 RX ADMIN — NITROFURANTOIN (MONOHYDRATE/MACROCRYSTALS) 100 MG: 75; 25 CAPSULE ORAL at 21:25

## 2017-10-01 RX ADMIN — SODIUM CHLORIDE 1000 ML: 9 INJECTION, SOLUTION INTRAVENOUS at 20:48

## 2017-10-01 RX ADMIN — METRONIDAZOLE 500 MG: 500 TABLET, FILM COATED ORAL at 21:25

## 2017-10-01 RX ADMIN — Medication 10 ML: at 20:56

## 2017-10-01 RX ADMIN — KETOROLAC TROMETHAMINE 30 MG: 30 INJECTION, SOLUTION INTRAMUSCULAR at 20:19

## 2017-10-01 RX ADMIN — OXYCODONE HYDROCHLORIDE AND ACETAMINOPHEN 2 TABLET: 5; 325 TABLET ORAL at 21:25

## 2017-10-01 ASSESSMENT — PAIN DESCRIPTION - PROGRESSION
CLINICAL_PROGRESSION: NOT CHANGED
CLINICAL_PROGRESSION: GRADUALLY IMPROVING
CLINICAL_PROGRESSION: GRADUALLY WORSENING

## 2017-10-01 ASSESSMENT — PAIN DESCRIPTION - ONSET
ONSET: ON-GOING
ONSET: SUDDEN
ONSET: ON-GOING

## 2017-10-01 ASSESSMENT — PAIN DESCRIPTION - FREQUENCY
FREQUENCY: CONTINUOUS

## 2017-10-01 ASSESSMENT — ENCOUNTER SYMPTOMS
BACK PAIN: 0
VOMITING: 0
EYE PAIN: 0
NAUSEA: 0
RHINORRHEA: 0
SORE THROAT: 0
DIARRHEA: 0
COUGH: 0
SHORTNESS OF BREATH: 0
ABDOMINAL PAIN: 1

## 2017-10-01 ASSESSMENT — PAIN DESCRIPTION - PAIN TYPE
TYPE: ACUTE PAIN

## 2017-10-01 ASSESSMENT — PAIN DESCRIPTION - LOCATION
LOCATION: GROIN

## 2017-10-01 ASSESSMENT — PAIN DESCRIPTION - DESCRIPTORS
DESCRIPTORS: SHARP;STABBING
DESCRIPTORS: SHARP;STABBING;SHOOTING

## 2017-10-01 ASSESSMENT — PAIN DESCRIPTION - ORIENTATION: ORIENTATION: RIGHT

## 2017-10-01 ASSESSMENT — PAIN SCALES - GENERAL
PAINLEVEL_OUTOF10: 7
PAINLEVEL_OUTOF10: 10
PAINLEVEL_OUTOF10: 7
PAINLEVEL_OUTOF10: 10
PAINLEVEL_OUTOF10: 7

## 2017-10-01 NOTE — ED PROVIDER NOTES
[facility]  [address] [address]  Phone: [phone]    eMERGENCY dEPARTMENT eNCOUnter          Pt Name: Eriberto Ivory  MRN: [id number]  [place] 1980  Date of evaluation: 10/1/2017      CHIEF COMPLAINT       Chief Complaint   Patient presents with    Abdominal Pain     vaginal spotting       HISTORY OF PRESENT ILLNESS      The history is provided by the patient. Eriberto Ivory is a 30 y.o. female who presents with intermittent lower abdominal pain for the past week in addition to some vaginal spotting. She reports she's had a total hysterectomy in 2012. This was secondary to cancer. Patient reports the pain worsened/intensified over the past few hours. States she spoke to her PCP who advised her to come to the emergency department. Nothing makes the symptoms better or worse. Patient denies any urinary symptoms. She does report some intermittent subjective fever/chills. No trauma. No diarrhea, nausea or vomiting. She denies other concerns at this time. Patient is asking if she can drink her cappuccino and I advised that she does not until further evaluation. REVIEW OF SYSTEMS         Review of Systems   Constitutional: Negative for chills, fatigue and fever. HENT: Negative for congestion, rhinorrhea and sore throat. Eyes: Negative for pain. Respiratory: Negative for cough and shortness of breath. Cardiovascular: Negative for chest pain. Gastrointestinal: Positive for abdominal pain. Negative for diarrhea, nausea and vomiting. Genitourinary: Negative for difficulty urinating. Musculoskeletal: Negative for back pain and neck pain. Skin: Negative for rash. Neurological: Negative for weakness and headaches. All other systems reviewed and are negative. PAST MEDICAL HISTORY    has a past medical history of Abnormal EKG; Arthritis; Asthma; Bipolar disorder ([address] Bladder cancer ([address] Bone cancer ([address] Brain cancer ([address]  Breast cancer ([facility] Chronic kidney disease; CKD stage G4/A1, GFR 15-29 and albumin creatinine ratio <30 mg/g (HCC); COPD (chronic obstructive pulmonary disease) ([facility] Cyst of left kidney; Depression; DVT (deep venous thrombosis) ([facility] Endometrial cancer ([facility] Fibromyalgia; Headache; Hyponatremia; IBS (irritable bowel syndrome); Kidney disease; Migraine; Neuropathy ([facility] Pain management; PTSD (post-traumatic stress disorder); Seizures ([facility] SIADH (syndrome of inappropriate ADH production) ([facility] and Sleep apnea. SURGICAL HISTORY      has a past surgical history that includes dequan and bso (cervix removed) (2012, 2014); Bladder surgery; Cystoscopy; Skin graft; Breast lumpectomy (Bilateral); Tonsillectomy and adenoidectomy; other surgical history (06/21/2017); sacral nerve stimulator lead placement (N/A, 6/21/2017); other surgical history (07/05/2017); sacral nerve stimulator lead placement (N/A, 7/5/2017); and Bladder surgery.     CURRENT MEDICATIONS       Previous Medications    BIOTIN 1 MG CAPS    Take 1 mg by mouth daily    BUDESONIDE-FORMOTEROL (SYMBICORT) 160-4.5 MCG/ACT AERO    Inhale 2 puffs into the lungs 2 times daily    BUSPIRONE (BUSPAR) 15 MG TABLET    Take 15 mg by mouth 4 times daily    BUTALBITAL-ACETAMINOPHEN-CAFFEINE-CODEINE (FIORICET WITH CODEINE) -96-30 MG PER CAPSULE    Take by mouth as needed    CHLORPROMAZINE (THORAZINE) 25 MG TABLET    Take by mouth 3 times daily 25 mg morning  50 Mg in afternoon  50 mg nighttime     CIPRODEX 0.3-0.1 % OTIC SUSPENSION    USE 4 DROPS INTO EARS BID FOR 5 DAYS    CITRIC ACID-POTASSIUM CITRATE (POLYCITRA) 1100-334 MG/5ML SOLUTION    Take by mouth 2 times daily    CLONAZEPAM (KLONOPIN) 0.5 MG TABLET    Take by mouth 2 times daily Indications: half mg in AM, 1 mg bed time     CLONIDINE (CATAPRES) 0.1 MG TABLET    Take 1 tablet by mouth nightly    CYCLOBENZAPRINE (FLEXERIL) 10 MG TABLET    Take 10 mg by mouth 3 times daily as needed for Muscle spasms DICYCLOMINE (BENTYL) 10 MG CAPSULE    Take 2 capsules by mouth every 6 hours as needed (cramps)    DOCUSATE SODIUM (COLACE) 100 MG CAPSULE    Take 1 capsule by mouth 2 times daily as needed for Constipation    DULOXETINE (CYMBALTA) 30 MG EXTENDED RELEASE CAPSULE    Take 30 mg by mouth nightly    ESCITALOPRAM OXALATE (LEXAPRO PO)    Take 10 mg by mouth daily    FLUTICASONE (FLONASE) 50 MCG/ACT NASAL SPRAY    2 sprays by Nasal route daily     FUROSEMIDE (LASIX) 20 MG TABLET    TAKE 1 TABLET BY MOUTH DAILY    HYDROXYZINE (VISTARIL) 25 MG CAPSULE    Take 50 mg by mouth 3 times daily as needed     ILOPERIDONE (FANAPT) 6 MG TABLET    Take 1 tablet by mouth 2 times daily    IPRATROPIUM-ALBUTEROL (COMBIVENT IN)    Inhale 1 puff into the lungs 4 times daily    KETOROLAC TROMETHAMINE (TORADOL ORAL PO)    Take 50 mg by mouth 4 times daily as needed    LIDOCAINE (LMX) 4 % CREAM    Apply topically as needed for Pain Apply topically as needed. LORATADINE (CLARITIN) 10 MG TABLET    Take 10 mg by mouth daily    MECLIZINE (ANTIVERT) 25 MG TABLET    Take 25 mg by mouth 3 times daily as needed    MIRTAZAPINE (REMERON) 30 MG TABLET    Take 30 mg by mouth nightly    NADOLOL (CORGARD) 40 MG TABLET    Take 40 mg by mouth nightly     ONDANSETRON (ZOFRAN ODT) 4 MG DISINTEGRATING TABLET    Take 1 tablet by mouth every 8 hours as needed for Nausea    OXYCODONE-ACETAMINOPHEN (PERCOCET) 5-325 MG PER TABLET    Take 1-2 tablets by mouth every 6 hours as needed for Pain  WARNING:  May cause drowsiness. May impair ability to operate vehicles or machinery. Do not use in combination with alcohol. Nikky Pemberton     PANTOPRAZOLE (PROTONIX) 40 MG TABLET    Take 40 mg by mouth daily    PHENAZOPYRIDINE (PYRIDIUM) 100 MG TABLET    Take 100 mg by mouth 3 times daily as needed for Pain    PREGABALIN (LYRICA PO)    Take 225 mg by mouth 2 times daily     PROMETHAZINE (PHENERGAN) 25 MG TABLET    TK 1 T PO Q 4 TO 6 H PRN    RIZATRIPTAN (MAXALT-MLT) 10 MG DISINTEGRATING TABLET    Take 10 mg by mouth once as needed     ROPINIROLE (REQUIP) 0.5 MG TABLET    Take 1 mg by mouth 2 times daily     SUMATRIPTAN (IMITREX) 100 MG TABLET    Take 100 mg by mouth 2 times daily as needed     TIZANIDINE (ZANAFLEX) 2 MG CAPSULE    Take 2 capsules by mouth 3 times daily as needed for Muscle spasms    TOPIRAMATE (TOPAMAX) 100 MG TABLET    Take 1 tablet by mouth 2 times daily    TRAZODONE (DESYREL) 100 MG TABLET    Take 100 mg by mouth nightly       ALLERGIES     is allergic to latex; abilify [aripiprazole]; aspirin; geodon [ziprasidone hcl]; lithium; morphine; doxycycline; seroquel [quetiapine fumarate]; tape [adhesive tape]; and tramadol. FAMILY HISTORY     indicated that her mother is alive. She indicated that her father is alive. She indicated that both of her sisters are alive. She indicated that both of her brothers are alive. She indicated that her maternal grandmother is . She indicated that her daughter is alive. She indicated that her son is alive. She indicated that the status of her maternal aunt is unknown.    family history includes Alzheimer's Disease in her maternal grandmother; Breast Cancer in her mother; Cancer in her maternal aunt and sister; Endometrial Cancer in her mother; Heart Attack in her brother; Heart Disease in her maternal aunt; Heart Failure in her maternal grandmother; Kidney Disease in her sister; No Known Problems in her father; Other in her sister; Ovarian Cancer in her mother; Seizures in her son. SOCIAL HISTORY      reports that she has quit smoking. Her smoking use included Cigarettes. She has a 25.00 pack-year smoking history. She has never used smokeless tobacco. She reports that she drinks alcohol. She reports that she does not use illicit drugs. PHYSICAL EXAM     INITIAL VITALS:  height is 5' 6\" (1.676 m) and weight is 153.3 kg (338 lb) (abnormal). Her oral temperature is 98.2 °F (36.8 °C). Her blood pressure is 100/69 and her pulse is 97. Her respiration is 18 and oxygen saturation is 98%. Physical Exam   Constitutional: She is oriented to person, place, and time and well-developed, well-nourished, and in no distress. HENT:   Head: Normocephalic and atraumatic. Eyes: EOM are normal. Right eye exhibits no discharge. Left eye exhibits no discharge. Neck: Normal range of motion. Neck supple. No spinous process tenderness and no muscular tenderness present. Cardiovascular: Normal rate, regular rhythm and normal heart sounds. Pulmonary/Chest: Effort normal and breath sounds normal. No respiratory distress. She has no wheezes. Abdominal: Soft. Bowel sounds are normal. She exhibits no distension. There is tenderness in the suprapubic area. There is no rigidity and no guarding. There is some mild lower abdominal tenderness bilaterally and centrally. No upper abdominal tenderness. Genitourinary: Vagina normal and vulva normal. No vaginal discharge found. Genitourinary Comments: There is some tenderness to bilateral adnexal regions. No masses detected. Also some central tenderness over the bladder. No peritoneal signs. No vaginal bleeding or remnants of vaginal bleeding detected in the vaginal vault. Musculoskeletal: Normal range of motion. Neurological: She is alert and oriented to person, place, and time. GCS score is 15. Skin: Skin is warm and dry. No rash noted. Psychiatric: Affect normal.       DIFFERENTIAL DIAGNOSIS/ MDM:     Plan at this time will be to initiate an abdominal workup.   Relatively unremarkable pelvic exam.    DIAGNOSTIC RESULTS     EKG: All EKG's are interpreted by the Emergency Department Physician who either signs or Co-signs this chart in the absence of a cardiologist.        RADIOLOGY:   I directly visualized the following  images and reviewed the radiologist interpretations:  CT ABDOMEN PELVIS W IV CONTRAST Additional Contrast? None   Final Result   No acute disease             No results found.    LABS:  Results for orders placed or performed during the hospital encounter of 10/01/17   VAGINITIS DNA PROBE   Result Value Ref Range    Specimen Description . VAGINA     Special Requests NOT REPORTED     Direct Exam NEGATIVE for Trichomonas vaginalis     Direct Exam POSITIVE for Gardnerella vaginalis. (A)     Direct Exam POSITIVE for Candida sp. (A)     Direct Exam       Method of testing is a DNA probe intended for detection and identification of    Direct Exam        Candida species, Gardnerella vaginalis, and Trichomonas vaginalis nucleic acid    Direct Exam        in vaginal fluid specimens from patients with symptoms of vaginitis/vaginosis.     Direct Exam       Performed at [facility] Emergency Dept and [address] [facility], [address] [facility]    [place] , [facility], [address] [place] [address]     Status FINAL 10/01/2017    Urinalysis   Result Value Ref Range    Color, UA YELLOW YEL    Turbidity UA SLIGHTLY CLOUDY (A) CLEAR    Glucose, Ur NEGATIVE NEG    Bilirubin Urine NEGATIVE NEG    Ketones, Urine NEGATIVE NEG    Specific Gravity, UA 1.015 1.005 - 1.030    Urine Hgb NEGATIVE NEG    pH, UA 6.0 5.0 - 8.0    Protein, UA NEGATIVE NEG    Urobilinogen, Urine Normal NORM    Nitrite, Urine NEGATIVE NEG    Leukocyte Esterase, Urine TRACE (A) NEG    Urinalysis Comments NOT REPORTED    Comprehensive Metabolic Panel   Result Value Ref Range    Glucose 123 (H) 70 - 99 mg/dL    BUN 9 6 - 20 mg/dL    CREATININE 0.70 0.50 - 0.90 mg/dL    Bun/Cre Ratio NOT REPORTED 9 - 20    Calcium 8.7 8.6 - 10.4 mg/dL    Sodium 139 135 - 144 mmol/L    Potassium 3.6 (L) 3.7 - 5.3 mmol/L    Chloride 104 98 - 107 mmol/L    CO2 21 20 - 31 mmol/L    Anion Gap 14 9 - 17 mmol/L    Alkaline Phosphatase 105 (H) 35 - 104 U/L    ALT 24 5 - 33 U/L    AST 20 <32 U/L    Total Bilirubin 0.20 (L) 0.3 - 1.2 mg/dL    Total Protein 6.8 6.4 - 8.3 g/dL    Alb 3.5 3.5 - 5.2 g/dL    Albumin/Globulin Ratio 1.1 1.0 - 2.5    GFR Non-African American >60 >60 mL/min    GFR

## 2017-10-01 NOTE — ED AVS SNAPSHOT
cloNIDine 0.1 MG tablet   Commonly known as:  CATAPRES   Take 1 tablet by mouth nightly       COMBIVENT IN   Inhale 1 puff into the lungs 4 times daily       cyclobenzaprine 10 MG tablet   Commonly known as:  FLEXERIL   Take 10 mg by mouth 3 times daily as needed for Muscle spasms       CYMBALTA 30 MG extended release capsule   Generic drug:  DULoxetine   Take 30 mg by mouth nightly       dicyclomine 10 MG capsule   Commonly known as:  BENTYL   Take 2 capsules by mouth every 6 hours as needed (cramps)       docusate sodium 100 MG capsule   Commonly known as:  COLACE   Take 1 capsule by mouth 2 times daily as needed for Constipation       fluticasone 50 MCG/ACT nasal spray   Commonly known as:  FLONASE   2 sprays by Nasal route daily       furosemide 20 MG tablet   Commonly known as:  LASIX   TAKE 1 TABLET BY MOUTH DAILY       hydrOXYzine 25 MG capsule   Commonly known as:  VISTARIL   Take 50 mg by mouth 3 times daily as needed       iloperidone 6 MG tablet   Commonly known as:  FANAPT   Take 1 tablet by mouth 2 times daily       KLONOPIN 0.5 MG tablet   Generic drug:  clonazePAM   Take by mouth 2 times daily Indications: half mg in AM, 1 mg bed time       LEXAPRO PO   Take 10 mg by mouth daily       lidocaine 4 % cream   Commonly known as:  LMX   Apply topically as needed for Pain Apply topically as needed.        loratadine 10 MG tablet   Commonly known as:  CLARITIN   Take 10 mg by mouth daily       LYRICA PO   Take 225 mg by mouth 2 times daily       meclizine 25 MG tablet   Commonly known as:  ANTIVERT   Take 25 mg by mouth 3 times daily as needed       mirtazapine 30 MG tablet   Commonly known as:  REMERON   Take 30 mg by mouth nightly       nadolol 40 MG tablet   Commonly known as:  CORGARD   Take 40 mg by mouth nightly       ondansetron 4 MG disintegrating tablet   Commonly known as:  ZOFRAN ODT   Take 1 tablet by mouth every 8 hours as needed for Nausea       oxyCODONE-acetaminophen 5-325 MG per tablet Seroquel [Quetiapine Fumarate] Other (See Comments)    Suicidal    Tape [Adhesive Tape]     Paper tape - causes blistering/rash    Tramadol Other (See Comments)    Epilepsy      Immunizations as of 10/1/2017     Name Date Dose VIS Date Route    Influenza Vaccine, unspecified formulation 10/26/2016 -- -- --    Comment: [employer]    Tdap (Boostrix, Adacel) 2017 0.5 mL 2015 Intramuscular         After Visit Summary    This summary was created for you. Thank you for entrusting your care to us. The following information includes details about your hospital/visit stay along with steps you should take to help with your recovery once you leave the hospital.  In this packet, you will find information about the topics listed below:    · Instructions about your medications including a list of your home medications  · A summary of your hospital visit  · Follow-up appointments once you have left the hospital  · Your care plan at home      You may receive a survey regarding the care you received during your stay. Your input is valuable to us. We encourage you to complete and return your survey in the envelope provided. We hope you will choose us in the future for your healthcare needs. Patient Information     Patient Name BRYCE Garcia 1980      Care Provided at:     Name             [facility]              Your Visit    Here you will find information about your visit, including the reason for your visit. Please take this sheet with you when you visit your doctor or other health care provider in the future. It will help determine the best possible medical care for you at that time. If you have any questions once you leave the hospital, please call the department phone number listed below. Diagnoses this visit     Your diagnoses were YEAST VAGINITIS, BACTERIAL VAGINOSIS, URINARY TRACT INFECTION WITHOUT HEMATURIA, SITE UNSPECIFIED, and LOWER ABDOMINAL PAIN. Visit Information     Date of Visit Department Dept Phone    10/1/2017 [address] [place] [address] [phone]      You were seen by     You were seen by Jose Luis Levi DO and Cha Bowen DO. Follow-up Appointments    Below is a list of your follow-up and future appointments. This may not be a complete list as you may have made appointments directly with providers that we are not aware of or your providers may have made some for you. Please call your providers to confirm appointments. It is important to keep your appointments. Please bring your current insurance card, photo ID, co-pay, and all medication bottles to your appointment. If self-pay, payment is expected at the time of service. Follow-up Information     Follow up with Tristan Alcazar. Schedule an appointment as soon as possible for a visit in 1 day. Specialty:  Nurse Practitioner    Contact information:    [address] [place] [address] [address]  [phone]        Future Appointments     12/4/2017 11:15 AM     Appointment with Jennifer Jeffers MD at [facility] ([phone])   Please arrive 15 minutes prior to appointment time, bring insurance card and photo ID.    [id number]         Preventive Care        Date Due    HIV screening is recommended for all people regardless of risk factors  aged 15-65 years at least once (lifetime) who have never been HIV tested.  6/9/1997    Pneumococcal Vaccines (two) for adults aged 19-64  years who are immunocompromised or whose spleen is missing or not working  (1 of 3 - PCV13) 6/9/2001    Yearly Flu Vaccine (1) 9/1/2017    Tetanus Combination Vaccine (2 - Td) 8/8/2027            Ordered Labs Pending Results     Order Current Status    C.trachomatis N.gonorrhoeae DNA In process    Urine Culture In process           Care Plan Once You Return Home    This section includes instructions you will need to follow once you leave Date:____________Time:____________              Discharge Instructions       Follow-up with your doctor tomorrow for a recheck. Return to the emergency department right away for worsening symptoms, vomiting, fevers, change in location or type of abdominal discomfort, any other new or concerning symptoms. Abdominal Pain: Care Instructions  Your Care Instructions    Abdominal pain has many possible causes. Some aren't serious and get better on their own in a few days. Others need more testing and treatment. If your pain continues or gets worse, you need to be rechecked and may need more tests to find out what is wrong. You may need surgery to correct the problem. Don't ignore new symptoms, such as fever, nausea and vomiting, urination problems, pain that gets worse, and dizziness. These may be signs of a more serious problem. Your doctor may have recommended a follow-up visit in the next 8 to 12 hours. If you are not getting better, you may need more tests or treatment. The doctor has checked you carefully, but problems can develop later. If you notice any problems or new symptoms, get medical treatment right away. Follow-up care is a key part of your treatment and safety. Be sure to make and go to all appointments, and call your doctor if you are having problems. It's also a good idea to know your test results and keep a list of the medicines you take. How can you care for yourself at home? · Rest until you feel better. · To prevent dehydration, drink plenty of fluids, enough so that your urine is light yellow or clear like water. Choose water and other caffeine-free clear liquids until you feel better. If you have kidney, heart, or liver disease and have to limit fluids, talk with your doctor before you increase the amount of fluids you drink. · If your stomach is upset, eat mild foods, such as rice, dry toast or crackers, bananas, and applesauce.  Try eating several small meals instead of two or three large ones. · Wait until 48 hours after all symptoms have gone away before you have spicy foods, alcohol, and drinks that contain caffeine. · Do not eat foods that are high in fat. · Avoid anti-inflammatory medicines such as aspirin, ibuprofen (Advil, Motrin), and naproxen (Aleve). These can cause stomach upset. Talk to your doctor if you take daily aspirin for another health problem. When should you call for help? Call 911 anytime you think you may need emergency care. For example, call if:  · You passed out (lost consciousness). · You pass maroon or very bloody stools. · You vomit blood or what looks like coffee grounds. · You have new, severe belly pain. Call your doctor now or seek immediate medical care if:  · Your pain gets worse, especially if it becomes focused in one area of your belly. · You have a new or higher fever. · Your stools are black and look like tar, or they have streaks of blood. · You have unexpected vaginal bleeding. · You have symptoms of a urinary tract infection. These may include:  ¨ Pain when you urinate. ¨ Urinating more often than usual.  ¨ Blood in your urine. · You are dizzy or lightheaded, or you feel like you may faint. Watch closely for changes in your health, and be sure to contact your doctor if:  · You are not getting better after 1 day (24 hours). Where can you learn more? Go to https://APROOFED.GiPStech. org and sign in to your Navita account. Enter B094 in the Insight Plus box to learn more about \"Abdominal Pain: Care Instructions. \"     If you do not have an account, please click on the \"Sign Up Now\" link. Current as of: March 20, 2017  Content Version: 11.3  © 0356-4104 Slide. Care instructions adapted under license by [facility] [facility]  If you have questions about a medical condition or this instruction, always ask your healthcare professional. [employer] disclaims any warranty or liability for your use of this information. Urinary Tract Infection in Women: Care Instructions  Your Care Instructions    A urinary tract infection, or UTI, is a general term for an infection anywhere between the kidneys and the urethra (where urine comes out). Most UTIs are bladder infections. They often cause pain or burning when you urinate. UTIs are caused by bacteria and can be cured with antibiotics. Be sure to complete your treatment so that the infection goes away. Follow-up care is a key part of your treatment and safety. Be sure to make and go to all appointments, and call your doctor if you are having problems. It's also a good idea to know your test results and keep a list of the medicines you take. How can you care for yourself at home? · Take your antibiotics as directed. Do not stop taking them just because you feel better. You need to take the full course of antibiotics. · Drink extra water and other fluids for the next day or two. This may help wash out the bacteria that are causing the infection. (If you have kidney, heart, or liver disease and have to limit fluids, talk with your doctor before you increase your fluid intake.)  · Avoid drinks that are carbonated or have caffeine. They can irritate the bladder. · Urinate often. Try to empty your bladder each time. · To relieve pain, take a hot bath or lay a heating pad set on low over your lower belly or genital area. Never go to sleep with a heating pad in place. To prevent UTIs  · Drink plenty of water each day. This helps you urinate often, which clears bacteria from your system. (If you have kidney, heart, or liver disease and have to limit fluids, talk with your doctor before you increase your fluid intake.)  · Urinate when you need to. · Urinate right after you have sex. · Change sanitary pads often.   · Avoid douches, bubble baths, feminine hygiene sprays, and other feminine While the infection may go away on its own, most doctors use antibiotics to treat it. You may have been prescribed pills or vaginal cream. With treatment, bacterial vaginosis usually clears up in 5 to 7 days. Follow-up care is a key part of your treatment and safety. Be sure to make and go to all appointments, and call your doctor if you are having problems. It's also a good idea to know your test results and keep a list of the medicines you take. How can you care for yourself at home? · Take your antibiotics as directed. Do not stop taking them just because you feel better. You need to take the full course of antibiotics. · Do not eat or drink anything that contains alcohol if you are taking metronidazole (Flagyl). · Keep using your medicine if you start your period. Use pads instead of tampons while using a vaginal cream or suppository. Tampons can absorb the medicine. · Wear loose cotton clothing. Do not wear nylon and other materials that hold body heat and moisture close to the skin. · Do not scratch. Relieve itching with a cold pack or a cool bath. · Do not wash your vaginal area more than once a day. Use plain water or a mild, unscented soap. Do not douche. When should you call for help? Watch closely for changes in your health, and be sure to contact your doctor if:  · You have unexpected vaginal bleeding. · You have a fever. · You have new or increased pain in your vagina or pelvis. · You are not getting better after 1 week. · Your symptoms return after you finish the course of your medicine. Where can you learn more? Go to https://AnybodyOutTherepeSoliant Energy.bubl. org and sign in to your [email or web address] account. Enter O131 in the fg microtec box to learn more about \"Bacterial Vaginosis: Care Instructions. \"     If you do not have an account, please click on the \"Sign Up Now\" link.   Current as of: October 13, 2016  Content Version: 11.3 © 7475-4810 Healthwise, relocality. Care instructions adapted under license by [facility] ([facility]). If you have questions about a medical condition or this instruction, always ask your healthcare professional. [address] any warranty or liability for your use of this information. Vaginal Yeast Infection: Care Instructions  Your Care Instructions  A vaginal yeast infection is caused by too many yeast cells in the vagina. This is common in women of all ages. Itching, vaginal discharge and irritation, and other symptoms can bother you. But yeast infections don't often cause other health problems. Some medicines can increase your risk of getting a yeast infection. These include antibiotics, birth control pills, hormones, and steroids. You may also be more likely to get a yeast infection if you are pregnant, have diabetes, douche, or wear tight clothes. With treatment, most yeast infections get better in 2 to 3 days. Follow-up care is a key part of your treatment and safety. Be sure to make and go to all appointments, and call your doctor if you are having problems. It's also a good idea to know your test results and keep a list of the medicines you take. How can you care for yourself at home? · Take your medicines exactly as prescribed. Call your doctor if you think you are having a problem with your medicine. · Ask your doctor about over-the-counter (OTC) medicines for yeast infections. They may cost less than prescription medicines. If you use an OTC treatment, read and follow all instructions on the label. · Do not use tampons while using a vaginal cream or suppository. The tampons can absorb the medicine. Use pads instead. · Wear loose cotton clothing. Do not wear nylon or other fabric that holds body heat and moisture close to the skin. · Try sleeping without underwear. · Do not scratch. Relieve itching with a cold pack or a cool bath. · Do not wash your vaginal area more than once a day. Use plain water or a mild, unscented soap. Air-dry the vaginal area. · Change out of wet swimsuits after swimming. · Do not have sex until you have finished your treatment. · Do not douche. When should you call for help? Call your doctor now or seek immediate medical care if:  · You have unexpected vaginal bleeding. · You have new or increased pain in your vagina or pelvis. Watch closely for changes in your health, and be sure to contact your doctor if:  · You have a fever. · You are not getting better after 2 days. · Your symptoms come back after you finish your medicines. Where can you learn more? Go to https://ClearPoint Learning Systemspeabeoeb.DreamLines. org and sign in to your Mint Labs account. Enter M565 in the Submittable box to learn more about \"Vaginal Yeast Infection: Care Instructions. \"     If you do not have an account, please click on the \"Sign Up Now\" link. Current as of: October 13, 2016  Content Version: 11.3  © 0123-8770 [employer]. Care instructions adapted under license by [facility] ([facility]). If you have questions about a medical condition or this instruction, always ask your healthcare professional. [place] [address] any warranty or liability for your use of this information.

## 2017-10-02 NOTE — ED TRIAGE NOTES
Pt to ed with complaints of lower abdominal pain radiating to her groin with some vaginal spotting x 1 week. Pt denies any vomiting or diarrhea. Pt states that she called her pcp and was told to come directly to the ER for evaluation.

## 2017-10-03 LAB
C TRACH DNA GENITAL QL NAA+PROBE: NEGATIVE
CULTURE: NORMAL
CULTURE: NORMAL
Lab: NORMAL
N. GONORRHOEAE DNA: NEGATIVE
SPECIMEN DESCRIPTION: NORMAL
SPECIMEN DESCRIPTION: NORMAL
STATUS: NORMAL

## 2017-11-08 ENCOUNTER — TELEPHONE (OUTPATIENT)
Dept: UROLOGY | Age: 37
End: 2017-11-08

## 2017-11-08 ENCOUNTER — OFFICE VISIT (OUTPATIENT)
Dept: UROLOGY | Age: 37
End: 2017-11-08
Payer: COMMERCIAL

## 2017-11-08 VITALS
BODY MASS INDEX: 47.09 KG/M2 | TEMPERATURE: 97.7 F | WEIGHT: 293 LBS | DIASTOLIC BLOOD PRESSURE: 69 MMHG | HEIGHT: 66 IN | HEART RATE: 89 BPM | SYSTOLIC BLOOD PRESSURE: 105 MMHG

## 2017-11-08 DIAGNOSIS — N39.46 MIXED INCONTINENCE: Primary | ICD-10-CM

## 2017-11-08 PROCEDURE — 95970 ALYS NPGT W/O PRGRMG: CPT | Performed by: NURSE PRACTITIONER

## 2017-11-08 ASSESSMENT — ENCOUNTER SYMPTOMS
BACK PAIN: 1
EYE REDNESS: 0
VOMITING: 0
COUGH: 0
ABDOMINAL PAIN: 0
SHORTNESS OF BREATH: 0
NAUSEA: 0
WHEEZING: 0
EYE PAIN: 0
COLOR CHANGE: 0
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
## 2017-11-10 ENCOUNTER — TELEPHONE (OUTPATIENT)
Dept: UROLOGY | Age: 37
End: 2017-11-10
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
## 2017-11-20 ENCOUNTER — TELEPHONE (OUTPATIENT)
Dept: UROLOGY | Age: 37
End: 2017-11-20

## 2017-11-27 ENCOUNTER — HOSPITAL ENCOUNTER (OUTPATIENT)
Dept: PREADMISSION TESTING | Age: 37
Discharge: HOME | End: 2017-11-27
Payer: COMMERCIAL

## 2017-11-27 VITALS
RESPIRATION RATE: 16 BRPM | WEIGHT: 293 LBS | HEART RATE: 77 BPM | SYSTOLIC BLOOD PRESSURE: 121 MMHG | OXYGEN SATURATION: 96 % | TEMPERATURE: 97.9 F | BODY MASS INDEX: 44.41 KG/M2 | DIASTOLIC BLOOD PRESSURE: 78 MMHG | HEIGHT: 68 IN

## 2017-11-27 LAB
ANION GAP SERPL CALCULATED.3IONS-SCNC: 14 MMOL/L (ref 9–17)
BUN BLDV-MCNC: 12 MG/DL (ref 6–20)
CHLORIDE BLD-SCNC: 102 MMOL/L (ref 98–107)
CO2: 22 MMOL/L (ref 20–31)
CREAT SERPL-MCNC: 0.92 MG/DL (ref 0.5–0.9)
EKG ATRIAL RATE: 69 BPM
EKG P AXIS: 18 DEGREES
EKG P-R INTERVAL: 158 MS
EKG Q-T INTERVAL: 408 MS
EKG QRS DURATION: 104 MS
EKG QTC CALCULATION (BAZETT): 437 MS
EKG R AXIS: -22 DEGREES
EKG T AXIS: 14 DEGREES
EKG VENTRICULAR RATE: 69 BPM
GFR AFRICAN AMERICAN: >60 ML/MIN
GFR NON-AFRICAN AMERICAN: >60 ML/MIN
GFR SERPL CREATININE-BSD FRML MDRD: ABNORMAL ML/MIN/{1.73_M2}
GFR SERPL CREATININE-BSD FRML MDRD: ABNORMAL ML/MIN/{1.73_M2}
GLUCOSE BLD-MCNC: 79 MG/DL (ref 70–99)
POTASSIUM SERPL-SCNC: 3.5 MMOL/L (ref 3.7–5.3)
SODIUM BLD-SCNC: 138 MMOL/L (ref 135–144)

## 2017-11-27 PROCEDURE — 82565 ASSAY OF CREATININE: CPT

## 2017-11-27 PROCEDURE — 84520 ASSAY OF UREA NITROGEN: CPT

## 2017-11-27 PROCEDURE — 80051 ELECTROLYTE PANEL: CPT

## 2017-11-27 PROCEDURE — 82947 ASSAY GLUCOSE BLOOD QUANT: CPT

## 2017-11-27 PROCEDURE — 36415 COLL VENOUS BLD VENIPUNCTURE: CPT

## 2017-11-27 PROCEDURE — 93005 ELECTROCARDIOGRAM TRACING: CPT

## 2017-11-27 PROCEDURE — 87086 URINE CULTURE/COLONY COUNT: CPT

## 2017-11-27 RX ORDER — FUROSEMIDE 20 MG/1
20 TABLET ORAL DAILY PRN
Status: ON HOLD | COMMUNITY
End: 2018-01-29 | Stop reason: HOSPADM

## 2017-11-27 RX ORDER — SODIUM CHLORIDE, SODIUM LACTATE, POTASSIUM CHLORIDE, CALCIUM CHLORIDE 600; 310; 30; 20 MG/100ML; MG/100ML; MG/100ML; MG/100ML
1000 INJECTION, SOLUTION INTRAVENOUS CONTINUOUS
Status: CANCELLED | OUTPATIENT
Start: 2017-11-27

## 2017-11-27 RX ORDER — HYDROXYZINE PAMOATE 50 MG/1
50 CAPSULE ORAL 4 TIMES DAILY PRN
Status: ON HOLD | COMMUNITY
End: 2018-01-29

## 2017-11-27 ASSESSMENT — PAIN DESCRIPTION - PAIN TYPE: TYPE: ACUTE PAIN

## 2017-11-27 ASSESSMENT — PAIN DESCRIPTION - FREQUENCY: FREQUENCY: CONTINUOUS

## 2017-11-27 ASSESSMENT — PAIN DESCRIPTION - LOCATION: LOCATION: BACK

## 2017-11-27 ASSESSMENT — PAIN SCALES - GENERAL: PAINLEVEL_OUTOF10: 8

## 2017-11-27 NOTE — PROGRESS NOTES
Anesthesia Focused Assessment    STOP-BANG Sleep Apnea Questionnaire    SNORE loudly (heard through closed doors)? Yes  TIRED, fatigued, sleepy during daytime? Yes  OBSERVED stopping breathing during sleep? Yes  High blood PRESSURE being treated? No    BMI over 35? Yes  AGE over 48? No  NECK circumference over 16\"? Yes  GENDER (male)? No             Total 5  High risk 5-8  Intermediate risk 3-4  Low risk 0-2    Obstructive Sleep Apnea: yes  If YES, machine used: cpap     Type 1 DM:   no  T2DM:  no    Coronary Artery Disease:  no  Hypertension:  no    Active smoker:  1 ppd for 24 years  Drinks Alcohol:  No  Marijuana history    Dentition: benign    Defib / AICD / Pacemaker: no      Renal Failure/dialysis:  no    Patient was evaluated in PAT & anesthesia guidelines were applied. NPO guidelines, medication instructions and scheduled arrival time were reviewed with patient. Hx of anesthesia complications:  no  Family hx of anesthesia complications:  no                                                                                                                     Anesthesia contacted:   no  Medical or cardiac clearance ordered: patient has been cleared, per patient, by Dr. Jennifer Penaloza (pulmonary), Dr. Ebenezer Sanchez (neurology). We will obtain copies of clearances. Patient has been cleared by PCP as well, will fax EKG to PCP and obtain copy of clearance.     Difficult IV start, may need PICC team.    Laureano Gong PA-C  11/27/17  9:38 AM
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
## 2017-11-28 LAB
CULTURE: NORMAL
CULTURE: NORMAL
Lab: NORMAL
SPECIMEN DESCRIPTION: NORMAL
STATUS: NORMAL

## 2017-12-01 ENCOUNTER — APPOINTMENT (OUTPATIENT)
Dept: GENERAL RADIOLOGY | Age: 37
End: 2017-12-01
Attending: UROLOGY
Payer: COMMERCIAL

## 2017-12-01 ENCOUNTER — HOSPITAL ENCOUNTER (OUTPATIENT)
Age: 37
Setting detail: OUTPATIENT SURGERY
Discharge: HOME | End: 2017-12-01
Attending: UROLOGY | Admitting: UROLOGY
Payer: COMMERCIAL

## 2017-12-01 ENCOUNTER — ANESTHESIA (OUTPATIENT)
Dept: OPERATING ROOM | Age: 37
End: 2017-12-01
Payer: COMMERCIAL

## 2017-12-01 ENCOUNTER — ANESTHESIA EVENT (OUTPATIENT)
Dept: OPERATING ROOM | Age: 37
End: 2017-12-01
Payer: COMMERCIAL

## 2017-12-01 VITALS
HEART RATE: 82 BPM | TEMPERATURE: 97 F | BODY MASS INDEX: 44.41 KG/M2 | SYSTOLIC BLOOD PRESSURE: 137 MMHG | OXYGEN SATURATION: 94 % | RESPIRATION RATE: 14 BRPM | HEIGHT: 68 IN | WEIGHT: 293 LBS | DIASTOLIC BLOOD PRESSURE: 82 MMHG

## 2017-12-01 VITALS — TEMPERATURE: 95.5 F | SYSTOLIC BLOOD PRESSURE: 123 MMHG | OXYGEN SATURATION: 95 % | DIASTOLIC BLOOD PRESSURE: 97 MMHG

## 2017-12-01 LAB — HCG, PREGNANCY URINE (POC): NEGATIVE

## 2017-12-01 PROCEDURE — 2500000003 HC RX 250 WO HCPCS: Performed by: NURSE ANESTHETIST, CERTIFIED REGISTERED

## 2017-12-01 PROCEDURE — 3600000003 HC SURGERY LEVEL 3 BASE: Performed by: UROLOGY

## 2017-12-01 PROCEDURE — 6360000002 HC RX W HCPCS: Performed by: STUDENT IN AN ORGANIZED HEALTH CARE EDUCATION/TRAINING PROGRAM

## 2017-12-01 PROCEDURE — 3700000000 HC ANESTHESIA ATTENDED CARE: Performed by: UROLOGY

## 2017-12-01 PROCEDURE — 3600000013 HC SURGERY LEVEL 3 ADDTL 15MIN: Performed by: UROLOGY

## 2017-12-01 PROCEDURE — 6360000002 HC RX W HCPCS: Performed by: NURSE ANESTHETIST, CERTIFIED REGISTERED

## 2017-12-01 PROCEDURE — C1767 GENERATOR, NEURO NON-RECHARG: HCPCS | Performed by: UROLOGY

## 2017-12-01 PROCEDURE — 84703 CHORIONIC GONADOTROPIN ASSAY: CPT

## 2017-12-01 PROCEDURE — 7100000001 HC PACU RECOVERY - ADDTL 15 MIN: Performed by: UROLOGY

## 2017-12-01 PROCEDURE — 88300 SURGICAL PATH GROSS: CPT

## 2017-12-01 PROCEDURE — 72220 X-RAY EXAM SACRUM TAILBONE: CPT

## 2017-12-01 PROCEDURE — 6360000002 HC RX W HCPCS: Performed by: ANESTHESIOLOGY

## 2017-12-01 PROCEDURE — C1894 INTRO/SHEATH, NON-LASER: HCPCS | Performed by: UROLOGY

## 2017-12-01 PROCEDURE — A6403 STERILE GAUZE>16 <= 48 SQ IN: HCPCS | Performed by: UROLOGY

## 2017-12-01 PROCEDURE — 3700000001 HC ADD 15 MINUTES (ANESTHESIA): Performed by: UROLOGY

## 2017-12-01 PROCEDURE — 2580000003 HC RX 258: Performed by: ANESTHESIOLOGY

## 2017-12-01 PROCEDURE — 7100000000 HC PACU RECOVERY - FIRST 15 MIN: Performed by: UROLOGY

## 2017-12-01 PROCEDURE — A6258 TRANSPARENT FILM >16<=48 IN: HCPCS | Performed by: UROLOGY

## 2017-12-01 PROCEDURE — 2500000003 HC RX 250 WO HCPCS: Performed by: UROLOGY

## 2017-12-01 PROCEDURE — A4364 ADHESIVE, LIQUID OR EQUAL: HCPCS | Performed by: UROLOGY

## 2017-12-01 PROCEDURE — 7100000040 HC SPAR PHASE II RECOVERY - FIRST 15 MIN: Performed by: UROLOGY

## 2017-12-01 PROCEDURE — C1778 LEAD, NEUROSTIMULATOR: HCPCS | Performed by: UROLOGY

## 2017-12-01 PROCEDURE — 7100000041 HC SPAR PHASE II RECOVERY - ADDTL 15 MIN: Performed by: UROLOGY

## 2017-12-01 PROCEDURE — 6370000000 HC RX 637 (ALT 250 FOR IP): Performed by: ANESTHESIOLOGY

## 2017-12-01 DEVICE — KIT NEUROSTIMULATOR LD L28CM DIA1.27MM ELECTRD SPC 1.5MM: Type: IMPLANTABLE DEVICE | Site: BACK | Status: FUNCTIONAL

## 2017-12-01 DEVICE — Z DUP USE 2628873 GENERATOR NEUROSTIMULATOR H1.7XL2IN THK3IN TORQ WRNCH PROD: Type: IMPLANTABLE DEVICE | Site: BACK | Status: FUNCTIONAL

## 2017-12-01 RX ORDER — FENTANYL CITRATE 50 UG/ML
50 INJECTION, SOLUTION INTRAMUSCULAR; INTRAVENOUS EVERY 5 MIN PRN
Status: COMPLETED | OUTPATIENT
Start: 2017-12-01 | End: 2017-12-01

## 2017-12-01 RX ORDER — MIDAZOLAM HYDROCHLORIDE 1 MG/ML
2 INJECTION INTRAMUSCULAR; INTRAVENOUS ONCE
Status: COMPLETED | OUTPATIENT
Start: 2017-12-01 | End: 2017-12-01

## 2017-12-01 RX ORDER — DIPHENHYDRAMINE HYDROCHLORIDE 50 MG/ML
12.5 INJECTION INTRAMUSCULAR; INTRAVENOUS
Status: DISCONTINUED | OUTPATIENT
Start: 2017-12-01 | End: 2017-12-01 | Stop reason: HOSPADM

## 2017-12-01 RX ORDER — OXYCODONE HYDROCHLORIDE AND ACETAMINOPHEN 5; 325 MG/1; MG/1
1 TABLET ORAL PRN
Status: COMPLETED | OUTPATIENT
Start: 2017-12-01 | End: 2017-12-01

## 2017-12-01 RX ORDER — HYDROCODONE BITARTRATE AND ACETAMINOPHEN 5; 325 MG/1; MG/1
2 TABLET ORAL EVERY 6 HOURS PRN
Qty: 20 TABLET | Refills: 0 | Status: SHIPPED | OUTPATIENT
Start: 2017-12-01 | End: 2020-10-05

## 2017-12-01 RX ORDER — LIDOCAINE HYDROCHLORIDE 10 MG/ML
INJECTION, SOLUTION EPIDURAL; INFILTRATION; INTRACAUDAL; PERINEURAL PRN
Status: DISCONTINUED | OUTPATIENT
Start: 2017-12-01 | End: 2017-12-01 | Stop reason: SDUPTHER

## 2017-12-01 RX ORDER — HYDRALAZINE HYDROCHLORIDE 20 MG/ML
5 INJECTION INTRAMUSCULAR; INTRAVENOUS EVERY 10 MIN PRN
Status: DISCONTINUED | OUTPATIENT
Start: 2017-12-01 | End: 2017-12-01 | Stop reason: HOSPADM

## 2017-12-01 RX ORDER — MIDAZOLAM HYDROCHLORIDE 1 MG/ML
INJECTION INTRAMUSCULAR; INTRAVENOUS PRN
Status: DISCONTINUED | OUTPATIENT
Start: 2017-12-01 | End: 2017-12-01 | Stop reason: SDUPTHER

## 2017-12-01 RX ORDER — OXYCODONE HYDROCHLORIDE AND ACETAMINOPHEN 5; 325 MG/1; MG/1
2 TABLET ORAL PRN
Status: COMPLETED | OUTPATIENT
Start: 2017-12-01 | End: 2017-12-01

## 2017-12-01 RX ORDER — WATER 1000 ML/1000ML
INJECTION, SOLUTION INTRAVENOUS PRN
Status: DISCONTINUED | OUTPATIENT
Start: 2017-12-01 | End: 2017-12-01 | Stop reason: HOSPADM

## 2017-12-01 RX ORDER — LABETALOL HYDROCHLORIDE 5 MG/ML
5 INJECTION, SOLUTION INTRAVENOUS EVERY 10 MIN PRN
Status: DISCONTINUED | OUTPATIENT
Start: 2017-12-01 | End: 2017-12-01 | Stop reason: HOSPADM

## 2017-12-01 RX ORDER — ONDANSETRON 2 MG/ML
4 INJECTION INTRAMUSCULAR; INTRAVENOUS
Status: DISCONTINUED | OUTPATIENT
Start: 2017-12-01 | End: 2017-12-01 | Stop reason: HOSPADM

## 2017-12-01 RX ORDER — SUCCINYLCHOLINE CHLORIDE 20 MG/ML
INJECTION INTRAMUSCULAR; INTRAVENOUS PRN
Status: DISCONTINUED | OUTPATIENT
Start: 2017-12-01 | End: 2017-12-01 | Stop reason: SDUPTHER

## 2017-12-01 RX ORDER — DOCUSATE SODIUM 100 MG/1
100 CAPSULE, LIQUID FILLED ORAL 2 TIMES DAILY PRN
Qty: 30 CAPSULE | Refills: 1 | Status: SHIPPED | OUTPATIENT
Start: 2017-12-01 | End: 2020-10-05

## 2017-12-01 RX ORDER — FENTANYL CITRATE 50 UG/ML
25 INJECTION, SOLUTION INTRAMUSCULAR; INTRAVENOUS EVERY 5 MIN PRN
Status: COMPLETED | OUTPATIENT
Start: 2017-12-01 | End: 2017-12-01

## 2017-12-01 RX ORDER — ONDANSETRON 2 MG/ML
INJECTION INTRAMUSCULAR; INTRAVENOUS PRN
Status: DISCONTINUED | OUTPATIENT
Start: 2017-12-01 | End: 2017-12-01 | Stop reason: SDUPTHER

## 2017-12-01 RX ORDER — MIDAZOLAM HYDROCHLORIDE 1 MG/ML
INJECTION INTRAMUSCULAR; INTRAVENOUS
Status: DISCONTINUED
Start: 2017-12-01 | End: 2017-12-01 | Stop reason: HOSPADM

## 2017-12-01 RX ORDER — FENTANYL CITRATE 50 UG/ML
INJECTION, SOLUTION INTRAMUSCULAR; INTRAVENOUS PRN
Status: DISCONTINUED | OUTPATIENT
Start: 2017-12-01 | End: 2017-12-01 | Stop reason: SDUPTHER

## 2017-12-01 RX ORDER — PROPOFOL 10 MG/ML
INJECTION, EMULSION INTRAVENOUS PRN
Status: DISCONTINUED | OUTPATIENT
Start: 2017-12-01 | End: 2017-12-01 | Stop reason: SDUPTHER

## 2017-12-01 RX ORDER — BUPIVACAINE HYDROCHLORIDE AND EPINEPHRINE 2.5; 5 MG/ML; UG/ML
INJECTION, SOLUTION EPIDURAL; INFILTRATION; INTRACAUDAL; PERINEURAL PRN
Status: DISCONTINUED | OUTPATIENT
Start: 2017-12-01 | End: 2017-12-01 | Stop reason: HOSPADM

## 2017-12-01 RX ORDER — MEPERIDINE HYDROCHLORIDE 50 MG/ML
12.5 INJECTION INTRAMUSCULAR; INTRAVENOUS; SUBCUTANEOUS EVERY 5 MIN PRN
Status: DISCONTINUED | OUTPATIENT
Start: 2017-12-01 | End: 2017-12-01 | Stop reason: HOSPADM

## 2017-12-01 RX ORDER — SULFAMETHOXAZOLE AND TRIMETHOPRIM 800; 160 MG/1; MG/1
1 TABLET ORAL 2 TIMES DAILY
Qty: 6 TABLET | Refills: 0 | Status: SHIPPED | OUTPATIENT
Start: 2017-12-01 | End: 2017-12-04

## 2017-12-01 RX ORDER — SODIUM CHLORIDE, SODIUM LACTATE, POTASSIUM CHLORIDE, CALCIUM CHLORIDE 600; 310; 30; 20 MG/100ML; MG/100ML; MG/100ML; MG/100ML
1000 INJECTION, SOLUTION INTRAVENOUS CONTINUOUS
Status: DISCONTINUED | OUTPATIENT
Start: 2017-12-01 | End: 2017-12-01 | Stop reason: HOSPADM

## 2017-12-01 RX ADMIN — FENTANYL CITRATE 25 MCG: 50 INJECTION INTRAMUSCULAR; INTRAVENOUS at 14:00

## 2017-12-01 RX ADMIN — SUCCINYLCHOLINE CHLORIDE 60 MG: 20 INJECTION, SOLUTION INTRAMUSCULAR; INTRAVENOUS at 11:48

## 2017-12-01 RX ADMIN — MIDAZOLAM HYDROCHLORIDE 0.5 MG: 1 INJECTION, SOLUTION INTRAMUSCULAR; INTRAVENOUS at 11:46

## 2017-12-01 RX ADMIN — FENTANYL CITRATE 50 MCG: 50 INJECTION INTRAMUSCULAR; INTRAVENOUS at 13:35

## 2017-12-01 RX ADMIN — FENTANYL CITRATE 25 MCG: 50 INJECTION INTRAMUSCULAR; INTRAVENOUS at 13:55

## 2017-12-01 RX ADMIN — FENTANYL CITRATE 50 MCG: 50 INJECTION INTRAMUSCULAR; INTRAVENOUS at 11:46

## 2017-12-01 RX ADMIN — FENTANYL CITRATE 50 MCG: 50 INJECTION INTRAMUSCULAR; INTRAVENOUS at 14:20

## 2017-12-01 RX ADMIN — ONDANSETRON 4 MG: 2 INJECTION INTRAMUSCULAR; INTRAVENOUS at 12:33

## 2017-12-01 RX ADMIN — LIDOCAINE HYDROCHLORIDE 50 MG: 10 INJECTION, SOLUTION EPIDURAL; INFILTRATION; INTRACAUDAL; PERINEURAL at 11:48

## 2017-12-01 RX ADMIN — MIDAZOLAM HYDROCHLORIDE 0.5 MG: 1 INJECTION, SOLUTION INTRAMUSCULAR; INTRAVENOUS at 11:48

## 2017-12-01 RX ADMIN — FENTANYL CITRATE 25 MCG: 50 INJECTION INTRAMUSCULAR; INTRAVENOUS at 13:50

## 2017-12-01 RX ADMIN — FENTANYL CITRATE 50 MCG: 50 INJECTION INTRAMUSCULAR; INTRAVENOUS at 14:15

## 2017-12-01 RX ADMIN — MIDAZOLAM HYDROCHLORIDE 2 MG: 1 INJECTION, SOLUTION INTRAMUSCULAR; INTRAVENOUS at 11:37

## 2017-12-01 RX ADMIN — PROPOFOL 200 MG: 10 INJECTION, EMULSION INTRAVENOUS at 11:48

## 2017-12-01 RX ADMIN — SODIUM CHLORIDE, POTASSIUM CHLORIDE, SODIUM LACTATE AND CALCIUM CHLORIDE 1000 ML: 600; 310; 30; 20 INJECTION, SOLUTION INTRAVENOUS at 10:42

## 2017-12-01 RX ADMIN — FENTANYL CITRATE 25 MCG: 50 INJECTION INTRAMUSCULAR; INTRAVENOUS at 13:45

## 2017-12-01 RX ADMIN — OXYCODONE HYDROCHLORIDE AND ACETAMINOPHEN 2 TABLET: 5; 325 TABLET ORAL at 13:49

## 2017-12-01 RX ADMIN — Medication 3 G: at 12:00

## 2017-12-01 RX ADMIN — FENTANYL CITRATE 50 MCG: 50 INJECTION INTRAMUSCULAR; INTRAVENOUS at 13:40

## 2017-12-01 ASSESSMENT — PULMONARY FUNCTION TESTS
PIF_VALUE: 3
PIF_VALUE: 25
PIF_VALUE: 26
PIF_VALUE: 8
PIF_VALUE: 22
PIF_VALUE: 25
PIF_VALUE: 2
PIF_VALUE: 27
PIF_VALUE: 26
PIF_VALUE: 0
PIF_VALUE: 26
PIF_VALUE: 25
PIF_VALUE: 2
PIF_VALUE: 26
PIF_VALUE: 24
PIF_VALUE: 26
PIF_VALUE: 27
PIF_VALUE: 1
PIF_VALUE: 27
PIF_VALUE: 25
PIF_VALUE: 26
PIF_VALUE: 25
PIF_VALUE: 25
PIF_VALUE: 27
PIF_VALUE: 0
PIF_VALUE: 27
PIF_VALUE: 25
PIF_VALUE: 35
PIF_VALUE: 33
PIF_VALUE: 28
PIF_VALUE: 25
PIF_VALUE: 27
PIF_VALUE: 25
PIF_VALUE: 2
PIF_VALUE: 25
PIF_VALUE: 26
PIF_VALUE: 28
PIF_VALUE: 27
PIF_VALUE: 32
PIF_VALUE: 33
PIF_VALUE: 25
PIF_VALUE: 26
PIF_VALUE: 32
PIF_VALUE: 26
PIF_VALUE: 30
PIF_VALUE: 26
PIF_VALUE: 25
PIF_VALUE: 26
PIF_VALUE: 25
PIF_VALUE: 27
PIF_VALUE: 25
PIF_VALUE: 26
PIF_VALUE: 26
PIF_VALUE: 25
PIF_VALUE: 26
PIF_VALUE: 26
PIF_VALUE: 27
PIF_VALUE: 30
PIF_VALUE: 25
PIF_VALUE: 26
PIF_VALUE: 25
PIF_VALUE: 26
PIF_VALUE: 25
PIF_VALUE: 26

## 2017-12-01 ASSESSMENT — PAIN DESCRIPTION - PAIN TYPE
TYPE: SURGICAL PAIN
TYPE: SURGICAL PAIN

## 2017-12-01 ASSESSMENT — PAIN SCALES - GENERAL
PAINLEVEL_OUTOF10: 9
PAINLEVEL_OUTOF10: 10

## 2017-12-01 ASSESSMENT — PAIN DESCRIPTION - LOCATION: LOCATION: BACK

## 2017-12-01 NOTE — H&P
History and Physical Update    Pt Name: Vamsi Omalley  MRN: [id number]  YOB: 1980  Date of evaluation: 12/1/2017    [x] I have examined the patient and reviewed the H&P/Consult and there are no changes to the patient or plans.     [] I have examined the patient and reviewed the H&P/Consult and have noted the following changes:        Walt Fernandez [facility]  electronically signed 12/1/2017 at 9:46 AM

## 2017-12-01 NOTE — ANESTHESIA PRE PROCEDURE
butalbital-acetaminophen-caffeine-codeine (FIORICET WITH CODEINE) -82-30 MG per capsule Take 1 capsule by mouth every 8 hours as needed  .  3/15/17  Yes Historical Provider, MD   Pregabalin (LYRICA PO) Take 225 mg by mouth 2 times daily    Yes Historical Provider, MD   chlorproMAZINE (THORAZINE) 25 MG tablet Take by mouth 3 times daily 25 mg morning  50 Mg in afternoon  50 mg nighttime    Yes Historical Provider, MD   busPIRone (BUSPAR) 15 MG tablet Take 15 mg by mouth 4 times daily   Yes Historical Provider, MD   DULoxetine (CYMBALTA) 30 MG extended release capsule Take 30 mg by mouth nightly   Yes Historical Provider, MD   iloperidone (FANAPT) 6 MG tablet Take 1 tablet by mouth 2 times daily 5/7/17  Yes Kathy Ken MD   pantoprazole (PROTONIX) 40 MG tablet Take 40 mg by mouth daily   Yes Historical Provider, MD   tiZANidine (ZANAFLEX) 2 MG capsule Take 2 capsules by mouth 3 times daily as needed for Muscle spasms 1/18/17  Yes David Shea,    lidocaine (LMX) 4 % cream Apply topically daily as needed for Pain    Yes Historical Provider, MD   ondansetron (ZOFRAN ODT) 4 MG disintegrating tablet Take 1 tablet by mouth every 8 hours as needed for Nausea 1/1/17  Yes Arley Perales MD   topiramate (TOPAMAX) 100 MG tablet Take 1 tablet by mouth 2 times daily 11/11/16  Yes Hubert Bhardwaj MD   Biotin 1 MG CAPS Take 1 mg by mouth daily   Yes Historical Provider, MD   rOPINIRole (REQUIP) 0.5 MG tablet Take 1 mg by mouth 2 times daily    Yes Historical Provider, MD   nadolol (CORGARD) 40 MG tablet Take 40 mg by mouth nightly    Yes Historical Provider, MD   SUMAtriptan (IMITREX) 100 MG tablet Take 100 mg by mouth 2 times daily as needed  5/24/16  Yes Historical Provider, MD   loratadine (CLARITIN) 10 MG tablet Take 10 mg by mouth daily   Yes Historical Provider, MD   cloNIDine (CATAPRES) 0.1 MG tablet Take 1 tablet by mouth nightly 5/22/16  Yes Hubert Bhardwaj MD   docusate sodium (COLACE) 100 MG capsule Take 1 capsule by mouth 2 times daily as needed for Constipation 6/21/17   Marcelino Mar MD   phenazopyridine (PYRIDIUM) 100 MG tablet Take 100 mg by mouth 3 times daily as needed for Pain    Historical Provider, MD   rizatriptan (MAXALT-MLT) 10 MG disintegrating tablet Take 10 mg by mouth once as needed  3/14/17   Historical Provider, MD   dicyclomine (BENTYL) 10 MG capsule Take 2 capsules by mouth every 6 hours as needed (cramps) 3/5/17   Mj Xiong MD   meclizine (ANTIVERT) 25 MG tablet Take 25 mg by mouth 3 times daily as needed    Historical Provider, MD       Current medications:    Current Facility-Administered Medications   Medication Dose Route Frequency Provider Last Rate Last Dose    lactated ringers infusion 1,000 mL  1,000 mL Intravenous Continuous Vicki Vaca MD           Allergies:     Allergies   Allergen Reactions    Latex Anaphylaxis and Hives    Abilify [Aripiprazole] Other (See Comments)     agitation    Aspirin Shortness Of Breath    Geodon [Ziprasidone Hcl] Anaphylaxis    Lithium Anaphylaxis    Morphine Anaphylaxis and Hives    Doxycycline Hives    Seroquel [Quetiapine Fumarate] Other (See Comments)     Suicidal    Tape Verline Fine Tape]      Paper tape - causes blistering/rash    Tramadol Other (See Comments)     Epilepsy       Problem List:    Patient Active Problem List   Diagnosis Code    Hyponatremia E87.1    Acute cystitis without hematuria N30.00    Morbid obesity due to excess calories ([facility]) E66.01    PTSD (post-traumatic stress disorder) F43.10    Asthma J45.909    Endometrial cancer ([facility] [address] C54.1    CKD stage G4/A1, GFR 15-29 and albumin creatinine ratio <30 mg/g ([facility]) N18.4    Depression F32.9    Fibromyalgia M79.7    Neuropathy ([facility]) G62.9    Seizures ([facility]) R56.9    Arthritis M19.90    DVT (deep venous thrombosis) ([facility]) I82.409    Bipolar disorder ([facility]) F31.9    SIADH (syndrome of inappropriate ADH production) ([facility]) E22.2    Cyst of left kidney N28.1    Value Date    PROTIME 10.4 08/09/2017    INR 1.0 08/09/2017    APTT 28.8 08/09/2017       HCG (If Applicable):   Lab Results   Component Value Date    PREGTESTUR NEGATIVE 11/10/2016        ABGs: No results found for: PHART, PO2ART, XTR8MVR, PTE3TEG, BEART, R9ILSUVG     Type & Screen (If Applicable):  No results found for: LABABO, LABRH    Anesthesia Evaluation    Airway: Mallampati: III  TM distance: <3 FB   Neck ROM: full  Mouth opening: < 3 FB Dental:          Pulmonary:   (+) COPD:  sleep apnea:  decreased breath sounds,  asthma:                           ROS comment: smoker   Cardiovascular:Negative CV ROS            Rhythm: regular  Rate: normal                    Neuro/Psych:   (+) headaches:, depression/anxiety             GI/Hepatic/Renal:   (+) GERD:,           Endo/Other:                      ROS comment: Morbid obesity Abdominal:   (+) obese,         Vascular: negative vascular ROS. Anesthesia Plan      MAC and general     ASA 3     (M Obesity    HTn RONALD)  Induction: intravenous. Anesthetic plan and risks discussed with patient. Plan discussed with CRNA.                   Tj Espitia MD   12/1/2017
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
## 2017-12-01 NOTE — ANESTHESIA POSTPROCEDURE EVALUATION
Department of Anesthesiology  Postprocedure Note    Patient: Dania Noyola  MRN: [id number]  [place]: 1980  Date of evaluation: 12/1/2017  Time:  3:03 PM     Procedure Summary     Date:  12/01/17 Room / Location:  [facility] OR [address] [place] [address] OR    Anesthesia Start:  1144 Anesthesia Stop:  6894    Procedure:  LEAD AND GENERATOR REMOVAL, STAGE 1 AND 2 INTERSTIM, C-ARM   NSA= GENERAL VS MAC, OFFICE NOTIFIED REP. (N/A Back) Diagnosis:  (MIXED INCONTINENCE, ELECTRICAL SHORT IN STIMULATOR )    Surgeon:  Gwendolyn Luciano MD Responsible Provider:  Martha Rowell MD    Anesthesia Type:  general ASA Status:  3          Anesthesia Type: general    Sara Phase I: Sara Score: 10    Sara Phase II:      Last vitals: Reviewed and per EMR flowsheets.        Anesthesia Post Evaluation    Patient location during evaluation: PACU  Patient participation: complete - patient participated  Level of consciousness: awake and alert  Pain score: 3  Airway patency: patent  Nausea & Vomiting: no vomiting  Complications: no  Cardiovascular status: hemodynamically stable  Respiratory status: acceptable  Hydration status: stable

## 2017-12-03 ENCOUNTER — HOSPITAL ENCOUNTER (EMERGENCY)
Age: 37
Discharge: HOME | End: 2017-12-03
Attending: EMERGENCY MEDICINE
Payer: COMMERCIAL

## 2017-12-03 VITALS
HEART RATE: 86 BPM | HEIGHT: 68 IN | WEIGHT: 293 LBS | OXYGEN SATURATION: 97 % | SYSTOLIC BLOOD PRESSURE: 125 MMHG | BODY MASS INDEX: 44.41 KG/M2 | DIASTOLIC BLOOD PRESSURE: 83 MMHG | TEMPERATURE: 99.7 F | RESPIRATION RATE: 15 BRPM

## 2017-12-03 DIAGNOSIS — R51.9 CHRONIC NONINTRACTABLE HEADACHE, UNSPECIFIED HEADACHE TYPE: ICD-10-CM

## 2017-12-03 DIAGNOSIS — J06.9 UPPER RESPIRATORY TRACT INFECTION, UNSPECIFIED TYPE: Primary | ICD-10-CM

## 2017-12-03 DIAGNOSIS — G89.29 CHRONIC NONINTRACTABLE HEADACHE, UNSPECIFIED HEADACHE TYPE: ICD-10-CM

## 2017-12-03 PROCEDURE — 99283 EMERGENCY DEPT VISIT LOW MDM: CPT

## 2017-12-03 PROCEDURE — 2580000003 HC RX 258: Performed by: EMERGENCY MEDICINE

## 2017-12-03 PROCEDURE — 96374 THER/PROPH/DIAG INJ IV PUSH: CPT

## 2017-12-03 PROCEDURE — 6370000000 HC RX 637 (ALT 250 FOR IP): Performed by: EMERGENCY MEDICINE

## 2017-12-03 PROCEDURE — 6360000002 HC RX W HCPCS: Performed by: EMERGENCY MEDICINE

## 2017-12-03 PROCEDURE — 96375 TX/PRO/DX INJ NEW DRUG ADDON: CPT

## 2017-12-03 RX ORDER — METOCLOPRAMIDE HYDROCHLORIDE 5 MG/ML
10 INJECTION INTRAMUSCULAR; INTRAVENOUS ONCE
Status: COMPLETED | OUTPATIENT
Start: 2017-12-03 | End: 2017-12-03

## 2017-12-03 RX ORDER — KETOROLAC TROMETHAMINE 15 MG/ML
15 INJECTION, SOLUTION INTRAMUSCULAR; INTRAVENOUS EVERY 6 HOURS PRN
Status: DISCONTINUED | OUTPATIENT
Start: 2017-12-03 | End: 2017-12-04 | Stop reason: HOSPADM

## 2017-12-03 RX ORDER — DEXAMETHASONE SODIUM PHOSPHATE 10 MG/ML
10 INJECTION INTRAMUSCULAR; INTRAVENOUS ONCE
Status: COMPLETED | OUTPATIENT
Start: 2017-12-03 | End: 2017-12-03

## 2017-12-03 RX ORDER — 0.9 % SODIUM CHLORIDE 0.9 %
1000 INTRAVENOUS SOLUTION INTRAVENOUS ONCE
Status: COMPLETED | OUTPATIENT
Start: 2017-12-03 | End: 2017-12-03

## 2017-12-03 RX ORDER — OXYCODONE HYDROCHLORIDE AND ACETAMINOPHEN 5; 325 MG/1; MG/1
1 TABLET ORAL ONCE
Status: COMPLETED | OUTPATIENT
Start: 2017-12-03 | End: 2017-12-03

## 2017-12-03 RX ORDER — DIPHENHYDRAMINE HYDROCHLORIDE 50 MG/ML
25 INJECTION INTRAMUSCULAR; INTRAVENOUS ONCE
Status: COMPLETED | OUTPATIENT
Start: 2017-12-03 | End: 2017-12-03

## 2017-12-03 RX ADMIN — DIPHENHYDRAMINE HYDROCHLORIDE 25 MG: 50 INJECTION, SOLUTION INTRAMUSCULAR; INTRAVENOUS at 20:34

## 2017-12-03 RX ADMIN — METOCLOPRAMIDE 10 MG: 5 INJECTION, SOLUTION INTRAMUSCULAR; INTRAVENOUS at 20:34

## 2017-12-03 RX ADMIN — SODIUM CHLORIDE 1000 ML: 9 INJECTION, SOLUTION INTRAVENOUS at 20:34

## 2017-12-03 RX ADMIN — KETOROLAC TROMETHAMINE 15 MG: 15 INJECTION, SOLUTION INTRAMUSCULAR; INTRAVENOUS at 20:34

## 2017-12-03 RX ADMIN — OXYCODONE HYDROCHLORIDE AND ACETAMINOPHEN 1 TABLET: 5; 325 TABLET ORAL at 22:07

## 2017-12-03 RX ADMIN — DEXAMETHASONE SODIUM PHOSPHATE 10 MG: 10 INJECTION INTRAMUSCULAR; INTRAVENOUS at 20:34

## 2017-12-03 ASSESSMENT — ENCOUNTER SYMPTOMS
EYE DISCHARGE: 0
VOMITING: 0
CHEST TIGHTNESS: 0
EYE REDNESS: 0
STRIDOR: 0
CHOKING: 1
DIARRHEA: 0
BLOOD IN STOOL: 0
COUGH: 0
ABDOMINAL PAIN: 0
ABDOMINAL DISTENTION: 0
SHORTNESS OF BREATH: 0
SORE THROAT: 1
WHEEZING: 0
SINUS PRESSURE: 1
NAUSEA: 0

## 2017-12-03 ASSESSMENT — PAIN DESCRIPTION - LOCATION: LOCATION: BACK;HEAD

## 2017-12-03 ASSESSMENT — PAIN SCALES - GENERAL
PAINLEVEL_OUTOF10: 10

## 2017-12-03 ASSESSMENT — PAIN DESCRIPTION - PAIN TYPE: TYPE: ACUTE PAIN

## 2017-12-04 LAB — SURGICAL PATHOLOGY REPORT: NORMAL
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
## 2017-12-04 NOTE — ED PROVIDER NOTES
ABD SOFT, NONDISTENDED, NONTENDER. NORMAL BOWEL SOUNDS, SKIN TURGOR. NECK SUPPLE, NONTENDER, NO NODES. OROPHARYNX NORMAL, MOIST MUCUS MEMBRANES. NASAL CAV-CLEAR RHIN. IMP-VIRAL  SYNDROME. PLAN-WILL PROVIDE ADDITIONAL TX FOR HEADACHE. REASSESS. Mickey Amaral MD  12/03/17 3713    PATIENT'S SYMPTOMS REPORTEDLY IMPROVED WITH TX NOTED AND IS NOW REQUESTING DISCHARGE.       Mickey Amaral MD  12/03/17 4842

## 2017-12-07 ENCOUNTER — TELEPHONE (OUTPATIENT)
Dept: UROLOGY | Age: 37
End: 2017-12-07

## 2017-12-11 ENCOUNTER — TELEPHONE (OUTPATIENT)
Dept: UROLOGY | Age: 37
End: 2017-12-11

## 2017-12-21 ENCOUNTER — OFFICE VISIT (OUTPATIENT)
Dept: PODIATRY | Age: 37
End: 2017-12-21
Payer: COMMERCIAL

## 2017-12-21 VITALS
HEIGHT: 68 IN | WEIGHT: 293 LBS | BODY MASS INDEX: 44.41 KG/M2 | SYSTOLIC BLOOD PRESSURE: 112 MMHG | DIASTOLIC BLOOD PRESSURE: 94 MMHG

## 2017-12-21 DIAGNOSIS — B35.1 ONYCHOMYCOSIS OF TOENAIL: Primary | ICD-10-CM

## 2017-12-21 DIAGNOSIS — M79.674 PAIN OF TOES OF BOTH FEET: ICD-10-CM

## 2017-12-21 DIAGNOSIS — E11.43 DIABETIC AUTONOMIC NEUROPATHY ASSOCIATED WITH TYPE 2 DIABETES MELLITUS (HCC): ICD-10-CM

## 2017-12-21 DIAGNOSIS — M79.675 PAIN OF TOES OF BOTH FEET: ICD-10-CM

## 2017-12-21 PROCEDURE — G8427 DOCREV CUR MEDS BY ELIG CLIN: HCPCS | Performed by: PODIATRIST

## 2017-12-21 PROCEDURE — 3044F HG A1C LEVEL LT 7.0%: CPT | Performed by: PODIATRIST

## 2017-12-21 PROCEDURE — 99203 OFFICE O/P NEW LOW 30 MIN: CPT | Performed by: PODIATRIST

## 2017-12-21 PROCEDURE — G8417 CALC BMI ABV UP PARAM F/U: HCPCS | Performed by: PODIATRIST

## 2017-12-21 PROCEDURE — 11721 DEBRIDE NAIL 6 OR MORE: CPT | Performed by: PODIATRIST

## 2017-12-21 PROCEDURE — 4004F PT TOBACCO SCREEN RCVD TLK: CPT | Performed by: PODIATRIST

## 2017-12-21 PROCEDURE — G8484 FLU IMMUNIZE NO ADMIN: HCPCS | Performed by: PODIATRIST

## 2017-12-21 ASSESSMENT — ENCOUNTER SYMPTOMS
BACK PAIN: 0
NAUSEA: 0
SHORTNESS OF BREATH: 0
COLOR CHANGE: 0
DIARRHEA: 0
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
## 2018-01-12 ENCOUNTER — HOSPITAL ENCOUNTER (EMERGENCY)
Facility: CLINIC | Age: 38
Discharge: HOME | End: 2018-01-12
Attending: EMERGENCY MEDICINE
Payer: COMMERCIAL

## 2018-01-12 VITALS
BODY MASS INDEX: 44.41 KG/M2 | DIASTOLIC BLOOD PRESSURE: 79 MMHG | WEIGHT: 293 LBS | HEART RATE: 78 BPM | HEIGHT: 68 IN | OXYGEN SATURATION: 97 % | TEMPERATURE: 98.2 F | RESPIRATION RATE: 16 BRPM | SYSTOLIC BLOOD PRESSURE: 124 MMHG

## 2018-01-12 DIAGNOSIS — B37.2 CUTANEOUS CANDIDIASIS: Primary | ICD-10-CM

## 2018-01-12 PROCEDURE — 96372 THER/PROPH/DIAG INJ SC/IM: CPT

## 2018-01-12 PROCEDURE — 6360000002 HC RX W HCPCS: Performed by: EMERGENCY MEDICINE

## 2018-01-12 PROCEDURE — 99282 EMERGENCY DEPT VISIT SF MDM: CPT

## 2018-01-12 RX ORDER — KETOROLAC TROMETHAMINE 30 MG/ML
30 INJECTION, SOLUTION INTRAMUSCULAR; INTRAVENOUS ONCE
Status: COMPLETED | OUTPATIENT
Start: 2018-01-12 | End: 2018-01-12

## 2018-01-12 RX ORDER — NYSTATIN 100000 U/G
CREAM TOPICAL
Qty: 30 G | Refills: 0 | Status: SHIPPED | OUTPATIENT
Start: 2018-01-12 | End: 2020-10-05

## 2018-01-12 RX ORDER — FLUCONAZOLE 150 MG/1
150 TABLET ORAL DAILY
Qty: 7 TABLET | Refills: 0 | Status: SHIPPED | OUTPATIENT
Start: 2018-01-12 | End: 2018-01-19

## 2018-01-12 RX ADMIN — KETOROLAC TROMETHAMINE 30 MG: 30 INJECTION, SOLUTION INTRAMUSCULAR at 11:41

## 2018-01-12 ASSESSMENT — PAIN DESCRIPTION - ORIENTATION: ORIENTATION: LEFT;RIGHT

## 2018-01-12 ASSESSMENT — PAIN SCALES - GENERAL
PAINLEVEL_OUTOF10: 7

## 2018-01-12 NOTE — ED PROVIDER NOTES
occasionally words are mis-transcribed.)    Michael De Paz MD, F.A.C.E.P, F.A.A.E.M  Emergency Physician Attending          Michael De Paz MD  01/12/18 1144

## 2018-01-14 ENCOUNTER — HOSPITAL ENCOUNTER (EMERGENCY)
Facility: CLINIC | Age: 38
Discharge: HOME | End: 2018-01-14
Attending: EMERGENCY MEDICINE
Payer: COMMERCIAL

## 2018-01-14 ENCOUNTER — APPOINTMENT (OUTPATIENT)
Dept: CT IMAGING | Facility: CLINIC | Age: 38
End: 2018-01-14
Payer: COMMERCIAL

## 2018-01-14 VITALS
WEIGHT: 293 LBS | TEMPERATURE: 97.8 F | HEIGHT: 68 IN | SYSTOLIC BLOOD PRESSURE: 142 MMHG | HEART RATE: 78 BPM | RESPIRATION RATE: 20 BRPM | BODY MASS INDEX: 44.41 KG/M2 | OXYGEN SATURATION: 97 % | DIASTOLIC BLOOD PRESSURE: 96 MMHG

## 2018-01-14 DIAGNOSIS — G43.919 INTRACTABLE MIGRAINE WITHOUT STATUS MIGRAINOSUS, UNSPECIFIED MIGRAINE TYPE: Primary | ICD-10-CM

## 2018-01-14 LAB
ABSOLUTE EOS #: 0 K/UL (ref 0–0.4)
ABSOLUTE IMMATURE GRANULOCYTE: ABNORMAL K/UL (ref 0–0.3)
ABSOLUTE LYMPH #: 1.65 K/UL (ref 1–4.8)
ABSOLUTE MONO #: 0.66 K/UL (ref 0.1–0.8)
ALBUMIN SERPL-MCNC: 4 G/DL (ref 3.5–5.2)
ALBUMIN/GLOBULIN RATIO: 1.1 (ref 1–2.5)
ALP BLD-CCNC: 111 U/L (ref 35–104)
ALT SERPL-CCNC: 17 U/L (ref 5–33)
ANION GAP SERPL CALCULATED.3IONS-SCNC: 17 MMOL/L (ref 9–17)
AST SERPL-CCNC: 14 U/L
BASOPHILS # BLD: 0 % (ref 0–2)
BASOPHILS ABSOLUTE: 0 K/UL (ref 0–0.2)
BILIRUB SERPL-MCNC: 0.2 MG/DL (ref 0.3–1.2)
BILIRUBIN DIRECT: 0.1 MG/DL
BILIRUBIN URINE: NEGATIVE
BILIRUBIN, INDIRECT: 0.1 MG/DL (ref 0–1)
BUN BLDV-MCNC: 14 MG/DL (ref 6–20)
BUN/CREAT BLD: ABNORMAL (ref 9–20)
CALCIUM SERPL-MCNC: 9.3 MG/DL (ref 8.6–10.4)
CHLORIDE BLD-SCNC: 100 MMOL/L (ref 98–107)
CO2: 18 MMOL/L (ref 20–31)
COLOR: YELLOW
COMMENT UA: NORMAL
CREAT SERPL-MCNC: 0.8 MG/DL (ref 0.5–0.9)
DIFFERENTIAL TYPE: ABNORMAL
DIRECT EXAM: NORMAL
EOSINOPHILS RELATIVE PERCENT: 0 % (ref 1–4)
GFR AFRICAN AMERICAN: >60 ML/MIN
GFR NON-AFRICAN AMERICAN: >60 ML/MIN
GFR SERPL CREATININE-BSD FRML MDRD: ABNORMAL ML/MIN/{1.73_M2}
GFR SERPL CREATININE-BSD FRML MDRD: ABNORMAL ML/MIN/{1.73_M2}
GLOBULIN: ABNORMAL G/DL (ref 1.5–3.8)
GLUCOSE BLD-MCNC: 126 MG/DL (ref 70–99)
GLUCOSE URINE: NEGATIVE
HCT VFR BLD CALC: 45.7 % (ref 36–46)
HEMOGLOBIN: 15.1 G/DL (ref 12–16)
IMMATURE GRANULOCYTES: ABNORMAL %
KETONES, URINE: NEGATIVE
LACTIC ACID: 2.5 MMOL/L (ref 0.5–2.2)
LEUKOCYTE ESTERASE, URINE: NEGATIVE
LIPASE: 24 U/L (ref 13–60)
LYMPHOCYTES # BLD: 10 % (ref 24–44)
Lab: NORMAL
MCH RBC QN AUTO: 28.1 PG (ref 26–34)
MCHC RBC AUTO-ENTMCNC: 33.1 G/DL (ref 31–37)
MCV RBC AUTO: 85.1 FL (ref 80–100)
MONOCYTES # BLD: 4 % (ref 1–7)
MORPHOLOGY: NORMAL
MYOGLOBIN: <21 NG/ML (ref 25–58)
NITRITE, URINE: NEGATIVE
PDW BLD-RTO: 14.1 % (ref 12.5–15.4)
PH UA: 7 (ref 5–8)
PLATELET # BLD: 346 K/UL (ref 140–450)
PLATELET ESTIMATE: ABNORMAL
PMV BLD AUTO: 8.8 FL (ref 6–12)
POTASSIUM SERPL-SCNC: 3.9 MMOL/L (ref 3.7–5.3)
PROTEIN UA: NEGATIVE
RBC # BLD: 5.36 M/UL (ref 4–5.2)
RBC # BLD: ABNORMAL 10*6/UL
SEG NEUTROPHILS: 86 % (ref 36–66)
SEGMENTED NEUTROPHILS ABSOLUTE COUNT: 14.19 K/UL (ref 1.8–7.7)
SODIUM BLD-SCNC: 135 MMOL/L (ref 135–144)
SPECIFIC GRAVITY UA: 1.01 (ref 1–1.03)
SPECIMEN DESCRIPTION: NORMAL
STATUS: NORMAL
TOTAL PROTEIN: 7.5 G/DL (ref 6.4–8.3)
TROPONIN INTERP: NORMAL
TROPONIN INTERP: NORMAL
TROPONIN T: <0.03 NG/ML
TROPONIN T: <0.03 NG/ML
TURBIDITY: CLEAR
URINE HGB: NEGATIVE
UROBILINOGEN, URINE: NORMAL
WBC # BLD: 16.5 K/UL (ref 3.5–11)
WBC # BLD: ABNORMAL 10*3/UL

## 2018-01-14 PROCEDURE — 85025 COMPLETE CBC W/AUTO DIFF WBC: CPT

## 2018-01-14 PROCEDURE — 70450 CT HEAD/BRAIN W/O DYE: CPT

## 2018-01-14 PROCEDURE — 6360000002 HC RX W HCPCS: Performed by: EMERGENCY MEDICINE

## 2018-01-14 PROCEDURE — 99284 EMERGENCY DEPT VISIT MOD MDM: CPT

## 2018-01-14 PROCEDURE — 80048 BASIC METABOLIC PNL TOTAL CA: CPT

## 2018-01-14 PROCEDURE — 2580000003 HC RX 258: Performed by: EMERGENCY MEDICINE

## 2018-01-14 PROCEDURE — 96375 TX/PRO/DX INJ NEW DRUG ADDON: CPT

## 2018-01-14 PROCEDURE — 87804 INFLUENZA ASSAY W/OPTIC: CPT

## 2018-01-14 PROCEDURE — 96374 THER/PROPH/DIAG INJ IV PUSH: CPT

## 2018-01-14 PROCEDURE — 87086 URINE CULTURE/COLONY COUNT: CPT

## 2018-01-14 PROCEDURE — 96376 TX/PRO/DX INJ SAME DRUG ADON: CPT

## 2018-01-14 PROCEDURE — 80076 HEPATIC FUNCTION PANEL: CPT

## 2018-01-14 PROCEDURE — 84484 ASSAY OF TROPONIN QUANT: CPT

## 2018-01-14 PROCEDURE — 83690 ASSAY OF LIPASE: CPT

## 2018-01-14 PROCEDURE — 83605 ASSAY OF LACTIC ACID: CPT

## 2018-01-14 PROCEDURE — 93005 ELECTROCARDIOGRAM TRACING: CPT

## 2018-01-14 PROCEDURE — 83874 ASSAY OF MYOGLOBIN: CPT

## 2018-01-14 PROCEDURE — 81003 URINALYSIS AUTO W/O SCOPE: CPT

## 2018-01-14 PROCEDURE — 36415 COLL VENOUS BLD VENIPUNCTURE: CPT

## 2018-01-14 RX ORDER — ONDANSETRON 2 MG/ML
4 INJECTION INTRAMUSCULAR; INTRAVENOUS ONCE
Status: COMPLETED | OUTPATIENT
Start: 2018-01-14 | End: 2018-01-14

## 2018-01-14 RX ORDER — METOCLOPRAMIDE HYDROCHLORIDE 5 MG/ML
10 INJECTION INTRAMUSCULAR; INTRAVENOUS ONCE
Status: COMPLETED | OUTPATIENT
Start: 2018-01-14 | End: 2018-01-14

## 2018-01-14 RX ORDER — DIPHENHYDRAMINE HYDROCHLORIDE 50 MG/ML
25 INJECTION INTRAMUSCULAR; INTRAVENOUS ONCE
Status: COMPLETED | OUTPATIENT
Start: 2018-01-14 | End: 2018-01-14

## 2018-01-14 RX ORDER — PROMETHAZINE HYDROCHLORIDE 25 MG/ML
12.5 INJECTION, SOLUTION INTRAMUSCULAR; INTRAVENOUS ONCE
Status: DISCONTINUED | OUTPATIENT
Start: 2018-01-14 | End: 2018-01-14

## 2018-01-14 RX ORDER — 0.9 % SODIUM CHLORIDE 0.9 %
1000 INTRAVENOUS SOLUTION INTRAVENOUS ONCE
Status: COMPLETED | OUTPATIENT
Start: 2018-01-14 | End: 2018-01-14

## 2018-01-14 RX ORDER — PROMETHAZINE HYDROCHLORIDE 25 MG/ML
25 INJECTION, SOLUTION INTRAMUSCULAR; INTRAVENOUS ONCE
Status: COMPLETED | OUTPATIENT
Start: 2018-01-14 | End: 2018-01-14

## 2018-01-14 RX ORDER — FENTANYL CITRATE 50 UG/ML
50 INJECTION, SOLUTION INTRAMUSCULAR; INTRAVENOUS ONCE
Status: COMPLETED | OUTPATIENT
Start: 2018-01-14 | End: 2018-01-14

## 2018-01-14 RX ORDER — KETOROLAC TROMETHAMINE 30 MG/ML
30 INJECTION, SOLUTION INTRAMUSCULAR; INTRAVENOUS ONCE
Status: COMPLETED | OUTPATIENT
Start: 2018-01-14 | End: 2018-01-14

## 2018-01-14 RX ORDER — ONDANSETRON 4 MG/1
4 TABLET, ORALLY DISINTEGRATING ORAL EVERY 8 HOURS PRN
Qty: 8 TABLET | Refills: 0 | Status: SHIPPED | OUTPATIENT
Start: 2018-01-14 | End: 2020-10-05 | Stop reason: SINTOL

## 2018-01-14 RX ADMIN — FENTANYL CITRATE 50 MCG: 50 INJECTION INTRAMUSCULAR; INTRAVENOUS at 19:24

## 2018-01-14 RX ADMIN — DIPHENHYDRAMINE HYDROCHLORIDE 25 MG: 50 INJECTION, SOLUTION INTRAMUSCULAR; INTRAVENOUS at 20:55

## 2018-01-14 RX ADMIN — KETOROLAC TROMETHAMINE 30 MG: 30 INJECTION, SOLUTION INTRAMUSCULAR at 17:43

## 2018-01-14 RX ADMIN — METOCLOPRAMIDE 10 MG: 5 INJECTION, SOLUTION INTRAMUSCULAR; INTRAVENOUS at 19:37

## 2018-01-14 RX ADMIN — SODIUM CHLORIDE 1000 ML: 9 INJECTION, SOLUTION INTRAVENOUS at 17:44

## 2018-01-14 RX ADMIN — ONDANSETRON 4 MG: 2 INJECTION INTRAMUSCULAR; INTRAVENOUS at 18:37

## 2018-01-14 RX ADMIN — PROMETHAZINE HYDROCHLORIDE 25 MG: 25 INJECTION INTRAMUSCULAR; INTRAVENOUS at 17:43

## 2018-01-14 RX ADMIN — DIPHENHYDRAMINE HYDROCHLORIDE 25 MG: 50 INJECTION, SOLUTION INTRAMUSCULAR; INTRAVENOUS at 17:43

## 2018-01-14 ASSESSMENT — ENCOUNTER SYMPTOMS
PHOTOPHOBIA: 1
VOMITING: 1
NAUSEA: 1
ABDOMINAL PAIN: 0
CONSTIPATION: 0
COUGH: 1
SHORTNESS OF BREATH: 0
BACK PAIN: 0
CHOKING: 0
BLOOD IN STOOL: 0
EYE PAIN: 0
DIARRHEA: 1

## 2018-01-14 ASSESSMENT — PAIN SCALES - GENERAL
PAINLEVEL_OUTOF10: 9
PAINLEVEL_OUTOF10: 10
PAINLEVEL_OUTOF10: 10

## 2018-01-14 ASSESSMENT — PAIN DESCRIPTION - PAIN TYPE
TYPE: ACUTE PAIN

## 2018-01-14 ASSESSMENT — PAIN DESCRIPTION - LOCATION
LOCATION: HEAD

## 2018-01-14 NOTE — ED PROVIDER NOTES
times daily as needed for Pain    POTASSIUM CITRATE ER PO    Take 1 tablet by mouth 2 times daily    PREGABALIN (LYRICA PO)    Take 225 mg by mouth 2 times daily     PROMETHAZINE (PHENERGAN) 25 MG TABLET    TK 1 T PO Q 4 TO 6 H PRN    RIZATRIPTAN (MAXALT-MLT) 10 MG DISINTEGRATING TABLET    Take 10 mg by mouth once as needed     ROPINIROLE (REQUIP) 0.5 MG TABLET    Take 1 mg by mouth 2 times daily     SUMATRIPTAN (IMITREX) 100 MG TABLET    Take 100 mg by mouth 2 times daily as needed     THIAMINE HCL (VITAMIN B-1 PO)    Take 1 tablet by mouth daily    TIZANIDINE (ZANAFLEX) 2 MG CAPSULE    Take 2 capsules by mouth 3 times daily as needed for Muscle spasms    TOPIRAMATE (TOPAMAX) 100 MG TABLET    Take 1 tablet by mouth 2 times daily    TRAZODONE (DESYREL) 100 MG TABLET    Take 150 mg by mouth nightly        ALLERGIES     is allergic to latex; abilify [aripiprazole]; aspirin; geodon [ziprasidone hcl]; lithium; morphine; doxycycline; seroquel [quetiapine fumarate]; tape [adhesive tape]; and tramadol. FAMILY HISTORY     indicated that her mother is alive. She indicated that her father is alive. She indicated that both of her sisters are alive. She indicated that both of her brothers are alive. She indicated that her maternal grandmother is . She indicated that her daughter is alive. She indicated that her son is alive. She indicated that the status of her maternal aunt is unknown.      family history includes Alzheimer's Disease in her maternal grandmother; Breast Cancer in her mother; Cancer in her maternal aunt and sister; Endometrial Cancer in her mother; Heart Attack in her brother; Heart Disease in her maternal aunt; Heart Failure in her maternal grandmother; High Blood Pressure in her mother; Kidney Disease in her sister; No Known Problems in her father; Other in her sister; Ovarian Cancer in her mother; Seizures in her son. SOCIAL HISTORY      reports that she has been smoking Cigarettes.   She has a 12.50 pack-year smoking history. She has never used smokeless tobacco. She reports that she drinks alcohol. She reports that she uses drugs, including Marijuana. PHYSICAL EXAM     INITIAL VITALS:  height is 5' 8\" (1.727 m) and weight is 142.9 kg (315 lb) (abnormal). Her oral temperature is 97.9 °F (36.6 °C). Her blood pressure is 137/87 and her pulse is 70. Her respiration is 16 and oxygen saturation is 97%. Physical Exam   Constitutional: She is oriented to person, place, and time. She appears well-developed and well-nourished. She appears distressed. HENT:   Head: Normocephalic and atraumatic. Right Ear: External ear normal.   Left Ear: External ear normal.   Mouth/Throat: Oropharynx is clear and moist.   Eyes: Conjunctivae and EOM are normal. Pupils are equal, round, and reactive to light. Neck: Normal range of motion. Neck supple. Cardiovascular: Normal rate and regular rhythm. Pulmonary/Chest: Effort normal and breath sounds normal.   Abdominal: Soft. Bowel sounds are normal.   Musculoskeletal: She exhibits no edema or tenderness. Neurological: She is alert and oriented to person, place, and time. No cranial nerve deficit. She exhibits normal muscle tone. Coordination normal.   Skin: Skin is warm and dry. She is not diaphoretic. Psychiatric: She has a normal mood and affect.  Her behavior is normal.         DIFFERENTIAL DIAGNOSIS/ MDM:     Migraine with a history of migraines seems to be in status will give her IV fluids check labs with palpitations also do a cardiac workup    DIAGNOSTIC RESULTS     EKG: All EKG's are interpreted by the Emergency Department Physician who either signs or Co-signs this chart in the absence of a cardiologist.    EKG shows normal sinus rhythm at 72 bpm.  It was 136 ms, respirations 86 ms QTC is 440 ms axis is -17 there is no acute ST-T wave changes    RADIOLOGY:   I directly visualized the following  images and reviewed the radiologist interpretations:

## 2018-01-15 NOTE — ED PROVIDER NOTES
- 31 mmol/L    Anion Gap 17 9 - 17 mmol/L    GFR Non-African American >60 >60 mL/min    GFR African American >60 >60 mL/min    GFR Comment          GFR Staging NOT REPORTED    CBC Auto Differential   Result Value Ref Range    WBC 16.5 (H) 3.5 - 11.0 k/uL    RBC 5.36 (H) 4.0 - 5.2 m/uL    Hemoglobin 15.1 12.0 - 16.0 g/dL    Hematocrit 45.7 36 - 46 %    MCV 85.1 80 - 100 fL    MCH 28.1 26 - 34 pg    MCHC 33.1 31 - 37 g/dL    RDW 14.1 12.5 - 15.4 %    Platelets 213 711 - 806 k/uL    MPV 8.8 6.0 - 12.0 fL    Differential Type NOT REPORTED     Immature Granulocytes NOT REPORTED 0 %    Absolute Immature Granulocyte NOT REPORTED 0.00 - 0.30 k/uL    WBC Morphology NOT REPORTED     RBC Morphology NOT REPORTED     Platelet Estimate NOT REPORTED     Seg Neutrophils 86 (H) 36 - 66 %    Lymphocytes 10 (L) 24 - 44 %    Monocytes 4 1 - 7 %    Eosinophils % 0 (L) 1 - 4 %    Basophils 0 0 - 2 %    Segs Absolute 14.19 (H) 1.8 - 7.7 k/uL    Absolute Lymph # 1.65 1.0 - 4.8 k/uL    Absolute Mono # 0.66 0.1 - 0.8 k/uL    Absolute Eos # 0.00 0.0 - 0.4 k/uL    Basophils # 0.00 0.0 - 0.2 k/uL    Morphology Normal    Hepatic Function Panel   Result Value Ref Range    Alb 4.0 3.5 - 5.2 g/dL    Alkaline Phosphatase 111 (H) 35 - 104 U/L    ALT 17 5 - 33 U/L    AST 14 <32 U/L    Total Bilirubin 0.20 (L) 0.3 - 1.2 mg/dL    Bilirubin, Direct 0.10 <0.31 mg/dL    Bilirubin, Indirect 0.10 0.00 - 1.00 mg/dL    Total Protein 7.5 6.4 - 8.3 g/dL    Globulin NOT REPORTED 1.5 - 3.8 g/dL    Albumin/Globulin Ratio 1.1 1.0 - 2.5   Lipase   Result Value Ref Range    Lipase 24 13 - 60 U/L   Lactic Acid   Result Value Ref Range    Lactic Acid 2.5 (H) 0.5 - 2.2 mmol/L   Urinalysis   Result Value Ref Range    Color, UA YELLOW YEL    Turbidity UA CLEAR CLEAR    Glucose, Ur NEGATIVE NEG    Bilirubin Urine NEGATIVE NEG    Ketones, Urine NEGATIVE NEG    Specific Gravity, UA 1.015 1.005 - 1.030    Urine Hgb NEGATIVE NEG    pH, UA 7.0 5.0 - 8.0    Protein, UA NEGATIVE NEG    Urobilinogen, Urine Normal NORM    Nitrite, Urine NEGATIVE NEG    Leukocyte Esterase, Urine NEGATIVE NEG    Urinalysis Comments       Microscopic exam not performed based on chemical results unless requested in   Myoglobin, Serum   Result Value Ref Range    Myoglobin <21 (L) 25 - 58 ng/mL   Troponin   Result Value Ref Range    Troponin T <0.03 <0.03 ng/mL    Troponin Interp         Troponin   Result Value Ref Range    Troponin T <0.03 <0.03 ng/mL    Troponin Interp             RECENT VITALS:  BP: (!) 142/96, Temp: 97.8 °F (36.6 °C), Pulse: 78, Resp: 20     ED Course     The patient was given the following medications:  Orders Placed This Encounter   Medications    0.9 % sodium chloride bolus    ketorolac (TORADOL) injection 30 mg    DISCONTD: promethazine (PHENERGAN) injection 12.5 mg    diphenhydrAMINE (BENADRYL) injection 25 mg    promethazine (PHENERGAN) injection 25 mg    ondansetron (ZOFRAN) injection 4 mg    fentaNYL (SUBLIMAZE) injection 50 mcg    metoclopramide (REGLAN) injection 10 mg    diphenhydrAMINE (BENADRYL) injection 25 mg    ondansetron (ZOFRAN ODT) 4 MG disintegrating tablet     Sig: Take 1 tablet by mouth every 8 hours as needed for Nausea     Dispense:  8 tablet     Refill:  0     Patient states nausea and vomiting is better. However, she states her headache is little improved, although she looks extremely comfortable    Medical Decision Making      Cardiac enzymes are negative. EKG shows nothing acute, some nonspecific changes, lateral.  No indications of this being cardiac etiology. No complaints of chest pain, still having headache. She does have an elevation 1 blood cell count. She has been vomiting. This may be more reactive. Is there is no indication she is septic. Abdomen is benign. She is afebrile with stable vital signs at this time. She is instructed follow-up. I did write her for some additional ODT's, Zofran.   Return immediately if fever, worsening

## 2018-01-16 LAB
CULTURE: NORMAL
CULTURE: NORMAL
EKG ATRIAL RATE: 58 BPM
EKG ATRIAL RATE: 72 BPM
EKG P AXIS: 41 DEGREES
EKG P AXIS: 46 DEGREES
EKG P-R INTERVAL: 136 MS
EKG P-R INTERVAL: 156 MS
EKG Q-T INTERVAL: 402 MS
EKG Q-T INTERVAL: 408 MS
EKG QRS DURATION: 86 MS
EKG QRS DURATION: 88 MS
EKG QTC CALCULATION (BAZETT): 400 MS
EKG QTC CALCULATION (BAZETT): 440 MS
EKG R AXIS: -17 DEGREES
EKG R AXIS: -6 DEGREES
EKG T AXIS: 17 DEGREES
EKG T AXIS: 52 DEGREES
EKG VENTRICULAR RATE: 58 BPM
EKG VENTRICULAR RATE: 72 BPM
Lab: NORMAL
SPECIMEN DESCRIPTION: NORMAL
SPECIMEN DESCRIPTION: NORMAL
STATUS: NORMAL

## 2018-01-21 ENCOUNTER — HOSPITAL ENCOUNTER (INPATIENT)
Age: 38
LOS: 9 days | Discharge: HOME | DRG: 885 | End: 2018-01-30
Attending: EMERGENCY MEDICINE | Admitting: PSYCHIATRY & NEUROLOGY
Payer: COMMERCIAL

## 2018-01-21 DIAGNOSIS — F17.200 SMOKING ADDICTION: ICD-10-CM

## 2018-01-21 DIAGNOSIS — Z86.59 HISTORY OF POSTTRAUMATIC STRESS DISORDER (PTSD): ICD-10-CM

## 2018-01-21 DIAGNOSIS — F32.A DEPRESSION WITH SUICIDAL IDEATION: Primary | ICD-10-CM

## 2018-01-21 DIAGNOSIS — R45.851 DEPRESSION WITH SUICIDAL IDEATION: Primary | ICD-10-CM

## 2018-01-21 LAB
ABSOLUTE EOS #: 0 K/UL (ref 0–0.4)
ABSOLUTE IMMATURE GRANULOCYTE: ABNORMAL K/UL (ref 0–0.3)
ABSOLUTE LYMPH #: 2.3 K/UL (ref 1–4.8)
ABSOLUTE MONO #: 0.8 K/UL (ref 0.1–1.3)
ALBUMIN SERPL-MCNC: 3.6 G/DL (ref 3.5–5.2)
ALBUMIN/GLOBULIN RATIO: ABNORMAL (ref 1–2.5)
ALP BLD-CCNC: 107 U/L (ref 35–104)
ALT SERPL-CCNC: 20 U/L (ref 5–33)
ANION GAP SERPL CALCULATED.3IONS-SCNC: 17 MMOL/L (ref 9–17)
AST SERPL-CCNC: 13 U/L
BASOPHILS # BLD: 0 % (ref 0–2)
BASOPHILS ABSOLUTE: 0 K/UL (ref 0–0.2)
BILIRUB SERPL-MCNC: 0.18 MG/DL (ref 0.3–1.2)
BUN BLDV-MCNC: 20 MG/DL (ref 6–20)
BUN/CREAT BLD: ABNORMAL (ref 9–20)
CALCIUM SERPL-MCNC: 8.9 MG/DL (ref 8.6–10.4)
CHLORIDE BLD-SCNC: 99 MMOL/L (ref 98–107)
CO2: 18 MMOL/L (ref 20–31)
CREAT SERPL-MCNC: 0.94 MG/DL (ref 0.5–0.9)
DIFFERENTIAL TYPE: ABNORMAL
EOSINOPHILS RELATIVE PERCENT: 0 % (ref 0–4)
GFR AFRICAN AMERICAN: >60 ML/MIN
GFR NON-AFRICAN AMERICAN: >60 ML/MIN
GFR SERPL CREATININE-BSD FRML MDRD: ABNORMAL ML/MIN/{1.73_M2}
GFR SERPL CREATININE-BSD FRML MDRD: ABNORMAL ML/MIN/{1.73_M2}
GLUCOSE BLD-MCNC: 146 MG/DL (ref 70–99)
HCT VFR BLD CALC: 43.3 % (ref 36–46)
HEMOGLOBIN: 14.2 G/DL (ref 12–16)
IMMATURE GRANULOCYTES: ABNORMAL %
LYMPHOCYTES # BLD: 18 % (ref 24–44)
MCH RBC QN AUTO: 27.7 PG (ref 26–34)
MCHC RBC AUTO-ENTMCNC: 32.7 G/DL (ref 31–37)
MCV RBC AUTO: 84.8 FL (ref 80–100)
MONOCYTES # BLD: 6 % (ref 1–7)
NRBC AUTOMATED: ABNORMAL PER 100 WBC
PDW BLD-RTO: 14.6 % (ref 11.5–14.9)
PLATELET # BLD: 316 K/UL (ref 150–450)
PLATELET ESTIMATE: ABNORMAL
PMV BLD AUTO: 7.6 FL (ref 6–12)
POTASSIUM SERPL-SCNC: 3.4 MMOL/L (ref 3.7–5.3)
RBC # BLD: 5.1 M/UL (ref 4–5.2)
RBC # BLD: ABNORMAL 10*6/UL
SEG NEUTROPHILS: 76 % (ref 36–66)
SEGMENTED NEUTROPHILS ABSOLUTE COUNT: 9.7 K/UL (ref 1.3–9.1)
SODIUM BLD-SCNC: 134 MMOL/L (ref 135–144)
TOTAL PROTEIN: 6.8 G/DL (ref 6.4–8.3)
WBC # BLD: 12.9 K/UL (ref 3.5–11)
WBC # BLD: ABNORMAL 10*3/UL

## 2018-01-21 PROCEDURE — 99285 EMERGENCY DEPT VISIT HI MDM: CPT

## 2018-01-21 PROCEDURE — 85025 COMPLETE CBC W/AUTO DIFF WBC: CPT

## 2018-01-21 PROCEDURE — 1240000000 HC EMOTIONAL WELLNESS R&B

## 2018-01-21 PROCEDURE — 80053 COMPREHEN METABOLIC PANEL: CPT

## 2018-01-21 PROCEDURE — 6360000002 HC RX W HCPCS: Performed by: PSYCHIATRY & NEUROLOGY

## 2018-01-21 PROCEDURE — 6370000000 HC RX 637 (ALT 250 FOR IP): Performed by: PSYCHIATRY & NEUROLOGY

## 2018-01-21 PROCEDURE — 36415 COLL VENOUS BLD VENIPUNCTURE: CPT

## 2018-01-21 PROCEDURE — 94664 DEMO&/EVAL PT USE INHALER: CPT

## 2018-01-21 RX ORDER — MECLIZINE HYDROCHLORIDE 25 MG/1
25 TABLET ORAL 3 TIMES DAILY PRN
Status: DISCONTINUED | OUTPATIENT
Start: 2018-01-21 | End: 2018-01-30 | Stop reason: HOSPADM

## 2018-01-21 RX ORDER — DICYCLOMINE HCL 20 MG
20 TABLET ORAL EVERY 6 HOURS PRN
Status: DISCONTINUED | OUTPATIENT
Start: 2018-01-21 | End: 2018-01-30 | Stop reason: HOSPADM

## 2018-01-21 RX ORDER — BUSPIRONE HYDROCHLORIDE 15 MG/1
15 TABLET ORAL 4 TIMES DAILY
Status: DISCONTINUED | OUTPATIENT
Start: 2018-01-21 | End: 2018-01-30 | Stop reason: HOSPADM

## 2018-01-21 RX ORDER — CETIRIZINE HYDROCHLORIDE 10 MG/1
10 TABLET ORAL DAILY
Status: DISCONTINUED | OUTPATIENT
Start: 2018-01-21 | End: 2018-01-30 | Stop reason: HOSPADM

## 2018-01-21 RX ORDER — MIRTAZAPINE 30 MG/1
30 TABLET, FILM COATED ORAL NIGHTLY
Status: DISCONTINUED | OUTPATIENT
Start: 2018-01-21 | End: 2018-01-30 | Stop reason: HOSPADM

## 2018-01-21 RX ORDER — LIDOCAINE 40 MG/G
CREAM TOPICAL DAILY PRN
Status: DISCONTINUED | OUTPATIENT
Start: 2018-01-21 | End: 2018-01-30 | Stop reason: HOSPADM

## 2018-01-21 RX ORDER — TOPIRAMATE 100 MG/1
100 TABLET, FILM COATED ORAL 2 TIMES DAILY
Status: DISCONTINUED | OUTPATIENT
Start: 2018-01-21 | End: 2018-01-30 | Stop reason: HOSPADM

## 2018-01-21 RX ORDER — CLONAZEPAM 0.5 MG/1
0.5 TABLET ORAL 2 TIMES DAILY PRN
Status: ON HOLD | COMMUNITY
End: 2018-01-29

## 2018-01-21 RX ORDER — DOCUSATE SODIUM 100 MG/1
100 CAPSULE, LIQUID FILLED ORAL 2 TIMES DAILY PRN
Status: DISCONTINUED | OUTPATIENT
Start: 2018-01-21 | End: 2018-01-21 | Stop reason: SDUPTHER

## 2018-01-21 RX ORDER — CALCIUM CARBONATE 500(1250)
500 TABLET ORAL DAILY
Status: DISCONTINUED | OUTPATIENT
Start: 2018-01-21 | End: 2018-01-30 | Stop reason: HOSPADM

## 2018-01-21 RX ORDER — HYDROCODONE BITARTRATE AND ACETAMINOPHEN 5; 325 MG/1; MG/1
2 TABLET ORAL EVERY 6 HOURS PRN
Status: DISCONTINUED | OUTPATIENT
Start: 2018-01-21 | End: 2018-01-30 | Stop reason: HOSPADM

## 2018-01-21 RX ORDER — ESCITALOPRAM OXALATE 10 MG/1
10 TABLET ORAL DAILY
Status: DISCONTINUED | OUTPATIENT
Start: 2018-01-21 | End: 2018-01-30 | Stop reason: HOSPADM

## 2018-01-21 RX ORDER — NICOTINE POLACRILEX 2 MG
1 GUM BUCCAL DAILY
Status: DISCONTINUED | OUTPATIENT
Start: 2018-01-21 | End: 2018-01-21

## 2018-01-21 RX ORDER — TIZANIDINE 4 MG/1
4 TABLET ORAL 3 TIMES DAILY PRN
Status: DISCONTINUED | OUTPATIENT
Start: 2018-01-21 | End: 2018-01-30 | Stop reason: HOSPADM

## 2018-01-21 RX ORDER — CLONIDINE HYDROCHLORIDE 0.1 MG/1
0.1 TABLET ORAL NIGHTLY
Status: DISCONTINUED | OUTPATIENT
Start: 2018-01-21 | End: 2018-01-23

## 2018-01-21 RX ORDER — ONDANSETRON 4 MG/1
4 TABLET, ORALLY DISINTEGRATING ORAL EVERY 8 HOURS PRN
Status: DISCONTINUED | OUTPATIENT
Start: 2018-01-21 | End: 2018-01-30 | Stop reason: HOSPADM

## 2018-01-21 RX ORDER — TRAZODONE HYDROCHLORIDE 150 MG/1
150 TABLET ORAL NIGHTLY
Status: DISCONTINUED | OUTPATIENT
Start: 2018-01-21 | End: 2018-01-30 | Stop reason: HOSPADM

## 2018-01-21 RX ORDER — PROMETHAZINE HYDROCHLORIDE 25 MG/1
25 TABLET ORAL EVERY 6 HOURS PRN
Status: DISCONTINUED | OUTPATIENT
Start: 2018-01-21 | End: 2018-01-30 | Stop reason: HOSPADM

## 2018-01-21 RX ORDER — BUTALBITAL, ACETAMINOPHEN, CAFFEINE AND CODEINE PHOSPHATE 300; 50; 40; 30 MG/1; MG/1; MG/1; MG/1
1 CAPSULE ORAL EVERY 8 HOURS PRN
Status: DISCONTINUED | OUTPATIENT
Start: 2018-01-21 | End: 2018-01-22 | Stop reason: ALTCHOICE

## 2018-01-21 RX ORDER — DULOXETIN HYDROCHLORIDE 60 MG/1
60 CAPSULE, DELAYED RELEASE ORAL NIGHTLY
Status: DISCONTINUED | OUTPATIENT
Start: 2018-01-21 | End: 2018-01-30 | Stop reason: HOSPADM

## 2018-01-21 RX ORDER — CLONAZEPAM 0.5 MG/1
0.5 TABLET ORAL 2 TIMES DAILY PRN
Status: DISCONTINUED | OUTPATIENT
Start: 2018-01-21 | End: 2018-01-30 | Stop reason: HOSPADM

## 2018-01-21 RX ORDER — HYDROXYZINE HYDROCHLORIDE 25 MG/1
50 TABLET, FILM COATED ORAL 4 TIMES DAILY PRN
Status: DISCONTINUED | OUTPATIENT
Start: 2018-01-21 | End: 2018-01-22

## 2018-01-21 RX ORDER — DOCUSATE SODIUM 100 MG/1
100 CAPSULE, LIQUID FILLED ORAL 2 TIMES DAILY PRN
Status: DISCONTINUED | OUTPATIENT
Start: 2018-01-21 | End: 2018-01-30 | Stop reason: HOSPADM

## 2018-01-21 RX ORDER — NADOLOL 20 MG/1
40 TABLET ORAL NIGHTLY
Status: DISCONTINUED | OUTPATIENT
Start: 2018-01-21 | End: 2018-01-30 | Stop reason: HOSPADM

## 2018-01-21 RX ORDER — THIAMINE MONONITRATE (VIT B1) 100 MG
100 TABLET ORAL DAILY
Status: DISCONTINUED | OUTPATIENT
Start: 2018-01-21 | End: 2018-01-30 | Stop reason: HOSPADM

## 2018-01-21 RX ORDER — PANTOPRAZOLE SODIUM 40 MG/1
40 TABLET, DELAYED RELEASE ORAL DAILY
Status: DISCONTINUED | OUTPATIENT
Start: 2018-01-21 | End: 2018-01-30 | Stop reason: HOSPADM

## 2018-01-21 RX ORDER — IBUPROFEN 200 MG
1250 CAPSULE ORAL DAILY
Status: DISCONTINUED | OUTPATIENT
Start: 2018-01-21 | End: 2018-01-21

## 2018-01-21 RX ORDER — CHLORPROMAZINE HYDROCHLORIDE 25 MG/1
25 TABLET, FILM COATED ORAL 3 TIMES DAILY
Status: DISCONTINUED | OUTPATIENT
Start: 2018-01-21 | End: 2018-01-23

## 2018-01-21 RX ORDER — POTASSIUM CITRATE 5 MEQ/1
5 TABLET, EXTENDED RELEASE ORAL 2 TIMES DAILY
Status: DISCONTINUED | OUTPATIENT
Start: 2018-01-21 | End: 2018-01-30 | Stop reason: HOSPADM

## 2018-01-21 RX ORDER — BUTALBITAL, ACETAMINOPHEN AND CAFFEINE 50; 325; 40 MG/1; MG/1; MG/1
1-2 TABLET ORAL EVERY 12 HOURS PRN
COMMUNITY
End: 2020-11-13

## 2018-01-21 RX ORDER — NYSTATIN 100000 U/G
CREAM TOPICAL 2 TIMES DAILY
Status: DISCONTINUED | OUTPATIENT
Start: 2018-01-21 | End: 2018-01-30 | Stop reason: HOSPADM

## 2018-01-21 RX ORDER — ROPINIROLE 1 MG/1
1 TABLET, FILM COATED ORAL 2 TIMES DAILY
Status: DISCONTINUED | OUTPATIENT
Start: 2018-01-21 | End: 2018-01-30 | Stop reason: HOSPADM

## 2018-01-21 RX ADMIN — MOMETASONE FUROATE AND FORMOTEROL FUMARATE DIHYDRATE 2 PUFF: 100; 5 AEROSOL RESPIRATORY (INHALATION) at 20:34

## 2018-01-21 RX ADMIN — BUSPIRONE HYDROCHLORIDE 15 MG: 15 TABLET ORAL at 18:08

## 2018-01-21 RX ADMIN — TRAZODONE HYDROCHLORIDE 150 MG: 150 TABLET ORAL at 20:35

## 2018-01-21 RX ADMIN — CHLORPROMAZINE HYDROCHLORIDE 25 MG: 25 TABLET, SUGAR COATED ORAL at 15:13

## 2018-01-21 RX ADMIN — VITAMIN D, TAB 1000IU (100/BT) 1000 UNITS: 25 TAB at 12:18

## 2018-01-21 RX ADMIN — HYDROCODONE BITARTRATE AND ACETAMINOPHEN 2 TABLET: 5; 325 TABLET ORAL at 20:36

## 2018-01-21 RX ADMIN — ROPINIROLE HYDROCHLORIDE 1 MG: 1 TABLET, FILM COATED ORAL at 12:17

## 2018-01-21 RX ADMIN — TOPIRAMATE 100 MG: 100 TABLET, FILM COATED ORAL at 12:18

## 2018-01-21 RX ADMIN — PREGABALIN 225 MG: 150 CAPSULE ORAL at 12:17

## 2018-01-21 RX ADMIN — ILOPERIDONE 6 MG: 4 TABLET ORAL at 12:16

## 2018-01-21 RX ADMIN — IPRATROPIUM BROMIDE AND ALBUTEROL 2 PUFF: 20; 100 SPRAY, METERED RESPIRATORY (INHALATION) at 12:22

## 2018-01-21 RX ADMIN — MECLIZINE HYDROCHLORIDE 25 MG: 25 TABLET ORAL at 20:35

## 2018-01-21 RX ADMIN — ILOPERIDONE 6 MG: 4 TABLET ORAL at 20:35

## 2018-01-21 RX ADMIN — CLONIDINE HYDROCHLORIDE 0.1 MG: 0.1 TABLET ORAL at 20:36

## 2018-01-21 RX ADMIN — BUSPIRONE HYDROCHLORIDE 15 MG: 15 TABLET ORAL at 12:18

## 2018-01-21 RX ADMIN — NYSTATIN: 100000 CREAM TOPICAL at 12:21

## 2018-01-21 RX ADMIN — ESCITALOPRAM OXALATE 10 MG: 10 TABLET ORAL at 12:18

## 2018-01-21 RX ADMIN — NYSTATIN: 100000 CREAM TOPICAL at 20:36

## 2018-01-21 RX ADMIN — CLONAZEPAM 0.5 MG: 0.5 TABLET ORAL at 20:36

## 2018-01-21 RX ADMIN — BUSPIRONE HYDROCHLORIDE 15 MG: 15 TABLET ORAL at 20:35

## 2018-01-21 RX ADMIN — MIRTAZAPINE 30 MG: 30 TABLET, FILM COATED ORAL at 20:35

## 2018-01-21 RX ADMIN — POTASSIUM CITRATE 5 MEQ: 5 TABLET ORAL at 17:09

## 2018-01-21 RX ADMIN — PANTOPRAZOLE SODIUM 40 MG: 40 TABLET, DELAYED RELEASE ORAL at 12:17

## 2018-01-21 RX ADMIN — CETIRIZINE HYDROCHLORIDE 10 MG: 10 TABLET, FILM COATED ORAL at 12:18

## 2018-01-21 RX ADMIN — ROPINIROLE HYDROCHLORIDE 1 MG: 1 TABLET, FILM COATED ORAL at 20:35

## 2018-01-21 RX ADMIN — Medication 100 MG: at 12:18

## 2018-01-21 RX ADMIN — PREGABALIN 225 MG: 150 CAPSULE ORAL at 20:35

## 2018-01-21 RX ADMIN — TOPIRAMATE 100 MG: 100 TABLET, FILM COATED ORAL at 20:36

## 2018-01-21 RX ADMIN — CALCIUM 500 MG: 500 TABLET ORAL at 12:19

## 2018-01-21 RX ADMIN — CHLORPROMAZINE HYDROCHLORIDE 25 MG: 25 TABLET, SUGAR COATED ORAL at 20:34

## 2018-01-21 RX ADMIN — DULOXETINE HYDROCHLORIDE 60 MG: 60 CAPSULE, DELAYED RELEASE ORAL at 20:36

## 2018-01-21 RX ADMIN — NADOLOL 40 MG: 20 TABLET ORAL at 20:35

## 2018-01-21 ASSESSMENT — PAIN SCALES - GENERAL
PAINLEVEL_OUTOF10: 7
PAINLEVEL_OUTOF10: 6
PAINLEVEL_OUTOF10: 2
PAINLEVEL_OUTOF10: 7

## 2018-01-21 ASSESSMENT — ENCOUNTER SYMPTOMS
NAUSEA: 0
EYE PAIN: 0
DIARRHEA: 0
SORE THROAT: 0
VOMITING: 0
ABDOMINAL PAIN: 0
BACK PAIN: 0
SHORTNESS OF BREATH: 0
COUGH: 0

## 2018-01-21 ASSESSMENT — SLEEP AND FATIGUE QUESTIONNAIRES
DO YOU USE A SLEEP AID: YES
DIFFICULTY FALLING ASLEEP: YES
DIFFICULTY ARISING: YES
RESTFUL SLEEP: NO
RESTFUL SLEEP: NO
SLEEP PATTERN: DIFFICULTY FALLING ASLEEP;DIFFICULTY ARISING;DISTURBED/INTERRUPTED SLEEP
DIFFICULTY FALLING ASLEEP: YES
AVERAGE NUMBER OF SLEEP HOURS: 5
SLEEP PATTERN: DIFFICULTY FALLING ASLEEP;DIFFICULTY ARISING;DISTURBED/INTERRUPTED SLEEP
DIFFICULTY ARISING: YES
DO YOU HAVE DIFFICULTY SLEEPING: YES
DIFFICULTY STAYING ASLEEP: YES
DO YOU HAVE DIFFICULTY SLEEPING: YES
DIFFICULTY STAYING ASLEEP: YES
DO YOU USE A SLEEP AID: YES

## 2018-01-21 ASSESSMENT — PAIN DESCRIPTION - PAIN TYPE
TYPE: CHRONIC PAIN
TYPE: CHRONIC PAIN

## 2018-01-21 ASSESSMENT — PATIENT HEALTH QUESTIONNAIRE - PHQ9: SUM OF ALL RESPONSES TO PHQ QUESTIONS 1-9: 9

## 2018-01-21 ASSESSMENT — LIFESTYLE VARIABLES
HISTORY_ALCOHOL_USE: NO
HISTORY_ALCOHOL_USE: NO

## 2018-01-21 ASSESSMENT — PAIN DESCRIPTION - ORIENTATION: ORIENTATION: LEFT;RIGHT

## 2018-01-21 ASSESSMENT — PAIN DESCRIPTION - LOCATION
LOCATION: HEAD;BACK;HIP
LOCATION: HEAD;BACK;HIP

## 2018-01-22 PROCEDURE — 6370000000 HC RX 637 (ALT 250 FOR IP): Performed by: PSYCHIATRY & NEUROLOGY

## 2018-01-22 PROCEDURE — 1240000000 HC EMOTIONAL WELLNESS R&B

## 2018-01-22 PROCEDURE — 6360000002 HC RX W HCPCS: Performed by: PSYCHIATRY & NEUROLOGY

## 2018-01-22 RX ORDER — BUTALBITAL, ACETAMINOPHEN AND CAFFEINE 50; 325; 40 MG/1; MG/1; MG/1
1 TABLET ORAL EVERY 4 HOURS PRN
Status: DISCONTINUED | OUTPATIENT
Start: 2018-01-22 | End: 2018-01-30 | Stop reason: HOSPADM

## 2018-01-22 RX ORDER — NICOTINE 21 MG/24HR
1 PATCH, TRANSDERMAL 24 HOURS TRANSDERMAL DAILY
Status: DISCONTINUED | OUTPATIENT
Start: 2018-01-22 | End: 2018-01-30 | Stop reason: HOSPADM

## 2018-01-22 RX ORDER — OLANZAPINE 10 MG/1
10 TABLET ORAL NIGHTLY
Status: DISCONTINUED | OUTPATIENT
Start: 2018-01-22 | End: 2018-01-23

## 2018-01-22 RX ORDER — HYDROXYZINE HYDROCHLORIDE 25 MG/1
50 TABLET, FILM COATED ORAL 4 TIMES DAILY
Status: DISCONTINUED | OUTPATIENT
Start: 2018-01-22 | End: 2018-01-30 | Stop reason: HOSPADM

## 2018-01-22 RX ADMIN — DULOXETINE HYDROCHLORIDE 60 MG: 60 CAPSULE, DELAYED RELEASE ORAL at 21:08

## 2018-01-22 RX ADMIN — CLONAZEPAM 0.5 MG: 0.5 TABLET ORAL at 19:27

## 2018-01-22 RX ADMIN — Medication 100 MG: at 09:51

## 2018-01-22 RX ADMIN — IPRATROPIUM BROMIDE AND ALBUTEROL 2 PUFF: 20; 100 SPRAY, METERED RESPIRATORY (INHALATION) at 17:04

## 2018-01-22 RX ADMIN — NADOLOL 40 MG: 20 TABLET ORAL at 21:07

## 2018-01-22 RX ADMIN — PREGABALIN 225 MG: 150 CAPSULE ORAL at 21:07

## 2018-01-22 RX ADMIN — ONDANSETRON 4 MG: 4 TABLET, ORALLY DISINTEGRATING ORAL at 21:22

## 2018-01-22 RX ADMIN — BUSPIRONE HYDROCHLORIDE 15 MG: 15 TABLET ORAL at 21:08

## 2018-01-22 RX ADMIN — OLANZAPINE 10 MG: 10 TABLET, FILM COATED ORAL at 21:08

## 2018-01-22 RX ADMIN — ILOPERIDONE 6 MG: 4 TABLET ORAL at 09:51

## 2018-01-22 RX ADMIN — PREGABALIN 225 MG: 150 CAPSULE ORAL at 09:52

## 2018-01-22 RX ADMIN — VITAMIN D, TAB 1000IU (100/BT) 1000 UNITS: 25 TAB at 09:50

## 2018-01-22 RX ADMIN — HYDROCODONE BITARTRATE AND ACETAMINOPHEN 2 TABLET: 5; 325 TABLET ORAL at 13:31

## 2018-01-22 RX ADMIN — MOMETASONE FUROATE AND FORMOTEROL FUMARATE DIHYDRATE 2 PUFF: 100; 5 AEROSOL RESPIRATORY (INHALATION) at 21:12

## 2018-01-22 RX ADMIN — MOMETASONE FUROATE AND FORMOTEROL FUMARATE DIHYDRATE 2 PUFF: 100; 5 AEROSOL RESPIRATORY (INHALATION) at 08:03

## 2018-01-22 RX ADMIN — PANTOPRAZOLE SODIUM 40 MG: 40 TABLET, DELAYED RELEASE ORAL at 07:32

## 2018-01-22 RX ADMIN — MIRTAZAPINE 30 MG: 30 TABLET, FILM COATED ORAL at 21:08

## 2018-01-22 RX ADMIN — IPRATROPIUM BROMIDE AND ALBUTEROL 2 PUFF: 20; 100 SPRAY, METERED RESPIRATORY (INHALATION) at 11:01

## 2018-01-22 RX ADMIN — BUSPIRONE HYDROCHLORIDE 15 MG: 15 TABLET ORAL at 09:51

## 2018-01-22 RX ADMIN — POTASSIUM CITRATE 5 MEQ: 5 TABLET ORAL at 09:50

## 2018-01-22 RX ADMIN — BUSPIRONE HYDROCHLORIDE 15 MG: 15 TABLET ORAL at 13:10

## 2018-01-22 RX ADMIN — HYDROXYZINE HYDROCHLORIDE 50 MG: 25 TABLET, FILM COATED ORAL at 13:31

## 2018-01-22 RX ADMIN — TOPIRAMATE 100 MG: 100 TABLET, FILM COATED ORAL at 21:08

## 2018-01-22 RX ADMIN — HYDROCODONE BITARTRATE AND ACETAMINOPHEN 2 TABLET: 5; 325 TABLET ORAL at 07:31

## 2018-01-22 RX ADMIN — TOPIRAMATE 100 MG: 100 TABLET, FILM COATED ORAL at 09:50

## 2018-01-22 RX ADMIN — CHLORPROMAZINE HYDROCHLORIDE 25 MG: 25 TABLET, SUGAR COATED ORAL at 09:51

## 2018-01-22 RX ADMIN — ROPINIROLE HYDROCHLORIDE 1 MG: 1 TABLET, FILM COATED ORAL at 09:51

## 2018-01-22 RX ADMIN — HYDROXYZINE HYDROCHLORIDE 50 MG: 25 TABLET, FILM COATED ORAL at 21:08

## 2018-01-22 RX ADMIN — CALCIUM 500 MG: 500 TABLET ORAL at 09:51

## 2018-01-22 RX ADMIN — BUSPIRONE HYDROCHLORIDE 15 MG: 15 TABLET ORAL at 17:04

## 2018-01-22 RX ADMIN — CETIRIZINE HYDROCHLORIDE 10 MG: 10 TABLET, FILM COATED ORAL at 09:50

## 2018-01-22 RX ADMIN — CHLORPROMAZINE HYDROCHLORIDE 25 MG: 25 TABLET, SUGAR COATED ORAL at 14:23

## 2018-01-22 RX ADMIN — HYDROXYZINE HYDROCHLORIDE 50 MG: 25 TABLET, FILM COATED ORAL at 07:32

## 2018-01-22 RX ADMIN — CLONAZEPAM 0.5 MG: 0.5 TABLET ORAL at 09:50

## 2018-01-22 RX ADMIN — CLONIDINE HYDROCHLORIDE 0.1 MG: 0.1 TABLET ORAL at 21:08

## 2018-01-22 RX ADMIN — CHLORPROMAZINE HYDROCHLORIDE 25 MG: 25 TABLET, SUGAR COATED ORAL at 21:08

## 2018-01-22 RX ADMIN — ROPINIROLE HYDROCHLORIDE 1 MG: 1 TABLET, FILM COATED ORAL at 21:08

## 2018-01-22 RX ADMIN — POTASSIUM CITRATE 5 MEQ: 5 TABLET ORAL at 16:58

## 2018-01-22 RX ADMIN — NYSTATIN: 100000 CREAM TOPICAL at 09:49

## 2018-01-22 RX ADMIN — HYDROCODONE BITARTRATE AND ACETAMINOPHEN 2 TABLET: 5; 325 TABLET ORAL at 19:27

## 2018-01-22 RX ADMIN — BUTALBITAL, ACETAMINOPHEN AND CAFFEINE 1 TABLET: 50; 325; 40 TABLET ORAL at 16:58

## 2018-01-22 RX ADMIN — TRAZODONE HYDROCHLORIDE 150 MG: 150 TABLET ORAL at 21:08

## 2018-01-22 RX ADMIN — ESCITALOPRAM OXALATE 10 MG: 10 TABLET ORAL at 09:50

## 2018-01-22 ASSESSMENT — PAIN DESCRIPTION - FREQUENCY: FREQUENCY: CONTINUOUS

## 2018-01-22 ASSESSMENT — PAIN DESCRIPTION - ONSET: ONSET: ON-GOING

## 2018-01-22 ASSESSMENT — LIFESTYLE VARIABLES: HISTORY_ALCOHOL_USE: NO

## 2018-01-22 ASSESSMENT — PAIN SCALES - GENERAL
PAINLEVEL_OUTOF10: 7
PAINLEVEL_OUTOF10: 7
PAINLEVEL_OUTOF10: 4
PAINLEVEL_OUTOF10: 8
PAINLEVEL_OUTOF10: 5
PAINLEVEL_OUTOF10: 4
PAINLEVEL_OUTOF10: 4
PAINLEVEL_OUTOF10: 5

## 2018-01-22 ASSESSMENT — PAIN SCALES - WONG BAKER
WONGBAKER_NUMERICALRESPONSE: 4

## 2018-01-22 ASSESSMENT — PATIENT HEALTH QUESTIONNAIRE - PHQ9: SUM OF ALL RESPONSES TO PHQ QUESTIONS 1-9: 9

## 2018-01-22 ASSESSMENT — PAIN DESCRIPTION - ORIENTATION: ORIENTATION: LEFT;RIGHT

## 2018-01-22 ASSESSMENT — PAIN DESCRIPTION - DESCRIPTORS: DESCRIPTORS: ACHING;CONSTANT

## 2018-01-22 ASSESSMENT — PAIN DESCRIPTION - PAIN TYPE
TYPE: CHRONIC PAIN
TYPE: CHRONIC PAIN

## 2018-01-22 ASSESSMENT — PAIN DESCRIPTION - LOCATION: LOCATION: HEAD;BACK

## 2018-01-23 PROCEDURE — 6360000002 HC RX W HCPCS: Performed by: PSYCHIATRY & NEUROLOGY

## 2018-01-23 PROCEDURE — 6370000000 HC RX 637 (ALT 250 FOR IP): Performed by: PSYCHIATRY & NEUROLOGY

## 2018-01-23 PROCEDURE — 99222 1ST HOSP IP/OBS MODERATE 55: CPT | Performed by: INTERNAL MEDICINE

## 2018-01-23 PROCEDURE — 94664 DEMO&/EVAL PT USE INHALER: CPT

## 2018-01-23 PROCEDURE — 1240000000 HC EMOTIONAL WELLNESS R&B

## 2018-01-23 RX ORDER — CHLORPROMAZINE HYDROCHLORIDE 25 MG/1
25 TABLET, FILM COATED ORAL DAILY
Status: DISCONTINUED | OUTPATIENT
Start: 2018-01-24 | End: 2018-01-30 | Stop reason: HOSPADM

## 2018-01-23 RX ORDER — CHLORPROMAZINE HYDROCHLORIDE 25 MG/1
50 TABLET, FILM COATED ORAL NIGHTLY
Status: DISCONTINUED | OUTPATIENT
Start: 2018-01-23 | End: 2018-01-30 | Stop reason: HOSPADM

## 2018-01-23 RX ORDER — PRAZOSIN HYDROCHLORIDE 1 MG/1
1 CAPSULE ORAL NIGHTLY
Status: DISCONTINUED | OUTPATIENT
Start: 2018-01-23 | End: 2018-01-30 | Stop reason: HOSPADM

## 2018-01-23 RX ORDER — CHLORPROMAZINE HYDROCHLORIDE 25 MG/1
50 TABLET, FILM COATED ORAL
Status: DISCONTINUED | OUTPATIENT
Start: 2018-01-23 | End: 2018-01-30 | Stop reason: HOSPADM

## 2018-01-23 RX ORDER — OLANZAPINE 15 MG/1
15 TABLET ORAL NIGHTLY
Status: DISCONTINUED | OUTPATIENT
Start: 2018-01-23 | End: 2018-01-25

## 2018-01-23 RX ADMIN — TRAZODONE HYDROCHLORIDE 150 MG: 150 TABLET ORAL at 20:58

## 2018-01-23 RX ADMIN — HYDROCODONE BITARTRATE AND ACETAMINOPHEN 2 TABLET: 5; 325 TABLET ORAL at 21:49

## 2018-01-23 RX ADMIN — CHLORPROMAZINE HYDROCHLORIDE 50 MG: 25 TABLET, SUGAR COATED ORAL at 21:03

## 2018-01-23 RX ADMIN — BUTALBITAL, ACETAMINOPHEN AND CAFFEINE 1 TABLET: 50; 325; 40 TABLET ORAL at 09:54

## 2018-01-23 RX ADMIN — BUSPIRONE HYDROCHLORIDE 15 MG: 15 TABLET ORAL at 17:23

## 2018-01-23 RX ADMIN — CLONAZEPAM 0.5 MG: 0.5 TABLET ORAL at 09:52

## 2018-01-23 RX ADMIN — CHLORPROMAZINE HYDROCHLORIDE 25 MG: 25 TABLET, SUGAR COATED ORAL at 09:55

## 2018-01-23 RX ADMIN — BUSPIRONE HYDROCHLORIDE 15 MG: 15 TABLET ORAL at 09:54

## 2018-01-23 RX ADMIN — PREGABALIN 225 MG: 150 CAPSULE ORAL at 09:54

## 2018-01-23 RX ADMIN — HYDROXYZINE HYDROCHLORIDE 50 MG: 25 TABLET, FILM COATED ORAL at 17:23

## 2018-01-23 RX ADMIN — HYDROXYZINE HYDROCHLORIDE 50 MG: 25 TABLET, FILM COATED ORAL at 20:58

## 2018-01-23 RX ADMIN — CLONAZEPAM 0.5 MG: 0.5 TABLET ORAL at 19:26

## 2018-01-23 RX ADMIN — OLANZAPINE 15 MG: 15 TABLET, FILM COATED ORAL at 20:58

## 2018-01-23 RX ADMIN — MECLIZINE HYDROCHLORIDE 25 MG: 25 TABLET ORAL at 20:58

## 2018-01-23 RX ADMIN — TIZANIDINE 4 MG: 4 TABLET ORAL at 20:58

## 2018-01-23 RX ADMIN — DICYCLOMINE HYDROCHLORIDE 20 MG: 20 TABLET ORAL at 20:59

## 2018-01-23 RX ADMIN — CHLORPROMAZINE HYDROCHLORIDE 50 MG: 25 TABLET, SUGAR COATED ORAL at 13:03

## 2018-01-23 RX ADMIN — POTASSIUM CITRATE 5 MEQ: 5 TABLET ORAL at 16:01

## 2018-01-23 RX ADMIN — TOPIRAMATE 100 MG: 100 TABLET, FILM COATED ORAL at 09:52

## 2018-01-23 RX ADMIN — BUSPIRONE HYDROCHLORIDE 15 MG: 15 TABLET ORAL at 13:03

## 2018-01-23 RX ADMIN — PANTOPRAZOLE SODIUM 40 MG: 40 TABLET, DELAYED RELEASE ORAL at 09:52

## 2018-01-23 RX ADMIN — BUTALBITAL, ACETAMINOPHEN AND CAFFEINE 1 TABLET: 50; 325; 40 TABLET ORAL at 19:26

## 2018-01-23 RX ADMIN — PROMETHAZINE HYDROCHLORIDE 25 MG: 25 TABLET ORAL at 20:58

## 2018-01-23 RX ADMIN — NADOLOL 40 MG: 20 TABLET ORAL at 20:58

## 2018-01-23 RX ADMIN — HYDROCODONE BITARTRATE AND ACETAMINOPHEN 2 TABLET: 5; 325 TABLET ORAL at 09:53

## 2018-01-23 RX ADMIN — PREGABALIN 225 MG: 150 CAPSULE ORAL at 20:58

## 2018-01-23 RX ADMIN — BUSPIRONE HYDROCHLORIDE 15 MG: 15 TABLET ORAL at 20:59

## 2018-01-23 RX ADMIN — POTASSIUM CITRATE 5 MEQ: 5 TABLET ORAL at 10:01

## 2018-01-23 RX ADMIN — ESCITALOPRAM OXALATE 10 MG: 10 TABLET ORAL at 09:54

## 2018-01-23 RX ADMIN — VITAMIN D, TAB 1000IU (100/BT) 1000 UNITS: 25 TAB at 09:53

## 2018-01-23 RX ADMIN — IPRATROPIUM BROMIDE AND ALBUTEROL 2 PUFF: 20; 100 SPRAY, METERED RESPIRATORY (INHALATION) at 17:25

## 2018-01-23 RX ADMIN — NYSTATIN: 100000 CREAM TOPICAL at 09:56

## 2018-01-23 RX ADMIN — DULOXETINE HYDROCHLORIDE 60 MG: 60 CAPSULE, DELAYED RELEASE ORAL at 20:59

## 2018-01-23 RX ADMIN — MIRTAZAPINE 30 MG: 30 TABLET, FILM COATED ORAL at 20:58

## 2018-01-23 RX ADMIN — ROPINIROLE HYDROCHLORIDE 1 MG: 1 TABLET, FILM COATED ORAL at 09:54

## 2018-01-23 RX ADMIN — CALCIUM 500 MG: 500 TABLET ORAL at 09:55

## 2018-01-23 RX ADMIN — ONDANSETRON 4 MG: 4 TABLET, ORALLY DISINTEGRATING ORAL at 09:54

## 2018-01-23 RX ADMIN — HYDROXYZINE HYDROCHLORIDE 50 MG: 25 TABLET, FILM COATED ORAL at 10:00

## 2018-01-23 RX ADMIN — Medication 100 MG: at 09:52

## 2018-01-23 RX ADMIN — ROPINIROLE HYDROCHLORIDE 1 MG: 1 TABLET, FILM COATED ORAL at 20:58

## 2018-01-23 RX ADMIN — MOMETASONE FUROATE AND FORMOTEROL FUMARATE DIHYDRATE 2 PUFF: 100; 5 AEROSOL RESPIRATORY (INHALATION) at 20:57

## 2018-01-23 RX ADMIN — IPRATROPIUM BROMIDE AND ALBUTEROL 2 PUFF: 20; 100 SPRAY, METERED RESPIRATORY (INHALATION) at 21:49

## 2018-01-23 RX ADMIN — PRAZOSIN HYDROCHLORIDE 1 MG: 1 CAPSULE ORAL at 20:59

## 2018-01-23 RX ADMIN — HYDROCODONE BITARTRATE AND ACETAMINOPHEN 2 TABLET: 5; 325 TABLET ORAL at 16:01

## 2018-01-23 RX ADMIN — TOPIRAMATE 100 MG: 100 TABLET, FILM COATED ORAL at 20:59

## 2018-01-23 RX ADMIN — NYSTATIN: 100000 CREAM TOPICAL at 20:56

## 2018-01-23 RX ADMIN — TIZANIDINE 4 MG: 4 TABLET ORAL at 09:54

## 2018-01-23 RX ADMIN — HYDROXYZINE HYDROCHLORIDE 50 MG: 25 TABLET, FILM COATED ORAL at 13:03

## 2018-01-23 RX ADMIN — CETIRIZINE HYDROCHLORIDE 10 MG: 10 TABLET, FILM COATED ORAL at 09:54

## 2018-01-23 ASSESSMENT — PAIN SCALES - GENERAL
PAINLEVEL_OUTOF10: 7
PAINLEVEL_OUTOF10: 8
PAINLEVEL_OUTOF10: 5
PAINLEVEL_OUTOF10: 0
PAINLEVEL_OUTOF10: 3
PAINLEVEL_OUTOF10: 2
PAINLEVEL_OUTOF10: 5
PAINLEVEL_OUTOF10: 5

## 2018-01-23 ASSESSMENT — PAIN DESCRIPTION - DESCRIPTORS: DESCRIPTORS: ACHING;NAGGING

## 2018-01-23 ASSESSMENT — PAIN - FUNCTIONAL ASSESSMENT: PAIN_FUNCTIONAL_ASSESSMENT: 0-10

## 2018-01-24 PROCEDURE — 6370000000 HC RX 637 (ALT 250 FOR IP): Performed by: PSYCHIATRY & NEUROLOGY

## 2018-01-24 PROCEDURE — 1240000000 HC EMOTIONAL WELLNESS R&B

## 2018-01-24 PROCEDURE — 6360000002 HC RX W HCPCS: Performed by: PSYCHIATRY & NEUROLOGY

## 2018-01-24 RX ADMIN — ESCITALOPRAM OXALATE 10 MG: 10 TABLET ORAL at 09:07

## 2018-01-24 RX ADMIN — IPRATROPIUM BROMIDE AND ALBUTEROL 1 PUFF: 20; 100 SPRAY, METERED RESPIRATORY (INHALATION) at 12:29

## 2018-01-24 RX ADMIN — PREGABALIN 225 MG: 150 CAPSULE ORAL at 09:06

## 2018-01-24 RX ADMIN — CETIRIZINE HYDROCHLORIDE 10 MG: 10 TABLET, FILM COATED ORAL at 09:06

## 2018-01-24 RX ADMIN — BUTALBITAL, ACETAMINOPHEN AND CAFFEINE 1 TABLET: 50; 325; 40 TABLET ORAL at 21:21

## 2018-01-24 RX ADMIN — HYDROXYZINE HYDROCHLORIDE 50 MG: 25 TABLET, FILM COATED ORAL at 12:28

## 2018-01-24 RX ADMIN — VITAMIN D, TAB 1000IU (100/BT) 1000 UNITS: 25 TAB at 09:06

## 2018-01-24 RX ADMIN — TIZANIDINE 4 MG: 4 TABLET ORAL at 21:20

## 2018-01-24 RX ADMIN — HYDROCODONE BITARTRATE AND ACETAMINOPHEN 2 TABLET: 5; 325 TABLET ORAL at 17:00

## 2018-01-24 RX ADMIN — POTASSIUM CITRATE 5 MEQ: 5 TABLET ORAL at 16:59

## 2018-01-24 RX ADMIN — HYDROXYZINE HYDROCHLORIDE 50 MG: 25 TABLET, FILM COATED ORAL at 16:59

## 2018-01-24 RX ADMIN — DICYCLOMINE HYDROCHLORIDE 20 MG: 20 TABLET ORAL at 21:20

## 2018-01-24 RX ADMIN — PROMETHAZINE HYDROCHLORIDE 25 MG: 25 TABLET ORAL at 09:07

## 2018-01-24 RX ADMIN — CHLORPROMAZINE HYDROCHLORIDE 25 MG: 25 TABLET, SUGAR COATED ORAL at 09:06

## 2018-01-24 RX ADMIN — BUSPIRONE HYDROCHLORIDE 15 MG: 15 TABLET ORAL at 09:07

## 2018-01-24 RX ADMIN — DOCUSATE SODIUM 100 MG: 100 CAPSULE, LIQUID FILLED ORAL at 16:59

## 2018-01-24 RX ADMIN — IPRATROPIUM BROMIDE AND ALBUTEROL 1 PUFF: 20; 100 SPRAY, METERED RESPIRATORY (INHALATION) at 22:46

## 2018-01-24 RX ADMIN — OLANZAPINE 15 MG: 15 TABLET, FILM COATED ORAL at 21:20

## 2018-01-24 RX ADMIN — Medication 100 MG: at 09:07

## 2018-01-24 RX ADMIN — TOPIRAMATE 100 MG: 100 TABLET, FILM COATED ORAL at 21:20

## 2018-01-24 RX ADMIN — CHLORPROMAZINE HYDROCHLORIDE 50 MG: 25 TABLET, SUGAR COATED ORAL at 21:19

## 2018-01-24 RX ADMIN — ROPINIROLE HYDROCHLORIDE 1 MG: 1 TABLET, FILM COATED ORAL at 21:20

## 2018-01-24 RX ADMIN — IPRATROPIUM BROMIDE AND ALBUTEROL 1 PUFF: 20; 100 SPRAY, METERED RESPIRATORY (INHALATION) at 16:59

## 2018-01-24 RX ADMIN — NADOLOL 40 MG: 20 TABLET ORAL at 21:20

## 2018-01-24 RX ADMIN — CALCIUM 500 MG: 500 TABLET ORAL at 09:07

## 2018-01-24 RX ADMIN — BUSPIRONE HYDROCHLORIDE 15 MG: 15 TABLET ORAL at 12:29

## 2018-01-24 RX ADMIN — CHLORPROMAZINE HYDROCHLORIDE 50 MG: 25 TABLET, SUGAR COATED ORAL at 12:29

## 2018-01-24 RX ADMIN — CLONAZEPAM 0.5 MG: 0.5 TABLET ORAL at 21:20

## 2018-01-24 RX ADMIN — MOMETASONE FUROATE AND FORMOTEROL FUMARATE DIHYDRATE 2 PUFF: 100; 5 AEROSOL RESPIRATORY (INHALATION) at 09:05

## 2018-01-24 RX ADMIN — MECLIZINE HYDROCHLORIDE 25 MG: 25 TABLET ORAL at 21:21

## 2018-01-24 RX ADMIN — HYDROCODONE BITARTRATE AND ACETAMINOPHEN 2 TABLET: 5; 325 TABLET ORAL at 09:07

## 2018-01-24 RX ADMIN — HYDROXYZINE HYDROCHLORIDE 50 MG: 25 TABLET, FILM COATED ORAL at 09:07

## 2018-01-24 RX ADMIN — BUSPIRONE HYDROCHLORIDE 15 MG: 15 TABLET ORAL at 21:20

## 2018-01-24 RX ADMIN — PANTOPRAZOLE SODIUM 40 MG: 40 TABLET, DELAYED RELEASE ORAL at 09:07

## 2018-01-24 RX ADMIN — NYSTATIN: 100000 CREAM TOPICAL at 21:21

## 2018-01-24 RX ADMIN — POTASSIUM CITRATE 5 MEQ: 5 TABLET ORAL at 09:07

## 2018-01-24 RX ADMIN — ONDANSETRON 4 MG: 4 TABLET, ORALLY DISINTEGRATING ORAL at 21:20

## 2018-01-24 RX ADMIN — TOPIRAMATE 100 MG: 100 TABLET, FILM COATED ORAL at 09:07

## 2018-01-24 RX ADMIN — TIZANIDINE 4 MG: 4 TABLET ORAL at 12:29

## 2018-01-24 RX ADMIN — MIRTAZAPINE 30 MG: 30 TABLET, FILM COATED ORAL at 21:20

## 2018-01-24 RX ADMIN — NYSTATIN: 100000 CREAM TOPICAL at 09:08

## 2018-01-24 RX ADMIN — HYDROCODONE BITARTRATE AND ACETAMINOPHEN 2 TABLET: 5; 325 TABLET ORAL at 22:48

## 2018-01-24 RX ADMIN — BUSPIRONE HYDROCHLORIDE 15 MG: 15 TABLET ORAL at 16:59

## 2018-01-24 RX ADMIN — DULOXETINE HYDROCHLORIDE 60 MG: 60 CAPSULE, DELAYED RELEASE ORAL at 21:21

## 2018-01-24 RX ADMIN — TRAZODONE HYDROCHLORIDE 150 MG: 150 TABLET ORAL at 21:20

## 2018-01-24 RX ADMIN — PRAZOSIN HYDROCHLORIDE 1 MG: 1 CAPSULE ORAL at 21:18

## 2018-01-24 RX ADMIN — PREGABALIN 225 MG: 150 CAPSULE ORAL at 21:18

## 2018-01-24 RX ADMIN — MOMETASONE FUROATE AND FORMOTEROL FUMARATE DIHYDRATE 2 PUFF: 100; 5 AEROSOL RESPIRATORY (INHALATION) at 21:18

## 2018-01-24 RX ADMIN — HYDROXYZINE HYDROCHLORIDE 50 MG: 25 TABLET, FILM COATED ORAL at 21:20

## 2018-01-24 RX ADMIN — ROPINIROLE HYDROCHLORIDE 1 MG: 1 TABLET, FILM COATED ORAL at 09:06

## 2018-01-24 RX ADMIN — CLONAZEPAM 0.5 MG: 0.5 TABLET ORAL at 12:28

## 2018-01-24 ASSESSMENT — PAIN SCALES - GENERAL
PAINLEVEL_OUTOF10: 10
PAINLEVEL_OUTOF10: 8
PAINLEVEL_OUTOF10: 0
PAINLEVEL_OUTOF10: 8
PAINLEVEL_OUTOF10: 6
PAINLEVEL_OUTOF10: 7

## 2018-01-24 ASSESSMENT — PAIN DESCRIPTION - PAIN TYPE: TYPE: CHRONIC PAIN

## 2018-01-24 ASSESSMENT — PAIN DESCRIPTION - LOCATION: LOCATION: GENERALIZED

## 2018-01-25 PROCEDURE — 6360000002 HC RX W HCPCS: Performed by: PSYCHIATRY & NEUROLOGY

## 2018-01-25 PROCEDURE — 1240000000 HC EMOTIONAL WELLNESS R&B

## 2018-01-25 PROCEDURE — 6370000000 HC RX 637 (ALT 250 FOR IP): Performed by: PSYCHIATRY & NEUROLOGY

## 2018-01-25 RX ORDER — OLANZAPINE 10 MG/1
20 TABLET ORAL NIGHTLY
Status: DISCONTINUED | OUTPATIENT
Start: 2018-01-25 | End: 2018-01-30 | Stop reason: HOSPADM

## 2018-01-25 RX ADMIN — TIZANIDINE 4 MG: 4 TABLET ORAL at 03:54

## 2018-01-25 RX ADMIN — Medication 100 MG: at 09:04

## 2018-01-25 RX ADMIN — ROPINIROLE HYDROCHLORIDE 1 MG: 1 TABLET, FILM COATED ORAL at 21:06

## 2018-01-25 RX ADMIN — CETIRIZINE HYDROCHLORIDE 10 MG: 10 TABLET, FILM COATED ORAL at 09:05

## 2018-01-25 RX ADMIN — NYSTATIN: 100000 CREAM TOPICAL at 21:09

## 2018-01-25 RX ADMIN — BUSPIRONE HYDROCHLORIDE 15 MG: 15 TABLET ORAL at 17:58

## 2018-01-25 RX ADMIN — HYDROXYZINE HYDROCHLORIDE 50 MG: 25 TABLET, FILM COATED ORAL at 09:05

## 2018-01-25 RX ADMIN — DULOXETINE HYDROCHLORIDE 60 MG: 60 CAPSULE, DELAYED RELEASE ORAL at 21:06

## 2018-01-25 RX ADMIN — MIRTAZAPINE 30 MG: 30 TABLET, FILM COATED ORAL at 21:07

## 2018-01-25 RX ADMIN — HYDROCODONE BITARTRATE AND ACETAMINOPHEN 2 TABLET: 5; 325 TABLET ORAL at 09:05

## 2018-01-25 RX ADMIN — CLONAZEPAM 0.5 MG: 0.5 TABLET ORAL at 21:06

## 2018-01-25 RX ADMIN — TOPIRAMATE 100 MG: 100 TABLET, FILM COATED ORAL at 09:04

## 2018-01-25 RX ADMIN — POTASSIUM CITRATE 5 MEQ: 5 TABLET ORAL at 09:05

## 2018-01-25 RX ADMIN — HYDROXYZINE HYDROCHLORIDE 50 MG: 25 TABLET, FILM COATED ORAL at 21:06

## 2018-01-25 RX ADMIN — ROPINIROLE HYDROCHLORIDE 1 MG: 1 TABLET, FILM COATED ORAL at 09:04

## 2018-01-25 RX ADMIN — CLONAZEPAM 0.5 MG: 0.5 TABLET ORAL at 09:06

## 2018-01-25 RX ADMIN — DOCUSATE SODIUM 100 MG: 100 CAPSULE, LIQUID FILLED ORAL at 09:10

## 2018-01-25 RX ADMIN — CHLORPROMAZINE HYDROCHLORIDE 50 MG: 25 TABLET, SUGAR COATED ORAL at 11:56

## 2018-01-25 RX ADMIN — CALCIUM 500 MG: 500 TABLET ORAL at 09:05

## 2018-01-25 RX ADMIN — TOPIRAMATE 100 MG: 100 TABLET, FILM COATED ORAL at 21:07

## 2018-01-25 RX ADMIN — PREGABALIN 225 MG: 150 CAPSULE ORAL at 21:06

## 2018-01-25 RX ADMIN — TRAZODONE HYDROCHLORIDE 150 MG: 150 TABLET ORAL at 21:06

## 2018-01-25 RX ADMIN — NADOLOL 40 MG: 20 TABLET ORAL at 21:09

## 2018-01-25 RX ADMIN — CHLORPROMAZINE HYDROCHLORIDE 50 MG: 25 TABLET, SUGAR COATED ORAL at 21:09

## 2018-01-25 RX ADMIN — BUSPIRONE HYDROCHLORIDE 15 MG: 15 TABLET ORAL at 14:27

## 2018-01-25 RX ADMIN — OLANZAPINE 20 MG: 10 TABLET, FILM COATED ORAL at 21:07

## 2018-01-25 RX ADMIN — HYDROCODONE BITARTRATE AND ACETAMINOPHEN 2 TABLET: 5; 325 TABLET ORAL at 14:26

## 2018-01-25 RX ADMIN — VITAMIN D, TAB 1000IU (100/BT) 1000 UNITS: 25 TAB at 09:05

## 2018-01-25 RX ADMIN — HYDROXYZINE HYDROCHLORIDE 50 MG: 25 TABLET, FILM COATED ORAL at 14:26

## 2018-01-25 RX ADMIN — PRAZOSIN HYDROCHLORIDE 1 MG: 1 CAPSULE ORAL at 21:06

## 2018-01-25 RX ADMIN — MOMETASONE FUROATE AND FORMOTEROL FUMARATE DIHYDRATE 2 PUFF: 100; 5 AEROSOL RESPIRATORY (INHALATION) at 09:04

## 2018-01-25 RX ADMIN — HYDROXYZINE HYDROCHLORIDE 50 MG: 25 TABLET, FILM COATED ORAL at 17:58

## 2018-01-25 RX ADMIN — BUSPIRONE HYDROCHLORIDE 15 MG: 15 TABLET ORAL at 09:05

## 2018-01-25 RX ADMIN — PREGABALIN 225 MG: 150 CAPSULE ORAL at 09:04

## 2018-01-25 RX ADMIN — BUSPIRONE HYDROCHLORIDE 15 MG: 15 TABLET ORAL at 21:06

## 2018-01-25 RX ADMIN — HYDROCODONE BITARTRATE AND ACETAMINOPHEN 2 TABLET: 5; 325 TABLET ORAL at 21:06

## 2018-01-25 RX ADMIN — PANTOPRAZOLE SODIUM 40 MG: 40 TABLET, DELAYED RELEASE ORAL at 09:05

## 2018-01-25 RX ADMIN — POTASSIUM CITRATE 5 MEQ: 5 TABLET ORAL at 17:58

## 2018-01-25 RX ADMIN — ESCITALOPRAM OXALATE 10 MG: 10 TABLET ORAL at 09:05

## 2018-01-25 RX ADMIN — CHLORPROMAZINE HYDROCHLORIDE 25 MG: 25 TABLET, SUGAR COATED ORAL at 09:05

## 2018-01-25 RX ADMIN — TIZANIDINE 4 MG: 4 TABLET ORAL at 18:14

## 2018-01-25 ASSESSMENT — PAIN DESCRIPTION - ORIENTATION: ORIENTATION: RIGHT;LEFT

## 2018-01-25 ASSESSMENT — PAIN SCALES - GENERAL
PAINLEVEL_OUTOF10: 1
PAINLEVEL_OUTOF10: 9
PAINLEVEL_OUTOF10: 8
PAINLEVEL_OUTOF10: 7
PAINLEVEL_OUTOF10: 6
PAINLEVEL_OUTOF10: 5
PAINLEVEL_OUTOF10: 7

## 2018-01-25 ASSESSMENT — PAIN DESCRIPTION - ONSET: ONSET: ON-GOING

## 2018-01-25 ASSESSMENT — PAIN DESCRIPTION - FREQUENCY: FREQUENCY: CONTINUOUS

## 2018-01-25 ASSESSMENT — PAIN DESCRIPTION - PAIN TYPE: TYPE: CHRONIC PAIN

## 2018-01-25 ASSESSMENT — PAIN DESCRIPTION - LOCATION: LOCATION: GENERALIZED

## 2018-01-25 ASSESSMENT — PAIN DESCRIPTION - DESCRIPTORS: DESCRIPTORS: ACHING;DISCOMFORT

## 2018-01-26 PROCEDURE — 6370000000 HC RX 637 (ALT 250 FOR IP): Performed by: PSYCHIATRY & NEUROLOGY

## 2018-01-26 PROCEDURE — 1240000000 HC EMOTIONAL WELLNESS R&B

## 2018-01-26 PROCEDURE — 6360000002 HC RX W HCPCS: Performed by: PSYCHIATRY & NEUROLOGY

## 2018-01-26 RX ADMIN — HYDROCODONE BITARTRATE AND ACETAMINOPHEN 2 TABLET: 5; 325 TABLET ORAL at 08:09

## 2018-01-26 RX ADMIN — BUSPIRONE HYDROCHLORIDE 15 MG: 15 TABLET ORAL at 21:50

## 2018-01-26 RX ADMIN — HYDROCODONE BITARTRATE AND ACETAMINOPHEN 2 TABLET: 5; 325 TABLET ORAL at 20:35

## 2018-01-26 RX ADMIN — CLONAZEPAM 0.5 MG: 0.5 TABLET ORAL at 11:14

## 2018-01-26 RX ADMIN — TOPIRAMATE 100 MG: 100 TABLET, FILM COATED ORAL at 08:06

## 2018-01-26 RX ADMIN — POTASSIUM CITRATE 5 MEQ: 5 TABLET ORAL at 08:05

## 2018-01-26 RX ADMIN — TIZANIDINE 4 MG: 4 TABLET ORAL at 20:36

## 2018-01-26 RX ADMIN — OLANZAPINE 20 MG: 10 TABLET, FILM COATED ORAL at 21:50

## 2018-01-26 RX ADMIN — HYDROXYZINE HYDROCHLORIDE 50 MG: 25 TABLET, FILM COATED ORAL at 12:09

## 2018-01-26 RX ADMIN — TIZANIDINE 4 MG: 4 TABLET ORAL at 11:14

## 2018-01-26 RX ADMIN — DULOXETINE HYDROCHLORIDE 60 MG: 60 CAPSULE, DELAYED RELEASE ORAL at 21:50

## 2018-01-26 RX ADMIN — POTASSIUM CITRATE 5 MEQ: 5 TABLET ORAL at 16:08

## 2018-01-26 RX ADMIN — CHLORPROMAZINE HYDROCHLORIDE 25 MG: 25 TABLET, SUGAR COATED ORAL at 08:05

## 2018-01-26 RX ADMIN — Medication 100 MG: at 08:06

## 2018-01-26 RX ADMIN — CLONAZEPAM 0.5 MG: 0.5 TABLET ORAL at 21:51

## 2018-01-26 RX ADMIN — PANTOPRAZOLE SODIUM 40 MG: 40 TABLET, DELAYED RELEASE ORAL at 08:06

## 2018-01-26 RX ADMIN — ROPINIROLE HYDROCHLORIDE 1 MG: 1 TABLET, FILM COATED ORAL at 08:05

## 2018-01-26 RX ADMIN — IPRATROPIUM BROMIDE AND ALBUTEROL 1 PUFF: 20; 100 SPRAY, METERED RESPIRATORY (INHALATION) at 16:08

## 2018-01-26 RX ADMIN — NYSTATIN: 100000 CREAM TOPICAL at 08:12

## 2018-01-26 RX ADMIN — DOCUSATE SODIUM 100 MG: 100 CAPSULE, LIQUID FILLED ORAL at 21:52

## 2018-01-26 RX ADMIN — PREGABALIN 225 MG: 150 CAPSULE ORAL at 21:51

## 2018-01-26 RX ADMIN — HYDROXYZINE HYDROCHLORIDE 50 MG: 25 TABLET, FILM COATED ORAL at 08:06

## 2018-01-26 RX ADMIN — MIRTAZAPINE 30 MG: 30 TABLET, FILM COATED ORAL at 22:02

## 2018-01-26 RX ADMIN — NYSTATIN: 100000 CREAM TOPICAL at 21:52

## 2018-01-26 RX ADMIN — TRAZODONE HYDROCHLORIDE 150 MG: 150 TABLET ORAL at 21:50

## 2018-01-26 RX ADMIN — TOPIRAMATE 100 MG: 100 TABLET, FILM COATED ORAL at 21:51

## 2018-01-26 RX ADMIN — HYDROXYZINE HYDROCHLORIDE 50 MG: 25 TABLET, FILM COATED ORAL at 21:51

## 2018-01-26 RX ADMIN — BUSPIRONE HYDROCHLORIDE 15 MG: 15 TABLET ORAL at 08:06

## 2018-01-26 RX ADMIN — CHLORPROMAZINE HYDROCHLORIDE 50 MG: 25 TABLET, SUGAR COATED ORAL at 12:09

## 2018-01-26 RX ADMIN — BUSPIRONE HYDROCHLORIDE 15 MG: 15 TABLET ORAL at 16:08

## 2018-01-26 RX ADMIN — ROPINIROLE HYDROCHLORIDE 1 MG: 1 TABLET, FILM COATED ORAL at 21:50

## 2018-01-26 RX ADMIN — ESCITALOPRAM OXALATE 10 MG: 10 TABLET ORAL at 08:05

## 2018-01-26 RX ADMIN — DOCUSATE SODIUM 100 MG: 100 CAPSULE, LIQUID FILLED ORAL at 08:51

## 2018-01-26 RX ADMIN — DICYCLOMINE HYDROCHLORIDE 20 MG: 20 TABLET ORAL at 21:50

## 2018-01-26 RX ADMIN — IPRATROPIUM BROMIDE AND ALBUTEROL 1 PUFF: 20; 100 SPRAY, METERED RESPIRATORY (INHALATION) at 12:09

## 2018-01-26 RX ADMIN — HYDROXYZINE HYDROCHLORIDE 50 MG: 25 TABLET, FILM COATED ORAL at 16:08

## 2018-01-26 RX ADMIN — CALCIUM 500 MG: 500 TABLET ORAL at 08:05

## 2018-01-26 RX ADMIN — CETIRIZINE HYDROCHLORIDE 10 MG: 10 TABLET, FILM COATED ORAL at 08:06

## 2018-01-26 RX ADMIN — VITAMIN D, TAB 1000IU (100/BT) 1000 UNITS: 25 TAB at 08:06

## 2018-01-26 RX ADMIN — PREGABALIN 225 MG: 150 CAPSULE ORAL at 08:05

## 2018-01-26 RX ADMIN — PRAZOSIN HYDROCHLORIDE 1 MG: 1 CAPSULE ORAL at 21:52

## 2018-01-26 RX ADMIN — HYDROCODONE BITARTRATE AND ACETAMINOPHEN 2 TABLET: 5; 325 TABLET ORAL at 14:34

## 2018-01-26 RX ADMIN — NADOLOL 40 MG: 20 TABLET ORAL at 21:50

## 2018-01-26 RX ADMIN — CHLORPROMAZINE HYDROCHLORIDE 50 MG: 25 TABLET, SUGAR COATED ORAL at 21:50

## 2018-01-26 RX ADMIN — BUSPIRONE HYDROCHLORIDE 15 MG: 15 TABLET ORAL at 12:09

## 2018-01-26 ASSESSMENT — PAIN DESCRIPTION - LOCATION
LOCATION: BACK

## 2018-01-26 ASSESSMENT — PAIN SCALES - GENERAL
PAINLEVEL_OUTOF10: 6
PAINLEVEL_OUTOF10: 3
PAINLEVEL_OUTOF10: 8
PAINLEVEL_OUTOF10: 5
PAINLEVEL_OUTOF10: 8

## 2018-01-26 ASSESSMENT — PAIN DESCRIPTION - PAIN TYPE
TYPE: CHRONIC PAIN

## 2018-01-27 PROCEDURE — 6360000002 HC RX W HCPCS: Performed by: PSYCHIATRY & NEUROLOGY

## 2018-01-27 PROCEDURE — 6370000000 HC RX 637 (ALT 250 FOR IP): Performed by: PSYCHIATRY & NEUROLOGY

## 2018-01-27 PROCEDURE — 1240000000 HC EMOTIONAL WELLNESS R&B

## 2018-01-27 RX ADMIN — DICYCLOMINE HYDROCHLORIDE 20 MG: 20 TABLET ORAL at 09:34

## 2018-01-27 RX ADMIN — ROPINIROLE HYDROCHLORIDE 1 MG: 1 TABLET, FILM COATED ORAL at 08:30

## 2018-01-27 RX ADMIN — BUTALBITAL, ACETAMINOPHEN AND CAFFEINE 1 TABLET: 50; 325; 40 TABLET ORAL at 19:17

## 2018-01-27 RX ADMIN — POTASSIUM CITRATE 5 MEQ: 5 TABLET ORAL at 08:30

## 2018-01-27 RX ADMIN — BUSPIRONE HYDROCHLORIDE 15 MG: 15 TABLET ORAL at 17:32

## 2018-01-27 RX ADMIN — HYDROXYZINE HYDROCHLORIDE 50 MG: 25 TABLET, FILM COATED ORAL at 08:30

## 2018-01-27 RX ADMIN — NADOLOL 40 MG: 20 TABLET ORAL at 21:06

## 2018-01-27 RX ADMIN — TOPIRAMATE 100 MG: 100 TABLET, FILM COATED ORAL at 21:06

## 2018-01-27 RX ADMIN — TIZANIDINE 4 MG: 4 TABLET ORAL at 21:06

## 2018-01-27 RX ADMIN — CLONAZEPAM 0.5 MG: 0.5 TABLET ORAL at 08:29

## 2018-01-27 RX ADMIN — HYDROCODONE BITARTRATE AND ACETAMINOPHEN 2 TABLET: 5; 325 TABLET ORAL at 12:14

## 2018-01-27 RX ADMIN — BUSPIRONE HYDROCHLORIDE 15 MG: 15 TABLET ORAL at 21:06

## 2018-01-27 RX ADMIN — CHLORPROMAZINE HYDROCHLORIDE 50 MG: 25 TABLET, SUGAR COATED ORAL at 21:04

## 2018-01-27 RX ADMIN — Medication 100 MG: at 08:31

## 2018-01-27 RX ADMIN — PRAZOSIN HYDROCHLORIDE 1 MG: 1 CAPSULE ORAL at 21:05

## 2018-01-27 RX ADMIN — DULOXETINE HYDROCHLORIDE 60 MG: 60 CAPSULE, DELAYED RELEASE ORAL at 21:06

## 2018-01-27 RX ADMIN — TIZANIDINE 4 MG: 4 TABLET ORAL at 14:47

## 2018-01-27 RX ADMIN — CHLORPROMAZINE HYDROCHLORIDE 25 MG: 25 TABLET, SUGAR COATED ORAL at 08:30

## 2018-01-27 RX ADMIN — PREGABALIN 225 MG: 150 CAPSULE ORAL at 21:05

## 2018-01-27 RX ADMIN — TRAZODONE HYDROCHLORIDE 150 MG: 150 TABLET ORAL at 21:05

## 2018-01-27 RX ADMIN — IPRATROPIUM BROMIDE AND ALBUTEROL 1 PUFF: 20; 100 SPRAY, METERED RESPIRATORY (INHALATION) at 21:04

## 2018-01-27 RX ADMIN — CALCIUM 500 MG: 500 TABLET ORAL at 08:31

## 2018-01-27 RX ADMIN — CLONAZEPAM 0.5 MG: 0.5 TABLET ORAL at 21:06

## 2018-01-27 RX ADMIN — MIRTAZAPINE 30 MG: 30 TABLET, FILM COATED ORAL at 21:07

## 2018-01-27 RX ADMIN — NYSTATIN: 100000 CREAM TOPICAL at 08:35

## 2018-01-27 RX ADMIN — ESCITALOPRAM OXALATE 10 MG: 10 TABLET ORAL at 08:31

## 2018-01-27 RX ADMIN — ROPINIROLE HYDROCHLORIDE 1 MG: 1 TABLET, FILM COATED ORAL at 21:06

## 2018-01-27 RX ADMIN — CETIRIZINE HYDROCHLORIDE 10 MG: 10 TABLET, FILM COATED ORAL at 08:31

## 2018-01-27 RX ADMIN — BUSPIRONE HYDROCHLORIDE 15 MG: 15 TABLET ORAL at 12:15

## 2018-01-27 RX ADMIN — NYSTATIN: 100000 CREAM TOPICAL at 21:07

## 2018-01-27 RX ADMIN — PANTOPRAZOLE SODIUM 40 MG: 40 TABLET, DELAYED RELEASE ORAL at 08:31

## 2018-01-27 RX ADMIN — HYDROXYZINE HYDROCHLORIDE 50 MG: 25 TABLET, FILM COATED ORAL at 17:32

## 2018-01-27 RX ADMIN — CHLORPROMAZINE HYDROCHLORIDE 50 MG: 25 TABLET, SUGAR COATED ORAL at 12:15

## 2018-01-27 RX ADMIN — MOMETASONE FUROATE AND FORMOTEROL FUMARATE DIHYDRATE 2 PUFF: 100; 5 AEROSOL RESPIRATORY (INHALATION) at 21:04

## 2018-01-27 RX ADMIN — PREGABALIN 225 MG: 150 CAPSULE ORAL at 08:30

## 2018-01-27 RX ADMIN — OLANZAPINE 20 MG: 10 TABLET, FILM COATED ORAL at 21:04

## 2018-01-27 RX ADMIN — VITAMIN D, TAB 1000IU (100/BT) 1000 UNITS: 25 TAB at 08:30

## 2018-01-27 RX ADMIN — IPRATROPIUM BROMIDE AND ALBUTEROL 1 PUFF: 20; 100 SPRAY, METERED RESPIRATORY (INHALATION) at 08:29

## 2018-01-27 RX ADMIN — HYDROXYZINE HYDROCHLORIDE 50 MG: 25 TABLET, FILM COATED ORAL at 21:06

## 2018-01-27 RX ADMIN — HYDROCODONE BITARTRATE AND ACETAMINOPHEN 2 TABLET: 5; 325 TABLET ORAL at 18:37

## 2018-01-27 RX ADMIN — TIZANIDINE 4 MG: 4 TABLET ORAL at 08:29

## 2018-01-27 RX ADMIN — HYDROXYZINE HYDROCHLORIDE 50 MG: 25 TABLET, FILM COATED ORAL at 12:15

## 2018-01-27 RX ADMIN — IPRATROPIUM BROMIDE AND ALBUTEROL 1 PUFF: 20; 100 SPRAY, METERED RESPIRATORY (INHALATION) at 17:33

## 2018-01-27 RX ADMIN — TOPIRAMATE 100 MG: 100 TABLET, FILM COATED ORAL at 08:31

## 2018-01-27 RX ADMIN — BUSPIRONE HYDROCHLORIDE 15 MG: 15 TABLET ORAL at 08:30

## 2018-01-27 RX ADMIN — ONDANSETRON 4 MG: 4 TABLET, ORALLY DISINTEGRATING ORAL at 21:06

## 2018-01-27 RX ADMIN — POTASSIUM CITRATE 5 MEQ: 5 TABLET ORAL at 17:00

## 2018-01-27 RX ADMIN — DICYCLOMINE HYDROCHLORIDE 20 MG: 20 TABLET ORAL at 21:06

## 2018-01-27 ASSESSMENT — PAIN SCALES - GENERAL
PAINLEVEL_OUTOF10: 4
PAINLEVEL_OUTOF10: 1
PAINLEVEL_OUTOF10: 7
PAINLEVEL_OUTOF10: 9
PAINLEVEL_OUTOF10: 8

## 2018-01-28 ENCOUNTER — APPOINTMENT (OUTPATIENT)
Dept: GENERAL RADIOLOGY | Age: 38
DRG: 885 | End: 2018-01-28
Payer: COMMERCIAL

## 2018-01-28 LAB
-: ABNORMAL
AMORPHOUS: ABNORMAL
BACTERIA: ABNORMAL
BILIRUBIN URINE: NEGATIVE
CASTS UA: ABNORMAL /LPF
COLOR: YELLOW
COMMENT UA: ABNORMAL
CRYSTALS, UA: ABNORMAL /HPF
EPITHELIAL CELLS UA: ABNORMAL /HPF
GLUCOSE URINE: NEGATIVE
KETONES, URINE: NEGATIVE
LEUKOCYTE ESTERASE, URINE: ABNORMAL
MUCUS: ABNORMAL
NITRITE, URINE: NEGATIVE
OTHER OBSERVATIONS UA: ABNORMAL
PH UA: 5.5 (ref 5–8)
PROTEIN UA: NEGATIVE
RBC UA: ABNORMAL /HPF
RENAL EPITHELIAL, UA: ABNORMAL /HPF
SPECIFIC GRAVITY UA: 1.01 (ref 1–1.03)
TRICHOMONAS: ABNORMAL
TURBIDITY: CLEAR
URINE HGB: NEGATIVE
UROBILINOGEN, URINE: NORMAL
WBC UA: ABNORMAL /HPF
YEAST: ABNORMAL

## 2018-01-28 PROCEDURE — 71045 X-RAY EXAM CHEST 1 VIEW: CPT

## 2018-01-28 PROCEDURE — 6370000000 HC RX 637 (ALT 250 FOR IP): Performed by: PSYCHIATRY & NEUROLOGY

## 2018-01-28 PROCEDURE — 6360000002 HC RX W HCPCS: Performed by: PSYCHIATRY & NEUROLOGY

## 2018-01-28 PROCEDURE — 1240000000 HC EMOTIONAL WELLNESS R&B

## 2018-01-28 PROCEDURE — 81001 URINALYSIS AUTO W/SCOPE: CPT

## 2018-01-28 RX ADMIN — VITAMIN D, TAB 1000IU (100/BT) 1000 UNITS: 25 TAB at 09:22

## 2018-01-28 RX ADMIN — CHLORPROMAZINE HYDROCHLORIDE 25 MG: 25 TABLET, SUGAR COATED ORAL at 09:23

## 2018-01-28 RX ADMIN — IPRATROPIUM BROMIDE AND ALBUTEROL 1 PUFF: 20; 100 SPRAY, METERED RESPIRATORY (INHALATION) at 21:26

## 2018-01-28 RX ADMIN — CHLORPROMAZINE HYDROCHLORIDE 50 MG: 25 TABLET, SUGAR COATED ORAL at 21:27

## 2018-01-28 RX ADMIN — ROPINIROLE HYDROCHLORIDE 1 MG: 1 TABLET, FILM COATED ORAL at 09:22

## 2018-01-28 RX ADMIN — TIZANIDINE 4 MG: 4 TABLET ORAL at 20:52

## 2018-01-28 RX ADMIN — HYDROXYZINE HYDROCHLORIDE 50 MG: 25 TABLET, FILM COATED ORAL at 21:36

## 2018-01-28 RX ADMIN — HYDROCODONE BITARTRATE AND ACETAMINOPHEN 2 TABLET: 5; 325 TABLET ORAL at 21:30

## 2018-01-28 RX ADMIN — OLANZAPINE 20 MG: 10 TABLET, FILM COATED ORAL at 21:26

## 2018-01-28 RX ADMIN — ESCITALOPRAM OXALATE 10 MG: 10 TABLET ORAL at 09:21

## 2018-01-28 RX ADMIN — NYSTATIN: 100000 CREAM TOPICAL at 09:27

## 2018-01-28 RX ADMIN — IPRATROPIUM BROMIDE AND ALBUTEROL 1 PUFF: 20; 100 SPRAY, METERED RESPIRATORY (INHALATION) at 14:34

## 2018-01-28 RX ADMIN — BUSPIRONE HYDROCHLORIDE 15 MG: 15 TABLET ORAL at 14:33

## 2018-01-28 RX ADMIN — DICYCLOMINE HYDROCHLORIDE 20 MG: 20 TABLET ORAL at 21:27

## 2018-01-28 RX ADMIN — DULOXETINE HYDROCHLORIDE 60 MG: 60 CAPSULE, DELAYED RELEASE ORAL at 21:26

## 2018-01-28 RX ADMIN — CALCIUM 500 MG: 500 TABLET ORAL at 09:22

## 2018-01-28 RX ADMIN — HYDROXYZINE HYDROCHLORIDE 50 MG: 25 TABLET, FILM COATED ORAL at 18:10

## 2018-01-28 RX ADMIN — TOPIRAMATE 100 MG: 100 TABLET, FILM COATED ORAL at 09:22

## 2018-01-28 RX ADMIN — DOCUSATE SODIUM 100 MG: 100 CAPSULE, LIQUID FILLED ORAL at 10:51

## 2018-01-28 RX ADMIN — HYDROXYZINE HYDROCHLORIDE 50 MG: 25 TABLET, FILM COATED ORAL at 14:33

## 2018-01-28 RX ADMIN — BUSPIRONE HYDROCHLORIDE 15 MG: 15 TABLET ORAL at 09:22

## 2018-01-28 RX ADMIN — PRAZOSIN HYDROCHLORIDE 1 MG: 1 CAPSULE ORAL at 21:27

## 2018-01-28 RX ADMIN — HYDROCODONE BITARTRATE AND ACETAMINOPHEN 2 TABLET: 5; 325 TABLET ORAL at 15:26

## 2018-01-28 RX ADMIN — BUSPIRONE HYDROCHLORIDE 15 MG: 15 TABLET ORAL at 18:11

## 2018-01-28 RX ADMIN — NADOLOL 40 MG: 20 TABLET ORAL at 21:27

## 2018-01-28 RX ADMIN — CHLORPROMAZINE HYDROCHLORIDE 50 MG: 25 TABLET, SUGAR COATED ORAL at 14:41

## 2018-01-28 RX ADMIN — CETIRIZINE HYDROCHLORIDE 10 MG: 10 TABLET, FILM COATED ORAL at 09:21

## 2018-01-28 RX ADMIN — POTASSIUM CITRATE 5 MEQ: 5 TABLET ORAL at 18:10

## 2018-01-28 RX ADMIN — ROPINIROLE HYDROCHLORIDE 1 MG: 1 TABLET, FILM COATED ORAL at 21:27

## 2018-01-28 RX ADMIN — PANTOPRAZOLE SODIUM 40 MG: 40 TABLET, DELAYED RELEASE ORAL at 09:23

## 2018-01-28 RX ADMIN — HYDROCODONE BITARTRATE AND ACETAMINOPHEN 2 TABLET: 5; 325 TABLET ORAL at 09:20

## 2018-01-28 RX ADMIN — ONDANSETRON 4 MG: 4 TABLET, ORALLY DISINTEGRATING ORAL at 21:27

## 2018-01-28 RX ADMIN — TRAZODONE HYDROCHLORIDE 150 MG: 150 TABLET ORAL at 21:28

## 2018-01-28 RX ADMIN — POTASSIUM CITRATE 5 MEQ: 5 TABLET ORAL at 09:23

## 2018-01-28 RX ADMIN — MIRTAZAPINE 30 MG: 30 TABLET, FILM COATED ORAL at 21:28

## 2018-01-28 RX ADMIN — PREGABALIN 225 MG: 150 CAPSULE ORAL at 09:21

## 2018-01-28 RX ADMIN — IPRATROPIUM BROMIDE AND ALBUTEROL 1 PUFF: 20; 100 SPRAY, METERED RESPIRATORY (INHALATION) at 18:10

## 2018-01-28 RX ADMIN — TIZANIDINE 4 MG: 4 TABLET ORAL at 10:51

## 2018-01-28 RX ADMIN — Medication 100 MG: at 09:22

## 2018-01-28 RX ADMIN — CLONAZEPAM 0.5 MG: 0.5 TABLET ORAL at 21:28

## 2018-01-28 RX ADMIN — TOPIRAMATE 100 MG: 100 TABLET, FILM COATED ORAL at 21:26

## 2018-01-28 RX ADMIN — NYSTATIN: 100000 CREAM TOPICAL at 21:33

## 2018-01-28 RX ADMIN — IPRATROPIUM BROMIDE AND ALBUTEROL 1 PUFF: 20; 100 SPRAY, METERED RESPIRATORY (INHALATION) at 09:20

## 2018-01-28 RX ADMIN — HYDROXYZINE HYDROCHLORIDE 50 MG: 25 TABLET, FILM COATED ORAL at 09:22

## 2018-01-28 RX ADMIN — MOMETASONE FUROATE AND FORMOTEROL FUMARATE DIHYDRATE 2 PUFF: 100; 5 AEROSOL RESPIRATORY (INHALATION) at 21:25

## 2018-01-28 RX ADMIN — BUSPIRONE HYDROCHLORIDE 15 MG: 15 TABLET ORAL at 21:26

## 2018-01-28 RX ADMIN — PREGABALIN 225 MG: 150 CAPSULE ORAL at 21:26

## 2018-01-28 RX ADMIN — MOMETASONE FUROATE AND FORMOTEROL FUMARATE DIHYDRATE 2 PUFF: 100; 5 AEROSOL RESPIRATORY (INHALATION) at 09:19

## 2018-01-28 ASSESSMENT — PAIN SCALES - GENERAL
PAINLEVEL_OUTOF10: 7
PAINLEVEL_OUTOF10: 5
PAINLEVEL_OUTOF10: 2
PAINLEVEL_OUTOF10: 3
PAINLEVEL_OUTOF10: 9
PAINLEVEL_OUTOF10: 0
PAINLEVEL_OUTOF10: 8

## 2018-01-29 PROCEDURE — 6370000000 HC RX 637 (ALT 250 FOR IP): Performed by: PSYCHIATRY & NEUROLOGY

## 2018-01-29 PROCEDURE — 6360000002 HC RX W HCPCS: Performed by: PSYCHIATRY & NEUROLOGY

## 2018-01-29 PROCEDURE — 1240000000 HC EMOTIONAL WELLNESS R&B

## 2018-01-29 RX ORDER — CLONAZEPAM 0.5 MG/1
0.5 TABLET ORAL 2 TIMES DAILY PRN
Qty: 28 TABLET | Refills: 0 | Status: SHIPPED | OUTPATIENT
Start: 2018-01-29 | End: 2020-10-05

## 2018-01-29 RX ORDER — BUSPIRONE HYDROCHLORIDE 15 MG/1
15 TABLET ORAL 4 TIMES DAILY
Qty: 56 TABLET | Refills: 0 | Status: SHIPPED | OUTPATIENT
Start: 2018-01-29 | End: 2020-10-05

## 2018-01-29 RX ORDER — TRAZODONE HYDROCHLORIDE 150 MG/1
150 TABLET ORAL NIGHTLY
Qty: 14 TABLET | Refills: 0 | Status: SHIPPED | OUTPATIENT
Start: 2018-01-29 | End: 2020-11-13

## 2018-01-29 RX ORDER — MIRTAZAPINE 30 MG/1
30 TABLET, FILM COATED ORAL NIGHTLY
Qty: 14 TABLET | Refills: 0 | Status: SHIPPED | OUTPATIENT
Start: 2018-01-29 | End: 2020-10-05

## 2018-01-29 RX ORDER — HYDROXYZINE PAMOATE 50 MG/1
50 CAPSULE ORAL 4 TIMES DAILY PRN
Qty: 56 CAPSULE | Refills: 0 | Status: SHIPPED | OUTPATIENT
Start: 2018-01-29 | End: 2018-06-17 | Stop reason: SINTOL

## 2018-01-29 RX ORDER — OLANZAPINE 20 MG/1
20 TABLET ORAL NIGHTLY
Qty: 14 TABLET | Refills: 0 | Status: SHIPPED | OUTPATIENT
Start: 2018-01-29 | End: 2020-10-05

## 2018-01-29 RX ORDER — CHLORPROMAZINE HYDROCHLORIDE 50 MG/1
TABLET, FILM COATED ORAL
Qty: 28 TABLET | Refills: 0 | Status: SHIPPED | OUTPATIENT
Start: 2018-01-29 | End: 2020-10-05 | Stop reason: SINTOL

## 2018-01-29 RX ORDER — DULOXETIN HYDROCHLORIDE 60 MG/1
60 CAPSULE, DELAYED RELEASE ORAL NIGHTLY
Qty: 14 CAPSULE | Refills: 0 | Status: SHIPPED | OUTPATIENT
Start: 2018-01-29 | End: 2020-11-13

## 2018-01-29 RX ORDER — TOPIRAMATE 100 MG/1
100 TABLET, FILM COATED ORAL 2 TIMES DAILY
Qty: 28 TABLET | Refills: 0 | Status: SHIPPED | OUTPATIENT
Start: 2018-01-29 | End: 2020-10-05

## 2018-01-29 RX ORDER — ESCITALOPRAM OXALATE 10 MG/1
10 TABLET ORAL DAILY
Qty: 14 TABLET | Refills: 0 | Status: SHIPPED | OUTPATIENT
Start: 2018-01-29 | End: 2020-10-05

## 2018-01-29 RX ORDER — PRAZOSIN HYDROCHLORIDE 1 MG/1
1 CAPSULE ORAL NIGHTLY
Qty: 14 CAPSULE | Refills: 0 | Status: SHIPPED | OUTPATIENT
Start: 2018-01-29 | End: 2020-11-13

## 2018-01-29 RX ORDER — CHLORPROMAZINE HYDROCHLORIDE 25 MG/1
25 TABLET, FILM COATED ORAL EVERY MORNING
Qty: 14 TABLET | Refills: 0 | Status: SHIPPED | OUTPATIENT
Start: 2018-01-29 | End: 2020-10-05 | Stop reason: SINTOL

## 2018-01-29 RX ADMIN — NADOLOL 40 MG: 20 TABLET ORAL at 21:18

## 2018-01-29 RX ADMIN — IPRATROPIUM BROMIDE AND ALBUTEROL 1 PUFF: 20; 100 SPRAY, METERED RESPIRATORY (INHALATION) at 08:39

## 2018-01-29 RX ADMIN — PANTOPRAZOLE SODIUM 40 MG: 40 TABLET, DELAYED RELEASE ORAL at 08:38

## 2018-01-29 RX ADMIN — TIZANIDINE 4 MG: 4 TABLET ORAL at 21:18

## 2018-01-29 RX ADMIN — POTASSIUM CITRATE 5 MEQ: 5 TABLET ORAL at 08:38

## 2018-01-29 RX ADMIN — BUSPIRONE HYDROCHLORIDE 15 MG: 15 TABLET ORAL at 08:38

## 2018-01-29 RX ADMIN — MIRTAZAPINE 30 MG: 30 TABLET, FILM COATED ORAL at 21:19

## 2018-01-29 RX ADMIN — PRAZOSIN HYDROCHLORIDE 1 MG: 1 CAPSULE ORAL at 21:18

## 2018-01-29 RX ADMIN — IPRATROPIUM BROMIDE AND ALBUTEROL 1 PUFF: 20; 100 SPRAY, METERED RESPIRATORY (INHALATION) at 12:54

## 2018-01-29 RX ADMIN — POTASSIUM CITRATE 5 MEQ: 5 TABLET ORAL at 16:58

## 2018-01-29 RX ADMIN — PREGABALIN 225 MG: 150 CAPSULE ORAL at 08:38

## 2018-01-29 RX ADMIN — CHLORPROMAZINE HYDROCHLORIDE 25 MG: 25 TABLET, SUGAR COATED ORAL at 08:39

## 2018-01-29 RX ADMIN — CALCIUM 500 MG: 500 TABLET ORAL at 08:38

## 2018-01-29 RX ADMIN — ROPINIROLE HYDROCHLORIDE 1 MG: 1 TABLET, FILM COATED ORAL at 08:38

## 2018-01-29 RX ADMIN — CLONAZEPAM 0.5 MG: 0.5 TABLET ORAL at 21:18

## 2018-01-29 RX ADMIN — DOCUSATE SODIUM 100 MG: 100 CAPSULE, LIQUID FILLED ORAL at 17:46

## 2018-01-29 RX ADMIN — CETIRIZINE HYDROCHLORIDE 10 MG: 10 TABLET, FILM COATED ORAL at 08:38

## 2018-01-29 RX ADMIN — CLONAZEPAM 0.5 MG: 0.5 TABLET ORAL at 10:18

## 2018-01-29 RX ADMIN — ESCITALOPRAM OXALATE 10 MG: 10 TABLET ORAL at 08:38

## 2018-01-29 RX ADMIN — HYDROCODONE BITARTRATE AND ACETAMINOPHEN 2 TABLET: 5; 325 TABLET ORAL at 22:53

## 2018-01-29 RX ADMIN — CHLORPROMAZINE HYDROCHLORIDE 50 MG: 25 TABLET, SUGAR COATED ORAL at 21:17

## 2018-01-29 RX ADMIN — CHLORPROMAZINE HYDROCHLORIDE 50 MG: 25 TABLET, SUGAR COATED ORAL at 12:35

## 2018-01-29 RX ADMIN — HYDROCODONE BITARTRATE AND ACETAMINOPHEN 2 TABLET: 5; 325 TABLET ORAL at 17:45

## 2018-01-29 RX ADMIN — HYDROXYZINE HYDROCHLORIDE 50 MG: 25 TABLET, FILM COATED ORAL at 08:38

## 2018-01-29 RX ADMIN — TIZANIDINE 4 MG: 4 TABLET ORAL at 09:01

## 2018-01-29 RX ADMIN — HYDROXYZINE HYDROCHLORIDE 50 MG: 25 TABLET, FILM COATED ORAL at 12:54

## 2018-01-29 RX ADMIN — PREGABALIN 225 MG: 150 CAPSULE ORAL at 21:17

## 2018-01-29 RX ADMIN — NYSTATIN: 100000 CREAM TOPICAL at 08:40

## 2018-01-29 RX ADMIN — HYDROXYZINE HYDROCHLORIDE 50 MG: 25 TABLET, FILM COATED ORAL at 16:58

## 2018-01-29 RX ADMIN — BUSPIRONE HYDROCHLORIDE 15 MG: 15 TABLET ORAL at 16:58

## 2018-01-29 RX ADMIN — DULOXETINE HYDROCHLORIDE 60 MG: 60 CAPSULE, DELAYED RELEASE ORAL at 21:18

## 2018-01-29 RX ADMIN — BUSPIRONE HYDROCHLORIDE 15 MG: 15 TABLET ORAL at 21:19

## 2018-01-29 RX ADMIN — HYDROXYZINE HYDROCHLORIDE 50 MG: 25 TABLET, FILM COATED ORAL at 21:18

## 2018-01-29 RX ADMIN — MOMETASONE FUROATE AND FORMOTEROL FUMARATE DIHYDRATE 2 PUFF: 100; 5 AEROSOL RESPIRATORY (INHALATION) at 08:39

## 2018-01-29 RX ADMIN — NYSTATIN: 100000 CREAM TOPICAL at 21:17

## 2018-01-29 RX ADMIN — ROPINIROLE HYDROCHLORIDE 1 MG: 1 TABLET, FILM COATED ORAL at 21:18

## 2018-01-29 RX ADMIN — OLANZAPINE 20 MG: 10 TABLET, FILM COATED ORAL at 21:18

## 2018-01-29 RX ADMIN — IPRATROPIUM BROMIDE AND ALBUTEROL 1 PUFF: 20; 100 SPRAY, METERED RESPIRATORY (INHALATION) at 21:17

## 2018-01-29 RX ADMIN — DICYCLOMINE HYDROCHLORIDE 20 MG: 20 TABLET ORAL at 21:18

## 2018-01-29 RX ADMIN — IPRATROPIUM BROMIDE AND ALBUTEROL 1 PUFF: 20; 100 SPRAY, METERED RESPIRATORY (INHALATION) at 16:58

## 2018-01-29 RX ADMIN — ONDANSETRON 4 MG: 4 TABLET, ORALLY DISINTEGRATING ORAL at 21:18

## 2018-01-29 RX ADMIN — BUSPIRONE HYDROCHLORIDE 15 MG: 15 TABLET ORAL at 12:54

## 2018-01-29 RX ADMIN — TRAZODONE HYDROCHLORIDE 150 MG: 150 TABLET ORAL at 21:18

## 2018-01-29 RX ADMIN — VITAMIN D, TAB 1000IU (100/BT) 1000 UNITS: 25 TAB at 08:38

## 2018-01-29 RX ADMIN — HYDROCODONE BITARTRATE AND ACETAMINOPHEN 2 TABLET: 5; 325 TABLET ORAL at 09:01

## 2018-01-29 RX ADMIN — Medication 100 MG: at 08:38

## 2018-01-29 RX ADMIN — MOMETASONE FUROATE AND FORMOTEROL FUMARATE DIHYDRATE 2 PUFF: 100; 5 AEROSOL RESPIRATORY (INHALATION) at 21:16

## 2018-01-29 RX ADMIN — TOPIRAMATE 100 MG: 100 TABLET, FILM COATED ORAL at 21:18

## 2018-01-29 RX ADMIN — TOPIRAMATE 100 MG: 100 TABLET, FILM COATED ORAL at 08:38

## 2018-01-29 ASSESSMENT — PAIN SCALES - GENERAL
PAINLEVEL_OUTOF10: 5
PAINLEVEL_OUTOF10: 8
PAINLEVEL_OUTOF10: 0
PAINLEVEL_OUTOF10: 8
PAINLEVEL_OUTOF10: 7
PAINLEVEL_OUTOF10: 10
PAINLEVEL_OUTOF10: 0
PAINLEVEL_OUTOF10: 4

## 2018-01-29 ASSESSMENT — PAIN DESCRIPTION - DESCRIPTORS: DESCRIPTORS: ACHING;DISCOMFORT

## 2018-01-29 ASSESSMENT — PAIN DESCRIPTION - PAIN TYPE: TYPE: CHRONIC PAIN

## 2018-01-29 ASSESSMENT — PAIN SCALES - WONG BAKER: WONGBAKER_NUMERICALRESPONSE: 4

## 2018-01-29 ASSESSMENT — PAIN DESCRIPTION - ONSET: ONSET: ON-GOING

## 2018-01-29 ASSESSMENT — PAIN DESCRIPTION - LOCATION: LOCATION: OTHER (COMMENT)

## 2018-01-29 ASSESSMENT — PAIN DESCRIPTION - FREQUENCY: FREQUENCY: CONTINUOUS

## 2018-01-29 ASSESSMENT — PAIN DESCRIPTION - PROGRESSION: CLINICAL_PROGRESSION: NOT CHANGED

## 2018-01-30 VITALS
OXYGEN SATURATION: 98 % | WEIGHT: 293 LBS | TEMPERATURE: 98.1 F | RESPIRATION RATE: 18 BRPM | DIASTOLIC BLOOD PRESSURE: 89 MMHG | HEART RATE: 90 BPM | HEIGHT: 68 IN | SYSTOLIC BLOOD PRESSURE: 132 MMHG | BODY MASS INDEX: 44.41 KG/M2

## 2018-01-30 PROCEDURE — 5130000000 HC BRIDGE APPOINTMENT

## 2018-01-30 PROCEDURE — 6360000002 HC RX W HCPCS: Performed by: PSYCHIATRY & NEUROLOGY

## 2018-01-30 PROCEDURE — 6370000000 HC RX 637 (ALT 250 FOR IP): Performed by: PSYCHIATRY & NEUROLOGY

## 2018-01-30 RX ADMIN — PANTOPRAZOLE SODIUM 40 MG: 40 TABLET, DELAYED RELEASE ORAL at 08:36

## 2018-01-30 RX ADMIN — IPRATROPIUM BROMIDE AND ALBUTEROL 1 PUFF: 20; 100 SPRAY, METERED RESPIRATORY (INHALATION) at 08:35

## 2018-01-30 RX ADMIN — HYDROCODONE BITARTRATE AND ACETAMINOPHEN 2 TABLET: 5; 325 TABLET ORAL at 09:10

## 2018-01-30 RX ADMIN — BUSPIRONE HYDROCHLORIDE 15 MG: 15 TABLET ORAL at 08:35

## 2018-01-30 RX ADMIN — VITAMIN D, TAB 1000IU (100/BT) 1000 UNITS: 25 TAB at 08:36

## 2018-01-30 RX ADMIN — MOMETASONE FUROATE AND FORMOTEROL FUMARATE DIHYDRATE 2 PUFF: 100; 5 AEROSOL RESPIRATORY (INHALATION) at 09:11

## 2018-01-30 RX ADMIN — ROPINIROLE HYDROCHLORIDE 1 MG: 1 TABLET, FILM COATED ORAL at 08:36

## 2018-01-30 RX ADMIN — IPRATROPIUM BROMIDE AND ALBUTEROL 1 PUFF: 20; 100 SPRAY, METERED RESPIRATORY (INHALATION) at 12:46

## 2018-01-30 RX ADMIN — CHLORPROMAZINE HYDROCHLORIDE 50 MG: 25 TABLET, SUGAR COATED ORAL at 12:46

## 2018-01-30 RX ADMIN — Medication 100 MG: at 08:36

## 2018-01-30 RX ADMIN — ESCITALOPRAM OXALATE 10 MG: 10 TABLET ORAL at 08:35

## 2018-01-30 RX ADMIN — TIZANIDINE 4 MG: 4 TABLET ORAL at 12:46

## 2018-01-30 RX ADMIN — HYDROXYZINE HYDROCHLORIDE 50 MG: 25 TABLET, FILM COATED ORAL at 12:46

## 2018-01-30 RX ADMIN — BUTALBITAL, ACETAMINOPHEN AND CAFFEINE 1 TABLET: 50; 325; 40 TABLET ORAL at 12:46

## 2018-01-30 RX ADMIN — CETIRIZINE HYDROCHLORIDE 10 MG: 10 TABLET, FILM COATED ORAL at 08:36

## 2018-01-30 RX ADMIN — PREGABALIN 225 MG: 150 CAPSULE ORAL at 08:36

## 2018-01-30 RX ADMIN — POTASSIUM CITRATE 5 MEQ: 5 TABLET ORAL at 08:35

## 2018-01-30 RX ADMIN — CLONAZEPAM 0.5 MG: 0.5 TABLET ORAL at 09:10

## 2018-01-30 RX ADMIN — CALCIUM 500 MG: 500 TABLET ORAL at 08:36

## 2018-01-30 RX ADMIN — BUSPIRONE HYDROCHLORIDE 15 MG: 15 TABLET ORAL at 12:46

## 2018-01-30 RX ADMIN — HYDROXYZINE HYDROCHLORIDE 50 MG: 25 TABLET, FILM COATED ORAL at 08:36

## 2018-01-30 RX ADMIN — TOPIRAMATE 100 MG: 100 TABLET, FILM COATED ORAL at 08:36

## 2018-01-30 RX ADMIN — CHLORPROMAZINE HYDROCHLORIDE 25 MG: 25 TABLET, SUGAR COATED ORAL at 08:35

## 2018-01-30 ASSESSMENT — PAIN SCALES - GENERAL
PAINLEVEL_OUTOF10: 0
PAINLEVEL_OUTOF10: 8
PAINLEVEL_OUTOF10: 8
PAINLEVEL_OUTOF10: 7

## 2018-01-30 ASSESSMENT — PAIN DESCRIPTION - PAIN TYPE: TYPE: CHRONIC PAIN

## 2018-01-30 ASSESSMENT — PAIN DESCRIPTION - LOCATION: LOCATION: OTHER (COMMENT)

## 2018-01-30 ASSESSMENT — PAIN SCALES - WONG BAKER: WONGBAKER_NUMERICALRESPONSE: 0

## 2018-02-20 ENCOUNTER — HOSPITAL ENCOUNTER (EMERGENCY)
Facility: CLINIC | Age: 38
Discharge: HOME | End: 2018-02-21
Attending: EMERGENCY MEDICINE
Payer: COMMERCIAL

## 2018-02-20 VITALS
TEMPERATURE: 98.6 F | BODY MASS INDEX: 44.41 KG/M2 | HEIGHT: 68 IN | SYSTOLIC BLOOD PRESSURE: 127 MMHG | RESPIRATION RATE: 16 BRPM | DIASTOLIC BLOOD PRESSURE: 98 MMHG | HEART RATE: 96 BPM | WEIGHT: 293 LBS | OXYGEN SATURATION: 94 %

## 2018-02-20 DIAGNOSIS — J18.9 PNEUMONIA DUE TO ORGANISM: ICD-10-CM

## 2018-02-20 DIAGNOSIS — R11.2 NON-INTRACTABLE VOMITING WITH NAUSEA, UNSPECIFIED VOMITING TYPE: Primary | ICD-10-CM

## 2018-02-20 PROCEDURE — 99284 EMERGENCY DEPT VISIT MOD MDM: CPT

## 2018-02-20 PROCEDURE — 80053 COMPREHEN METABOLIC PANEL: CPT

## 2018-02-20 PROCEDURE — 96374 THER/PROPH/DIAG INJ IV PUSH: CPT

## 2018-02-20 PROCEDURE — 36415 COLL VENOUS BLD VENIPUNCTURE: CPT

## 2018-02-20 PROCEDURE — 83690 ASSAY OF LIPASE: CPT

## 2018-02-20 PROCEDURE — 6360000002 HC RX W HCPCS: Performed by: EMERGENCY MEDICINE

## 2018-02-20 PROCEDURE — 85025 COMPLETE CBC W/AUTO DIFF WBC: CPT

## 2018-02-20 PROCEDURE — 2580000003 HC RX 258: Performed by: EMERGENCY MEDICINE

## 2018-02-20 PROCEDURE — 96372 THER/PROPH/DIAG INJ SC/IM: CPT

## 2018-02-20 RX ORDER — ONDANSETRON 2 MG/ML
4 INJECTION INTRAMUSCULAR; INTRAVENOUS ONCE
Status: COMPLETED | OUTPATIENT
Start: 2018-02-20 | End: 2018-02-20

## 2018-02-20 RX ORDER — DICYCLOMINE HYDROCHLORIDE 10 MG/ML
20 INJECTION INTRAMUSCULAR ONCE
Status: COMPLETED | OUTPATIENT
Start: 2018-02-21 | End: 2018-02-20

## 2018-02-20 RX ORDER — 0.9 % SODIUM CHLORIDE 0.9 %
1000 INTRAVENOUS SOLUTION INTRAVENOUS ONCE
Status: COMPLETED | OUTPATIENT
Start: 2018-02-20 | End: 2018-02-21

## 2018-02-20 RX ADMIN — ONDANSETRON 4 MG: 2 INJECTION INTRAMUSCULAR; INTRAVENOUS at 23:37

## 2018-02-20 RX ADMIN — SODIUM CHLORIDE 1000 ML: 9 INJECTION, SOLUTION INTRAVENOUS at 23:36

## 2018-02-20 RX ADMIN — DICYCLOMINE HYDROCHLORIDE 20 MG: 20 INJECTION, SOLUTION INTRAMUSCULAR at 23:43

## 2018-02-20 ASSESSMENT — PAIN DESCRIPTION - LOCATION: LOCATION: ABDOMEN;CHEST;HEAD

## 2018-02-20 ASSESSMENT — PAIN DESCRIPTION - PAIN TYPE: TYPE: ACUTE PAIN

## 2018-02-20 ASSESSMENT — PAIN SCALES - GENERAL: PAINLEVEL_OUTOF10: 7

## 2018-02-21 ENCOUNTER — APPOINTMENT (OUTPATIENT)
Dept: CT IMAGING | Facility: CLINIC | Age: 38
End: 2018-02-21
Payer: COMMERCIAL

## 2018-02-21 LAB
ABSOLUTE EOS #: 0.2 K/UL (ref 0–0.4)
ABSOLUTE IMMATURE GRANULOCYTE: ABNORMAL K/UL (ref 0–0.3)
ABSOLUTE LYMPH #: 2.7 K/UL (ref 1–4.8)
ABSOLUTE MONO #: 1 K/UL (ref 0.1–1.2)
ALBUMIN SERPL-MCNC: 3.7 G/DL (ref 3.5–5.2)
ALBUMIN/GLOBULIN RATIO: 1.1 (ref 1–2.5)
ALP BLD-CCNC: 107 U/L (ref 35–104)
ALT SERPL-CCNC: 14 U/L (ref 5–33)
ANION GAP SERPL CALCULATED.3IONS-SCNC: 14 MMOL/L (ref 9–17)
AST SERPL-CCNC: 12 U/L
BASOPHILS # BLD: 1 % (ref 0–2)
BASOPHILS ABSOLUTE: 0.1 K/UL (ref 0–0.2)
BILIRUB SERPL-MCNC: 0.3 MG/DL (ref 0.3–1.2)
BUN BLDV-MCNC: 13 MG/DL (ref 6–20)
BUN/CREAT BLD: ABNORMAL (ref 9–20)
CALCIUM SERPL-MCNC: 9.1 MG/DL (ref 8.6–10.4)
CHLORIDE BLD-SCNC: 103 MMOL/L (ref 98–107)
CO2: 22 MMOL/L (ref 20–31)
CREAT SERPL-MCNC: 0.7 MG/DL (ref 0.5–0.9)
DIFFERENTIAL TYPE: ABNORMAL
EOSINOPHILS RELATIVE PERCENT: 1 % (ref 1–4)
GFR AFRICAN AMERICAN: >60 ML/MIN
GFR NON-AFRICAN AMERICAN: >60 ML/MIN
GFR SERPL CREATININE-BSD FRML MDRD: ABNORMAL ML/MIN/{1.73_M2}
GFR SERPL CREATININE-BSD FRML MDRD: ABNORMAL ML/MIN/{1.73_M2}
GLUCOSE BLD-MCNC: 93 MG/DL (ref 70–99)
HCT VFR BLD CALC: 40.6 % (ref 36–46)
HEMOGLOBIN: 13.5 G/DL (ref 12–16)
IMMATURE GRANULOCYTES: ABNORMAL %
LIPASE: 17 U/L (ref 13–60)
LYMPHOCYTES # BLD: 19 % (ref 24–44)
MCH RBC QN AUTO: 27.7 PG (ref 26–34)
MCHC RBC AUTO-ENTMCNC: 33.2 G/DL (ref 31–37)
MCV RBC AUTO: 83.6 FL (ref 80–100)
MONOCYTES # BLD: 7 % (ref 2–11)
NRBC AUTOMATED: ABNORMAL PER 100 WBC
PDW BLD-RTO: 14.6 % (ref 12.5–15.4)
PLATELET # BLD: 354 K/UL (ref 140–450)
PLATELET ESTIMATE: ABNORMAL
PMV BLD AUTO: 7.6 FL (ref 6–12)
POTASSIUM SERPL-SCNC: 3.3 MMOL/L (ref 3.7–5.3)
RBC # BLD: 4.86 M/UL (ref 4–5.2)
RBC # BLD: ABNORMAL 10*6/UL
SEG NEUTROPHILS: 72 % (ref 36–66)
SEGMENTED NEUTROPHILS ABSOLUTE COUNT: 10.2 K/UL (ref 1.8–7.7)
SODIUM BLD-SCNC: 139 MMOL/L (ref 135–144)
TOTAL PROTEIN: 7.2 G/DL (ref 6.4–8.3)
WBC # BLD: 14.1 K/UL (ref 3.5–11)
WBC # BLD: ABNORMAL 10*3/UL

## 2018-02-21 PROCEDURE — 6360000002 HC RX W HCPCS: Performed by: EMERGENCY MEDICINE

## 2018-02-21 PROCEDURE — 74176 CT ABD & PELVIS W/O CONTRAST: CPT

## 2018-02-21 PROCEDURE — 96376 TX/PRO/DX INJ SAME DRUG ADON: CPT

## 2018-02-21 PROCEDURE — 6370000000 HC RX 637 (ALT 250 FOR IP): Performed by: EMERGENCY MEDICINE

## 2018-02-21 RX ORDER — ONDANSETRON 2 MG/ML
4 INJECTION INTRAMUSCULAR; INTRAVENOUS ONCE
Status: COMPLETED | OUTPATIENT
Start: 2018-02-21 | End: 2018-02-21

## 2018-02-21 RX ORDER — ONDANSETRON 2 MG/ML
4 INJECTION INTRAMUSCULAR; INTRAVENOUS ONCE
Status: DISCONTINUED | OUTPATIENT
Start: 2018-02-21 | End: 2018-02-21

## 2018-02-21 RX ORDER — DICYCLOMINE HYDROCHLORIDE 10 MG/1
10 CAPSULE ORAL EVERY 6 HOURS PRN
Qty: 10 CAPSULE | Refills: 0 | Status: SHIPPED | OUTPATIENT
Start: 2018-02-21 | End: 2020-10-05

## 2018-02-21 RX ORDER — LEVOFLOXACIN 500 MG/1
500 TABLET, FILM COATED ORAL ONCE
Status: COMPLETED | OUTPATIENT
Start: 2018-02-21 | End: 2018-02-21

## 2018-02-21 RX ORDER — LEVOFLOXACIN 750 MG/1
750 TABLET ORAL DAILY
Qty: 5 TABLET | Refills: 0 | Status: SHIPPED | OUTPATIENT
Start: 2018-02-21 | End: 2018-02-26

## 2018-02-21 RX ADMIN — LEVOFLOXACIN 500 MG: 500 TABLET, FILM COATED ORAL at 01:11

## 2018-02-21 RX ADMIN — ONDANSETRON HYDROCHLORIDE 4 MG: 2 INJECTION, SOLUTION INTRAVENOUS at 00:13

## 2018-02-21 ASSESSMENT — PAIN DESCRIPTION - LOCATION: LOCATION: ABDOMEN

## 2018-02-21 ASSESSMENT — PAIN SCALES - GENERAL: PAINLEVEL_OUTOF10: 5

## 2018-02-21 ASSESSMENT — PAIN DESCRIPTION - PAIN TYPE: TYPE: ACUTE PAIN

## 2018-02-21 NOTE — ED PROVIDER NOTES
daily as needed for Anxiety    IPRATROPIUM-ALBUTEROL (COMBIVENT IN)    Inhale 1 puff into the lungs 4 times daily    LIDOCAINE (LMX) 4 % CREAM    Apply topically daily as needed for Pain     LORATADINE (CLARITIN) 10 MG TABLET    Take 10 mg by mouth daily    MECLIZINE (ANTIVERT) 25 MG TABLET    Take 25 mg by mouth 3 times daily as needed    MIRTAZAPINE (REMERON) 30 MG TABLET    Take 1 tablet by mouth nightly    NADOLOL (CORGARD) 40 MG TABLET    Take 40 mg by mouth nightly     NYSTATIN (MYCOSTATIN) 121961 UNIT/GM CREAM    Apply topically 3 times daily. OLANZAPINE (ZYPREXA) 20 MG TABLET    Take 1 tablet by mouth nightly    ONDANSETRON (ZOFRAN ODT) 4 MG DISINTEGRATING TABLET    Take 1 tablet by mouth every 8 hours as needed for Nausea    PANTOPRAZOLE (PROTONIX) 40 MG TABLET    Take 40 mg by mouth daily    POTASSIUM CITRATE ER PO    Take 1 tablet by mouth 2 times daily    PRAZOSIN (MINIPRESS) 1 MG CAPSULE    Take 1 capsule by mouth nightly    PREGABALIN (LYRICA PO)    Take 225 mg by mouth 2 times daily     PROMETHAZINE (PHENERGAN) 25 MG TABLET    TK 1 T PO Q 4 TO 6 H PRN    ROPINIROLE (REQUIP) 0.5 MG TABLET    Take 1 mg by mouth 2 times daily     THIAMINE HCL (VITAMIN B-1 PO)    Take 1 tablet by mouth daily    TIZANIDINE (ZANAFLEX) 2 MG CAPSULE    Take 2 capsules by mouth 3 times daily as needed for Muscle spasms    TOPIRAMATE (TOPAMAX) 100 MG TABLET    Take 1 tablet by mouth 2 times daily    TRAZODONE (DESYREL) 150 MG TABLET    Take 1 tablet by mouth nightly       ALLERGIES     is allergic to latex; abilify [aripiprazole]; aspirin; geodon [ziprasidone hcl]; lithium; morphine; doxycycline; seroquel [quetiapine fumarate]; tape [adhesive tape]; and tramadol. FAMILY HISTORY     indicated that her mother is alive. She indicated that her father is alive. She indicated that both of her sisters are alive. She indicated that both of her brothers are alive. She indicated that her maternal grandmother is .  She coughing which may account for some symptoms. She is afebrile here. At this time. I feel she is still be discharged home with outpatient antibiotics. No indications acute surgical abdomen. I did offer her IV antibiotics here. She states she does want to wait around any longer. She is once go home. She did take a pill here readily. She will be discharged with prescription for Levaquin. Follow up with her primary and return if worse. Also requesting a prescription for Bentyl        FINAL IMPRESSION      1. Non-intractable vomiting with nausea, unspecified vomiting type    2. Pneumonia due to organism          DISPOSITION/PLAN   DISPOSITION Decision To Discharge 02/21/2018 01:03:44 AM      Condition on Disposition    Stable    PATIENT REFERRED TO:  Gisselle Victor  [address] [id number] [phone]    In 2 days        DISCHARGE MEDICATIONS:  New Prescriptions    DICYCLOMINE (BENTYL) 10 MG CAPSULE    Take 1 capsule by mouth every 6 hours as needed (cramps)    LEVOFLOXACIN (LEVAQUIN) 750 MG TABLET    Take 1 tablet by mouth daily for 5 days       (Please note that portions of this note were completed with a voice recognition program.  Efforts were made to edit the dictations but occasionally words are mis-transcribed.)    Connell MD, F.A.C.E.P.   Attending Emergency Physician        Barbie White MD  02/21/18 [id number]

## 2018-02-23 ENCOUNTER — APPOINTMENT (OUTPATIENT)
Dept: CT IMAGING | Facility: CLINIC | Age: 38
End: 2018-02-23
Payer: COMMERCIAL

## 2018-02-23 ENCOUNTER — APPOINTMENT (OUTPATIENT)
Dept: ULTRASOUND IMAGING | Facility: CLINIC | Age: 38
End: 2018-02-23
Payer: COMMERCIAL

## 2018-02-23 ENCOUNTER — HOSPITAL ENCOUNTER (EMERGENCY)
Facility: CLINIC | Age: 38
Discharge: LEFT BEFORE DISCHARGE | End: 2018-02-23
Attending: EMERGENCY MEDICINE
Payer: COMMERCIAL

## 2018-02-23 VITALS
WEIGHT: 293 LBS | DIASTOLIC BLOOD PRESSURE: 77 MMHG | BODY MASS INDEX: 44.41 KG/M2 | TEMPERATURE: 97.6 F | HEART RATE: 88 BPM | RESPIRATION RATE: 16 BRPM | HEIGHT: 68 IN | SYSTOLIC BLOOD PRESSURE: 115 MMHG | OXYGEN SATURATION: 95 %

## 2018-02-23 DIAGNOSIS — R19.7 NAUSEA VOMITING AND DIARRHEA: Primary | ICD-10-CM

## 2018-02-23 DIAGNOSIS — J18.9 PNEUMONIA DUE TO ORGANISM: ICD-10-CM

## 2018-02-23 DIAGNOSIS — R11.2 NAUSEA VOMITING AND DIARRHEA: Primary | ICD-10-CM

## 2018-02-23 LAB
ABSOLUTE EOS #: 0.1 K/UL (ref 0–0.4)
ABSOLUTE IMMATURE GRANULOCYTE: ABNORMAL K/UL (ref 0–0.3)
ABSOLUTE LYMPH #: 2.7 K/UL (ref 1–4.8)
ABSOLUTE MONO #: 1.1 K/UL (ref 0.1–1.2)
ALBUMIN SERPL-MCNC: 3.9 G/DL (ref 3.5–5.2)
ALBUMIN/GLOBULIN RATIO: 1.2 (ref 1–2.5)
ALP BLD-CCNC: 106 U/L (ref 35–104)
ALT SERPL-CCNC: 24 U/L (ref 5–33)
AMYLASE: 23 U/L (ref 28–100)
ANION GAP SERPL CALCULATED.3IONS-SCNC: 18 MMOL/L (ref 9–17)
AST SERPL-CCNC: 23 U/L
BASOPHILS # BLD: 1 % (ref 0–2)
BASOPHILS ABSOLUTE: 0.1 K/UL (ref 0–0.2)
BILIRUB SERPL-MCNC: 0.3 MG/DL (ref 0.3–1.2)
BILIRUBIN DIRECT: 0.11 MG/DL
BILIRUBIN, INDIRECT: 0.19 MG/DL (ref 0–1)
BNP INTERPRETATION: NORMAL
BUN BLDV-MCNC: 8 MG/DL (ref 6–20)
BUN/CREAT BLD: ABNORMAL (ref 9–20)
CALCIUM SERPL-MCNC: 9.3 MG/DL (ref 8.6–10.4)
CHLORIDE BLD-SCNC: 102 MMOL/L (ref 98–107)
CO2: 20 MMOL/L (ref 20–31)
CREAT SERPL-MCNC: 0.6 MG/DL (ref 0.5–0.9)
D-DIMER QUANTITATIVE: 1.08 MG/L FEU
DIFFERENTIAL TYPE: ABNORMAL
EKG ATRIAL RATE: 86 BPM
EKG P AXIS: 5 DEGREES
EKG P-R INTERVAL: 142 MS
EKG Q-T INTERVAL: 368 MS
EKG QRS DURATION: 90 MS
EKG QTC CALCULATION (BAZETT): 440 MS
EKG R AXIS: -43 DEGREES
EKG T AXIS: -17 DEGREES
EKG VENTRICULAR RATE: 86 BPM
EOSINOPHILS RELATIVE PERCENT: 1 % (ref 1–4)
GFR AFRICAN AMERICAN: >60 ML/MIN
GFR NON-AFRICAN AMERICAN: >60 ML/MIN
GFR SERPL CREATININE-BSD FRML MDRD: ABNORMAL ML/MIN/{1.73_M2}
GFR SERPL CREATININE-BSD FRML MDRD: ABNORMAL ML/MIN/{1.73_M2}
GLOBULIN: ABNORMAL G/DL (ref 1.5–3.8)
GLUCOSE BLD-MCNC: 99 MG/DL (ref 70–99)
HCG QUALITATIVE: NEGATIVE
HCT VFR BLD CALC: 40.9 % (ref 36–46)
HEMOGLOBIN: 13.9 G/DL (ref 12–16)
IMMATURE GRANULOCYTES: ABNORMAL %
INR BLD: 1.1
LACTIC ACID: 1 MMOL/L (ref 0.5–2.2)
LIPASE: 16 U/L (ref 13–60)
LYMPHOCYTES # BLD: 19 % (ref 24–44)
MCH RBC QN AUTO: 27.9 PG (ref 26–34)
MCHC RBC AUTO-ENTMCNC: 33.9 G/DL (ref 31–37)
MCV RBC AUTO: 82.4 FL (ref 80–100)
MONOCYTES # BLD: 8 % (ref 2–11)
NRBC AUTOMATED: ABNORMAL PER 100 WBC
PARTIAL THROMBOPLASTIN TIME: 31.6 SEC (ref 21.3–31.3)
PDW BLD-RTO: 14.2 % (ref 12.5–15.4)
PLATELET # BLD: 420 K/UL (ref 140–450)
PLATELET ESTIMATE: ABNORMAL
PMV BLD AUTO: 7.7 FL (ref 6–12)
POTASSIUM SERPL-SCNC: 3.2 MMOL/L (ref 3.7–5.3)
PRO-BNP: 46 PG/ML
PROTHROMBIN TIME: 10.8 SEC (ref 9.4–12.6)
RBC # BLD: 4.96 M/UL (ref 4–5.2)
RBC # BLD: ABNORMAL 10*6/UL
SEG NEUTROPHILS: 71 % (ref 36–66)
SEGMENTED NEUTROPHILS ABSOLUTE COUNT: 9.8 K/UL (ref 1.8–7.7)
SODIUM BLD-SCNC: 140 MMOL/L (ref 135–144)
TOTAL CK: 47 U/L (ref 26–192)
TOTAL PROTEIN: 7.2 G/DL (ref 6.4–8.3)
TROPONIN INTERP: NORMAL
TROPONIN T: <0.03 NG/ML
WBC # BLD: 13.7 K/UL (ref 3.5–11)
WBC # BLD: ABNORMAL 10*3/UL

## 2018-02-23 PROCEDURE — 83880 ASSAY OF NATRIURETIC PEPTIDE: CPT

## 2018-02-23 PROCEDURE — 96374 THER/PROPH/DIAG INJ IV PUSH: CPT

## 2018-02-23 PROCEDURE — 85025 COMPLETE CBC W/AUTO DIFF WBC: CPT

## 2018-02-23 PROCEDURE — 96376 TX/PRO/DX INJ SAME DRUG ADON: CPT

## 2018-02-23 PROCEDURE — 85379 FIBRIN DEGRADATION QUANT: CPT

## 2018-02-23 PROCEDURE — 93005 ELECTROCARDIOGRAM TRACING: CPT

## 2018-02-23 PROCEDURE — 83605 ASSAY OF LACTIC ACID: CPT

## 2018-02-23 PROCEDURE — 99284 EMERGENCY DEPT VISIT MOD MDM: CPT

## 2018-02-23 PROCEDURE — 96372 THER/PROPH/DIAG INJ SC/IM: CPT

## 2018-02-23 PROCEDURE — 84484 ASSAY OF TROPONIN QUANT: CPT

## 2018-02-23 PROCEDURE — 80048 BASIC METABOLIC PNL TOTAL CA: CPT

## 2018-02-23 PROCEDURE — 82150 ASSAY OF AMYLASE: CPT

## 2018-02-23 PROCEDURE — 6360000002 HC RX W HCPCS: Performed by: EMERGENCY MEDICINE

## 2018-02-23 PROCEDURE — 85730 THROMBOPLASTIN TIME PARTIAL: CPT

## 2018-02-23 PROCEDURE — 83690 ASSAY OF LIPASE: CPT

## 2018-02-23 PROCEDURE — 36415 COLL VENOUS BLD VENIPUNCTURE: CPT

## 2018-02-23 PROCEDURE — 84703 CHORIONIC GONADOTROPIN ASSAY: CPT

## 2018-02-23 PROCEDURE — 71250 CT THORAX DX C-: CPT

## 2018-02-23 PROCEDURE — 96375 TX/PRO/DX INJ NEW DRUG ADDON: CPT

## 2018-02-23 PROCEDURE — 85610 PROTHROMBIN TIME: CPT

## 2018-02-23 PROCEDURE — 6370000000 HC RX 637 (ALT 250 FOR IP): Performed by: EMERGENCY MEDICINE

## 2018-02-23 PROCEDURE — 82550 ASSAY OF CK (CPK): CPT

## 2018-02-23 PROCEDURE — 80076 HEPATIC FUNCTION PANEL: CPT

## 2018-02-23 PROCEDURE — 2580000003 HC RX 258: Performed by: EMERGENCY MEDICINE

## 2018-02-23 RX ORDER — ONDANSETRON 2 MG/ML
4 INJECTION INTRAMUSCULAR; INTRAVENOUS ONCE
Status: COMPLETED | OUTPATIENT
Start: 2018-02-23 | End: 2018-02-23

## 2018-02-23 RX ORDER — 0.9 % SODIUM CHLORIDE 0.9 %
70 INTRAVENOUS SOLUTION INTRAVENOUS ONCE
Status: DISCONTINUED | OUTPATIENT
Start: 2018-02-23 | End: 2018-02-23

## 2018-02-23 RX ORDER — SODIUM CHLORIDE 0.9 % (FLUSH) 0.9 %
10 SYRINGE (ML) INJECTION PRN
Status: DISCONTINUED | OUTPATIENT
Start: 2018-02-23 | End: 2018-02-23

## 2018-02-23 RX ORDER — KETOROLAC TROMETHAMINE 30 MG/ML
30 INJECTION, SOLUTION INTRAMUSCULAR; INTRAVENOUS ONCE
Status: COMPLETED | OUTPATIENT
Start: 2018-02-23 | End: 2018-02-23

## 2018-02-23 RX ORDER — LEVOFLOXACIN 500 MG/1
500 TABLET, FILM COATED ORAL ONCE
Status: COMPLETED | OUTPATIENT
Start: 2018-02-23 | End: 2018-02-23

## 2018-02-23 RX ORDER — 0.9 % SODIUM CHLORIDE 0.9 %
1000 INTRAVENOUS SOLUTION INTRAVENOUS ONCE
Status: COMPLETED | OUTPATIENT
Start: 2018-02-23 | End: 2018-02-23

## 2018-02-23 RX ORDER — DICYCLOMINE HYDROCHLORIDE 10 MG/ML
20 INJECTION INTRAMUSCULAR ONCE
Status: COMPLETED | OUTPATIENT
Start: 2018-02-23 | End: 2018-02-23

## 2018-02-23 RX ADMIN — ONDANSETRON 4 MG: 2 INJECTION INTRAMUSCULAR; INTRAVENOUS at 12:05

## 2018-02-23 RX ADMIN — SODIUM CHLORIDE 1000 ML: 9 INJECTION, SOLUTION INTRAVENOUS at 12:05

## 2018-02-23 RX ADMIN — KETOROLAC TROMETHAMINE 30 MG: 30 INJECTION, SOLUTION INTRAMUSCULAR at 12:05

## 2018-02-23 RX ADMIN — ONDANSETRON 4 MG: 2 INJECTION INTRAMUSCULAR; INTRAVENOUS at 12:45

## 2018-02-23 RX ADMIN — DICYCLOMINE HYDROCHLORIDE 20 MG: 20 INJECTION, SOLUTION INTRAMUSCULAR at 12:05

## 2018-02-23 RX ADMIN — LEVOFLOXACIN 500 MG: 500 TABLET, FILM COATED ORAL at 14:50

## 2018-02-23 ASSESSMENT — PAIN SCALES - GENERAL
PAINLEVEL_OUTOF10: 10
PAINLEVEL_OUTOF10: 10

## 2018-02-23 ASSESSMENT — PAIN DESCRIPTION - PROGRESSION: CLINICAL_PROGRESSION: NOT CHANGED

## 2018-02-23 ASSESSMENT — PAIN DESCRIPTION - PAIN TYPE: TYPE: ACUTE PAIN

## 2018-02-23 ASSESSMENT — PAIN DESCRIPTION - LOCATION: LOCATION: GENERALIZED

## 2018-02-23 NOTE — ED NOTES
Pt asking for test results, informed MD will be in with results. No acute distress.      Brenda Rice RN  02/23/18 1257

## 2018-02-23 NOTE — ED NOTES
MD notified of no IV access available appropriate for IV contrast. Rad notified. No new orders received.      Portia Ramirez RN  02/23/18 [id number]

## 2018-02-23 NOTE — ED PROVIDER NOTES
CALCIUM CARBONATE PO Take 1 tablet by mouth dailyHistorical Med      Cholecalciferol (VITAMIN D PO) Take 1 tablet by mouth dailyHistorical Med      promethazine (PHENERGAN) 25 MG tablet TK 1 T PO Q 4 TO 6 H PRN, R-0Historical Med      budesonide-formoterol (SYMBICORT) 160-4.5 MCG/ACT AERO Inhale 2 puffs into the lungs 2 times dailyHistorical Med      Ipratropium-Albuterol (COMBIVENT IN) Inhale 1 puff into the lungs 4 times dailyHistorical Med      Pregabalin (LYRICA PO) Take 225 mg by mouth 2 times daily Historical Med      pantoprazole (PROTONIX) 40 MG tablet Take 40 mg by mouth dailyHistorical Med      tiZANidine (ZANAFLEX) 2 MG capsule Take 2 capsules by mouth 3 times daily as needed for Muscle spasms, Disp-15 capsule, R-0      lidocaine (LMX) 4 % cream Apply topically daily as needed for Pain , Topical, DAILY PRN, Historical Med      rOPINIRole (REQUIP) 0.5 MG tablet Take 1 mg by mouth 2 times daily Historical Med      nadolol (CORGARD) 40 MG tablet Take 40 mg by mouth nightly       meclizine (ANTIVERT) 25 MG tablet Take 25 mg by mouth 3 times daily as needed      loratadine (CLARITIN) 10 MG tablet Take 10 mg by mouth daily       ! ! - Potential duplicate medications found. Please discuss with provider. ALLERGIES     is allergic to latex; abilify [aripiprazole]; aspirin; geodon [ziprasidone hcl]; lithium; morphine; doxycycline; seroquel [quetiapine fumarate]; tape [adhesive tape]; and tramadol. FAMILY HISTORY     indicated that her mother is alive. She indicated that her father is alive. She indicated that both of her sisters are alive. She indicated that both of her brothers are alive. She indicated that her maternal grandmother is . She indicated that her daughter is alive. She indicated that her son is alive.  She indicated that the status of her maternal aunt is unknown.      family history includes Alzheimer's Disease in her maternal grandmother; Breast Cancer in her mother; Cancer in the Emergency Department Physician who either signs or Co-signs this chart in the absence of a cardiologist.    1136 sinus rhythm rate 86  QRS 90  T wave inversions in precordial leads. No ST elevations.  No change compared to January 14, 2018    Not indicated unless otherwise documented above    LABS:  Results for orders placed or performed during the hospital encounter of 02/23/18   CBC Auto Differential   Result Value Ref Range    WBC 13.7 (H) 3.5 - 11.0 k/uL    RBC 4.96 4.0 - 5.2 m/uL    Hemoglobin 13.9 12.0 - 16.0 g/dL    Hematocrit 40.9 36 - 46 %    MCV 82.4 80 - 100 fL    MCH 27.9 26 - 34 pg    MCHC 33.9 31 - 37 g/dL    RDW 14.2 12.5 - 15.4 %    Platelets 780 718 - 015 k/uL    MPV 7.7 6.0 - 12.0 fL    NRBC Automated NOT REPORTED per 100 WBC    Differential Type NOT REPORTED     Seg Neutrophils 71 (H) 36 - 66 %    Lymphocytes 19 (L) 24 - 44 %    Monocytes 8 2 - 11 %    Eosinophils % 1 1 - 4 %    Basophils 1 0 - 2 %    Immature Granulocytes NOT REPORTED 0 %    Segs Absolute 9.80 (H) 1.8 - 7.7 k/uL    Absolute Lymph # 2.70 1.0 - 4.8 k/uL    Absolute Mono # 1.10 0.1 - 1.2 k/uL    Absolute Eos # 0.10 0.0 - 0.4 k/uL    Basophils # 0.10 0.0 - 0.2 k/uL    Absolute Immature Granulocyte NOT REPORTED 0.00 - 0.30 k/uL    WBC Morphology NOT REPORTED     RBC Morphology NOT REPORTED     Platelet Estimate NOT REPORTED    Basic Metabolic Panel   Result Value Ref Range    Glucose 99 70 - 99 mg/dL    BUN 8 6 - 20 mg/dL    CREATININE 0.60 0.50 - 0.90 mg/dL    Bun/Cre Ratio NOT REPORTED 9 - 20    Calcium 9.3 8.6 - 10.4 mg/dL    Sodium 140 135 - 144 mmol/L    Potassium 3.2 (L) 3.7 - 5.3 mmol/L    Chloride 102 98 - 107 mmol/L    CO2 20 20 - 31 mmol/L    Anion Gap 18 (H) 9 - 17 mmol/L    GFR Non-African American >60 >60 mL/min    GFR African American >60 >60 mL/min    GFR Comment          GFR Staging NOT REPORTED    Amylase   Result Value Ref Range    Amylase 23 (L) 28 - 100 U/L   Lipase   Result Value Ref Range Lipase 16 13 - 60 U/L   Hepatic Function Panel   Result Value Ref Range    Alb 3.9 3.5 - 5.2 g/dL    Alkaline Phosphatase 106 (H) 35 - 104 U/L    ALT 24 5 - 33 U/L    AST 23 <32 U/L    Total Bilirubin 0.30 0.3 - 1.2 mg/dL    Bilirubin, Direct 0.11 <0.31 mg/dL    Bilirubin, Indirect 0.19 0.00 - 1.00 mg/dL    Total Protein 7.2 6.4 - 8.3 g/dL    Globulin NOT REPORTED 1.5 - 3.8 g/dL    Albumin/Globulin Ratio 1.2 1.0 - 2.5   Troponin   Result Value Ref Range    Troponin T <0.03 <0.03 ng/mL    Troponin Interp         Protime-INR   Result Value Ref Range    Protime 10.8 9.4 - 12.6 sec    INR 1.1    APTT   Result Value Ref Range    PTT 31.6 (H) 21.3 - 31.3 sec   CK   Result Value Ref Range    Total CK 47 26 - 192 U/L   Brain Natriuretic Peptide   Result Value Ref Range    Pro-BNP 46 <300 pg/mL    BNP Interpretation         HCG Qualitative, Serum   Result Value Ref Range    hCG Qual NEGATIVE NEG   Lactic Acid   Result Value Ref Range    Lactic Acid 1.0 0.5 - 2.2 mmol/L   D-Dimer, Quantitative   Result Value Ref Range    D-Dimer, Quant 1.08 mg/L FEU   EKG 12 Lead   Result Value Ref Range    Ventricular Rate 86 BPM    Atrial Rate 86 BPM    P-R Interval 142 ms    QRS Duration 90 ms    Q-T Interval 368 ms    QTc Calculation (Bazett) 440 ms    P Axis 5 degrees    R Axis -43 degrees    T Axis -17 degrees       Not indicated unless otherwise documented above    RADIOLOGY:   I reviewed the radiologist interpretations:    CT CHEST WO CONTRAST   Preliminary Result   1. Airspace consolidation at the medial right lower lobe/right infrahilar   region favored to represent pneumonia/infiltrate. There is associated   narrowing of the right lower lobe posterior basal segmental airway. Follow-up is recommend to document resolution as underlying mass is difficult   to exclude on the basis of this study. 2. Parenchymal banding/scarring in the right middle lobe. 3. Stable probable superior pole left renal cyst measuring 1.8 cm.    4. Scattered nonenlarged mediastinal lymph nodes. Not indicated unless otherwise documented above    EMERGENCY DEPARTMENT COURSE:     The patient was given the following medications:  Orders Placed This Encounter   Medications    0.9 % sodium chloride bolus    dicyclomine (BENTYL) injection 20 mg    ketorolac (TORADOL) injection 30 mg    ondansetron (ZOFRAN) injection 4 mg    DISCONTD: 0.9 % sodium chloride bolus    DISCONTD: iopamidol (ISOVUE-370) 76 % injection 75 mL    DISCONTD: sodium chloride flush 0.9 % injection 10 mL    ondansetron (ZOFRAN) injection 4 mg    levofloxacin (LEVAQUIN) tablet 500 mg        Vitals:    Vitals:    02/23/18 1116 02/23/18 1222   BP: (!) 136/93 115/77   Pulse: 96 88   Resp: 18 16   Temp: 97.6 °F (36.4 °C)    TempSrc: Oral    SpO2: 99% 95%   Weight: (!) 142.9 kg (315 lb)    Height: 5' 8\" (1.727 m)      -------------------------  /77   Pulse 88   Temp 97.6 °F (36.4 °C) (Oral)   Resp 16   Ht 5' 8\" (1.727 m)   Wt (!) 142.9 kg (315 lb)   LMP  (Within Years) Comment: 2012  SpO2 95%   BMI 47.90 kg/m²     2:30 PM IV access initially obtained and fluids started. They should without any vomiting in the emergency department. Ultimately d-dimer was elevated requiring a separate IV which took a prolonged amount of time obtaining secondary to patient's body habitus. Ultimately line was obtained but was positional.  Will obtain a CAT scan of the chest without IV contrast for pneumonia and will get bilateral lower extremity Dopplers. 2:50 PM patient returned from CAT scan refusing Dopplers of her lower extremities states that she doesn't have a blood clot and she knows when she does. She is currently denying any chest pain and her shortness of breath seems improved but she wants to go home despite further workup that is necessary. She is alert she's answering questions appropriately and understands my concern of a blood clot.   She says she has a history of blood clots in the past and that is how she lost her \"twin\". She was a to leave against medical advice understands my concern. We will give her her dose of Levaquin and she'll be discharged against medical advice to follow up with her doctor on Monday and to call today to arrange an appointment she also understands that she can return at any time. Pulmonary CAT scan demonstrates right sided pneumonia. Recommends follow-up. Patient was given a dose of Levaquin here before discharge. I have reviewed the disposition diagnosis with the patient and or their family/guardian. I have answered their questions and given discharge instructions. They voiced understanding of these instructions and did not have any further questions or complaints. CRITICAL CARE:    None    CONSULTS:    None    PROCEDURES:    None      OARRS Report if indicated             FINAL IMPRESSION      1. Nausea vomiting and diarrhea    2.  Pneumonia due to organism          DISPOSITION/PLAN   DISPOSITION [place] 02/23/2018 02:46:45 PM        PATIENT REFERRED TO:  Valerie Carlisle [address] [place] [id number]  [phone]    Call today        DISCHARGE MEDICATIONS:  Discharge Medication List as of 2/23/2018  2:48 PM          (Please note that portions of this note were completed with a voice recognition program.  Efforts were made to edit the dictations but occasionally words are mis-transcribed.)    Mini Lee,   Attending Emergency Physician       Mini Lee,   02/23/18 7848

## 2018-02-23 NOTE — ED NOTES
3 warm blankets given. [place] mist given per Dr Kristi Wolfe verbal okay for pt to have.       Marisa Rice, RN  02/23/18 3850

## 2018-02-23 NOTE — ED NOTES
Extra warm blankets and pillows provided for comfort. Pt positions self on cot without difficulty for comfort.      Brenda Rice RN  02/23/18 9195

## 2018-02-23 NOTE — ED NOTES
Pt ambulatory back to room with steady and upright gait. Resp even and NL.      Carmen Skinner RN  02/23/18 0075

## 2018-02-23 NOTE — ED NOTES
Pt up to bathroom due to abdominal cramps. Hat placed in toilet for stool specimen collection.      Eriberto Esquivel RN  02/23/18 8266

## 2018-06-17 ENCOUNTER — APPOINTMENT (OUTPATIENT)
Dept: GENERAL RADIOLOGY | Age: 38
End: 2018-06-17
Payer: MEDICARE

## 2018-06-17 ENCOUNTER — HOSPITAL ENCOUNTER (EMERGENCY)
Age: 38
Discharge: HOME | End: 2018-06-17
Payer: MEDICARE

## 2018-06-17 VITALS
HEART RATE: 92 BPM | DIASTOLIC BLOOD PRESSURE: 76 MMHG | SYSTOLIC BLOOD PRESSURE: 136 MMHG | OXYGEN SATURATION: 98 % | RESPIRATION RATE: 18 BRPM | TEMPERATURE: 99 F

## 2018-06-17 DIAGNOSIS — Z76.0 ENCOUNTER FOR MEDICATION REFILL: ICD-10-CM

## 2018-06-17 DIAGNOSIS — J06.9 UPPER RESPIRATORY TRACT INFECTION, UNSPECIFIED TYPE: Primary | ICD-10-CM

## 2018-06-17 PROCEDURE — 71046 X-RAY EXAM CHEST 2 VIEWS: CPT

## 2018-06-17 PROCEDURE — 99283 EMERGENCY DEPT VISIT LOW MDM: CPT

## 2018-06-17 RX ORDER — ACETAMINOPHEN 325 MG/1
650 TABLET ORAL EVERY 6 HOURS PRN
COMMUNITY
End: 2020-11-13

## 2018-06-17 RX ORDER — HYDROXYZINE 50 MG/1
25 TABLET, FILM COATED ORAL EVERY 8 HOURS PRN
Qty: 12 TABLET | Refills: 0 | Status: SHIPPED | OUTPATIENT
Start: 2018-06-17 | End: 2018-06-27

## 2018-06-17 RX ORDER — FLUTICASONE PROPIONATE 50 MCG
1 SPRAY, SUSPENSION (ML) NASAL DAILY
Qty: 1 BOTTLE | Refills: 0 | Status: SHIPPED | OUTPATIENT
Start: 2018-06-17 | End: 2020-10-05

## 2018-06-17 RX ORDER — ALBUTEROL SULFATE 90 UG/1
2 AEROSOL, METERED RESPIRATORY (INHALATION) EVERY 6 HOURS PRN
Qty: 1 INHALER | Refills: 0 | Status: SHIPPED | OUTPATIENT
Start: 2018-06-17 | End: 2020-11-13

## 2018-06-17 RX ORDER — FUROSEMIDE 20 MG/1
20 TABLET ORAL DAILY PRN
Qty: 14 TABLET | Refills: 0 | Status: SHIPPED | OUTPATIENT
Start: 2018-06-17 | End: 2020-10-05

## 2018-06-17 RX ORDER — BENZONATATE 100 MG/1
100 CAPSULE ORAL 2 TIMES DAILY PRN
Qty: 12 CAPSULE | Refills: 0 | Status: SHIPPED | OUTPATIENT
Start: 2018-06-17 | End: 2018-06-24

## 2018-06-17 ASSESSMENT — ENCOUNTER SYMPTOMS
COUGH: 1
SHORTNESS OF BREATH: 0
WHEEZING: 0
DIARRHEA: 0
VOMITING: 0
SORE THROAT: 1
EYE REDNESS: 0
EYE DISCHARGE: 0
RHINORRHEA: 0
CHEST TIGHTNESS: 0
NAUSEA: 0
ABDOMINAL PAIN: 0
CONSTIPATION: 0
BACK PAIN: 0
BLOOD IN STOOL: 0

## 2020-10-05 ENCOUNTER — OFFICE VISIT (OUTPATIENT)
Dept: INTERNAL MEDICINE CLINIC | Age: 40
End: 2020-10-05
Payer: MEDICARE

## 2020-10-05 VITALS
WEIGHT: 135 LBS | BODY MASS INDEX: 20.46 KG/M2 | SYSTOLIC BLOOD PRESSURE: 130 MMHG | TEMPERATURE: 98.3 F | DIASTOLIC BLOOD PRESSURE: 82 MMHG | HEIGHT: 68 IN

## 2020-10-05 PROCEDURE — G8420 CALC BMI NORM PARAMETERS: HCPCS | Performed by: INTERNAL MEDICINE

## 2020-10-05 PROCEDURE — 99204 OFFICE O/P NEW MOD 45 MIN: CPT | Performed by: INTERNAL MEDICINE

## 2020-10-05 PROCEDURE — G8427 DOCREV CUR MEDS BY ELIG CLIN: HCPCS | Performed by: INTERNAL MEDICINE

## 2020-10-05 PROCEDURE — G8484 FLU IMMUNIZE NO ADMIN: HCPCS | Performed by: INTERNAL MEDICINE

## 2020-10-05 PROCEDURE — 4004F PT TOBACCO SCREEN RCVD TLK: CPT | Performed by: INTERNAL MEDICINE

## 2020-10-05 RX ORDER — PAROXETINE HYDROCHLORIDE 20 MG/1
20 TABLET, FILM COATED ORAL EVERY MORNING
Qty: 30 TABLET | Refills: 3 | Status: ON HOLD | OUTPATIENT
Start: 2020-10-05 | End: 2020-11-14 | Stop reason: HOSPADM

## 2020-10-05 RX ORDER — ONDANSETRON 4 MG/1
4 TABLET, ORALLY DISINTEGRATING ORAL EVERY 4 HOURS PRN
COMMUNITY
End: 2021-10-12 | Stop reason: DRUGHIGH

## 2020-10-05 RX ORDER — HYDROXYZINE PAMOATE 50 MG/1
50 CAPSULE ORAL 3 TIMES DAILY
COMMUNITY
End: 2020-10-05

## 2020-10-05 RX ORDER — PAROXETINE HYDROCHLORIDE 20 MG/1
20 TABLET, FILM COATED ORAL EVERY MORNING
COMMUNITY
End: 2020-10-05 | Stop reason: SDUPTHER

## 2020-10-05 RX ORDER — SCOLOPAMINE TRANSDERMAL SYSTEM 1 MG/1
1 PATCH, EXTENDED RELEASE TRANSDERMAL ONCE
COMMUNITY
Start: 2020-01-22 | End: 2020-11-13

## 2020-10-05 RX ORDER — POTASSIUM CHLORIDE 20 MEQ/1
20 TABLET, EXTENDED RELEASE ORAL 2 TIMES DAILY
COMMUNITY
End: 2021-10-12

## 2020-10-05 ASSESSMENT — ENCOUNTER SYMPTOMS
EYES NEGATIVE: 1
RESPIRATORY NEGATIVE: 1
ALLERGIC/IMMUNOLOGIC NEGATIVE: 1
GASTROINTESTINAL NEGATIVE: 1

## 2020-10-05 ASSESSMENT — PATIENT HEALTH QUESTIONNAIRE - PHQ9
SUM OF ALL RESPONSES TO PHQ QUESTIONS 1-9: 12
1. LITTLE INTEREST OR PLEASURE IN DOING THINGS: 2
6. FEELING BAD ABOUT YOURSELF - OR THAT YOU ARE A FAILURE OR HAVE LET YOURSELF OR YOUR FAMILY DOWN: 2
5. POOR APPETITE OR OVEREATING: 3
7. TROUBLE CONCENTRATING ON THINGS, SUCH AS READING THE NEWSPAPER OR WATCHING TELEVISION: 0
2. FEELING DOWN, DEPRESSED OR HOPELESS: 2
3. TROUBLE FALLING OR STAYING ASLEEP: 0
SUM OF ALL RESPONSES TO PHQ9 QUESTIONS 1 & 2: 4
8. MOVING OR SPEAKING SO SLOWLY THAT OTHER PEOPLE COULD HAVE NOTICED. OR THE OPPOSITE, BEING SO FIGETY OR RESTLESS THAT YOU HAVE BEEN MOVING AROUND A LOT MORE THAN USUAL: 0
10. IF YOU CHECKED OFF ANY PROBLEMS, HOW DIFFICULT HAVE THESE PROBLEMS MADE IT FOR YOU TO DO YOUR WORK, TAKE CARE OF THINGS AT HOME, OR GET ALONG WITH OTHER PEOPLE: 1
SUM OF ALL RESPONSES TO PHQ QUESTIONS 1-9: 12
9. THOUGHTS THAT YOU WOULD BE BETTER OFF DEAD, OR OF HURTING YOURSELF: 0
4. FEELING TIRED OR HAVING LITTLE ENERGY: 3

## 2020-10-05 ASSESSMENT — COLUMBIA-SUICIDE SEVERITY RATING SCALE - C-SSRS
1. WITHIN THE PAST MONTH, HAVE YOU WISHED YOU WERE DEAD OR WISHED YOU COULD GO TO SLEEP AND NOT WAKE UP?: NO
6. HAVE YOU EVER DONE ANYTHING, STARTED TO DO ANYTHING, OR PREPARED TO DO ANYTHING TO END YOUR LIFE?: NO
2. HAVE YOU ACTUALLY HAD ANY THOUGHTS OF KILLING YOURSELF?: NO

## 2020-10-05 NOTE — PROGRESS NOTES
psychiatrist         Assessment / Plan:      Diagnosis Orders   1. Deep vein thrombosis (DVT) of calf muscle vein of left lower extremity, unspecified chronicity (HCC) = resolved    2. SIADH (syndrome of inappropriate ADH production) ([facility] = the details of this diagnosis are not apparent at this time but her laboratory work-up done a couple of years ago showed normal sodium level    3. Endometrial cancer ([facility] = this is by history only and I have not seen any records    4. Bipolar disorder in partial remission, most recent episode unspecified type ([facility] = at present she is not receiving any treatment and she was advised to contact [facility]) system to obtain a psychiatric evaluation    5. Depression, acute = kept on Paxil I gave her 1 month supply and 3 refills    Dx= he has a right rib cage especially anterior ribs, she has a tenderness over anterior ribs which is nonspecific recent x-ray for ribs and chest was normal.  Advised that she is not a candidate for any narcotic therapy      Return in about 12 weeks (around 12/28/2020) for Follow Up. No orders of the defined types were placed in this encounter.     Orders Placed This Encounter   Medications    PARoxetine (PAXIL) 20 MG tablet     Sig: Take 1 tablet by mouth every morning     Dispense:  30 tablet     Refill:  3

## 2020-11-03 PROBLEM — F32.A DEPRESSION: Status: RESOLVED | Noted: 2020-11-03 | Resolved: 2020-11-03

## 2020-11-12 ENCOUNTER — NURSE TRIAGE (OUTPATIENT)
Dept: OTHER | Facility: CLINIC | Age: 40
End: 2020-11-12

## 2020-11-12 NOTE — TELEPHONE ENCOUNTER
SOB and pain in the back of right side. Caller was recently in the ED and had a call regarding her CT post discharge. The MD stated she had blood clots in her lungs. Caller is concerned and MD states she needs to get on a blood thinner/talk to PCP. Caller states she has had trouble sleeping as well, would like something to aid in sleep. See dictation from previous ED visit. Caller states she also is still having abdominal pain. She states she was impacted and unsure if it needs to be re-checked.      Reason for Disposition   Caller has URGENT question (includes prescribed medication questions) and triager unable to answer question    Protocols used: RECENT MEDICAL VISIT FOR ILLNESS FOLLOW-UP CALL-ADULT-[place]

## 2020-11-13 ENCOUNTER — VIRTUAL VISIT (OUTPATIENT)
Dept: INTERNAL MEDICINE CLINIC | Age: 40
End: 2020-11-13
Payer: MEDICARE

## 2020-11-13 ENCOUNTER — HOSPITAL ENCOUNTER (INPATIENT)
Age: 40
LOS: 1 days | Discharge: HOME | DRG: 176 | End: 2020-11-14
Attending: EMERGENCY MEDICINE | Admitting: INTERNAL MEDICINE
Payer: MEDICARE

## 2020-11-13 ENCOUNTER — APPOINTMENT (OUTPATIENT)
Dept: CT IMAGING | Age: 40
DRG: 176 | End: 2020-11-13
Payer: MEDICARE

## 2020-11-13 PROBLEM — R09.89 SUSPECTED PULMONARY EMBOLISM: Status: ACTIVE | Noted: 2020-11-13

## 2020-11-13 LAB
ABSOLUTE EOS #: 0.3 K/UL (ref 0–0.4)
ABSOLUTE IMMATURE GRANULOCYTE: ABNORMAL K/UL (ref 0–0.3)
ABSOLUTE LYMPH #: 3.8 K/UL (ref 1–4.8)
ABSOLUTE MONO #: 0.6 K/UL (ref 0.1–1.3)
ALBUMIN SERPL-MCNC: 4.2 G/DL (ref 3.5–5.2)
ALBUMIN/GLOBULIN RATIO: ABNORMAL (ref 1–2.5)
ALP BLD-CCNC: 93 U/L (ref 35–104)
ALT SERPL-CCNC: 10 U/L (ref 5–33)
ANION GAP SERPL CALCULATED.3IONS-SCNC: 12 MMOL/L (ref 9–17)
AST SERPL-CCNC: 14 U/L
BASOPHILS # BLD: 1 % (ref 0–2)
BASOPHILS ABSOLUTE: 0.1 K/UL (ref 0–0.2)
BILIRUB SERPL-MCNC: <0.15 MG/DL (ref 0.3–1.2)
BUN BLDV-MCNC: 14 MG/DL (ref 6–20)
BUN/CREAT BLD: ABNORMAL (ref 9–20)
CALCIUM SERPL-MCNC: 9.2 MG/DL (ref 8.6–10.4)
CHLORIDE BLD-SCNC: 101 MMOL/L (ref 98–107)
CO2: 24 MMOL/L (ref 20–31)
CREAT SERPL-MCNC: 0.69 MG/DL (ref 0.5–0.9)
D-DIMER QUANTITATIVE: 0.46 MG/L FEU (ref 0–0.59)
DIFFERENTIAL TYPE: ABNORMAL
EKG ATRIAL RATE: 75 BPM
EKG P AXIS: 29 DEGREES
EKG P-R INTERVAL: 146 MS
EKG Q-T INTERVAL: 404 MS
EKG QRS DURATION: 90 MS
EKG QTC CALCULATION (BAZETT): 451 MS
EKG R AXIS: -37 DEGREES
EKG T AXIS: 28 DEGREES
EKG VENTRICULAR RATE: 75 BPM
EOSINOPHILS RELATIVE PERCENT: 3 % (ref 0–4)
GFR AFRICAN AMERICAN: >60 ML/MIN
GFR NON-AFRICAN AMERICAN: >60 ML/MIN
GFR SERPL CREATININE-BSD FRML MDRD: ABNORMAL ML/MIN/{1.73_M2}
GFR SERPL CREATININE-BSD FRML MDRD: ABNORMAL ML/MIN/{1.73_M2}
GLUCOSE BLD-MCNC: 77 MG/DL (ref 70–99)
HCT VFR BLD CALC: 39.2 % (ref 36–46)
HEMOGLOBIN: 13.7 G/DL (ref 12–16)
IMMATURE GRANULOCYTES: ABNORMAL %
INR BLD: 0.9
LYMPHOCYTES # BLD: 44 % (ref 24–44)
MCH RBC QN AUTO: 31.2 PG (ref 26–34)
MCHC RBC AUTO-ENTMCNC: 34.9 G/DL (ref 31–37)
MCV RBC AUTO: 89.6 FL (ref 80–100)
MONOCYTES # BLD: 7 % (ref 1–7)
NRBC AUTOMATED: ABNORMAL PER 100 WBC
PARTIAL THROMBOPLASTIN TIME: 31.6 SEC (ref 24–36)
PDW BLD-RTO: 14 % (ref 11.5–14.9)
PLATELET # BLD: 485 K/UL (ref 150–450)
PLATELET ESTIMATE: ABNORMAL
PMV BLD AUTO: 6.9 FL (ref 6–12)
POTASSIUM SERPL-SCNC: 4.3 MMOL/L (ref 3.7–5.3)
PROTHROMBIN TIME: 11.6 SEC (ref 11.8–14.6)
RBC # BLD: 4.38 M/UL (ref 4–5.2)
RBC # BLD: ABNORMAL 10*6/UL
SEG NEUTROPHILS: 45 % (ref 36–66)
SEGMENTED NEUTROPHILS ABSOLUTE COUNT: 3.8 K/UL (ref 1.3–9.1)
SODIUM BLD-SCNC: 137 MMOL/L (ref 135–144)
TOTAL PROTEIN: 7.6 G/DL (ref 6.4–8.3)
TROPONIN INTERP: NORMAL
TROPONIN T: NORMAL NG/ML
TROPONIN, HIGH SENSITIVITY: <6 NG/L (ref 0–14)
WBC # BLD: 8.5 K/UL (ref 3.5–11)
WBC # BLD: ABNORMAL 10*3/UL

## 2020-11-13 PROCEDURE — 93970 EXTREMITY STUDY: CPT

## 2020-11-13 PROCEDURE — 6370000000 HC RX 637 (ALT 250 FOR IP): Performed by: STUDENT IN AN ORGANIZED HEALTH CARE EDUCATION/TRAINING PROGRAM

## 2020-11-13 PROCEDURE — 2060000000 HC ICU INTERMEDIATE R&B

## 2020-11-13 PROCEDURE — 6370000000 HC RX 637 (ALT 250 FOR IP): Performed by: EMERGENCY MEDICINE

## 2020-11-13 PROCEDURE — 85379 FIBRIN DEGRADATION QUANT: CPT

## 2020-11-13 PROCEDURE — 85610 PROTHROMBIN TIME: CPT

## 2020-11-13 PROCEDURE — 36415 COLL VENOUS BLD VENIPUNCTURE: CPT

## 2020-11-13 PROCEDURE — 85025 COMPLETE CBC W/AUTO DIFF WBC: CPT

## 2020-11-13 PROCEDURE — 93005 ELECTROCARDIOGRAM TRACING: CPT | Performed by: EMERGENCY MEDICINE

## 2020-11-13 PROCEDURE — 70450 CT HEAD/BRAIN W/O DYE: CPT

## 2020-11-13 PROCEDURE — 6360000002 HC RX W HCPCS: Performed by: EMERGENCY MEDICINE

## 2020-11-13 PROCEDURE — 85730 THROMBOPLASTIN TIME PARTIAL: CPT

## 2020-11-13 PROCEDURE — 2580000003 HC RX 258: Performed by: STUDENT IN AN ORGANIZED HEALTH CARE EDUCATION/TRAINING PROGRAM

## 2020-11-13 PROCEDURE — 93010 ELECTROCARDIOGRAM REPORT: CPT | Performed by: INTERNAL MEDICINE

## 2020-11-13 PROCEDURE — 99223 1ST HOSP IP/OBS HIGH 75: CPT | Performed by: INTERNAL MEDICINE

## 2020-11-13 PROCEDURE — 96372 THER/PROPH/DIAG INJ SC/IM: CPT

## 2020-11-13 PROCEDURE — 80053 COMPREHEN METABOLIC PANEL: CPT

## 2020-11-13 PROCEDURE — 84484 ASSAY OF TROPONIN QUANT: CPT

## 2020-11-13 PROCEDURE — 99443 PR PHYS/QHP TELEPHONE EVALUATION 21-30 MIN: CPT | Performed by: PHYSICIAN ASSISTANT

## 2020-11-13 PROCEDURE — 99283 EMERGENCY DEPT VISIT LOW MDM: CPT

## 2020-11-13 RX ORDER — SODIUM CHLORIDE 0.9 % (FLUSH) 0.9 %
10 SYRINGE (ML) INJECTION PRN
Status: DISCONTINUED | OUTPATIENT
Start: 2020-11-13 | End: 2020-11-14 | Stop reason: HOSPADM

## 2020-11-13 RX ORDER — ONDANSETRON 4 MG/1
4 TABLET, FILM COATED ORAL EVERY 8 HOURS PRN
COMMUNITY
End: 2020-11-13

## 2020-11-13 RX ORDER — CETIRIZINE HYDROCHLORIDE 10 MG/1
10 TABLET ORAL DAILY
Status: DISCONTINUED | OUTPATIENT
Start: 2020-11-13 | End: 2020-11-14 | Stop reason: HOSPADM

## 2020-11-13 RX ORDER — ONDANSETRON 2 MG/ML
4 INJECTION INTRAMUSCULAR; INTRAVENOUS EVERY 6 HOURS PRN
Status: DISCONTINUED | OUTPATIENT
Start: 2020-11-13 | End: 2020-11-13

## 2020-11-13 RX ORDER — POLYETHYLENE GLYCOL 3350 17 G/17G
17 POWDER, FOR SOLUTION ORAL DAILY PRN
Status: DISCONTINUED | OUTPATIENT
Start: 2020-11-13 | End: 2020-11-14 | Stop reason: HOSPADM

## 2020-11-13 RX ORDER — ONDANSETRON 4 MG/1
4 TABLET, ORALLY DISINTEGRATING ORAL EVERY 8 HOURS PRN
Status: DISCONTINUED | OUTPATIENT
Start: 2020-11-13 | End: 2020-11-14 | Stop reason: HOSPADM

## 2020-11-13 RX ORDER — PAROXETINE 10 MG/1
10 TABLET, FILM COATED ORAL DAILY
Status: DISCONTINUED | OUTPATIENT
Start: 2020-11-13 | End: 2020-11-14 | Stop reason: HOSPADM

## 2020-11-13 RX ORDER — SODIUM CHLORIDE 0.9 % (FLUSH) 0.9 %
10 SYRINGE (ML) INJECTION EVERY 12 HOURS SCHEDULED
Status: DISCONTINUED | OUTPATIENT
Start: 2020-11-13 | End: 2020-11-14 | Stop reason: HOSPADM

## 2020-11-13 RX ORDER — ONDANSETRON 2 MG/ML
4 INJECTION INTRAMUSCULAR; INTRAVENOUS EVERY 6 HOURS PRN
Status: DISCONTINUED | OUTPATIENT
Start: 2020-11-13 | End: 2020-11-14 | Stop reason: HOSPADM

## 2020-11-13 RX ORDER — HEPARIN SODIUM 10000 [USP'U]/100ML
16 INJECTION, SOLUTION INTRAVENOUS CONTINUOUS
Status: DISCONTINUED | OUTPATIENT
Start: 2020-11-13 | End: 2020-11-13

## 2020-11-13 RX ORDER — HEPARIN SODIUM 1000 [USP'U]/ML
40 INJECTION, SOLUTION INTRAVENOUS; SUBCUTANEOUS PRN
Status: DISCONTINUED | OUTPATIENT
Start: 2020-11-13 | End: 2020-11-13

## 2020-11-13 RX ORDER — ACETAMINOPHEN 325 MG/1
650 TABLET ORAL ONCE
Status: COMPLETED | OUTPATIENT
Start: 2020-11-13 | End: 2020-11-13

## 2020-11-13 RX ORDER — HEPARIN SODIUM 1000 [USP'U]/ML
80 INJECTION, SOLUTION INTRAVENOUS; SUBCUTANEOUS ONCE
Status: DISCONTINUED | OUTPATIENT
Start: 2020-11-13 | End: 2020-11-13

## 2020-11-13 RX ORDER — ONDANSETRON 4 MG/1
4 TABLET, ORALLY DISINTEGRATING ORAL EVERY 8 HOURS PRN
Status: DISCONTINUED | OUTPATIENT
Start: 2020-11-13 | End: 2020-11-13

## 2020-11-13 RX ORDER — OXYCODONE HYDROCHLORIDE AND ACETAMINOPHEN 5; 325 MG/1; MG/1
1 TABLET ORAL EVERY 4 HOURS PRN
Status: DISCONTINUED | OUTPATIENT
Start: 2020-11-13 | End: 2020-11-14 | Stop reason: HOSPADM

## 2020-11-13 RX ORDER — CETIRIZINE HYDROCHLORIDE 10 MG/1
10 TABLET ORAL DAILY
Status: ON HOLD | COMMUNITY
End: 2021-11-22 | Stop reason: SDUPTHER

## 2020-11-13 RX ORDER — ACETAMINOPHEN 325 MG/1
650 TABLET ORAL EVERY 6 HOURS PRN
Status: DISCONTINUED | OUTPATIENT
Start: 2020-11-13 | End: 2020-11-14 | Stop reason: HOSPADM

## 2020-11-13 RX ORDER — DOCUSATE SODIUM 100 MG/1
100 CAPSULE, LIQUID FILLED ORAL 2 TIMES DAILY
Status: DISCONTINUED | OUTPATIENT
Start: 2020-11-13 | End: 2020-11-14 | Stop reason: HOSPADM

## 2020-11-13 RX ORDER — HEPARIN SODIUM 1000 [USP'U]/ML
80 INJECTION, SOLUTION INTRAVENOUS; SUBCUTANEOUS PRN
Status: DISCONTINUED | OUTPATIENT
Start: 2020-11-13 | End: 2020-11-13

## 2020-11-13 RX ORDER — DOCUSATE SODIUM 100 MG/1
100 CAPSULE, LIQUID FILLED ORAL 2 TIMES DAILY
COMMUNITY
End: 2021-10-12

## 2020-11-13 RX ORDER — ACETAMINOPHEN 650 MG/1
650 SUPPOSITORY RECTAL EVERY 6 HOURS PRN
Status: DISCONTINUED | OUTPATIENT
Start: 2020-11-13 | End: 2020-11-14 | Stop reason: HOSPADM

## 2020-11-13 RX ORDER — LANOLIN ALCOHOL/MO/W.PET/CERES
3 CREAM (GRAM) TOPICAL NIGHTLY PRN
Status: DISCONTINUED | OUTPATIENT
Start: 2020-11-13 | End: 2020-11-14 | Stop reason: HOSPADM

## 2020-11-13 RX ADMIN — PAROXETINE HYDROCHLORIDE 10 MG: 10 TABLET, FILM COATED ORAL at 20:02

## 2020-11-13 RX ADMIN — ENOXAPARIN SODIUM 60 MG: 60 INJECTION SUBCUTANEOUS at 16:07

## 2020-11-13 RX ADMIN — Medication 10 ML: at 19:59

## 2020-11-13 RX ADMIN — ACETAMINOPHEN 650 MG: 325 TABLET, FILM COATED ORAL at 15:56

## 2020-11-13 RX ADMIN — Medication 3 MG: at 20:01

## 2020-11-13 RX ADMIN — DOCUSATE SODIUM 100 MG: 100 CAPSULE, LIQUID FILLED ORAL at 20:00

## 2020-11-13 RX ADMIN — CETIRIZINE HYDROCHLORIDE 10 MG: 10 TABLET, FILM COATED ORAL at 20:02

## 2020-11-13 RX ADMIN — OXYCODONE HYDROCHLORIDE AND ACETAMINOPHEN 1 TABLET: 5; 325 TABLET ORAL at 20:01

## 2020-11-13 ASSESSMENT — ENCOUNTER SYMPTOMS
STRIDOR: 0
TROUBLE SWALLOWING: 0
NAUSEA: 1
ABDOMINAL PAIN: 1
SORE THROAT: 0
ABDOMINAL PAIN: 1
EYE ITCHING: 0
VOMITING: 0
CONSTIPATION: 0
WHEEZING: 0
EYE PAIN: 0
BLOOD IN STOOL: 1
WHEEZING: 0
SINUS PAIN: 0
SHORTNESS OF BREATH: 1
SHORTNESS OF BREATH: 1
BACK PAIN: 0
RHINORRHEA: 0
EYE DISCHARGE: 0
COUGH: 1
DIARRHEA: 0
DIARRHEA: 1
COLOR CHANGE: 0
CHEST TIGHTNESS: 0
NAUSEA: 1
VOMITING: 0
CONSTIPATION: 1

## 2020-11-13 ASSESSMENT — PAIN SCALES - GENERAL
PAINLEVEL_OUTOF10: 10
PAINLEVEL_OUTOF10: 8
PAINLEVEL_OUTOF10: 10

## 2020-11-13 ASSESSMENT — PAIN DESCRIPTION - PAIN TYPE
TYPE: ACUTE PAIN
TYPE: ACUTE PAIN

## 2020-11-13 NOTE — PROGRESS NOTES
Medication History completed:    New medications: Cetirizine    Medications discontinued: Tylenol, Proair, Fioricet, Cymbalta, Protonix, prazosin, promethazine, scopolamine, trazodone    Changes to dosing: Zofran ODT changed from 4 mg every 8 hours to 4 mg every 4 hours    Stated allergies: As listed    Other pertinent information:  Confirmed medications with patient and [facility].     Thank you,  Kayode Shelley, PharmD Candidate [id number]

## 2020-11-13 NOTE — PROGRESS NOTES
Visit Information    Have you changed or started any medications since your last visit including any over-the-counter medicines, vitamins, or herbal medicines? no   Are you having any side effects from any of your medications? -  no  Have you stopped taking any of your medications? Is so, why? -  no    Have you seen any other physician or provider since your last visit? No  Have you had any other diagnostic tests since your last visit? Yes - Records Obtained  Have you been seen in the emergency room and/or had an admission to a hospital since we last saw you? Yes - Records Obtained  Have you had your routine dental cleaning in the past 6 months? no    Have you activated your [employer] account? If not, what are your barriers?  Yes     Patient Care Team:  Everett Lynch MD as PCP - General (Internal Medicine)  Everett Lynch MD as PCP - [facility]    Medical History Review  Past Medical, Family, and Social History reviewed and does not contribute to the patient presenting condition    Health Maintenance   Topic Date Due    Varicella vaccine (1 of 2 - 2-dose childhood series) 06/09/1983    HIV screen  06/09/1997    Pneumococcal 0-64 years Vaccine (2 of 3 - PCV13) 02/11/2019    Potassium monitoring  02/23/2019    Creatinine monitoring  02/23/2019    Annual Wellness Visit (AWV)  06/23/2019    Flu vaccine (1) 09/01/2020    DTaP/Tdap/Td vaccine (2 - Td) 08/08/2027    Hepatitis A vaccine  Aged Out    Hepatitis B vaccine  Aged Out    Hib vaccine  Aged Out    Meningococcal (ACWY) vaccine  Aged Out

## 2020-11-13 NOTE — ED NOTES
Admission Dx:  Suspected pulmonary embolism    Pts Chief Complaints on Arrival: Leg pain, chest pain, shortness of breath    ADL's - Self-care    Pending Diagnostics:  none    Residence PTA: home    Special Considerations/Circumstances:  none    Vitals: Current vital signs:  /73   Pulse 86   Temp 98.7 °F (37.1 °C) (Oral)   Resp 16   Ht 5' 7\" (1.702 m)   Wt 135 lb (61.2 kg)   SpO2 100%   BMI 21.14 kg/m²                MEWS Score: Mj Hughes 32, RN  11/13/20 3920

## 2020-11-13 NOTE — PROGRESS NOTES
Pt arrived to floor from ED to room 2122. Vitals taken and telemetry applied. No distress noted. See doc flowsheet for details. Call light within reach, and pt educated on its use. Bed in lowest position, and locked. Side rails up x 2. Denied further questions or needs at this time. Will continue to monitor.  Electronically signed by Abhishek Gutierrez RN on 11/13/2020 at 5:46 PM

## 2020-11-13 NOTE — ED NOTES
Report given to [facility], RN from PCU. Report method by phone   The following was reviewed with receiving RN:   Current vital signs:  /73   Pulse 86   Temp 98.7 °F (37.1 °C) (Oral)   Resp 16   Ht 5' 7\" (1.702 m)   Wt 135 lb (61.2 kg)   SpO2 100%   BMI 21.14 kg/m²                MEWS Score: 1     Any medication or safety alerts were reviewed. Any pending diagnostics and notifications were also reviewed, as well as any safety concerns or issues, abnormal labs, abnormal imaging, and abnormal assessment findings. Questions were answered.             Madison Mauro RN  11/13/20 1611
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
## 2020-11-13 NOTE — PROGRESS NOTES
[address] [address]  Dept: [phone]  Dept Fax: [phone]    Virtual Visit Progress Note  Date of patient's visit: 11/13/2020  Patient's Name:  Clementina Woodard YOB: 1980            Patient Care Team:  Valente Arias MD as PCP - General (Internal Medicine)  Valente Arias MD as PCP - [facility] Empaneled Provider    The patient and/or health care decision maker are aware that the patient may receive a bill for this telephone service, depending on her/his insurance coverage, and provided verbal consent to proceed: Yes    REASON FOR VISIT:  Same day visit    HISTORY OF PRESENT ILLNESS:      Chief Complaint   Patient presents with    Follow-Up from Hospital     for clots in her lungs     History was obtained from the patient. Clementina Woodard is a 47 y.o. female here today virtually for emergency room follow up. Patient was seen at [facility] ER twice over the past week. Patient presented with recent cough, abdominal pain, nausea/vomting, fever/chills, myalgias. Covid testing was negative. CT chest nondiagnostic for pulmonary emboli due to poor pulmonary arterial opacification however left upper lobe and right lower lobe pulmonary emboli suspected due to asymmetric pulmonary artery size. CT AP moderate amount of stool throughout the colon without obstruction. Patient complains of right sided chest pain, pain right back under shoulder. She reports mild shortness of breath. She complains of bilateral leg pain, no significant swelling, redness or warmth. Patient was informed she may need to be started on anticoagulation.      Significant labs include: SARS-COV-2 neg, WBC 15.2, K+ 3.1, CO2 21, CRP 11.1    Patient Active Problem List   Diagnosis    Hyponatremia    Acute cystitis without hematuria    Morbid obesity due to excess calories (HCC)    PTSD (post-traumatic stress disorder)    Asthma    [facility]    CKD stage G4/A1, GFR 15-29 and albumin creatinine ratio <30 mg/g (HCC)    Fibromyalgia    Neuropathy    Seizures (HCC)    Arthritis    DVT (deep venous thrombosis) (HCC)    Bipolar disorder (HCC)    SIADH (syndrome of inappropriate ADH production) ([facility])    Cyst of left kidney    Multifocal pneumonia    Adverse drug reaction    Failure of outpatient treatment    Int[facility] drug overdose ([facility]    [facility]    Depression, acute     MEDICATIONS:      Current Outpatient Medications   Medication Sig Dispense Refill    docusate sodium (COLACE) 100 MG capsule Take 100 mg by mouth 2 times daily      ondansetron (ZOFRAN) 8 MG tablet Take 8 mg by mouth every 8 hours as needed for Nausea or Vomiting      potassium chloride (KLOR-CON M) 20 MEQ extended release tablet Take 20 mEq by mouth      scopolamine (TRANSDERM-SCOP) transdermal patch Place 1 patch onto the skin once      ondansetron (ZOFRAN ODT) 4 MG disintegrating tablet Take 4 mg by mouth every 8 hours as needed for Nausea or Vomiting      PARoxetine (PAXIL) 20 MG tablet Take 1 tablet by mouth every morning 30 tablet 3    acetaminophen (TYLENOL) 325 MG tablet Take 650 mg by mouth every 6 hours as needed for Pain      albuterol sulfate HFA (PROAIR HFA) 108 (90 Base) MCG/ACT inhaler Inhale 2 puffs into the lungs every 6 hours as needed for Wheezing (Patient not taking: Reported on 11/13/2020) 1 Inhaler 0    DULoxetine (CYMBALTA) 60 MG extended release capsule Take 1 capsule by mouth nightly (Patient not taking: Reported on 11/13/2020) 14 capsule 0    traZODone (DESYREL) 150 MG tablet Take 1 tablet by mouth nightly (Patient not taking: Reported on 11/13/2020) 14 tablet 0    prazosin (MINIPRESS) 1 MG capsule Take 1 capsule by mouth nightly (Patient not taking: Reported on 11/13/2020) 14 capsule 0    butalbital-acetaminophen-caffeine (FIORICET, ESGIC) -40 MG per tablet Take 1-2 tablets by mouth every 12 hours as needed for Headaches      promethazine (PHENERGAN) 25 MG tablet TK 1 T PO Q 4 TO 6 H PRN  0    pantoprazole (PROTONIX) 40 MG tablet Take 40 mg by mouth daily       No current facility-administered medications for this visit. ALLERGIES:      Allergies   Allergen Reactions    Latex Anaphylaxis and Hives    Abilify [Aripiprazole] Other (See Comments)     agitation    Aspirin Shortness Of Breath    Geodon [Ziprasidone Hcl] Anaphylaxis    Lithium Anaphylaxis    Morphine Anaphylaxis and Hives    Doxycycline Hives    Seroquel [Quetiapine Fumarate] Other (See Comments)     Suicidal    Thorazine [Chlorpromazine]     Tramadol Other (See Comments)     Epilepsy    Adhesive Tape Rash     Paper tape - causes blistering/rash  paper       SOCIAL HISTORY   Reviewed and no change from previous record. Clifford Marroquin  reports that she has been smoking cigarettes. She has a 12.50 pack-year smoking history. She has never used smokeless tobacco.    REVIEW OF SYSTEMS:    Review of Systems   Constitutional: Negative for chills, diaphoresis, fatigue and fever. HENT: Negative for congestion, ear discharge, ear pain, hearing loss, rhinorrhea, sinus pain, sore throat and trouble swallowing. Eyes: Negative for pain, discharge, itching and visual disturbance. Respiratory: Positive for cough and shortness of breath. Negative for chest tightness and wheezing. Cardiovascular: Positive for chest pain. Negative for palpitations and leg swelling. Gastrointestinal: Positive for abdominal pain, blood in stool and nausea. Negative for constipation, diarrhea and vomiting. Genitourinary: Negative for difficulty urinating, dysuria, flank pain, frequency, hematuria and urgency. Musculoskeletal: Positive for arthralgias (bilateral leg pain). Negative for back pain, neck pain and neck stiffness. Skin: Negative for color change, pallor and rash. Neurological: Negative for dizziness, weakness, light-headedness, numbness and headaches.    Hematological: Negative for adenopathy. PHYSICAL EXAM:      Patient-Reported Vitals 11/13/2020   Patient-Reported Weight 125 lbs   Patient-Reported Height 5 8      Exam was very limited due to telephone encounter, patient sounds well, in no acute distress, speaking in complete sentences, speech is clear, infrequent cough noted. ASSESSMENT AND PLAN:       Diagnosis Orders   1. Chest pain, unspecified type     2. Shortness of breath     3. Abnormal CT of the chest     4. History of DVT (deep vein thrombosis)     5. Leukocytosis, unspecified type     6. Hypokalemia     7. Hypomagnesemia         Reviewed previous emergency room visits, workup concerning for pulmonary emboli, multiple risk factors, history of deep vein thrombosis, patient complaining of chest pain with shortness of breath, bilateral leg pain, multiple electrolyte abnormalities, advised to present to nearest emergency room as soon as possible for further workup, expect hospital admission, follow up upon discharge     FOLLOW UP AND INSTRUCTIONS:   Return if symptoms worsen or fail to improve. Discussed use, benefit, and side effects of prescribed medications. All patient questions answered. Patient voiced understanding. Patient given educational materials - see patient instructions    Patient being evaluated by a Virtual Visit (video visit) encounter to address concerns as mentioned above. A caregiver was present when appropriate. Due to this being a TeleHealth encounter (During [address] emergency), evaluation of the following organ systems was limited: Vitals/Constitutional/EENT/Resp/CV/GI//MS/Neuro/Skin/Heme-Lymph-Imm.   Pursuant to the emergency declaration under the [facility], [facility] authority and the [employer] Act, this Virtual Visit was conducted with patient's (and/or legal guardian's) consent, to reduce the patient's risk of exposure to COVID-19 and provide necessary medical care. The patient (and/or legal guardian) has also been advised to contact this office for worsening conditions or problems, and seek emergency medical treatment and/or call 911 if deemed necessary. Total time spent on encounter: 23 mins     Services were provided through a video synchronous discussion virtually to substitute for in-person clinic visit. Patient and provider were located at their individual homes. JUAN ALBERTO [facility]  11/13/2020, 10:49 AM    Please note that this chart wasgenerated using voice recognition Dragon dictation software. Although every effort was made to ensure the accuracy of this automated transcription, some errors in transcription may have occurred.
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
## 2020-11-14 VITALS
RESPIRATION RATE: 16 BRPM | SYSTOLIC BLOOD PRESSURE: 119 MMHG | WEIGHT: 181 LBS | TEMPERATURE: 97.3 F | BODY MASS INDEX: 28.41 KG/M2 | HEART RATE: 86 BPM | OXYGEN SATURATION: 99 % | HEIGHT: 67 IN | DIASTOLIC BLOOD PRESSURE: 83 MMHG

## 2020-11-14 LAB
BILIRUBIN URINE: NEGATIVE
COLOR: YELLOW
COMMENT UA: NORMAL
GLUCOSE URINE: NEGATIVE
KETONES, URINE: NEGATIVE
LEUKOCYTE ESTERASE, URINE: NEGATIVE
NITRITE, URINE: NEGATIVE
PH UA: 6 (ref 5–8)
PROTEIN UA: NEGATIVE
SPECIFIC GRAVITY UA: 1.01 (ref 1–1.03)
TURBIDITY: CLEAR
URINE HGB: NEGATIVE
UROBILINOGEN, URINE: NORMAL

## 2020-11-14 PROCEDURE — 2580000003 HC RX 258: Performed by: STUDENT IN AN ORGANIZED HEALTH CARE EDUCATION/TRAINING PROGRAM

## 2020-11-14 PROCEDURE — 97116 GAIT TRAINING THERAPY: CPT

## 2020-11-14 PROCEDURE — 6360000002 HC RX W HCPCS: Performed by: STUDENT IN AN ORGANIZED HEALTH CARE EDUCATION/TRAINING PROGRAM

## 2020-11-14 PROCEDURE — 99239 HOSP IP/OBS DSCHRG MGMT >30: CPT | Performed by: INTERNAL MEDICINE

## 2020-11-14 PROCEDURE — 81003 URINALYSIS AUTO W/O SCOPE: CPT

## 2020-11-14 PROCEDURE — 6370000000 HC RX 637 (ALT 250 FOR IP): Performed by: STUDENT IN AN ORGANIZED HEALTH CARE EDUCATION/TRAINING PROGRAM

## 2020-11-14 PROCEDURE — 97161 PT EVAL LOW COMPLEX 20 MIN: CPT

## 2020-11-14 RX ORDER — PAROXETINE 10 MG/1
10 TABLET, FILM COATED ORAL DAILY
Qty: 30 TABLET | Refills: 3 | Status: SHIPPED | OUTPATIENT
Start: 2020-11-14 | End: 2021-10-12

## 2020-11-14 RX ORDER — CARISOPRODOL 350 MG/1
350 TABLET ORAL 3 TIMES DAILY PRN
Qty: 30 TABLET | Refills: 0 | Status: SHIPPED | OUTPATIENT
Start: 2020-11-14 | End: 2020-11-24

## 2020-11-14 RX ORDER — PANTOPRAZOLE SODIUM 40 MG/1
40 TABLET, DELAYED RELEASE ORAL
Status: DISCONTINUED | OUTPATIENT
Start: 2020-11-14 | End: 2020-11-14 | Stop reason: HOSPADM

## 2020-11-14 RX ORDER — PANTOPRAZOLE SODIUM 40 MG/1
40 TABLET, DELAYED RELEASE ORAL
Qty: 30 TABLET | Refills: 3 | Status: SHIPPED | OUTPATIENT
Start: 2020-11-14 | End: 2020-11-14 | Stop reason: HOSPADM

## 2020-11-14 RX ORDER — CARISOPRODOL 350 MG/1
350 TABLET ORAL 3 TIMES DAILY PRN
Qty: 30 TABLET | Refills: 0 | Status: SHIPPED | OUTPATIENT
Start: 2020-11-14 | End: 2020-11-14 | Stop reason: HOSPADM

## 2020-11-14 RX ORDER — PANTOPRAZOLE SODIUM 40 MG/1
40 TABLET, DELAYED RELEASE ORAL
Qty: 180 TABLET | Refills: 1 | Status: SHIPPED | OUTPATIENT
Start: 2020-11-14 | End: 2021-10-12 | Stop reason: DRUGHIGH

## 2020-11-14 RX ORDER — PAROXETINE 10 MG/1
10 TABLET, FILM COATED ORAL DAILY
Qty: 30 TABLET | Refills: 3 | Status: SHIPPED | OUTPATIENT
Start: 2020-11-15 | End: 2020-11-14 | Stop reason: HOSPADM

## 2020-11-14 RX ADMIN — OXYCODONE HYDROCHLORIDE AND ACETAMINOPHEN 1 TABLET: 5; 325 TABLET ORAL at 14:28

## 2020-11-14 RX ADMIN — PAROXETINE HYDROCHLORIDE 10 MG: 10 TABLET, FILM COATED ORAL at 08:40

## 2020-11-14 RX ADMIN — Medication 10 ML: at 08:40

## 2020-11-14 RX ADMIN — OXYCODONE HYDROCHLORIDE AND ACETAMINOPHEN 1 TABLET: 5; 325 TABLET ORAL at 00:36

## 2020-11-14 RX ADMIN — OXYCODONE HYDROCHLORIDE AND ACETAMINOPHEN 1 TABLET: 5; 325 TABLET ORAL at 05:38

## 2020-11-14 RX ADMIN — DOCUSATE SODIUM 100 MG: 100 CAPSULE, LIQUID FILLED ORAL at 08:40

## 2020-11-14 RX ADMIN — Medication 10 ML: at 05:47

## 2020-11-14 RX ADMIN — PANTOPRAZOLE SODIUM 40 MG: 40 TABLET, DELAYED RELEASE ORAL at 16:45

## 2020-11-14 RX ADMIN — ONDANSETRON 4 MG: 4 TABLET, ORALLY DISINTEGRATING ORAL at 10:36

## 2020-11-14 RX ADMIN — CETIRIZINE HYDROCHLORIDE 10 MG: 10 TABLET, FILM COATED ORAL at 08:40

## 2020-11-14 RX ADMIN — OXYCODONE HYDROCHLORIDE AND ACETAMINOPHEN 1 TABLET: 5; 325 TABLET ORAL at 09:52

## 2020-11-14 RX ADMIN — ENOXAPARIN SODIUM 80 MG: 80 INJECTION SUBCUTANEOUS at 08:40

## 2020-11-14 ASSESSMENT — ENCOUNTER SYMPTOMS
ABDOMINAL PAIN: 1
COUGH: 1
CONSTIPATION: 1
NAUSEA: 0
ABDOMINAL PAIN: 0
RHINORRHEA: 0
VOMITING: 0
SHORTNESS OF BREATH: 1
BLOOD IN STOOL: 0
SHORTNESS OF BREATH: 0
DIARRHEA: 0
WHEEZING: 0
RECTAL PAIN: 0

## 2020-11-14 ASSESSMENT — PAIN DESCRIPTION - PAIN TYPE
TYPE: ACUTE PAIN
TYPE: ACUTE PAIN
TYPE: ACUTE PAIN;CHRONIC PAIN

## 2020-11-14 ASSESSMENT — PAIN DESCRIPTION - LOCATION
LOCATION: HEAD
LOCATION: HEAD;BACK
LOCATION: HEAD;BACK

## 2020-11-14 ASSESSMENT — PAIN DESCRIPTION - ONSET: ONSET: UNABLE TO TELL

## 2020-11-14 ASSESSMENT — PAIN SCALES - GENERAL
PAINLEVEL_OUTOF10: 8
PAINLEVEL_OUTOF10: 8
PAINLEVEL_OUTOF10: 9
PAINLEVEL_OUTOF10: 7
PAINLEVEL_OUTOF10: 9

## 2020-11-14 ASSESSMENT — PAIN DESCRIPTION - FREQUENCY: FREQUENCY: CONTINUOUS

## 2020-11-14 NOTE — DISCHARGE SUMMARY
[address]    Discharge Summary     Patient ID: Dougie Robles  :  1980   MRN: [id number]     ACCOUNT:  [de-identified]   Patient's PCP: Jed Ojeda MD  Admit Date: 2020   Discharge Date: 2020     Length of Stay: 1  Code Status:  Full Code  Admitting Physician: Anselmo Arrieta MD  Discharge Physician: Edi Baeza MD     Active Discharge Diagnoses:       Primary Problem  Suspected pulmonary embolism      Hospital Problems  Active Hospital Problems    Diagnosis Date Noted    Suspected pulmonary embolism [R09.89] 2020    Depression, acute [F32.9] 2018    CKD stage G4/A1, GFR 15-29 and albumin creatinine ratio <30 mg/g (HCC) [N18.4]     DVT (deep venous thrombosis) ([facility] [I82.409]     Endometrial cancer ([facility] [C54.1]     Morbid obesity due to excess calories ([facility] [E66.01] 2016       Admission Condition:  fair     Discharged Condition: good    Hospital Stay:       Hospital Course:  Dougie Robles is a 47 y.o. female who was admitted for the management of  Suspected pulmonary embolism , presented to ER with Leg Pain and Shortness of Breath. Patient has past medical history of bladder, breast , ovarian, uterine, endometrial cancer, COPD, CKD, Covid-19 (2019), depression, DVT, and epilepsy. Patient reports that 5 days ago she experienced constipation nausea vomiting fever chills and was admitted to premedical hospital where she was treated for constipation with Dulcolax 100 mg and Zofran. CTA was performed and showed left upper lobe and right lower lobe pulmonary emboli are suspected due to asymmetric pulmonary artery size. Patient refused further work-up or treatment for suspected PE at that time and was discharged with Dulcolax and Paxil. Patient did report for the past 2 days she developed right-sided chest pain that radiates to her back that is constant, sharp, 10 out of 10 pain.   Pain was worse with inspiration unchanged in position and prevented her from taking deep breath. She also reports worsening headache, palpitations, shortness of breath on exertion. Patient called primary. Patient was recommended to come to the hospital immediately by Dr. Abel Hfof. In the ED patient was worked up for PE.  CBC wnl except for elevated plts, d-dimmer was normal, pt/ptt nl, CT scan head without contrast which showed no acute findings. Patient also had LE venous Dopplers bilateral which showed no sign of DVT. Patient was started on Lovenox 1 g/kg twice daily. Today patient reports provement of chest pain denies shortness of breath. Patient was seen walking the halls for most of the afternoon.       Significant therapeutic interventions:   CT scan head   Lower venous Dopplers bilaterally    Significant Diagnostic Studies:   Labs / Micro:  CBC:   Lab Results   Component Value Date    WBC 8.5 11/13/2020    RBC 4.38 11/13/2020    RBC 4.60 11/01/2016    HGB 13.7 11/13/2020    HCT 39.2 11/13/2020    MCV 89.6 11/13/2020    MCH 31.2 11/13/2020    MCHC 34.9 11/13/2020    RDW 14.0 11/13/2020     11/13/2020     03/11/2012     CMP:    Lab Results   Component Value Date    GLUCOSE 77 11/13/2020    GLUCOSE 128 11/01/2016     11/13/2020    K 4.3 11/13/2020     11/13/2020    CO2 24 11/13/2020    BUN 14 11/13/2020    CREATININE 0.69 11/13/2020    ANIONGAP 12 11/13/2020    ALKPHOS 93 11/13/2020    ALT 10 11/13/2020    AST 14 11/13/2020    BILITOT <0.15 11/13/2020    LABALBU 4.2 11/13/2020    ALBUMIN NOT REPORTED 11/13/2020    LABGLOM >60 11/13/2020    GFRAA >60 11/13/2020    GFR      11/13/2020    GFR NOT REPORTED 11/13/2020    PROT 7.6 11/13/2020    CALCIUM 9.2 11/13/2020     Radiology:    Ct Head Wo Contrast    Result Date: 11/13/2020  EXAMINATION: CT OF THE HEAD WITHOUT CONTRAST 11/13/2020 3:15 pm TECHNIQUE: CT of the head was performed without the administration of intravenous contrast. !Yes       !Yes            ! None      ! +------------------------------------+----------+---------------+----------+ ! Deep Femoral                        !Yes       ! Yes            ! None      ! +------------------------------------+----------+---------------+----------+ ! Popliteal                           !Yes       ! Yes            ! None      ! +------------------------------------+----------+---------------+----------+ ! Sapheno Femoral Junction            ! Yes       ! Yes            ! None      ! +------------------------------------+----------+---------------+----------+ ! PTV                                 ! Yes       ! Yes            ! None      ! +------------------------------------+----------+---------------+----------+ ! Peroneal                            !Yes       ! Yes            ! None      ! +------------------------------------+----------+---------------+----------+ ! Gastroc                             ! Yes       ! Yes            ! None      ! +------------------------------------+----------+---------------+----------+ ! GSV Thigh                           ! Yes       ! Yes            ! None      ! +------------------------------------+----------+---------------+----------+ ! GSV Knee                            ! Yes       ! Yes            ! None      ! +------------------------------------+----------+---------------+----------+ ! GSV Ankle                           ! Yes       ! Yes            ! None      ! +------------------------------------+----------+---------------+----------+ ! SSV                                 ! Yes       ! Yes            ! None      ! +------------------------------------+----------+---------------+----------+ Right Doppler Measurements +---------------------------+------+------+--------------------------------+ ! Location                   ! Signal!Reflux! Reflux (msec)                   ! +---------------------------+------+------+--------------------------------+ ! Common Femoral !Phasic!      !                                ! +---------------------------+------+------+--------------------------------+ ! Prox Femoral               !Phasic!      !                                ! +---------------------------+------+------+--------------------------------+ ! Popliteal                  !Phasic!      !                                ! +---------------------------+------+------+--------------------------------+ Left Lower Extremities DVT Study Measurements Left 2D Measurements +------------------------------------+----------+---------------+----------+ ! Location                            ! Visualized! Compressibility! Thrombosis! +------------------------------------+----------+---------------+----------+ ! Common Femoral                      !Yes       ! Yes            ! None      ! +------------------------------------+----------+---------------+----------+ ! Prox Femoral                        !Yes       ! Yes            ! None      ! +------------------------------------+----------+---------------+----------+ ! Mid Femoral                         !Yes       ! Yes            ! None      ! +------------------------------------+----------+---------------+----------+ ! Dist Femoral                        !Yes       ! Yes            ! None      ! +------------------------------------+----------+---------------+----------+ ! Deep Femoral                        !Yes       ! Yes            ! None      ! +------------------------------------+----------+---------------+----------+ ! Popliteal                           !Yes       ! Yes            ! None      ! +------------------------------------+----------+---------------+----------+ ! Sapheno Femoral Junction            ! Yes       ! Yes            ! None      ! +------------------------------------+----------+---------------+----------+ ! PTV                                 ! Yes       ! Yes            ! None      ! +------------------------------------+----------+---------------+----------+ ! Peroneal                            !Yes       ! Yes            ! None      ! +------------------------------------+----------+---------------+----------+ ! Gastroc                             ! Yes       ! Yes            ! None      ! +------------------------------------+----------+---------------+----------+ ! GSV Thigh                           ! Yes       ! Yes            ! None      ! +------------------------------------+----------+---------------+----------+ ! GSV Knee                            ! Yes       ! Yes            ! None      ! +------------------------------------+----------+---------------+----------+ ! GSV Ankle                           ! Yes       ! Yes            ! None      ! +------------------------------------+----------+---------------+----------+ ! SSV                                 ! Yes       ! Yes            ! None      ! +------------------------------------+----------+---------------+----------+ Left Doppler Measurements +---------------------------+------+------+--------------------------------+ ! Location                   ! Signal!Reflux! Reflux (msec)                   ! +---------------------------+------+------+--------------------------------+ ! Common Femoral             !Phasic!      !                                ! +---------------------------+------+------+--------------------------------+ ! Prox Femoral               !Phasic!      !                                ! +---------------------------+------+------+--------------------------------+ ! Popliteal                  !Phasic!      !                                ! +---------------------------+------+------+--------------------------------+        Consultations:    Consults:     Final Specialist Recommendations/Findings:   IP CONSULT TO INTERNAL MEDICINE  IP CONSULT TO SOCIAL WORK      The patient was seen and examined on day of discharge and this discharge summary is in conjunction with any daily progress note from day of discharge. Discharge plan:       Disposition: Home    Physician Follow Up:     Sydni Cody, [facility]  [phone]    In 1 week     Requiring Further Evaluation/Follow Up POST HOSPITALIZATION/Incidental Findings:     Diet: regular diet    Activity: As tolerated    Instructions to Patient: Continue take all medications as directed. Follow-up with Dr. Zack Álvarez in 1 week. Patient's health or symptoms should worsen please return back to ED. Discharge Medications:      Medication List      START taking these medications    * apixaban 5 MG Tabs tablet  Commonly known as:  Eliquis  Take 1 tablet by mouth 2 times daily     * apixaban 5 MG Tabs tablet  Commonly known as:  Eliquis  Take 1 tablet by mouth 2 times daily     * carisoprodol 350 MG tablet  Commonly known as:  Soma  Take 1 tablet by mouth 3 times daily as needed for Muscle spasms for up to 10 days. * carisoprodol 350 MG tablet  Commonly known as:  Soma  Take 1 tablet by mouth 3 times daily as needed for Muscle spasms for up to 10 days. influenza quadrivalent split vaccine 0.5 ML injection  Commonly known as:  FLUZONE;FLUARIX;FLULAVAL;AFLURIA  Inject 0.5 mLs into the muscle once for 1 dose     * pantoprazole 40 MG tablet  Commonly known as:  PROTONIX  Take 1 tablet by mouth 2 times daily (before meals)     * pantoprazole 40 MG tablet  Commonly known as:  PROTONIX  Take 1 tablet by mouth 2 times daily (before meals)         * This list has 6 medication(s) that are the same as other medications prescribed for you. Read the directions carefully, and ask your doctor or other care provider to review them with you. CHANGE how you take these medications    * PARoxetine 10 MG tablet  Commonly known as:  Paxil  Take 1 tablet by mouth daily  What changed: You were already taking a medication with the same name, and this prescription was added. Make sure you understand how and when to take each. * PARoxetine 10 MG tablet  Commonly known as:  PAXIL  Take 1 tablet by mouth daily  Start taking on:  November 15, 2020  What changed:    · medication strength  · how much to take  · when to take this         * This list has 2 medication(s) that are the same as other medications prescribed for you. Read the directions carefully, and ask your doctor or other care provider to review them with you. CONTINUE taking these medications    cetirizine 10 MG tablet  Commonly known as:  ZYRTEC     docusate sodium 100 MG capsule  Commonly known as:  COLACE     potassium chloride 20 MEQ extended release tablet  Commonly known as:  KLOR-CON M     Zofran ODT 4 MG disintegrating tablet  Generic drug:  ondansetron           Where to Get Your Medications      These medications were sent to [address] [address]    Hours:  24-hours Phone:  [phone]   · apixaban 5 MG Tabs tablet  · pantoprazole 40 MG tablet  · PARoxetine 10 MG tablet     These medications were sent to [facility]/pharmacy [facility], [address] [facility]  [address]  [facility] Janie San, [address] [facility] [address]    Phone:  [phone]   · apixaban 5 MG Tabs tablet  · influenza quadrivalent split vaccine 0.5 ML injection  · pantoprazole 40 MG tablet  · PARoxetine 10 MG tablet     You can get these medications from any pharmacy    Bring a paper prescription for each of these medications  · carisoprodol 350 MG tablet  · carisoprodol 350 MG tablet         Time Spent on discharge is  35 mins in patient examination, evaluation, counseling as well as medication reconciliation, prescriptions for required medications, discharge plan and follow up.     Electronically signed by   Cleopatra Elias MD  11/14/2020  5:14 PM      Thank you Dr. Oscar Noonan MD for the opportunity to be involved in this patient's care.

## 2020-11-14 NOTE — CARE COORDINATION
CASE MANAGEMENT NOTE:    Admission Date:  11/13/2020 Daina Reeves is a 47 y.o.  female    Admitted for : Suspected pulmonary embolism [R09.89]    Met with:  Patient    PCP:  Dr Miguel Pate:  Medicare      Current Residence/ Living Arrangements:  independently at home             Current Services PTA:  Yes, has a service dog    Is patient agreeable to VNS: No    Freedom of choice provided:  No    List of [address] provided: NA    VNS chosen:  No    DME:  No, unless you count a service dog as one. Home Oxygen: No    Nebulizer: No    CPAP/BIPAP: No    Supplier: N/A    Potential Assistance Needed: No    SNF needed: No    Freedom of choice and list provided: No    Pharmacy:  [facility]       Does Patient want to use MEDS to BEDS? No    Is patient currently receiving oral anticoagulation therapy? No    Is the Patient an [facility] with Readmission Risk Score greater than 14%? No  If yes, pt needs a follow up appointment made within 7 days. Family Members/Caregivers that pt would like involved in their care:    Yes    If yes, list name here: Mother Brandie France    Transportation Provider:  Family             Is patient in Isolation/One on One/Altered Mental Status? No  If yes, skip next question. If no, would they like an I-Pad to  use? No  If yes, call [phone]. Discharge Plan:  11/14/20 - Patient is from home, says she just moved her from [place]. 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 Here for possible PE, on full dose Lovenox, Following for possible need for Anti coags, Patient up walking hallways and outside smoking most of the day. Plan is to return home with no needs. Will follow . //pf    During writers conversation with patient.   Patient kept telling writer how educated she was, she has a masters degree in nursing and was 3 months shy of getting

## 2020-11-14 NOTE — FLOWSHEET NOTE
provided listening presence, words of comfort,pt was in very good spirits and hopeful she has support 3 kids some family and a service Dog. She has many health issues and still cheerful she said she takes things in life as they come. Chaplains remain available for spiritual or emotional support as needed. 11/13/20 2047   Encounter Summary   Services provided to: Patient   Referral/Consult From: Patient   Tone Hackett  (service dog)   Continue Visiting   (11/13/2020)   Complexity of Encounter Low   Length of Encounter 30 minutes   Spiritual Assessment Completed Yes   Routine   Type Follow up   Assessment Coping; Hopeful   Intervention Active listening;Nurtured hope;[place];Sustaining presence/ Ministry of presence   Outcome Expressed gratitude

## 2020-11-14 NOTE — FLOWSHEET NOTE
Remains awake; states is hungry ; snacks offered such as pudding or applesauce; pt declines but requests and given coffee; bed alram again engaged

## 2020-11-14 NOTE — PROGRESS NOTES
Pt's stool is a small soft formed brown stool; criteria for cdiff not met; stool discarded; cdiff order d/c'd

## 2020-11-14 NOTE — PROGRESS NOTES
Writer was at pt bedside with Jose L Knox RN for bedside rounds;pt was up and about in room and stated \"I'm Clary Kehr go for a walk. I've been in bed all night and I'm stiff so I need to stretch my legs. \"  Rubina Teran began to express concern for pt ambulating by herself d/t her c/o vision issues as noted at the beginning of this shift; the pt immediately stated \"I don't have blurred vision anymore. \"  When writer began to explain to Jose L Knox  that the bed alarm had been engaged for pt safety; the pt interjected that the bed alarm was off \"because they came in and turned it off. \" Rosa Yoo asked who had turned off the bed alarm and pt stated \"one of the two guys , I think the , I don't know what his name is. They kept coming in and turning  it off so I could get up and go to the bathroom. \" writer simply stated \"Actually I've been turning the bed alarm back on all night because you had said you were having visual problems. \" ; let it be noted that Rosa Yoo is witness that the  pt was awake and watched writer  re-engaging the bed alarm ; pt then had a sudden outburst of anger and said \"Are you calling me a liar?! I am so much more educated than you! I have a masters degree! You've been calling me a liar all night! You called me a liar when I told you about my headache! My friend was on the phone when you called me a liar about my head! I told you I can't hear out of my right ear and you continually kept whispering to me all night on my right side! \"  pt continued on with accusations against writer and then tearfuly said \"I'll call my friend to prove it! She was on when you were calling me a liar! \"   pt did called someone and was prepared to put the person on speaker ; Rosa Yoo will attest that the pt was  facetiming with a lady at the beginning of the shift when writer and pt were discussing her headache for which she was requesting pain medication and was medicated as ordered;at that time pt asked how often she could have her pain medication and writer referred to the [facility] and informed the pt every 4 hours; the pt  Then told this person the pt had been factiming with that she could have it every 4 hours and thanked writer ;  the dry erase board is evidence that name of pain med and times for next dose were posted on the dry erase board ; during pt's outburst  Jamesgomez Ramírezbejnamin then offered the pt to speak with the  and the pt said yes and pt then stormed out of the room and mentioned going AMA; cardiac monitor showed sinus tachycardia of 130-140,strips posted, then suddenly there was no reading on the monitor; pt did return to her room about 10 minutes later still talking on her cell and angrily walked out again when she notice writer in pt's room at the computer ;  writer departed; pt was in the hallway on her phone;writer called to pt that  Gustav Bumpers was out of her room and that she could return to her room and writer would not return to her room; incidentally, earlier in the shift the pt had mentioned that her friend was going to bring pt's service dog to the floor; there is a copy of pt's ID for her service dog on the chart; writer did call Security for policy re: how long a service dog can stay with the pt  and was told by security that as long as proper credentials are shown the service dog can stay with the pt \"all day\"; writer expressed concern on who the care for the animal would fall re: food ,water and toileting, again considering concern for the pt re: safety factors mentioned above; writer was told by security that this would need to be arranged with pt or pt's friend

## 2020-11-14 NOTE — PROGRESS NOTES
Pt informed writer that she\"can't hear out of my right ear because of what's going on with my head\"; also ,as writer was directing pt to the [employer] board pt states \" I can't read the board anyway . \" to where she described her vision as \"blind\" as reported to writer by Quinton ''BELEM'' . Writer asked pt if she wears glasses for distance and pt stated that the state she lived in didn't provide vision so she couldn't get them but that she has \"readers\" so she can read; for the many reasons mentioned above writer informed pt that the bed alarm has been engaged for the [employer] don't Fall\" program . While Mraylou Perez is in the room the pt gets out of bed to bathroom freely w/o any notable visual issues; when pt returned to bed the bed alarm was re-engaged.

## 2020-11-14 NOTE — PROGRESS NOTES
Limits  Hearing: Exceptions to [facility]  Hearing Exceptions: Hard of hearing/hearing concerns       General  Chart Reviewed: Yes  Patient assessed for rehabilitation services?: Yes  Response To Previous Treatment: Not applicable  Family / Caregiver Present: No  Referring Practitioner: Rigo Lynn MD  Referral Date : 11/13/20  Diagnosis: Suspected pulmonary embolism  Follows Commands: Within Functional Limits    Subjective  Pain Screening  Patient Currently in Pain: Yes  Pain Assessment  Pain Assessment: 0-10  Pain Level: 9  Patient's Stated Pain Goal: No pain  Pain Type: Acute pain  Pain Location: Head;Back  Pain Descriptors: Constant; Aching; Anthony Peeks; Headache  Pain Frequency: Continuous  Pain Onset: Unable to tell  Non-Pharmaceutical Pain Intervention(s): Repositioned; Rest  Response to Pain Intervention: Patient Satisfied  Multiple Pain Sites: No  Orientation  Overall Orientation Status: Within Normal Limits  Social/Functional History  Lives With: Alone  Type of Home: Trailer  Home Layout: One level  Home Access: Stairs to enter with rails  Entrance Stairs - Number of Steps: 3  Entrance Stairs - Rails: Both  Ambulation Assistance: Independent  Transfer Assistance: Independent    Objective  Range of Motion  AROM RLE (degrees)  RLE AROM: WFL  AROM LLE (degrees)  LLE AROM : WFL  AROM RUE (degrees)  RUE AROM : WFL  AROM LUE (degrees)  LUE AROM : WFL  Strength  Strength RLE  Strength RLE: WFL  Strength LLE  Strength LLE: WFL  Strength RUE  Strength RUE: WFL  Strength LUE  Strength LUE: WFL  Bed mobility  Rolling to Left: Independent  Rolling to Right: Independent  Supine to Sit: Independent  Sit to Supine: Independent  Scooting: Independent  Transfers  Sit to Stand: Independent  Stand to sit:  Independent  Bed to Chair: Independent  Stand Pivot Transfers: Independent  Ambulation  Ambulation?: Yes  Ambulation 1  Surface: level tile  Device: No Device  Assistance: Independent  Gait Deviations: None  Distance: 500 ft +    Plan  Plan Comment: All functional needs met. Discontinue physical therapy at this time.   Safety Devices  Type of devices: Left in bed, Call light within reach    AM-PAC Score  AM-PAC Inpatient Mobility without Stair Climbing Raw Score : 20 (11/14/20 1000)  AM-PAC Inpatient without Stair Climbing T-Scale Score : 60.57 (11/14/20 1000)  Mobility Inpatient CMS 0-100% Score: 0 (11/14/20 1000)  Mobility Inpatient without Stair CMS G-Code Modifier : [address] (11/14/20 1000)    Therapy Time   Individual Concurrent Group Co-treatment   Time In 1000         Time Out 1030         Minutes [facility], PT DPT

## 2020-11-14 NOTE — PROGRESS NOTES
Writer has re-engaged bed alarm multiple times as it has been found turned off;let it be noted that staff have not been turning off the bed alarm

## 2020-11-14 NOTE — PROGRESS NOTES
Comprehensive Nutrition Assessment    Type and Reason for Visit:  Initial, Positive Nutrition Screen(wt loss and poor appetite)    Nutrition Recommendations/Plan: Agree with General diet. Recommend Ensure High protein supplements on all trays for increased protein intake. Nutrition Assessment:  Pt admitted due to possible Pulmonary Embolism. Review of documented wts shows wt 10/5/20 was 135#. Suspect current stated wt of 181# is incorrect. Pt states her current wt is 120#. She is now on General diet and is requesting Vanilla Ensure High Protein supplements on trays. H/O Breast/endometrial Ca noted. Malnutrition Assessment:  Malnutrition Status: At risk for malnutrition (Comment)    Context:  Acute Illness     Findings of the 6 clinical characteristics of malnutrition:  Energy Intake:  Mild decrease in energy intake (Comment)  Weight Loss:  Unable to assess     Body Fat Loss:  No significant body fat loss     Muscle Mass Loss:  No significant muscle mass loss    Fluid Accumulation:  No significant fluid accumulation     Strength:  Not Performed    Estimated Daily Nutrient Needs:  Energy (kcal): [place] x 1.2 stress Factor=1800-1900 kcal; Weight Used for Energy Requirements:  Current     Protein (g):  1.3g/kg=80 g protein or more; Weight Used for Protein Requirements:  Ideal          Nutrition Related Findings:  Edema: none, Labs/Meds Reviewed, BM 11/13      Wounds:  None       Current Nutrition Therapies:    General diet    Anthropometric Measures:  · Height: 5' 7\" (170.2 cm)  · Current Body Weight: 181 lb (82.1 kg)(stated)   · Admission Body Weight: 135 lb (61.2 kg)(stated)    · Usual Body Weight: 135 lb (61.2 kg)(10/5/20)     · Ideal Body Weight: 135 lbs; BMI: 28.3  · BMI Categories: Overweight (BMI 25.0-29. 9)       Nutrition Diagnosis:   · Inadequate oral intake related to (pt is a very picky eater) as evidenced by intake 51-75%    Nutrition Interventions:   Food and/or Nutrient Delivery:  Continue Current Diet, Start Oral Nutrition Supplement  Nutrition Education/Counseling:  Education not indicated   Coordination of Nutrition Care:  No recommendation at this time    Goals:  po intake greater than 75%       Nutrition Monitoring and Evaluation:   Food/Nutrient Intake Outcomes:  Food and Nutrient Intake, Supplement Intake  Physical Signs/Symptoms Outcomes:  Biochemical Data, GI Status, Skin, Weight, Fluid Status or Edema     Discharge Planning:    Continue current diet     Electronically signed by Noelle Reno RD, GRACE on 11/14/20 at 2:32 PM EST    Contact: [phone]
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
## 2020-11-16 ENCOUNTER — CARE COORDINATION (OUTPATIENT)
Dept: CASE MANAGEMENT | Age: 40
End: 2020-11-16

## 2020-11-16 NOTE — CARE COORDINATION
[facility] Transitions Initial Follow Up Call    Call within 2 business days of discharge: Yes    Patient: Aman Bullard Patient : 1980   MRN: [id number]  Reason for Admission: sob  Discharge Date: 20 RARS: Readmission Risk Score: 13      Last Discharge [address] [id number]       Complaint Diagnosis Description Type Department Provider    20 Leg Pain; Shortness of Breath Other acute pulmonary embolism without acute cor pulmonale ([facility] . .. ED to Hosp-Admission (Discharged) (ADMITTED) Carrie Manrique MD; Polina Laws [facility]. .. Spoke with: [address]: [facility]    Non-face-to-face services provided:  Writer spoke to patient, she stated she is fine, going to  her medications today or have the pharmacy deliver them, reviewed discharge instructions and medications, 1111F order, no vns, patient stated she is moving to [employer], agreed to covid loop, provided contact information. Challenges to be reviewed by the provider   Additional needs identified to be addressed with provider No      Discussed COVID-19 related testing which was not done at this time. Test results were not done. Patient informed of results, if available? Method of communication with provider : none    Advance Care Planning:   Does patient have an Advance Directive:  decision maker updated. Was this a readmission? No  Patient stated reason for admission:   Patients top risk factors for readmission: medical condition    Care Transition Nurse (CTN) contacted the patient by telephone to perform post hospital discharge assessment. Verified name and  with patient as identifiers. Provided introduction to self, and explanation of the CTN role. CTN reviewed discharge instructions, medical action plan and red flags with patient who verbalized understanding. Patient given an opportunity to ask questions and does not have any further questions or concerns at this time.  Were discharge and are they filled?:  No  Have you been contacted by a [employer] [address]?:  No  Have you scheduled your follow up appointment?:  Yes  Were you discharged with any Home Care or Post Acute Services:  No  Do you feel like you have everything you need to keep you well at home?:  Yes  Care Transitions Interventions         Follow Up  Future Appointments   Date Time Provider Faisal Manley   12/14/2020  3:45 PM Ramsey Wynne  [address],

## 2021-04-26 ENCOUNTER — TELEPHONE (OUTPATIENT)
Dept: INTERNAL MEDICINE CLINIC | Age: 41
End: 2021-04-26

## 2021-10-12 ENCOUNTER — HOSPITAL ENCOUNTER (INPATIENT)
Age: 41
LOS: 22 days | Discharge: HOME | DRG: 885 | End: 2021-11-03
Attending: EMERGENCY MEDICINE | Admitting: PSYCHIATRY & NEUROLOGY
Payer: MEDICARE

## 2021-10-12 DIAGNOSIS — F32.A DEPRESSION WITH SUICIDAL IDEATION: Primary | ICD-10-CM

## 2021-10-12 DIAGNOSIS — R45.851 DEPRESSION WITH SUICIDAL IDEATION: Primary | ICD-10-CM

## 2021-10-12 LAB
ABSOLUTE EOS #: 0 K/UL (ref 0–0.4)
ABSOLUTE IMMATURE GRANULOCYTE: ABNORMAL K/UL (ref 0–0.3)
ABSOLUTE LYMPH #: 3.2 K/UL (ref 1–4.8)
ABSOLUTE MONO #: 0.34 K/UL (ref 0.1–1.3)
ACETAMINOPHEN LEVEL: 7 UG/ML (ref 10–30)
ALBUMIN SERPL-MCNC: 4.2 G/DL (ref 3.5–5.2)
ALBUMIN/GLOBULIN RATIO: ABNORMAL (ref 1–2.5)
ALP BLD-CCNC: 87 U/L (ref 35–104)
ALT SERPL-CCNC: 14 U/L (ref 5–33)
AMPHETAMINE SCREEN URINE: NEGATIVE
ANION GAP SERPL CALCULATED.3IONS-SCNC: 10 MMOL/L (ref 9–17)
AST SERPL-CCNC: 17 U/L
BARBITURATE SCREEN URINE: NEGATIVE
BASOPHILS # BLD: 0 % (ref 0–2)
BASOPHILS ABSOLUTE: 0 K/UL (ref 0–0.2)
BENZODIAZEPINE SCREEN, URINE: NEGATIVE
BILIRUB SERPL-MCNC: <0.15 MG/DL (ref 0.3–1.2)
BILIRUBIN URINE: NEGATIVE
BUN BLDV-MCNC: 23 MG/DL (ref 6–20)
BUN/CREAT BLD: ABNORMAL (ref 9–20)
BUPRENORPHINE URINE: ABNORMAL
CALCIUM SERPL-MCNC: 9.1 MG/DL (ref 8.6–10.4)
CANNABINOID SCREEN URINE: POSITIVE
CHLORIDE BLD-SCNC: 104 MMOL/L (ref 98–107)
CO2: 24 MMOL/L (ref 20–31)
COCAINE METABOLITE, URINE: NEGATIVE
COLOR: YELLOW
COMMENT UA: NORMAL
CREAT SERPL-MCNC: 0.8 MG/DL (ref 0.5–0.9)
DIFFERENTIAL TYPE: ABNORMAL
EOSINOPHILS RELATIVE PERCENT: 0 % (ref 0–4)
ETHANOL PERCENT: <0.01 %
ETHANOL: <10 MG/DL
GFR AFRICAN AMERICAN: >60 ML/MIN
GFR NON-AFRICAN AMERICAN: >60 ML/MIN
GFR SERPL CREATININE-BSD FRML MDRD: ABNORMAL ML/MIN/{1.73_M2}
GFR SERPL CREATININE-BSD FRML MDRD: ABNORMAL ML/MIN/{1.73_M2}
GLUCOSE BLD-MCNC: 101 MG/DL (ref 70–99)
GLUCOSE URINE: NEGATIVE
HCT VFR BLD CALC: 40.2 % (ref 36–46)
HEMOGLOBIN: 13.5 G/DL (ref 12–16)
IMMATURE GRANULOCYTES: ABNORMAL %
KETONES, URINE: NEGATIVE
LEUKOCYTE ESTERASE, URINE: NEGATIVE
LYMPHOCYTES # BLD: 47 % (ref 24–44)
MCH RBC QN AUTO: 30.4 PG (ref 26–34)
MCHC RBC AUTO-ENTMCNC: 33.6 G/DL (ref 31–37)
MCV RBC AUTO: 90.3 FL (ref 80–100)
MDMA URINE: ABNORMAL
METHADONE SCREEN, URINE: NEGATIVE
METHAMPHETAMINE, URINE: ABNORMAL
MONOCYTES # BLD: 5 % (ref 1–7)
MORPHOLOGY: NORMAL
NITRITE, URINE: NEGATIVE
NRBC AUTOMATED: ABNORMAL PER 100 WBC
OPIATES, URINE: NEGATIVE
OXYCODONE SCREEN URINE: NEGATIVE
PDW BLD-RTO: 13.3 % (ref 11.5–14.9)
PH UA: 6.5 (ref 5–8)
PHENCYCLIDINE, URINE: NEGATIVE
PLATELET # BLD: 271 K/UL (ref 150–450)
PLATELET ESTIMATE: ABNORMAL
PMV BLD AUTO: 6.8 FL (ref 6–12)
POTASSIUM SERPL-SCNC: 4.4 MMOL/L (ref 3.7–5.3)
PROPOXYPHENE, URINE: ABNORMAL
PROTEIN UA: NEGATIVE
RBC # BLD: 4.45 M/UL (ref 4–5.2)
RBC # BLD: ABNORMAL 10*6/UL
SALICYLATE LEVEL: <1 MG/DL (ref 3–10)
SARS-COV-2, RAPID: NOT DETECTED
SEG NEUTROPHILS: 48 % (ref 36–66)
SEGMENTED NEUTROPHILS ABSOLUTE COUNT: 3.26 K/UL (ref 1.3–9.1)
SODIUM BLD-SCNC: 138 MMOL/L (ref 135–144)
SPECIFIC GRAVITY UA: 1.03 (ref 1–1.03)
SPECIMEN DESCRIPTION: NORMAL
TEST INFORMATION: ABNORMAL
TOTAL PROTEIN: 7 G/DL (ref 6.4–8.3)
TOXIC TRICYCLIC SC,BLOOD: ABNORMAL
TRICYCLIC ANTIDEP,URINE: NEGATIVE
TRICYCLIC ANTIDEPRESSANTS, UR: ABNORMAL
TSH SERPL DL<=0.05 MIU/L-ACNC: 1.55 MIU/L (ref 0.3–5)
TURBIDITY: CLEAR
URINE HGB: NEGATIVE
UROBILINOGEN, URINE: NORMAL
WBC # BLD: 6.8 K/UL (ref 3.5–11)
WBC # BLD: ABNORMAL 10*3/UL

## 2021-10-12 PROCEDURE — 80179 DRUG ASSAY SALICYLATE: CPT

## 2021-10-12 PROCEDURE — 6370000000 HC RX 637 (ALT 250 FOR IP): Performed by: EMERGENCY MEDICINE

## 2021-10-12 PROCEDURE — 84443 ASSAY THYROID STIM HORMONE: CPT

## 2021-10-12 PROCEDURE — 87635 SARS-COV-2 COVID-19 AMP PRB: CPT

## 2021-10-12 PROCEDURE — G0480 DRUG TEST DEF 1-7 CLASSES: HCPCS

## 2021-10-12 PROCEDURE — 80143 DRUG ASSAY ACETAMINOPHEN: CPT

## 2021-10-12 PROCEDURE — 81003 URINALYSIS AUTO W/O SCOPE: CPT

## 2021-10-12 PROCEDURE — 1240000000 HC EMOTIONAL WELLNESS R&B

## 2021-10-12 PROCEDURE — 80307 DRUG TEST PRSMV CHEM ANLYZR: CPT

## 2021-10-12 PROCEDURE — 99284 EMERGENCY DEPT VISIT MOD MDM: CPT

## 2021-10-12 PROCEDURE — 36415 COLL VENOUS BLD VENIPUNCTURE: CPT

## 2021-10-12 PROCEDURE — 85025 COMPLETE CBC W/AUTO DIFF WBC: CPT

## 2021-10-12 PROCEDURE — 80053 COMPREHEN METABOLIC PANEL: CPT

## 2021-10-12 RX ORDER — LORAZEPAM 2 MG/ML
2 INJECTION INTRAMUSCULAR EVERY 6 HOURS PRN
Status: DISCONTINUED | OUTPATIENT
Start: 2021-10-12 | End: 2021-10-18

## 2021-10-12 RX ORDER — CHOLECALCIFEROL (VITAMIN D3) 125 MCG
5 CAPSULE ORAL NIGHTLY PRN
Status: ON HOLD | COMMUNITY
End: 2021-11-03 | Stop reason: HOSPADM

## 2021-10-12 RX ORDER — POLYETHYLENE GLYCOL 3350 17 G/17G
17 POWDER, FOR SOLUTION ORAL DAILY PRN
Status: DISCONTINUED | OUTPATIENT
Start: 2021-10-12 | End: 2021-11-03 | Stop reason: HOSPADM

## 2021-10-12 RX ORDER — ACETAMINOPHEN 500 MG
1000 TABLET ORAL ONCE
Status: COMPLETED | OUTPATIENT
Start: 2021-10-12 | End: 2021-10-12

## 2021-10-12 RX ORDER — DIPHENHYDRAMINE HYDROCHLORIDE 50 MG/ML
50 INJECTION INTRAMUSCULAR; INTRAVENOUS EVERY 6 HOURS PRN
Status: DISCONTINUED | OUTPATIENT
Start: 2021-10-12 | End: 2021-11-03 | Stop reason: HOSPADM

## 2021-10-12 RX ORDER — ALBUTEROL SULFATE 90 UG/1
2 AEROSOL, METERED RESPIRATORY (INHALATION) EVERY 6 HOURS PRN
Status: ON HOLD | COMMUNITY
End: 2021-11-22 | Stop reason: SDUPTHER

## 2021-10-12 RX ORDER — TRAZODONE HYDROCHLORIDE 50 MG/1
50 TABLET ORAL NIGHTLY PRN
Status: DISCONTINUED | OUTPATIENT
Start: 2021-10-13 | End: 2021-10-12

## 2021-10-12 RX ORDER — FLUTICASONE PROPIONATE 50 MCG
1 SPRAY, SUSPENSION (ML) NASAL DAILY
Status: ON HOLD | COMMUNITY
End: 2021-11-22 | Stop reason: SDUPTHER

## 2021-10-12 RX ORDER — ACETAMINOPHEN 325 MG/1
650 TABLET ORAL EVERY 4 HOURS PRN
Status: DISCONTINUED | OUTPATIENT
Start: 2021-10-12 | End: 2021-11-03 | Stop reason: HOSPADM

## 2021-10-12 RX ORDER — TRAZODONE HYDROCHLORIDE 50 MG/1
50 TABLET ORAL NIGHTLY
Status: ON HOLD | COMMUNITY
End: 2021-11-03 | Stop reason: HOSPADM

## 2021-10-12 RX ORDER — HYDROXYZINE 50 MG/1
50 TABLET, FILM COATED ORAL 3 TIMES DAILY PRN
Status: DISCONTINUED | OUTPATIENT
Start: 2021-10-12 | End: 2021-11-03 | Stop reason: HOSPADM

## 2021-10-12 RX ORDER — NICOTINE 21 MG/24HR
1 PATCH, TRANSDERMAL 24 HOURS TRANSDERMAL DAILY
Status: DISCONTINUED | OUTPATIENT
Start: 2021-10-13 | End: 2021-10-18

## 2021-10-12 RX ORDER — TRAZODONE HYDROCHLORIDE 50 MG/1
50 TABLET ORAL NIGHTLY PRN
Status: DISCONTINUED | OUTPATIENT
Start: 2021-10-12 | End: 2021-11-03 | Stop reason: HOSPADM

## 2021-10-12 RX ORDER — PANTOPRAZOLE SODIUM 40 MG/1
40 TABLET, DELAYED RELEASE ORAL DAILY
Status: ON HOLD | COMMUNITY
End: 2021-11-03 | Stop reason: SDUPTHER

## 2021-10-12 RX ORDER — ACETAMINOPHEN 500 MG
1000 TABLET ORAL 2 TIMES DAILY
Status: ON HOLD | COMMUNITY
End: 2021-11-03 | Stop reason: HOSPADM

## 2021-10-12 RX ORDER — HYDROXYZINE PAMOATE 50 MG/1
50 CAPSULE ORAL 3 TIMES DAILY PRN
Status: ON HOLD | COMMUNITY
End: 2021-11-03 | Stop reason: HOSPADM

## 2021-10-12 RX ORDER — TIZANIDINE 4 MG/1
4 TABLET ORAL EVERY 8 HOURS PRN
Status: ON HOLD | COMMUNITY
End: 2021-11-03 | Stop reason: HOSPADM

## 2021-10-12 RX ORDER — HALOPERIDOL 5 MG
5 TABLET ORAL EVERY 6 HOURS PRN
Status: DISCONTINUED | OUTPATIENT
Start: 2021-10-12 | End: 2021-11-03 | Stop reason: HOSPADM

## 2021-10-12 RX ORDER — LORAZEPAM 1 MG/1
2 TABLET ORAL EVERY 6 HOURS PRN
Status: DISCONTINUED | OUTPATIENT
Start: 2021-10-12 | End: 2021-10-18

## 2021-10-12 RX ORDER — ONDANSETRON 8 MG/1
8 TABLET, ORALLY DISINTEGRATING ORAL EVERY 8 HOURS PRN
Status: ON HOLD | COMMUNITY
End: 2021-11-22 | Stop reason: HOSPADM

## 2021-10-12 RX ORDER — OLANZAPINE 10 MG/1
10 TABLET, ORALLY DISINTEGRATING ORAL 2 TIMES DAILY
Status: ON HOLD | COMMUNITY
End: 2021-11-03 | Stop reason: HOSPADM

## 2021-10-12 RX ORDER — MAGNESIUM HYDROXIDE/ALUMINUM HYDROXICE/SIMETHICONE 120; 1200; 1200 MG/30ML; MG/30ML; MG/30ML
30 SUSPENSION ORAL EVERY 6 HOURS PRN
Status: DISCONTINUED | OUTPATIENT
Start: 2021-10-12 | End: 2021-11-03 | Stop reason: HOSPADM

## 2021-10-12 RX ORDER — HALOPERIDOL 5 MG/ML
5 INJECTION INTRAMUSCULAR EVERY 6 HOURS PRN
Status: DISCONTINUED | OUTPATIENT
Start: 2021-10-12 | End: 2021-11-03 | Stop reason: HOSPADM

## 2021-10-12 RX ORDER — DIVALPROEX SODIUM 500 MG/1
500 TABLET, DELAYED RELEASE ORAL 2 TIMES DAILY
Status: ON HOLD | COMMUNITY
End: 2021-11-03 | Stop reason: HOSPADM

## 2021-10-12 RX ADMIN — ACETAMINOPHEN 1000 MG: 500 TABLET ORAL at 19:41

## 2021-10-12 ASSESSMENT — ENCOUNTER SYMPTOMS
NAUSEA: 1
EYE PAIN: 0
EYE REDNESS: 0
VOMITING: 0
COUGH: 0
WHEEZING: 0
ABDOMINAL PAIN: 0
EYE DISCHARGE: 0
COLOR CHANGE: 0
SORE THROAT: 0
CONSTIPATION: 0
STRIDOR: 0
DIARRHEA: 0
SHORTNESS OF BREATH: 0

## 2021-10-12 ASSESSMENT — PAIN DESCRIPTION - LOCATION: LOCATION: HEAD

## 2021-10-12 ASSESSMENT — SLEEP AND FATIGUE QUESTIONNAIRES
DIFFICULTY ARISING: NO
DIFFICULTY FALLING ASLEEP: YES
DO YOU USE A SLEEP AID: YES
DIFFICULTY STAYING ASLEEP: YES
RESTFUL SLEEP: NO
DO YOU HAVE DIFFICULTY SLEEPING: YES
SLEEP PATTERN: DIFFICULTY FALLING ASLEEP;DISTURBED/INTERRUPTED SLEEP;RESTLESSNESS
AVERAGE NUMBER OF SLEEP HOURS: 3

## 2021-10-12 ASSESSMENT — PAIN SCALES - GENERAL
PAINLEVEL_OUTOF10: 0
PAINLEVEL_OUTOF10: 10

## 2021-10-12 ASSESSMENT — LIFESTYLE VARIABLES
HISTORY_ALCOHOL_USE: YES
HISTORY_ALCOHOL_USE: YES

## 2021-10-12 ASSESSMENT — PATIENT HEALTH QUESTIONNAIRE - PHQ9: SUM OF ALL RESPONSES TO PHQ QUESTIONS 1-9: 11

## 2021-10-12 ASSESSMENT — PAIN DESCRIPTION - PAIN TYPE: TYPE: ACUTE PAIN

## 2021-10-12 ASSESSMENT — PAIN DESCRIPTION - DESCRIPTORS: DESCRIPTORS: ACHING

## 2021-10-12 NOTE — ED TRIAGE NOTES
Pt into ER c/c suicidal ideation with plan to overdose on home meds, but no action carried out. Pt states that she became homeless today.
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
## 2021-10-12 NOTE — PROGRESS NOTES
Medication History completed:    New medications: acetaminophen, albuterol HFA, Eliquis, divalproex DR, Flonase, hydroxyzine pamoate, melatonin, olanzapine ODT, ondansetron ODT, tizanidine, trazodone    Medications discontinued: docusate, paroxetine, potassium chloride    Changes to dosing:   Pantoprazole changed to 40 mg daily    Stated allergies: As listed    Other pertinent information: Medications confirmed with patient's prescription vials ([facility]).     Thank you,  Claudetta Pitch, PharmD, BCPS  [phone]

## 2021-10-13 PROBLEM — F31.9 BIPOLAR DEPRESSION (HCC): Status: ACTIVE | Noted: 2021-10-13

## 2021-10-13 PROBLEM — F10.10 ALCOHOL ABUSE: Status: ACTIVE | Noted: 2021-10-13

## 2021-10-13 PROBLEM — F33.3 SEVERE RECURRENT MAJOR DEPRESSIVE DISORDER WITH PSYCHOTIC FEATURES (HCC): Status: ACTIVE | Noted: 2021-10-13

## 2021-10-13 PROBLEM — R45.851 DEPRESSION WITH SUICIDAL IDEATION: Status: ACTIVE | Noted: 2021-10-13

## 2021-10-13 PROBLEM — F12.10 MARIJUANA ABUSE: Status: ACTIVE | Noted: 2021-10-13

## 2021-10-13 PROBLEM — F32.A DEPRESSION WITH SUICIDAL IDEATION: Status: ACTIVE | Noted: 2021-10-13

## 2021-10-13 PROCEDURE — 6370000000 HC RX 637 (ALT 250 FOR IP): Performed by: NURSE PRACTITIONER

## 2021-10-13 PROCEDURE — 99223 1ST HOSP IP/OBS HIGH 75: CPT | Performed by: PSYCHIATRY & NEUROLOGY

## 2021-10-13 PROCEDURE — 6370000000 HC RX 637 (ALT 250 FOR IP): Performed by: PSYCHIATRY & NEUROLOGY

## 2021-10-13 PROCEDURE — 1240000000 HC EMOTIONAL WELLNESS R&B

## 2021-10-13 RX ORDER — CETIRIZINE HYDROCHLORIDE 10 MG/1
10 TABLET ORAL DAILY
Status: DISCONTINUED | OUTPATIENT
Start: 2021-10-13 | End: 2021-11-03 | Stop reason: HOSPADM

## 2021-10-13 RX ORDER — SERTRALINE HYDROCHLORIDE 25 MG/1
25 TABLET, FILM COATED ORAL DAILY
Status: DISCONTINUED | OUTPATIENT
Start: 2021-10-13 | End: 2021-10-14

## 2021-10-13 RX ORDER — DIVALPROEX SODIUM 500 MG/1
500 TABLET, DELAYED RELEASE ORAL 2 TIMES DAILY
Status: DISCONTINUED | OUTPATIENT
Start: 2021-10-13 | End: 2021-10-21

## 2021-10-13 RX ORDER — ALBUTEROL SULFATE 90 UG/1
2 AEROSOL, METERED RESPIRATORY (INHALATION) EVERY 6 HOURS PRN
Status: DISCONTINUED | OUTPATIENT
Start: 2021-10-13 | End: 2021-11-03 | Stop reason: HOSPADM

## 2021-10-13 RX ORDER — TIZANIDINE 4 MG/1
4 TABLET ORAL EVERY 8 HOURS PRN
Status: DISCONTINUED | OUTPATIENT
Start: 2021-10-13 | End: 2021-11-03 | Stop reason: HOSPADM

## 2021-10-13 RX ORDER — LANOLIN ALCOHOL/MO/W.PET/CERES
3 CREAM (GRAM) TOPICAL NIGHTLY PRN
Status: DISCONTINUED | OUTPATIENT
Start: 2021-10-13 | End: 2021-11-03 | Stop reason: HOSPADM

## 2021-10-13 RX ORDER — OLANZAPINE 10 MG/1
10 TABLET, ORALLY DISINTEGRATING ORAL 2 TIMES DAILY
Status: DISCONTINUED | OUTPATIENT
Start: 2021-10-13 | End: 2021-10-14

## 2021-10-13 RX ADMIN — HALOPERIDOL 5 MG: 5 TABLET ORAL at 01:15

## 2021-10-13 RX ADMIN — TIZANIDINE 4 MG: 4 TABLET ORAL at 19:49

## 2021-10-13 RX ADMIN — LORAZEPAM 2 MG: 1 TABLET ORAL at 01:15

## 2021-10-13 RX ADMIN — APIXABAN 5 MG: 5 TABLET, FILM COATED ORAL at 14:30

## 2021-10-13 RX ADMIN — TRAZODONE HYDROCHLORIDE 50 MG: 50 TABLET ORAL at 20:32

## 2021-10-13 RX ADMIN — TRAZODONE HYDROCHLORIDE 50 MG: 50 TABLET ORAL at 01:15

## 2021-10-13 RX ADMIN — OLANZAPINE 10 MG: 10 TABLET, ORALLY DISINTEGRATING ORAL at 20:30

## 2021-10-13 RX ADMIN — ACETAMINOPHEN 650 MG: 325 TABLET ORAL at 19:49

## 2021-10-13 RX ADMIN — APIXABAN 5 MG: 5 TABLET, FILM COATED ORAL at 20:31

## 2021-10-13 RX ADMIN — DIVALPROEX SODIUM 500 MG: 500 TABLET, DELAYED RELEASE ORAL at 20:31

## 2021-10-13 RX ADMIN — CETIRIZINE HYDROCHLORIDE 10 MG: 10 TABLET, FILM COATED ORAL at 14:29

## 2021-10-13 RX ADMIN — Medication 3 MG: at 20:30

## 2021-10-13 ASSESSMENT — PAIN SCALES - GENERAL: PAINLEVEL_OUTOF10: 6

## 2021-10-13 ASSESSMENT — PAIN - FUNCTIONAL ASSESSMENT
PAIN_FUNCTIONAL_ASSESSMENT: 0-10
PAIN_FUNCTIONAL_ASSESSMENT: 0-10
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
## 2021-10-13 NOTE — PLAN OF CARE
Problem: Depressive Behavior With or Without Suicide Precautions:  Goal: Ability to disclose and discuss suicidal ideas will improve  Description: Ability to disclose and discuss suicidal ideas will improve  10/13/2021 1830 by Karen Humphreys RN  Outcome: Ongoing     Patient denies active suicidal thoughts. Patient continues to have audio hallucinations- will not discuss further details. Patient has remained in her room most of shift. She has come out for needs only in the evening. Patient continues to be anxious. Patient has not participated in care today. She has not attended groups or engaged in interactions with staff or peers. Patient has taken medications as prescribed after multiple attempts by staff. Patient has maintained control of behaviors this shift. Safety has been maintained.

## 2021-10-13 NOTE — BH NOTE
Patient asked RN for a t-shirt, insisting that there was a room with clothes that were left behind when she was here previously. RN told patient that this room did not exist anymore. Patient requested her flip-flops and undergarments, and stated that she needed more clothes because she only had pants and her one t-shirt. RN explained the policy for giving out extra clothes and gave patient her undergarments and flip-flops. Patient was later seen in a long-sleeve top as well.

## 2021-10-13 NOTE — CARE COORDINATION
BHI Biopsychosocial Assessment    Current Level of Psychosocial Functioning     Independent: xx  Dependent:    Minimal Assist:       Psychosocial High Risk Factors (check all that apply)    Unable to obtain meds:    Chronic illness/pain:     Substance abuse: xx  Lack of Family Support: xx  Financial stress:    Isolation:    Inadequate Community Resources:    Suicide attempt(s): xx  Not taking medications:     Victim of crime:    Developmental Delay:    Unable to manage personal needs:    Age 72 or older:    Homeless: xx  No transportation:    Readmission within 30 days:    Unemployment:    Traumatic Event:      Psychiatric Advanced Directives: None reported    Family to Involve in Treatment: None reported    Sexual Orientation: KENYA    Patient Strengths: reports stable income    Patient Barriers: homeless, new to area, not linked to [address]    Opiate Education: patient denies opiate use    CMHC/mental health history: not linked    Plan of Care   medication management, group/individual therapies, family meetings, psycho -education, treatment team meetings to assist with stabilization    Initial Discharge Plan: to be determined with provider      Clinical Summary:    Patient is a 46year old female who presents to [facility] with reported suicidal ideation. Patient reports she has had 3 attempts since January, \"I tried to blow my brains out, I tried to jump off a bridge, I tried to overdose. \" When asked if these attempts required hospitalization she reports no. Patient states she came to [place] because her daughter goes to [facility]. Patient asks for social work to obtain a group home for patient, patient reports having income via [employer]. Patient is not linked with [address], advised she would need to pursue this with [address] outside hospital setting. Patient then states she would go to sober living, asked about substance use history where she reports drinking with last use 10/12.  Patient provided with recovery housing list as

## 2021-10-13 NOTE — BH NOTE
Pt agitated AEB, pacing in room then came up the nurse station raising voice at staff. Pt states she already talked to the NP, and that she is supposed to get something to help her calm down. Staff attempted 1:1 talk time, suggesting relaxation technique, and  redirecting/refocusing on new coping activity. Pt not receptive to any of these interventions. Pt offered Haldol 5 mg PO and  Ativan 2 mg PO per PRN order. Pt agreed to take medications, and requested to add Trazodone, states she has not slept for 3 days. Pt currently in room resting, will continue to monitor for changes in behavior.

## 2021-10-13 NOTE — ED NOTES
This writer consulted with Aury Olivarez NP who recommended inpatient hospitalization for safety and stabilization. Patient signed application for voluntary admission to [facility].

## 2021-10-13 NOTE — BH NOTE
Patient transported to unit by two staff members. Patient had 3 bags of belongings. Patient was cooperative.
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
## 2021-10-13 NOTE — PROGRESS NOTES
Behavioral Services  Medicare Certification Upon Admission    I certify that this patient's inpatient psychiatric hospital admission is medically necessary for:    [x] (1) Treatment which could reasonably be expected to improve this patient's condition,       [x] (2) Or for diagnostic study;     AND     [x](2) The inpatient psychiatric services are provided while the individual is under the care of a physician and are included in the individualized plan of care.     Estimated length of stay/service -3 to 6 days    Plan for post-hospital care -outpatient care    Electronically signed by Melva Monae MD on 10/13/2021 at 5:01 PM

## 2021-10-13 NOTE — PLAN OF CARE
[facility]  Initial Interdisciplinary Treatment Plan NOTE      Original treatment plan Date & Time: 10/13/2021                            741am    Admission Type:  Admission Type: Involuntary    Reason for admission:   Reason for Admission: SI no plan    Estimated Length of Stay:  5-7days  Estimated Discharge Date: to be determined by physician    PATIENT STRENGTHS:  Patient Strengths:Strengths: Positive Support, No significant Physical Illness  Patient Strengths and Limitations:Limitations: Tendency to isolate self, Lacks leisure interests, Difficulty problem solving/relies on others to help solve problems, Hopeless about future, Multiple barriers to leisure interests, Inappropriate/potentially harmful leisure interests (depression substance abuse anxiety poor coping skills)  Addictive Behavior: Addictive Behavior  In the past 3 months, have you felt or has someone told you that you have a problem with:  : None  Do you have a history of Chemical Use?: No  Do you have a history of Alcohol Use?: No  Do you have a history of Street Drug Abuse?: Yes  Histroy of Prescripton Drug Abuse?: No  Medical Problems:No past medical history on file.   Status EXAM:Status and Exam  Normal: No  Facial Expression: Elevated  Affect: Inappropriate  Level of Consciousness: Alert  Mood:Normal: No  Mood: Depressed, Anxious  Motor Activity:Normal: No  Motor Activity: Decreased  Interview Behavior: Cooperative  Preception: [place] to Person, [place] to Time, [place] to Place, [place] to Situation  Attention:Normal: Yes  Attention: Distractible  Thought Processes: Circumstantial  Thought Content:Normal: Yes  Thought Content: Preoccupations  Hallucinations: None  Delusions: No  Memory:Normal: Yes  Memory: Poor Recent, Confabulation  Insight and Judgment: No  Insight and Judgment: Unmotivated  Present Suicidal Ideation: No  Present Homicidal Ideation: No    EDUCATION:   Learner Progress Toward Treatment Goals: reviewed group plans and strategies for care    Method:group therapy, medication compliance, individualized assessments and care planning    Outcome: needs reinforcement    PATIENT GOALS: to be discussed with patient within 72 hours    PLAN/TREATMENT RECOMMENDATIONS:     continue group therapy , medications compliance, goal setting, individualized assessments and care, continue to monitor pt on unit      SHORT-TERM GOALS:   Time frame for Short-Term Goals: 5-7 days    LONG-TERM GOALS:  Time frame for Long-Term Goals: 6 months  Members Present in Team Meeting: See Signature Sheet    Salena Quinones

## 2021-10-13 NOTE — GROUP NOTE
Group Therapy Note    Date: 10/13/2021    Group Start Time: 1000  Group End Time: 5980  Group Topic: Group Therapy    [facility]    LILIAN Randolph LSW        Group Therapy Note  Pt declined to attend psychotherapy at 1000 am despite encouragement. Pt offered 1:1.       Attendees: 3/20           Signature:  LILIAN Randolph LSW

## 2021-10-13 NOTE — BH NOTE
Patient requested numbers out of her phone. Patient educated that she will not receive her phone until discharge and the patient understood. The patient turned her phones (2) off. Cell phones placed in security bag and sent to the safe.

## 2021-10-14 PROCEDURE — 6370000000 HC RX 637 (ALT 250 FOR IP): Performed by: NURSE PRACTITIONER

## 2021-10-14 PROCEDURE — 6370000000 HC RX 637 (ALT 250 FOR IP): Performed by: PSYCHIATRY & NEUROLOGY

## 2021-10-14 PROCEDURE — 99232 SBSQ HOSP IP/OBS MODERATE 35: CPT | Performed by: PSYCHIATRY & NEUROLOGY

## 2021-10-14 PROCEDURE — 1240000000 HC EMOTIONAL WELLNESS R&B

## 2021-10-14 RX ORDER — PRAZOSIN HYDROCHLORIDE 1 MG/1
1 CAPSULE ORAL NIGHTLY
Status: DISCONTINUED | OUTPATIENT
Start: 2021-10-14 | End: 2021-10-17

## 2021-10-14 RX ORDER — OLANZAPINE 10 MG/1
15 TABLET, ORALLY DISINTEGRATING ORAL NIGHTLY
Status: DISCONTINUED | OUTPATIENT
Start: 2021-10-14 | End: 2021-10-18

## 2021-10-14 RX ADMIN — DIVALPROEX SODIUM 500 MG: 500 TABLET, DELAYED RELEASE ORAL at 20:32

## 2021-10-14 RX ADMIN — HYDROXYZINE HYDROCHLORIDE 50 MG: 50 TABLET, FILM COATED ORAL at 11:37

## 2021-10-14 RX ADMIN — TRAZODONE HYDROCHLORIDE 50 MG: 50 TABLET ORAL at 20:32

## 2021-10-14 RX ADMIN — TIZANIDINE 4 MG: 4 TABLET ORAL at 09:54

## 2021-10-14 RX ADMIN — APIXABAN 5 MG: 5 TABLET, FILM COATED ORAL at 20:32

## 2021-10-14 RX ADMIN — DIVALPROEX SODIUM 500 MG: 500 TABLET, DELAYED RELEASE ORAL at 09:54

## 2021-10-14 RX ADMIN — PRAZOSIN HYDROCHLORIDE 1 MG: 1 CAPSULE ORAL at 20:32

## 2021-10-14 RX ADMIN — Medication 3 MG: at 20:32

## 2021-10-14 RX ADMIN — TIZANIDINE 4 MG: 4 TABLET ORAL at 18:16

## 2021-10-14 RX ADMIN — ACETAMINOPHEN 650 MG: 325 TABLET ORAL at 15:48

## 2021-10-14 RX ADMIN — ACETAMINOPHEN 650 MG: 325 TABLET ORAL at 09:54

## 2021-10-14 RX ADMIN — CETIRIZINE HYDROCHLORIDE 10 MG: 10 TABLET, FILM COATED ORAL at 09:54

## 2021-10-14 RX ADMIN — ACETAMINOPHEN 650 MG: 325 TABLET ORAL at 20:36

## 2021-10-14 RX ADMIN — APIXABAN 5 MG: 5 TABLET, FILM COATED ORAL at 09:54

## 2021-10-14 RX ADMIN — OLANZAPINE 15 MG: 10 TABLET, ORALLY DISINTEGRATING ORAL at 20:32

## 2021-10-14 RX ADMIN — OLANZAPINE 10 MG: 10 TABLET, ORALLY DISINTEGRATING ORAL at 09:53

## 2021-10-14 RX ADMIN — HYDROXYZINE HYDROCHLORIDE 50 MG: 50 TABLET, FILM COATED ORAL at 19:49

## 2021-10-14 RX ADMIN — SERTRALINE HYDROCHLORIDE 25 MG: 25 TABLET ORAL at 09:54

## 2021-10-14 ASSESSMENT — PAIN SCALES - GENERAL
PAINLEVEL_OUTOF10: 3
PAINLEVEL_OUTOF10: 1
PAINLEVEL_OUTOF10: 1
PAINLEVEL_OUTOF10: 6
PAINLEVEL_OUTOF10: 6

## 2021-10-14 ASSESSMENT — PAIN DESCRIPTION - LOCATION: LOCATION: HEAD

## 2021-10-14 ASSESSMENT — PAIN DESCRIPTION - PAIN TYPE: TYPE: ACUTE PAIN

## 2021-10-14 ASSESSMENT — PAIN DESCRIPTION - DESCRIPTORS: DESCRIPTORS: HEADACHE

## 2021-10-14 NOTE — GROUP NOTE
Group Therapy Note    Date: 10/14/2021    Group Start Time: 1430  Group End Time: 1382  Group Topic: Cognitive Skills    LUISANA Washington        Group Therapy Note    Attendees: 10/20         Patient's Goal:  To increase social interaction and to practice self expression, expressing feelings and exploring positive change, coping skills and resources, and communication skills. Notes: . Pt was able to practice self expression, expressing feelings and exploring positive change, coping skills and resources, and communication skills through creative expression and discussion. Pt shared individually and engaged in group discussion with peers. Pt shares openly and is articulate. Pt was supportive of peers and able to relate to some of their ideas and experiences. Status After Intervention:  Improved     Participation Level:  Active Listener, appropriate, sharing      Participation Quality: Appropriate, Attentive, sharing,supportive         Speech:  normal        Thought Process/Content: Logical, linear     Affective Functioning: Congruent     Mood: Euthymic     Level of consciousness:  Alert, and Attentive        Response to Learning:  Able express current knowledge, Able to acknowledge/verbalize new learning,  verbalized insight, and Progressing to goal        Endings: None Reported     Modes of Intervention: Education, Support, Socialization, Exploration, Clarifying and Problem-solving        Discipline Responsible: Psychoeducational Specialist        Signature:  Jamie Clayton

## 2021-10-14 NOTE — GROUP NOTE
Group Therapy Note    Date: 10/14/2021    Group Start Time: 1000  Group End Time: 3714  Group Topic: Psychotherapy    REDD Jones        Group Therapy Note         Patient refused to attend psychotherapy group after encouragement from staff. 1:1 talk time offered but refused. Signature:   Jennifer Jones

## 2021-10-14 NOTE — PLAN OF CARE
Problem: Depressive Behavior With or Without Suicide Precautions:  Goal: Ability to disclose and discuss suicidal ideas will improve  Description: Ability to disclose and discuss suicidal ideas will improve  10/14/2021 1723 by Maya Malave RN  Outcome: Ongoing   1:1 with pt x ten minutes. Pt encouraged to attend unit programming and interact with peers and staff. Pt also encouraged to tend to hygiene and ADLs. Pt encouraged to discuss feelings with staff and feedback and reassurance provided. Pt denies thoughts of self harm and is agreeable to seeking out should thoughts of self harm arise. Safe environment maintained. Q15 minute checks for safety cont per unit policy. Will cont to monitor for safety and provides support and reassurance as needed. Pt admits to auditory hallucinations. Compliant with medications. Seclusive in AM. Out et social in [place]. Attends select groups.

## 2021-10-14 NOTE — PLAN OF CARE
Problem: Tobacco Use:  Goal: Inpatient tobacco use cessation counseling participation  Description: Inpatient tobacco use cessation counseling participation  Outcome: Ongoing  Note: Patient given tobacco quitline number 63372146918 at this time, refusing to call at this time, states \" I just dont want to quit now\"- patient given information as to the dangers of long term tobacco use. Continue to reinforce the importance of tobacco cessation. Problem: Depressive Behavior With or Without Suicide Precautions:  Goal: Ability to disclose and discuss suicidal ideas will improve  Description: Ability to disclose and discuss suicidal ideas will improve  10/13/2021 2242 by Gigi Snell RN  Outcome: Ongoing  Note: Patient is out in day room social with select peers. She reports auditory hallucinations  non specific in content. She denies suicidal ideation and thoughts of self harm. She is medication compliant during this shift. Patient requested as needed Zanaflex and tylenol to help with pain. Patient is satisfied with these medications. Staff maintains Q 15 minute safety checks.

## 2021-10-14 NOTE — PLAN OF CARE
[address] [place] [address]  Day 3 Interdisciplinary Treatment Plan NOTE    Review Date & Time: 10/14/2021                   1300    Admission Type:   Admission Type: Involuntary    Reason for admission:  Reason for Admission: SI no plan  Estimated Length of Stay: 5-7 days  Estimated Discharge Date Update: to be determined by physician    PATIENT STRENGTHS:  Patient Strengths Strengths: Positive Support, No significant Physical Illness  Patient Strengths and Limitations:Limitations: Tendency to isolate self, Lacks leisure interests, Difficulty problem solving/relies on others to help solve problems, Hopeless about future, Multiple barriers to leisure interests, Inappropriate/potentially harmful leisure interests (depression substance abuse anxiety poor coping skills)  Addictive Behavior:Addictive Behavior  In the past 3 months, have you felt or has someone told you that you have a problem with:  : None  Do you have a history of Chemical Use?: No  Do you have a history of Alcohol Use?: No  Do you have a history of Street Drug Abuse?: Yes  Histroy of Prescripton Drug Abuse?: No  Medical Problems:No past medical history on file.     Risk:  Fall RiskTotal: 53  Lakhwinder Scale Lakhwinder Scale Score: 22  BVC Total: 0  Change in scores no Changes to plan of Care no    Status EXAM:   Status and Exam  Normal: No  Facial Expression: Elevated  Affect: Inappropriate  Level of Consciousness: Alert  Mood:Normal: No  Mood: Depressed, Anxious  Motor Activity:Normal: No  Motor Activity: Decreased  Interview Behavior: Cooperative  Preception: [place] to Person, [place] to Time, [place] to Place, [place] to Situation  Attention:Normal: Yes  Attention: Distractible  Thought Processes: Circumstantial  Thought Content:Normal: Yes  Thought Content: Preoccupations  Hallucinations: None  Delusions: No  Memory:Normal: Yes  Memory: Poor Recent, Confabulation  Insight and Judgment: No  Insight and Judgment: Unmotivated  Present Suicidal Ideation: No  Present Homicidal Ideation: No    Daily Assessment Last Entry:   Daily Sleep (WDL): Within Defined Limits         Patient Currently in Pain: No  Daily Nutrition (WDL): Within Defined Limits    Patient Monitoring:  Frequency of Checks: 4 times per hour, close    Psychiatric Symptoms:   Depression Symptoms  Depression Symptoms: Isolative, Loss of interest  Anxiety Symptoms  Anxiety Symptoms: Generalized  Ileana Symptoms  Ileana Symptoms: No problems reported or observed. Psychosis Symptoms  Delusion Type: No problems reported or observed.     Suicide Risk CSSR-S:  Have you wished you were dead or wished you could go to sleep and not wake up? : NO  Have you actually had any thoughts of killing yourself? : NO  Have you ever done anything, started to do anything, or prepared to do anything to end your life?: NO  Change in Result                no                  Change in Plan of care                 no      EDUCATION:   EDUCATION:   Learner Progress Toward Treatment Goals: Reviewed results and recommendations of this team, Reviewed group plan and strategies, Reviewed signs, symptoms and risk of self harm and violent behavior, Reviewed goals and plan of care    Method:small group, individual verbal education    Outcome:verbalized by patient, but needs reinforcement to obtain goals    PATIENT GOALS:  Short term:     \"decrease anxiety and suicidal ideation\"  Long term:   \"get stable housing, go back to school\"    PLAN/TREATMENT RECOMMENDATIONS UPDATE: continue with group therapies, increased socialization, continue planning for after discharge goals, continue with medication compliance    SHORT-TERM GOALS UPDATE:   Time frame for Short-Term Goals: 5-7 days    LONG-TERM GOALS UPDATE:   Time frame for Long-Term Goals: 6 months  Members Present in Team Meeting: See Signature Sheet    Yoni Villanueva

## 2021-10-15 PROCEDURE — 99232 SBSQ HOSP IP/OBS MODERATE 35: CPT | Performed by: PSYCHIATRY & NEUROLOGY

## 2021-10-15 PROCEDURE — 6370000000 HC RX 637 (ALT 250 FOR IP): Performed by: PSYCHIATRY & NEUROLOGY

## 2021-10-15 PROCEDURE — 6370000000 HC RX 637 (ALT 250 FOR IP): Performed by: NURSE PRACTITIONER

## 2021-10-15 PROCEDURE — 1240000000 HC EMOTIONAL WELLNESS R&B

## 2021-10-15 RX ORDER — PANTOPRAZOLE SODIUM 40 MG/1
40 TABLET, DELAYED RELEASE ORAL
Status: DISCONTINUED | OUTPATIENT
Start: 2021-10-16 | End: 2021-11-03 | Stop reason: HOSPADM

## 2021-10-15 RX ADMIN — NICOTINE POLACRILEX 2 MG: 2 GUM, CHEWING BUCCAL at 19:12

## 2021-10-15 RX ADMIN — PRAZOSIN HYDROCHLORIDE 1 MG: 1 CAPSULE ORAL at 20:35

## 2021-10-15 RX ADMIN — DIVALPROEX SODIUM 500 MG: 500 TABLET, DELAYED RELEASE ORAL at 20:35

## 2021-10-15 RX ADMIN — CETIRIZINE HYDROCHLORIDE 10 MG: 10 TABLET, FILM COATED ORAL at 10:53

## 2021-10-15 RX ADMIN — APIXABAN 5 MG: 5 TABLET, FILM COATED ORAL at 11:00

## 2021-10-15 RX ADMIN — DIVALPROEX SODIUM 500 MG: 500 TABLET, DELAYED RELEASE ORAL at 10:53

## 2021-10-15 RX ADMIN — ACETAMINOPHEN 650 MG: 325 TABLET ORAL at 13:23

## 2021-10-15 RX ADMIN — OLANZAPINE 15 MG: 10 TABLET, ORALLY DISINTEGRATING ORAL at 20:35

## 2021-10-15 RX ADMIN — TIZANIDINE 4 MG: 4 TABLET ORAL at 20:35

## 2021-10-15 RX ADMIN — HYDROXYZINE HYDROCHLORIDE 50 MG: 50 TABLET, FILM COATED ORAL at 16:36

## 2021-10-15 RX ADMIN — ACETAMINOPHEN 650 MG: 325 TABLET ORAL at 18:40

## 2021-10-15 RX ADMIN — HYDROXYZINE HYDROCHLORIDE 50 MG: 50 TABLET, FILM COATED ORAL at 10:53

## 2021-10-15 RX ADMIN — SERTRALINE 50 MG: 50 TABLET, FILM COATED ORAL at 10:53

## 2021-10-15 RX ADMIN — Medication 3 MG: at 20:35

## 2021-10-15 RX ADMIN — TRAZODONE HYDROCHLORIDE 50 MG: 50 TABLET ORAL at 20:35

## 2021-10-15 RX ADMIN — TIZANIDINE 4 MG: 4 TABLET ORAL at 13:23

## 2021-10-15 RX ADMIN — APIXABAN 5 MG: 5 TABLET, FILM COATED ORAL at 20:38

## 2021-10-15 RX ADMIN — NICOTINE POLACRILEX 2 MG: 2 GUM, CHEWING BUCCAL at 17:56

## 2021-10-15 ASSESSMENT — PAIN SCALES - GENERAL
PAINLEVEL_OUTOF10: 3
PAINLEVEL_OUTOF10: 6
PAINLEVEL_OUTOF10: 1

## 2021-10-15 NOTE — GROUP NOTE
Group Therapy Note    Date: 10/15/2021    Group Start Time: 1100  Group End Time: 2055  Group Topic: Cognitive Skills    LUISANA Washington        Group Therapy Note    Attendees: 8/18         Patient's Goal:  To increase social interaction and to practice problem solving, and communication skills. Notes: Pt participated fully in group task . Pt was able to practice problem solving, and communication skills independently . Pt was mostly pleasant and supportive of peers . Pt became irritated with a peer she felt was speaking for another pt. Pt argued with peer and was overbearing in her approach/criticizing peer. Pt and peer were redirected et  briefly-RT asked pt to not bring her dislike of peer into a group setting and not to also be overbearing with pt she said she was \"advocating for\"-in turn \"speaking for\" pt she had accused another peer of \"speaking for\". Status After Intervention:  Improved until verbal argument, did redirect     Participation Level:  Active Listener and Interactive     Participation Quality: Attentive, Sharing,and Supportive        Speech:  Normal     Thought Process/Content: Logical ,linear         Affective Functioning: Congruent, brightens until became overbearing with peer        Mood: Euthymic        Level of consciousness:  Alert, and Attentive         Response to Learning: Able to verbalize current knowledge/experience, Able to verbalize/acknowledge new learning,  and Progressing to goal        Endings: None Reported     Modes of Intervention: Education, Support, Socialization, Exploration, Clarifying and Problem-solving        Discipline Responsible: Psychoeducational Specialist        Signature:  Jamie Clayton

## 2021-10-15 NOTE — GROUP NOTE
Group Therapy Note    Date: 10/15/2021    Group Start Time: 1430  Group End Time: 9606  Group Topic: Cognitive Skills    REDD Garcia, CTRS        Group Therapy Note    Attendees: 5/18         Pt did not participate in Cognitive Skills Group at 1430 when encouraged by RT due to resting in room. Pt was offered talk time as an alternative to group but declined.        Discipline Responsible: Psychoeducational Specialist      Signature:  Wil Jarrett

## 2021-10-15 NOTE — PLAN OF CARE
Problem: Altered Mood, Psychotic Behavior:  Goal: Able to verbalize decrease in frequency and intensity of hallucinations  Description: Able to verbalize decrease in frequency and intensity of hallucinations  10/15/2021 0655 by Isael Mcintyre LPN  Outcome: Ongoing  10/15/2021 0434 by Rock Celia RN  Outcome: Ongoing     Problem: Depressive Behavior With or Without Suicide Precautions:  Goal: Ability to disclose and discuss suicidal ideas will improve  Description: Ability to disclose and discuss suicidal ideas will improve  10/15/2021 0655 by Isael Mcintyre LPN  Outcome: Ongoing  10/15/2021 0434 by Rock Celia RN  Outcome: Ongoing  10/15/2021 0433 by Rock Celia RN  Outcome: Ongoing    Pt is able to verbalize suicidal ideas. She states that is comes and goes. Pt has been able to verbalize her hallucinations.

## 2021-10-15 NOTE — PLAN OF CARE
Problem: Depressive Behavior With or Without Suicide Precautions:  Goal: Ability to disclose and discuss suicidal ideas will improve  Description: Ability to disclose and discuss suicidal ideas will improve  10/15/2021 0655 by Sheila Guerrero LPN  Outcome: Ongoing    Pt denies suicidal ideations at this time. Pt agreed to seek staff at anytime he/she felt like any urges to harm self would arise. Safety checks maintained [id number]. Problem: Altered Mood, Psychotic Behavior:  Goal: Able to verbalize decrease in frequency and intensity of hallucinations  Description: Able to verbalize decrease in frequency and intensity of hallucinations  10/15/2021 0655 by Sheila Guerrero LPN  Outcome: Ongoing   Patient working to try and limit her interaction with voices. Patient states that they are present but she is learning to copy with them.  Will continue to monitor for adverse behaviors and will continue to provide safety checks every 15 minutes

## 2021-10-15 NOTE — PROGRESS NOTES
Daily Progress Note  10/15/2021    Patient Name: Yunier Ngo    CHIEF COMPLAINT:   Suicidal ideation         SUBJECTIVE:      Nursing staff report the patient has maintained medication adherence and has been without acute behavioral changes in the last 24 hours. Patient is agreeable to assessment in the privacy of her room where she confirms that she has recently relocated from [place] to [place] and has been having trouble with this transition. Currently she continues to endorse fleeting suicidal ideation without plan or intent. She does contract for safety on the unit and appreciates the support of the team.  Reviewed medication with patient and she denies side effects related to her current medication regimen however she does verbalize some gastric discomfort as her Protonix was not started. After reviewing risks versus benefits and alternatives we mutually agree to restart and patient is grateful. Additionally discussed with nursing team possibility of elevating head of bed to assist with GI discomfort. Patient reports that she has set a goal to be more involved in group programming. She reports adequate sleep and fair appetite. It is noted that there are several drinks and snacks at bedside. She denies having questions or concerns related to her treatment plan currently    At this time, the patient is not appropriate for a lower level of care. There is risk of decompensation and patient warrants further hospitalization for safety and stabilization. Appetite:  [] Normal/Adequate/Unchanged  [] Increased  [x] Decreased      Sleep:       [] Normal/Adequate/Unchanged  [x] Fair  [] Poor      Group Attendance on Unit:   [] Yes  [x] Selectively    [] No    Medication Side Effects:  Patient denies any medication side effects at the time of assessment. Mental Status Exam  Level of consciousness: Alert and awake.    Appearance: Appropriate attire for setting, seated on bed, with good  grooming and hygiene. Behavior/Motor: Approachable, no psychomotor abnormalities. Attitude toward examiner: Cooperative, attentive, good eye contact. Speech: Normal rate, normal volume, normal tone. Mood:  Patient reports \" okay\". Affect: Blunted  Thought processes: Linear, goal directed and coherent. Thought content: Denies homicidal ideation. Suicidal Ideation: Active suicidal ideations, without current plan or intent, contracts for safety on the unit. Delusions: No evidence of delusions. Denies paranoia. Perceptual Disturbance: Patient does not appear to be responding to internal stimuli. Denies auditory hallucinations. Denies visual hallucinations. Cognition: Oriented to self, location, time, and situation. Memory: Intact. Insight & Judgement: Fair. Data   height is 5' 6\" (1.676 m) and weight is 230 lb (104.3 kg). Her oral temperature is 98 °F (36.7 °C). Her blood pressure is 111/57 (abnormal) and her pulse is 65. Her respiration is 14 and oxygen saturation is 100%.    Labs:   Admission on 10/12/2021   Component Date Value Ref Range Status    WBC 10/12/2021 6.8  3.5 - 11.0 k/uL Final    RBC 10/12/2021 4.45  4.0 - 5.2 m/uL Final    Hemoglobin 10/12/2021 13.5  12.0 - 16.0 g/dL Final    Hematocrit 10/12/2021 40.2  36 - 46 % Final    MCV 10/12/2021 90.3  80 - 100 fL Final    MCH 10/12/2021 30.4  26 - 34 pg Final    MCHC 10/12/2021 33.6  31 - 37 g/dL Final    RDW 10/12/2021 13.3  11.5 - 14.9 % Final    Platelets 98/07/2082 271  150 - 450 k/uL Final    MPV 10/12/2021 6.8  6.0 - 12.0 fL Final    NRBC Automated 10/12/2021 NOT REPORTED  per 100 WBC Final    Differential Type 10/12/2021 NOT REPORTED   Final    Immature Granulocytes 10/12/2021 NOT REPORTED  0 % Final    Absolute Immature Granulocyte 10/12/2021 NOT REPORTED  0.00 - 0.30 k/uL Final    WBC Morphology 10/12/2021 NOT REPORTED   Final    RBC Morphology 10/12/2021 NOT REPORTED   Final    Platelet Estimate 61/38/2871 NOT REPORTED Final    Seg Neutrophils 10/12/2021 48  36 - 66 % Final    Lymphocytes 10/12/2021 47* 24 - 44 % Final    Monocytes 10/12/2021 5  1 - 7 % Final    Eosinophils % 10/12/2021 0  0 - 4 % Final    Basophils 10/12/2021 0  0 - 2 % Final    Segs Absolute 10/12/2021 3.26  1.3 - 9.1 k/uL Final    Absolute Lymph # 10/12/2021 3.20  1.0 - 4.8 k/uL Final    Absolute Mono # 10/12/2021 0.34  0.1 - 1.3 k/uL Final    Absolute Eos # 10/12/2021 0.00  0.0 - 0.4 k/uL Final    Basophils Absolute 10/12/2021 0.00  0.0 - 0.2 k/uL Final    Morphology 10/12/2021 Normal   Final    Glucose 10/12/2021 101* 70 - 99 mg/dL Final    BUN 10/12/2021 23* 6 - 20 mg/dL Final    CREATININE 10/12/2021 0.80  0.50 - 0.90 mg/dL Final    Bun/Cre Ratio 10/12/2021 NOT REPORTED  9 - 20 Final    Calcium 10/12/2021 9.1  8.6 - 10.4 mg/dL Final    Sodium 10/12/2021 138  135 - 144 mmol/L Final    Potassium 10/12/2021 4.4  3.7 - 5.3 mmol/L Final    Chloride 10/12/2021 104  98 - 107 mmol/L Final    CO2 10/12/2021 24  20 - 31 mmol/L Final    Anion Gap 10/12/2021 10  9 - 17 mmol/L Final    Alkaline Phosphatase 10/12/2021 87  35 - 104 U/L Final    ALT 10/12/2021 14  5 - 33 U/L Final    AST 10/12/2021 17  <32 U/L Final    Total Bilirubin 10/12/2021 <0.15* 0.3 - 1.2 mg/dL Final    Total Protein 10/12/2021 7.0  6.4 - 8.3 g/dL Final    Albumin 10/12/2021 4.2  3.5 - 5.2 g/dL Final    Albumin/Globulin Ratio 10/12/2021 NOT REPORTED  1.0 - 2.5 Final    GFR Non- 10/12/2021 >60  >60 mL/min Final    GFR  10/12/2021 >60  >60 mL/min Final    GFR Comment 10/12/2021        Final    Comment: Average GFR for 30-36 years old:   80 mL/min/1.73sq m  Chronic Kidney Disease:   <60 mL/min/1.73sq m  Kidney failure:   <15 mL/min/1.73sq m              eGFR calculated using average adult body mass.  Additional eGFR calculator available at:        inFreeDA.br            GFR Staging 10/12/2021 NOT original order. Final    Amphetamine Screen, Ur 10/12/2021 NEGATIVE  NEGATIVE Final    Comment:       (Positive cutoff 1000 ng/mL)                  Barbiturate Screen, Ur 10/12/2021 NEGATIVE  NEGATIVE Final    Comment:       (Positive cutoff 200 ng/mL)                  Benzodiazepine Screen, Urine 10/12/2021 NEGATIVE  NEGATIVE Final    Comment:       (Positive cutoff 200 ng/mL)                  Cocaine Metabolite, Urine 10/12/2021 NEGATIVE  NEGATIVE Final    Comment:       (Positive cutoff 300 ng/mL)                  Methadone Screen, Urine 10/12/2021 NEGATIVE  NEGATIVE Final    Comment:       (Positive cutoff 300 ng/mL)                  Opiates, Urine 10/12/2021 NEGATIVE  NEGATIVE Final    Comment:       (Positive cutoff 300 ng/mL)                  Phencyclidine, Urine 10/12/2021 NEGATIVE  NEGATIVE Final    Comment:       (Positive cutoff 25 ng/mL)                  Propoxyphene, Urine 10/12/2021 NOT REPORTED  NEGATIVE Final    Cannabinoid Scrn, Ur 10/12/2021 POSITIVE* NEGATIVE Final    Comment:       (Positive cutoff 50 ng/mL)                  Oxycodone Screen, Ur 10/12/2021 NEGATIVE  NEGATIVE Final    Comment:       (Positive cutoff 100 ng/mL)                  Methamphetamine, Urine 10/12/2021 NOT REPORTED  NEGATIVE Final    Tricyclic Antidepressants, Urine 10/12/2021 NOT REPORTED  NEGATIVE Final    MDMA, Urine 10/12/2021 NOT REPORTED  NEGATIVE Final    Buprenorphine Urine 10/12/2021 NOT REPORTED  NEGATIVE Final    Test Information 10/12/2021 Assay provides medical screening only. The absence of expected drug(s) and/or metabolite(s) may indicate diluted or adulterated urine, limitations of testing or timing of collection. Final    Comment: Testing for legal purposes should be confirmed by another method. To request confirmation   of test result, please call the lab within 7 days of sample submission.       Acetaminophen Level 10/12/2021 7* 10 - 30 ug/mL Final    Ethanol 10/12/2021 <10  <10 mg/dL Final    Ethanol percent 10/12/2021 <2.540  % Final    Salicylate Lvl 82/90/7195 <1* 3 - 10 mg/dL Final    Toxic Tricyclic Sc,Blood 59/62/4675 WRONG TEST ORDERED  NEGATIVE Final    Tricyclic Antidep,Urine 17/69/6083 NEGATIVE  NEGATIVE Final    Comment:       (Positive cutoff 1000 ng/mL)  Assay provides rapid clinical screening only. Presumptive positive results for legal   purposes should be confirmed by another method. To request confirmation, please call the   lab within 7 days of sample submission. Reviewed patient's current plan of care and vital signs with nursing staff.     Labs reviewed: [x] Yes  Last EKG in EMR reviewed: [x] Yes  QTc: 451    Medications  Current Facility-Administered Medications: [START ON 10/16/2021] pantoprazole (PROTONIX) tablet 40 mg, 40 mg, Oral, QAM AC  sertraline (ZOLOFT) tablet 50 mg, 50 mg, Oral, Daily  OLANZapine zydis (ZYPREXA) disintegrating tablet 15 mg, 15 mg, Oral, Nightly  prazosin (MINIPRESS) capsule 1 mg, 1 mg, Oral, Nightly  albuterol sulfate  (90 Base) MCG/ACT inhaler 2 puff, 2 puff, Inhalation, Q6H PRN  apixaban (ELIQUIS) tablet 5 mg, 5 mg, Oral, BID  cetirizine (ZYRTEC) tablet 10 mg, 10 mg, Oral, Daily  melatonin tablet 3 mg, 3 mg, Oral, Nightly PRN  divalproex (DEPAKOTE) DR tablet 500 mg, 500 mg, Oral, BID  tiZANidine (ZANAFLEX) tablet 4 mg, 4 mg, Oral, Q8H PRN  acetaminophen (TYLENOL) tablet 650 mg, 650 mg, Oral, Q4H PRN  aluminum & magnesium hydroxide-simethicone (MAALOX) 200-200-20 MG/5ML suspension 30 mL, 30 mL, Oral, Q6H PRN  hydrOXYzine (ATARAX) tablet 50 mg, 50 mg, Oral, TID PRN  haloperidol (HALDOL) tablet 5 mg, 5 mg, Oral, Q6H PRN **AND** LORazepam (ATIVAN) tablet 2 mg, 2 mg, Oral, Q6H PRN  haloperidol lactate (HALDOL) injection 5 mg, 5 mg, IntraMUSCular, Q6H PRN **AND** LORazepam (ATIVAN) injection 2 mg, 2 mg, IntraMUSCular, Q6H PRN **AND** diphenhydrAMINE (BENADRYL) injection 50 mg, 50 mg, IntraMUSCular, Q6H PRN  polyethylene glycol (GLYCOLAX) packet 17 g, 17 g, Oral, Daily PRN  traZODone (DESYREL) tablet 50 mg, 50 mg, Oral, Nightly PRN  nicotine (NICODERM CQ) 14 MG/24HR 1 patch, 1 patch, TransDERmal, Daily    ASSESSMENT  Severe recurrent major depressive disorder with psychotic features [facility])         PLAN  Patient symptoms are: Remains Unstable. Add Protonix 40 mg daily before breakfast  Continue with all other currently prescribed medications. Monitor need and frequency of PRN medications. Encourage participation in groups and milieu. Attempt to develop insight. Psycho-education conducted. Supportive Therapy conducted. Probable discharge per attending physician and currently undetermined. Follow-up daily while inpatient. Patient continues to be monitored in the inpatient psychiatric facility at [facility] for safety and stabilization. Patient continues to need, on a daily basis, active treatment furnished directly by or requiring the supervision of inpatient psychiatric personnel. Electronically signed by DAMARIS Dela Cruz CNP on 10/15/2021 at 4:02 PM    **This report has been created using voice recognition software. It may contain minor errors which are inherent in voice recognition technology. **
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
## 2021-10-16 PROCEDURE — 6370000000 HC RX 637 (ALT 250 FOR IP): Performed by: PSYCHIATRY & NEUROLOGY

## 2021-10-16 PROCEDURE — 6370000000 HC RX 637 (ALT 250 FOR IP): Performed by: NURSE PRACTITIONER

## 2021-10-16 PROCEDURE — 1240000000 HC EMOTIONAL WELLNESS R&B

## 2021-10-16 PROCEDURE — 99232 SBSQ HOSP IP/OBS MODERATE 35: CPT

## 2021-10-16 RX ORDER — LOPERAMIDE HYDROCHLORIDE 2 MG/1
2 CAPSULE ORAL 4 TIMES DAILY PRN
Status: DISCONTINUED | OUTPATIENT
Start: 2021-10-16 | End: 2021-11-03 | Stop reason: HOSPADM

## 2021-10-16 RX ORDER — ONDANSETRON 4 MG/1
4 TABLET, ORALLY DISINTEGRATING ORAL EVERY 8 HOURS PRN
Status: DISCONTINUED | OUTPATIENT
Start: 2021-10-16 | End: 2021-11-03 | Stop reason: HOSPADM

## 2021-10-16 RX ADMIN — DIVALPROEX SODIUM 500 MG: 500 TABLET, DELAYED RELEASE ORAL at 09:21

## 2021-10-16 RX ADMIN — HYDROXYZINE HYDROCHLORIDE 50 MG: 50 TABLET, FILM COATED ORAL at 09:21

## 2021-10-16 RX ADMIN — NICOTINE POLACRILEX 2 MG: 2 GUM, CHEWING BUCCAL at 11:37

## 2021-10-16 RX ADMIN — TIZANIDINE 4 MG: 4 TABLET ORAL at 09:21

## 2021-10-16 RX ADMIN — ACETAMINOPHEN 650 MG: 325 TABLET ORAL at 16:59

## 2021-10-16 RX ADMIN — OLANZAPINE 15 MG: 10 TABLET, ORALLY DISINTEGRATING ORAL at 20:43

## 2021-10-16 RX ADMIN — APIXABAN 5 MG: 5 TABLET, FILM COATED ORAL at 20:43

## 2021-10-16 RX ADMIN — TIZANIDINE 4 MG: 4 TABLET ORAL at 20:42

## 2021-10-16 RX ADMIN — DIVALPROEX SODIUM 500 MG: 500 TABLET, DELAYED RELEASE ORAL at 20:43

## 2021-10-16 RX ADMIN — PRAZOSIN HYDROCHLORIDE 1 MG: 1 CAPSULE ORAL at 20:42

## 2021-10-16 RX ADMIN — NICOTINE POLACRILEX 2 MG: 2 GUM, CHEWING BUCCAL at 18:45

## 2021-10-16 RX ADMIN — SERTRALINE 50 MG: 50 TABLET, FILM COATED ORAL at 09:21

## 2021-10-16 RX ADMIN — APIXABAN 5 MG: 5 TABLET, FILM COATED ORAL at 09:21

## 2021-10-16 RX ADMIN — HYDROXYZINE HYDROCHLORIDE 50 MG: 50 TABLET, FILM COATED ORAL at 20:43

## 2021-10-16 RX ADMIN — PANTOPRAZOLE SODIUM 40 MG: 40 TABLET, DELAYED RELEASE ORAL at 06:29

## 2021-10-16 RX ADMIN — CETIRIZINE HYDROCHLORIDE 10 MG: 10 TABLET, FILM COATED ORAL at 09:21

## 2021-10-16 RX ADMIN — ONDANSETRON 4 MG: 4 TABLET, ORALLY DISINTEGRATING ORAL at 18:03

## 2021-10-16 RX ADMIN — LORAZEPAM 2 MG: 1 TABLET ORAL at 21:46

## 2021-10-16 RX ADMIN — TRAZODONE HYDROCHLORIDE 50 MG: 50 TABLET ORAL at 20:43

## 2021-10-16 RX ADMIN — HALOPERIDOL 5 MG: 5 TABLET ORAL at 21:46

## 2021-10-16 RX ADMIN — LOPERAMIDE HYDROCHLORIDE 2 MG: 2 CAPSULE ORAL at 19:45

## 2021-10-16 RX ADMIN — HYDROXYZINE HYDROCHLORIDE 50 MG: 50 TABLET, FILM COATED ORAL at 16:11

## 2021-10-16 RX ADMIN — NICOTINE POLACRILEX 2 MG: 2 GUM, CHEWING BUCCAL at 21:03

## 2021-10-16 ASSESSMENT — PAIN SCALES - GENERAL
PAINLEVEL_OUTOF10: 6
PAINLEVEL_OUTOF10: 3
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
## 2021-10-16 NOTE — GROUP NOTE
Group Therapy Note    Date: 10/16/2021    Group Start Time: 1030  Group End Time: 1050  Group Topic: Psychoeducation    REDD VALDEZI DAVID Turk        Group Therapy Note    Attendees: 6/20         Patient's Goal:  To attend and participate in group therapy setting    Notes:  CBT/DBT    Status After Intervention:  Unchanged    Participation Level:  Active Listener and Interactive    Participation Quality: Appropriate, Attentive, Sharing and Supportive      Speech:  normal      Thought Process/Content: Logical  Linear      Affective Functioning: Congruent      Mood: anxious and euphoric      Level of consciousness:  Oriented x4      Response to Learning: Able to verbalize current knowledge/experience and Able to verbalize/acknowledge new learning      Endings: None Reported    Modes of Intervention: Education, Support and Socialization      Discipline Responsible: Behavorial Health Tech      Signature:  Meagan Turk

## 2021-10-16 NOTE — PLAN OF CARE
Problem: Altered Mood, Psychotic Behavior:  Goal: Able to verbalize decrease in frequency and intensity of hallucinations  Description: Able to verbalize decrease in frequency and intensity of hallucinations  Outcome: Ongoing     Problem: Depressive Behavior With or Without Suicide Precautions:  Goal: Ability to disclose and discuss suicidal ideas will improve  Description: Ability to disclose and discuss suicidal ideas will improve  Outcome: Ongoing     Patient reports auditory hallucinations and anxiety. She reported having difficulty sleeping, and received trazodone, melatonin, and Zanaflex to help with this. Patient is pleasant but needy at times. Social with select peers and takes her medications as prescribed. Safety precautions in place and Q 15 minute checks completed.

## 2021-10-16 NOTE — BH NOTE
patient refused to attend wellness group at 0900 after encouragement from staff.   1:1 talk time provided as alternative to group session

## 2021-10-16 NOTE — PLAN OF CARE
Problem: Tobacco Use:  Goal: Inpatient tobacco use cessation counseling participation  Description: Inpatient tobacco use cessation counseling participation  Outcome: Ongoing   Patient uses Nicorette gum for tobacco cravings   Problem: Suicide risk  Goal: Provide patient with safe environment  Description: Provide patient with safe environment  Outcome: Ongoing   Patient is provided with a safe environment, and agrees to seek out staff if feelings become overwhelming. Problem: Depressive Behavior With or Without Suicide Precautions:  Goal: Ability to disclose and discuss suicidal ideas will improve  Description: Ability to disclose and discuss suicidal ideas will improve  Outcome: Ongoing   Kiran Finley is seen in the dayroom affect is flat, she reports suicidal thoughts with a plan but agrees to be safe on the unit. She states audio and visual hallucinations of voices from her past, denies any issues with appetite or sleep. Reports that she has 15 or more panic attacks daily, yet no visual signs of anxiety this high is noted.

## 2021-10-16 NOTE — BH NOTE
Patient was complaining of Nausea, and diarrhea, Dr ORTIZ notified, okay to order Imodium and Zofran

## 2021-10-17 PROCEDURE — 6370000000 HC RX 637 (ALT 250 FOR IP): Performed by: PSYCHIATRY & NEUROLOGY

## 2021-10-17 PROCEDURE — 6370000000 HC RX 637 (ALT 250 FOR IP): Performed by: NURSE PRACTITIONER

## 2021-10-17 PROCEDURE — 6370000000 HC RX 637 (ALT 250 FOR IP)

## 2021-10-17 PROCEDURE — 1240000000 HC EMOTIONAL WELLNESS R&B

## 2021-10-17 PROCEDURE — 99232 SBSQ HOSP IP/OBS MODERATE 35: CPT

## 2021-10-17 RX ORDER — PRAZOSIN HYDROCHLORIDE 1 MG/1
2 CAPSULE ORAL NIGHTLY
Status: DISCONTINUED | OUTPATIENT
Start: 2021-10-17 | End: 2021-11-03 | Stop reason: HOSPADM

## 2021-10-17 RX ADMIN — PANTOPRAZOLE SODIUM 40 MG: 40 TABLET, DELAYED RELEASE ORAL at 09:48

## 2021-10-17 RX ADMIN — DIVALPROEX SODIUM 500 MG: 500 TABLET, DELAYED RELEASE ORAL at 09:47

## 2021-10-17 RX ADMIN — NICOTINE POLACRILEX 2 MG: 2 GUM, CHEWING BUCCAL at 17:28

## 2021-10-17 RX ADMIN — LORAZEPAM 2 MG: 1 TABLET ORAL at 12:49

## 2021-10-17 RX ADMIN — PRAZOSIN HYDROCHLORIDE 2 MG: 1 CAPSULE ORAL at 20:46

## 2021-10-17 RX ADMIN — NICOTINE POLACRILEX 2 MG: 2 GUM, CHEWING BUCCAL at 15:47

## 2021-10-17 RX ADMIN — SERTRALINE HYDROCHLORIDE 75 MG: 25 TABLET ORAL at 09:48

## 2021-10-17 RX ADMIN — APIXABAN 5 MG: 5 TABLET, FILM COATED ORAL at 09:52

## 2021-10-17 RX ADMIN — OLANZAPINE 15 MG: 10 TABLET, ORALLY DISINTEGRATING ORAL at 20:46

## 2021-10-17 RX ADMIN — HYDROXYZINE HYDROCHLORIDE 50 MG: 50 TABLET, FILM COATED ORAL at 11:46

## 2021-10-17 RX ADMIN — HALOPERIDOL 5 MG: 5 TABLET ORAL at 19:59

## 2021-10-17 RX ADMIN — NICOTINE POLACRILEX 2 MG: 2 GUM, CHEWING BUCCAL at 20:50

## 2021-10-17 RX ADMIN — ACETAMINOPHEN 650 MG: 325 TABLET ORAL at 15:32

## 2021-10-17 RX ADMIN — LORAZEPAM 2 MG: 1 TABLET ORAL at 19:59

## 2021-10-17 RX ADMIN — HYDROXYZINE HYDROCHLORIDE 50 MG: 50 TABLET, FILM COATED ORAL at 17:49

## 2021-10-17 RX ADMIN — APIXABAN 5 MG: 5 TABLET, FILM COATED ORAL at 20:58

## 2021-10-17 RX ADMIN — DIVALPROEX SODIUM 500 MG: 500 TABLET, DELAYED RELEASE ORAL at 20:46

## 2021-10-17 RX ADMIN — CETIRIZINE HYDROCHLORIDE 10 MG: 10 TABLET, FILM COATED ORAL at 09:48

## 2021-10-17 RX ADMIN — HALOPERIDOL 5 MG: 5 TABLET ORAL at 12:49

## 2021-10-17 ASSESSMENT — PAIN SCALES - GENERAL
PAINLEVEL_OUTOF10: 7
PAINLEVEL_OUTOF10: 3

## 2021-10-17 NOTE — PROGRESS NOTES
Daily Progress Note  10/17/2021    Patient Name: Letty Zavala    CHIEF COMPLAINT: Suicidal ideation         SUBJECTIVE:      Patient is seen today for a follow up assessment. Patient is compliant with scheduled medications. Patient received emergency oral haldol and ativan both yesterday 10/16 at 2146 and today 10/17 at 1249. Per nursing documentation patient requested the medication for increased anxiety and agitation. We will continue to monitor this to make sure patient is not abusing medications. Today upon interview patient is laying in bed but agreeable to interview. She reports her anxiety and depression are severe rating both as a 10 (0-10 with 0 being none and 10 being the worst). She reports that she slept well last night but is still plagued by nightmares. She reports that normally she is on 2 mg of minipress nightly. It was mutually agreed that this will be titrated tonight as long as patient's blood pressure was within normal limits. Patient reports that her appetite is decreased. Patient endorses active suicidal ideation without current plan or intent. She states the thoughts are \"constant. \" She denies homicidal ideation. She is able to contract for safety on the unit but cannot contract for safety off the unit. She reports improvement in auditory hallucinations. She denies visual hallucinations. She denies paranoia. She denies medication side effects or medical concerns at this time. Writer encouraged patient to attend groups on the unit and to not lay in bed all day with her thoughts. At this time, the patient is not appropriate for a lower level of care. There is risk of decompensation and patient warrants further hospitalization for safety and stabilization.     Appetite:  [] Normal/Adequate/Unchanged  [] Increased  [x] Decreased      Sleep:       [] Normal/Adequate/Unchanged  [x] Fair  [] Poor      Group Attendance on Unit:   [] Yes  [] Selectively    [x] No    Medication Side Effects:  Patient denies any medication side effects at the time of assessment. Mental Status Exam  Level of consciousness: Alert and awake. Appearance: Appropriate attire for setting, resting in bed, with fair  grooming and hygiene. Behavior/Motor: Approachable, no psychomotor abnormalities. Attitude toward examiner: Cooperative, attentive, fair eye contact. Speech: Normal rate, normal volume, normal tone. Mood:  Patient reports \"very anxious and restless\". Affect: Depressed, anxious  Thought processes: Linear and coherent. Thought content: Denies homicidal ideation. Suicidal Ideation: Active suicidal ideations, without current plan or intent, contracts for safety on the unit. Delusions: No evidence of delusions. Denies paranoia. Perceptual Disturbance: Patient does not appear to be responding to internal stimuli. Reports improvement in auditory hallucinations. Denies visual hallucinations. Cognition: Oriented to self, location, time, and situation. Memory: Intact. Insight & Judgement: Poor. Data   height is 5' 6\" (1.676 m) and weight is 230 lb (104.3 kg). Her oral temperature is 98.2 °F (36.8 °C). Her blood pressure is 131/70 and her pulse is 53. Her respiration is 14 and oxygen saturation is 100%.    Labs:   Admission on 10/12/2021   Component Date Value Ref Range Status    WBC 10/12/2021 6.8  3.5 - 11.0 k/uL Final    RBC 10/12/2021 4.45  4.0 - 5.2 m/uL Final    Hemoglobin 10/12/2021 13.5  12.0 - 16.0 g/dL Final    Hematocrit 10/12/2021 40.2  36 - 46 % Final    MCV 10/12/2021 90.3  80 - 100 fL Final    MCH 10/12/2021 30.4  26 - 34 pg Final    MCHC 10/12/2021 33.6  31 - 37 g/dL Final    RDW 10/12/2021 13.3  11.5 - 14.9 % Final    Platelets 74/75/8336 271  150 - 450 k/uL Final    MPV 10/12/2021 6.8  6.0 - 12.0 fL Final    NRBC Automated 10/12/2021 NOT REPORTED  per 100 WBC Final    Differential Type 10/12/2021 NOT REPORTED   Final    Immature Granulocytes 10/12/2021 NOT REPORTED  0 % Final    Absolute Immature Granulocyte 10/12/2021 NOT REPORTED  0.00 - 0.30 k/uL Final    WBC Morphology 10/12/2021 NOT REPORTED   Final    RBC Morphology 10/12/2021 NOT REPORTED   Final    Platelet Estimate 50/62/2870 NOT REPORTED   Final    Seg Neutrophils 10/12/2021 48  36 - 66 % Final    Lymphocytes 10/12/2021 47* 24 - 44 % Final    Monocytes 10/12/2021 5  1 - 7 % Final    Eosinophils % 10/12/2021 0  0 - 4 % Final    Basophils 10/12/2021 0  0 - 2 % Final    Segs Absolute 10/12/2021 3.26  1.3 - 9.1 k/uL Final    Absolute Lymph # 10/12/2021 3.20  1.0 - 4.8 k/uL Final    Absolute Mono # 10/12/2021 0.34  0.1 - 1.3 k/uL Final    Absolute Eos # 10/12/2021 0.00  0.0 - 0.4 k/uL Final    Basophils Absolute 10/12/2021 0.00  0.0 - 0.2 k/uL Final    Morphology 10/12/2021 Normal   Final    Glucose 10/12/2021 101* 70 - 99 mg/dL Final    BUN 10/12/2021 23* 6 - 20 mg/dL Final    CREATININE 10/12/2021 0.80  0.50 - 0.90 mg/dL Final    Bun/Cre Ratio 10/12/2021 NOT REPORTED  9 - 20 Final    Calcium 10/12/2021 9.1  8.6 - 10.4 mg/dL Final    Sodium 10/12/2021 138  135 - 144 mmol/L Final    Potassium 10/12/2021 4.4  3.7 - 5.3 mmol/L Final    Chloride 10/12/2021 104  98 - 107 mmol/L Final    CO2 10/12/2021 24  20 - 31 mmol/L Final    Anion Gap 10/12/2021 10  9 - 17 mmol/L Final    Alkaline Phosphatase 10/12/2021 87  35 - 104 U/L Final    ALT 10/12/2021 14  5 - 33 U/L Final    AST 10/12/2021 17  <32 U/L Final    Total Bilirubin 10/12/2021 <0.15* 0.3 - 1.2 mg/dL Final    Total Protein 10/12/2021 7.0  6.4 - 8.3 g/dL Final    Albumin 10/12/2021 4.2  3.5 - 5.2 g/dL Final    Albumin/Globulin Ratio 10/12/2021 NOT REPORTED  1.0 - 2.5 Final    GFR Non- 10/12/2021 >60  >60 mL/min Final    GFR  10/12/2021 >60  >60 mL/min Final    GFR Comment 10/12/2021        Final    Comment: Average GFR for 30-36 years old:   107 mL/min/1.73sq m  Chronic Kidney Disease:   <60 mL/min/1.73sq m  Kidney failure:   <15 mL/min/1.73sq m              eGFR calculated using average adult body mass. Additional eGFR calculator available at:        [employer]            GFR Staging 10/12/2021 NOT REPORTED   Final    TSH 10/12/2021 1.55  0.30 - 5.00 mIU/L Final    Specimen Description 10/12/2021 . NASOPHARYNGEAL SWAB   Final    SARS-CoV-2, Rapid 10/12/2021 Not Detected  Not Detected Final    Comment:       Rapid NAAT:  The specimen is NEGATIVE for SARS-CoV-2, the novel coronavirus associated with   COVID-19. The ID NOW COVID-19 assay is designed to detect the virus that causes COVID-19 in patients   with signs and symptoms of infection who are suspected of COVID-19. An individual without symptoms of COVID-19 and who is not shedding SARS-CoV-2 virus would   expect to have a negative (not detected) result in this assay. Negative results should be treated as presumptive and, if inconsistent with clinical signs   and symptoms or necessary for patient management,  should be tested with an alternative molecular assay. Negative results do not preclude   SARS-CoV-2 infection and   should not be used as the sole basis for patient management decisions.          Fact sheet for Healthcare Providers: [employer]  Fact sheet for Patients: [employer]          Methodology: Isothermal Nucleic Acid Amplification      Color, UA 10/12/2021 Yellow  Yellow Final    Turbidity UA 10/12/2021 Clear  Clear Final    Glucose, Ur 10/12/2021 NEGATIVE  NEGATIVE Final    Bilirubin Urine 10/12/2021 NEGATIVE  NEGATIVE Final    Ketones, Urine 10/12/2021 NEGATIVE  NEGATIVE Final    Specific [place], UA 10/12/2021 1.027  1.000 - 1.030 Final    Urine Hgb 10/12/2021 NEGATIVE  NEGATIVE Final    pH, UA 10/12/2021 6.5  5.0 - 8.0 Final    Protein, UA 10/12/2021 NEGATIVE  NEGATIVE Final    Urobilinogen, Urine 10/12/2021 Normal Normal Final    Nitrite, Urine 10/12/2021 NEGATIVE  NEGATIVE Final    Leukocyte Esterase, Urine 10/12/2021 NEGATIVE  NEGATIVE Final    Urinalysis Comments 10/12/2021 Microscopic exam not performed based on chemical results unless requested in original order. Final    Amphetamine Screen, Ur 10/12/2021 NEGATIVE  NEGATIVE Final    Comment:       (Positive cutoff 1000 ng/mL)                  Barbiturate Screen, Ur 10/12/2021 NEGATIVE  NEGATIVE Final    Comment:       (Positive cutoff 200 ng/mL)                  Benzodiazepine Screen, Urine 10/12/2021 NEGATIVE  NEGATIVE Final    Comment:       (Positive cutoff 200 ng/mL)                  Cocaine Metabolite, Urine 10/12/2021 NEGATIVE  NEGATIVE Final    Comment:       (Positive cutoff 300 ng/mL)                  Methadone Screen, Urine 10/12/2021 NEGATIVE  NEGATIVE Final    Comment:       (Positive cutoff 300 ng/mL)                  Opiates, Urine 10/12/2021 NEGATIVE  NEGATIVE Final    Comment:       (Positive cutoff 300 ng/mL)                  Phencyclidine, Urine 10/12/2021 NEGATIVE  NEGATIVE Final    Comment:       (Positive cutoff 25 ng/mL)                  Propoxyphene, Urine 10/12/2021 NOT REPORTED  NEGATIVE Final    Cannabinoid Scrn, Ur 10/12/2021 POSITIVE* NEGATIVE Final    Comment:       (Positive cutoff 50 ng/mL)                  Oxycodone Screen, Ur 10/12/2021 NEGATIVE  NEGATIVE Final    Comment:       (Positive cutoff 100 ng/mL)                  Methamphetamine, Urine 10/12/2021 NOT REPORTED  NEGATIVE Final    Tricyclic Antidepressants, Urine 10/12/2021 NOT REPORTED  NEGATIVE Final    MDMA, Urine 10/12/2021 NOT REPORTED  NEGATIVE Final    Buprenorphine Urine 10/12/2021 NOT REPORTED  NEGATIVE Final    Test Information 10/12/2021 Assay provides medical screening only. The absence of expected drug(s) and/or metabolite(s) may indicate diluted or adulterated urine, limitations of testing or timing of collection.    Final    Comment: Testing for legal purposes should be confirmed by another method. To request confirmation   of test result, please call the lab within 7 days of sample submission.  Acetaminophen Level 10/12/2021 7* 10 - 30 ug/mL Final    Ethanol 10/12/2021 <10  <10 mg/dL Final    Ethanol percent 10/12/2021 <0.986  % Final    Salicylate Lvl 01/84/8618 <1* 3 - 10 mg/dL Final    Toxic Tricyclic Sc,Blood 59/40/3163 WRONG TEST ORDERED  NEGATIVE Final    Tricyclic Antidep,Urine 73/21/3443 NEGATIVE  NEGATIVE Final    Comment:       (Positive cutoff 1000 ng/mL)  Assay provides rapid clinical screening only. Presumptive positive results for legal   purposes should be confirmed by another method. To request confirmation, please call the   lab within 7 days of sample submission. Reviewed patient's current plan of care and vital signs with nursing staff.     Labs reviewed: [x] Yes    Medications  Current Facility-Administered Medications: sertraline (ZOLOFT) tablet 75 mg, 75 mg, Oral, Daily  loperamide (IMODIUM) capsule 2 mg, 2 mg, Oral, 4x Daily PRN  ondansetron (ZOFRAN-ODT) disintegrating tablet 4 mg, 4 mg, Oral, Q8H PRN  pantoprazole (PROTONIX) tablet 40 mg, 40 mg, Oral, QAM AC  nicotine polacrilex (NICORETTE) gum 2 mg, 2 mg, Oral, PRN  OLANZapine zydis (ZYPREXA) disintegrating tablet 15 mg, 15 mg, Oral, Nightly  prazosin (MINIPRESS) capsule 1 mg, 1 mg, Oral, Nightly  albuterol sulfate  (90 Base) MCG/ACT inhaler 2 puff, 2 puff, Inhalation, Q6H PRN  apixaban (ELIQUIS) tablet 5 mg, 5 mg, Oral, BID  cetirizine (ZYRTEC) tablet 10 mg, 10 mg, Oral, Daily  melatonin tablet 3 mg, 3 mg, Oral, Nightly PRN  divalproex (DEPAKOTE) DR tablet 500 mg, 500 mg, Oral, BID  tiZANidine (ZANAFLEX) tablet 4 mg, 4 mg, Oral, Q8H PRN  acetaminophen (TYLENOL) tablet 650 mg, 650 mg, Oral, Q4H PRN  aluminum & magnesium hydroxide-simethicone (MAALOX) 200-200-20 MG/5ML suspension 30 mL, 30 mL, Oral, Q6H PRN  hydrOXYzine (ATARAX) tablet 50 mg, 50 mg, Oral, TID PRN  haloperidol (HALDOL) tablet 5 mg, 5 mg, Oral, Q6H PRN **AND** LORazepam (ATIVAN) tablet 2 mg, 2 mg, Oral, Q6H PRN  haloperidol lactate (HALDOL) injection 5 mg, 5 mg, IntraMUSCular, Q6H PRN **AND** LORazepam (ATIVAN) injection 2 mg, 2 mg, IntraMUSCular, Q6H PRN **AND** diphenhydrAMINE (BENADRYL) injection 50 mg, 50 mg, IntraMUSCular, Q6H PRN  polyethylene glycol (GLYCOLAX) packet 17 g, 17 g, Oral, Daily PRN  traZODone (DESYREL) tablet 50 mg, 50 mg, Oral, Nightly PRN  nicotine (NICODERM CQ) 14 MG/24HR 1 patch, 1 patch, TransDERmal, Daily    ASSESSMENT  Severe recurrent major depressive disorder with psychotic features ([facility] [address]         PLAN  Patient symptoms are: Remains Unstable. Continue current medication regimen. Titrate minipress to 2 mg nightly  Monitor need and frequency of PRN medications. Encourage participation in groups and milieu. Attempt to develop insight. Psycho-education conducted. Supportive Therapy conducted. Probable discharge is to be determined by MD.   Follow-up daily while inpatient. Patient continues to be monitored in the inpatient psychiatric facility at [facility] for safety and stabilization. Patient continues to need, on a daily basis, active treatment furnished directly by or requiring the supervision of inpatient psychiatric personnel. Electronically signed by DAMARIS Galicia CNP on 10/17/2021 at 3:15 PM    **This report has been created using voice recognition software. It may contain minor errors which are inherent in voice recognition technology. **

## 2021-10-17 NOTE — PLAN OF CARE
Problem: Tobacco Use:  Goal: Inpatient tobacco use cessation counseling participation  Description: Inpatient tobacco use cessation counseling participation  Outcome: Ongoing   Patient uses Nicorette gum to control tobacco cravings   Problem: Depressive Behavior With or Without Suicide Precautions:  Goal: Ability to disclose and discuss suicidal ideas will improve  Description: Ability to disclose and discuss suicidal ideas will improve  Outcome: Ongoing   Tati Wharton is seen in her room affect is flat and she reports increased anxiety and restlessness, requesting PRN medication. Fleeting suicidal thoughts,agrees to be safe on the unit. Takes all her medications , reports fair sleep and no issues with appetite.  15 minute safety checks continue

## 2021-10-17 NOTE — GROUP NOTE
Group Therapy Note    Date: 10/17/2021    Group Start Time: 1400  Group End Time: 1968  Group Topic: Relaxation    CZ BHAMADEO Mayes, CTRS        Group Therapy Note    Attendees: 7/19         Pt did not participate in Relaxation Group at 1400 when encouraged by RT due to resting in room. Pt was offered talk time as an alternative to group but declined.          Discipline Responsible: Psychoeducational Specialist        Signature:  Salena Quinones

## 2021-10-17 NOTE — PLAN OF CARE
Problem: Depressive Behavior With or Without Suicide Precautions:  Goal: Ability to disclose and discuss suicidal ideas will improve  Description: Ability to disclose and discuss suicidal ideas will improve  10/16/2021 2210 by Nihcolas Steven RN  Outcome: Ongoing  Patient is currently admitting to having suicidal thoughts. Patient denies having a plan. Patient contracts for safety, feels safe on unit, and will talk to staff if suicidal thoughts worsens/increases. Problem: Altered Mood, Psychotic Behavior:  Goal: Able to verbalize decrease in frequency and intensity of hallucinations  Description: Able to verbalize decrease in frequency and intensity of hallucinations  Outcome: Ongoing  Patient is alert, observed in day area. Patient is currently admitting to having suicidal thoughts. Patient denies having a plan. Patient contracts for safety, feels safe on unit, and will talk to staff if suicidal thoughts worsens/increases. Patient denies thoughts of wanting to harm others. Patient is admitting to increase anxiety with complaints of irritability, restlessness and not being able to sit still, and depression, 10/10 with 10 being the worst due to suffering from PTSD. Coping skills explored and discussed. Patient given PO Haldol and Ativan for increase anxiety. Patient has been visible on unit, social with peers. Patient reports inadequate sleep and appetite. Patient hygiene is appropriate. Patient is medication compliant, states medications are ineffective. Informed patient to talk with Dr and NP. No further concerns voiced. Will continue to monitor.

## 2021-10-17 NOTE — GROUP NOTE
Group Therapy Note    Date: 10/17/2021    Group Start Time: 1000  Group End Time: 8905  Group Topic: Psychotherapy    [facility]    Roberto Mahmood        Group Therapy Note         Patient refused to attend psychotherapy group after encouragement from staff. 1:1 talk time offered but refused. Signature:   Roberto Mahmood

## 2021-10-17 NOTE — BH NOTE
Patient requesting Haldol Ativan for increased anxiety and agitation, encouraged to use coping skills and patient insists that she has been , deep breathing, rocking, resting in bed at this time

## 2021-10-17 NOTE — BH NOTE
Safety drill completed. All rooms and areas of unit checked. Pt. Denies any safety concerns at this time.

## 2021-10-18 PROCEDURE — APPSS30 APP SPLIT SHARED TIME 16-30 MINUTES: Performed by: PSYCHIATRY & NEUROLOGY

## 2021-10-18 PROCEDURE — 6370000000 HC RX 637 (ALT 250 FOR IP): Performed by: PSYCHIATRY & NEUROLOGY

## 2021-10-18 PROCEDURE — 6370000000 HC RX 637 (ALT 250 FOR IP): Performed by: NURSE PRACTITIONER

## 2021-10-18 PROCEDURE — 1240000000 HC EMOTIONAL WELLNESS R&B

## 2021-10-18 PROCEDURE — 99232 SBSQ HOSP IP/OBS MODERATE 35: CPT | Performed by: PSYCHIATRY & NEUROLOGY

## 2021-10-18 PROCEDURE — 6370000000 HC RX 637 (ALT 250 FOR IP)

## 2021-10-18 RX ORDER — OLANZAPINE 10 MG/1
20 TABLET, ORALLY DISINTEGRATING ORAL NIGHTLY
Status: DISCONTINUED | OUTPATIENT
Start: 2021-10-18 | End: 2021-10-20

## 2021-10-18 RX ORDER — SERTRALINE HYDROCHLORIDE 100 MG/1
100 TABLET, FILM COATED ORAL DAILY
Status: DISCONTINUED | OUTPATIENT
Start: 2021-10-19 | End: 2021-11-03 | Stop reason: HOSPADM

## 2021-10-18 RX ADMIN — NICOTINE POLACRILEX 2 MG: 2 GUM, CHEWING BUCCAL at 20:23

## 2021-10-18 RX ADMIN — TIZANIDINE 4 MG: 4 TABLET ORAL at 20:34

## 2021-10-18 RX ADMIN — HYDROXYZINE HYDROCHLORIDE 50 MG: 50 TABLET, FILM COATED ORAL at 17:54

## 2021-10-18 RX ADMIN — CETIRIZINE HYDROCHLORIDE 10 MG: 10 TABLET, FILM COATED ORAL at 10:21

## 2021-10-18 RX ADMIN — APIXABAN 5 MG: 5 TABLET, FILM COATED ORAL at 10:22

## 2021-10-18 RX ADMIN — PRAZOSIN HYDROCHLORIDE 2 MG: 1 CAPSULE ORAL at 20:34

## 2021-10-18 RX ADMIN — DIVALPROEX SODIUM 500 MG: 500 TABLET, DELAYED RELEASE ORAL at 20:34

## 2021-10-18 RX ADMIN — DIVALPROEX SODIUM 500 MG: 500 TABLET, DELAYED RELEASE ORAL at 10:21

## 2021-10-18 RX ADMIN — SERTRALINE HYDROCHLORIDE 75 MG: 25 TABLET ORAL at 10:21

## 2021-10-18 RX ADMIN — HALOPERIDOL 5 MG: 5 TABLET ORAL at 19:50

## 2021-10-18 RX ADMIN — Medication 3 MG: at 20:34

## 2021-10-18 RX ADMIN — APIXABAN 5 MG: 5 TABLET, FILM COATED ORAL at 20:34

## 2021-10-18 RX ADMIN — OLANZAPINE 20 MG: 10 TABLET, ORALLY DISINTEGRATING ORAL at 20:35

## 2021-10-18 RX ADMIN — TRAZODONE HYDROCHLORIDE 50 MG: 50 TABLET ORAL at 20:34

## 2021-10-18 ASSESSMENT — PAIN SCALES - GENERAL
PAINLEVEL_OUTOF10: 0
PAINLEVEL_OUTOF10: 0

## 2021-10-18 NOTE — PLAN OF CARE
Problem: Depressive Behavior With or Without Suicide Precautions:  Goal: Ability to disclose and discuss suicidal ideas will improve  Description: Ability to disclose and discuss suicidal ideas will improve  10/18/2021 0208 by Patricia Bronson RN  Outcome: Ongoing  Patient is alert, observed in day area. Patient is evasive on approach. Patient reports \"feeling anxious, irritable, not able to concentrate\" despite patient pacing halls with peers laughing with no distress noted. Patient requested PRN Haldol and Ativan PO, states \"I took atarax and it did not help. \" Patient was encouraged to use coping skills relating to anxiety. Patient reports \"I have been using my coping skills all day. \" Patient continues to admit to having suicidal thoughts, denies plan, feels safe on unit, contracts for safety. Patient denies thoughts of wanting to harm others. Patient states anxiety 8/10 and depression 10/10 due to life stressors. Reassurance and support given. Coping skills explored and discussed. Patient reports adequate sleep and appetite. Patient is social with peers, attends unit programming, visible in day area. Hygiene appropriate. Patient verbalizes she is not ready for discharge, want to \"get my anxiety under control\" and wants to go to sober living when discharged. No further concerns voiced. Will continue to monitor.

## 2021-10-18 NOTE — PLAN OF CARE
Problem: Tobacco Use:  Goal: Inpatient tobacco use cessation counseling participation  Description: Inpatient tobacco use cessation counseling participation  Outcome: Ongoing  Note: Patient given tobacco quitline number 41837539195 at this time, refusing to call at this time, states \" I just dont want to quit now\"- patient given information as to the dangers of long term tobacco use. Continue to reinforce the importance of tobacco cessation. Problem: Depressive Behavior With or Without Suicide Precautions:  Goal: Ability to disclose and discuss suicidal ideas will improve  Description: Ability to disclose and discuss suicidal ideas will improve  10/18/2021 1216 by Lindsey Ortiz RN  Outcome: Ongoing  Note: Pt denies thoughts of self harm and is agreeable to seeking out should thoughts of self harm arise. Safe environment maintained. Q15 minute checks for safety cont per unit policy. Will cont to monitor for safety and provides support and reassurance as needed. 10/18/2021 0208 by Celestino Moreno RN  Outcome: Ongoing     Problem: Altered Mood, Psychotic Behavior:  Goal: Able to verbalize decrease in frequency and intensity of hallucinations  Description: Able to verbalize decrease in frequency and intensity of hallucinations  Outcome: Ongoing  Note: Patient is currently denying any hallucinations and is reality based during 1:1 talk time. Problem: Suicide risk  Goal: Provide patient with safe environment  Description: Provide patient with safe environment  Outcome: Completed     Problem: Tobacco Use:  Intervention: Tobacco-use cessation counseling  Note: Patient is currently not requesting any smoking cessation medication per provider's orders. Problem: Altered Mood, Psychotic Behavior:  Intervention: Manage a safe environment  Note: Pt is currently not attempting to inflict self harm.    Intervention: Promote group participation  Note: Patient refused group even after much encouragement. Intervention: Supervise medication intake  Note: Pt is medication compliant, and received meds per drs orders. Safety checks are maintained every 15 minutes, irregular intervals, and shift change.

## 2021-10-18 NOTE — PROGRESS NOTES
Daily Progress Note  10/18/2021    Patient Name: Yunier Ngo    CHIEF COMPLAINT: Suicidal ideation         SUBJECTIVE:      Nursing staff report the patient has maintained medication adherence and did utilize emergency oral medications including Haldol and Ativan last evening at 1959 related to agitation and uncontrolled anxiety. Patient has remained isolative to her room for a large portion of the morning and is agreeable to assessment only at bedside. She reports her mood as\" tired\". Patient endorses active suicidal ideation without current plan or intent. She states the thoughts are \"constant. \" She denies homicidal ideation. She is able to contract for safety on the unit but cannot contract for safety off the unit. She reports improvement in auditory hallucinations. She denies visual hallucinations. She denies paranoia. She denies medication side effects and is agreeable that we will titrate her sertraline starting tomorrow or medical concerns at this time. Writer encouraged patient to attend groups or other milieu programs as engaging with others may be beneficial to her mood. At this time, the patient is not appropriate for a lower level of care. There is risk of decompensation and patient warrants further hospitalization for safety and stabilization. Appetite:  [] Normal/Adequate/Unchanged  [] Increased  [x] Decreased      Sleep:       [] Normal/Adequate/Unchanged  [x] Fair  [] Poor      Group Attendance on Unit:   [] Yes  [] Selectively    [x] No    Medication Side Effects:  Patient denies any medication side effects at the time of assessment. Mental Status Exam  Level of consciousness: Alert and awake. Appearance: Appropriate attire for setting, resting in bed, with fair  grooming and hygiene. Behavior/Motor: Approachable, no psychomotor abnormalities. Attitude toward examiner: Cooperative, attentive, fair eye contact. Speech: Normal rate, normal volume, normal tone.   Mood: Patient reports \"tired\". Affect: Congruent, labile  Thought processes: Linear and coherent. Thought content: Denies homicidal ideation. Suicidal Ideation: Active suicidal ideations, without current plan or intent, contracts for safety on the unit. Delusions: No evidence of delusions. Denies paranoia. Perceptual Disturbance: Patient does not appear to be responding to internal stimuli. Reports improvement in auditory hallucinations. Denies visual hallucinations. Cognition: Oriented to self, location, time, and situation. Memory: Intact. Insight & Judgement: Poor. Data   height is 5' 6\" (1.676 m) and weight is 230 lb (104.3 kg). Her oral temperature is 97.5 °F (36.4 °C). Her blood pressure is 106/68 and her pulse is 58. Her respiration is 14 and oxygen saturation is 100%.    Labs:   Admission on 10/12/2021   Component Date Value Ref Range Status    WBC 10/12/2021 6.8  3.5 - 11.0 k/uL Final    RBC 10/12/2021 4.45  4.0 - 5.2 m/uL Final    Hemoglobin 10/12/2021 13.5  12.0 - 16.0 g/dL Final    Hematocrit 10/12/2021 40.2  36 - 46 % Final    MCV 10/12/2021 90.3  80 - 100 fL Final    MCH 10/12/2021 30.4  26 - 34 pg Final    MCHC 10/12/2021 33.6  31 - 37 g/dL Final    RDW 10/12/2021 13.3  11.5 - 14.9 % Final    Platelets 05/24/0693 271  150 - 450 k/uL Final    MPV 10/12/2021 6.8  6.0 - 12.0 fL Final    NRBC Automated 10/12/2021 NOT REPORTED  per 100 WBC Final    Differential Type 10/12/2021 NOT REPORTED   Final    Immature Granulocytes 10/12/2021 NOT REPORTED  0 % Final    Absolute Immature Granulocyte 10/12/2021 NOT REPORTED  0.00 - 0.30 k/uL Final    WBC Morphology 10/12/2021 NOT REPORTED   Final    RBC Morphology 10/12/2021 NOT REPORTED   Final    Platelet Estimate 47/95/7258 NOT REPORTED   Final    Seg Neutrophils 10/12/2021 48  36 - 66 % Final    Lymphocytes 10/12/2021 47* 24 - 44 % Final    Monocytes 10/12/2021 5  1 - 7 % Final    Eosinophils % 10/12/2021 0  0 - 4 % Final    Basophils 10/12/2021 0  0 - 2 % Final    Segs Absolute 10/12/2021 3.26  1.3 - 9.1 k/uL Final    Absolute Lymph # 10/12/2021 3.20  1.0 - 4.8 k/uL Final    Absolute Mono # 10/12/2021 0.34  0.1 - 1.3 k/uL Final    Absolute Eos # 10/12/2021 0.00  0.0 - 0.4 k/uL Final    Basophils Absolute 10/12/2021 0.00  0.0 - 0.2 k/uL Final    Morphology 10/12/2021 Normal   Final    Glucose 10/12/2021 101* 70 - 99 mg/dL Final    BUN 10/12/2021 23* 6 - 20 mg/dL Final    CREATININE 10/12/2021 0.80  0.50 - 0.90 mg/dL Final    Bun/Cre Ratio 10/12/2021 NOT REPORTED  9 - 20 Final    Calcium 10/12/2021 9.1  8.6 - 10.4 mg/dL Final    Sodium 10/12/2021 138  135 - 144 mmol/L Final    Potassium 10/12/2021 4.4  3.7 - 5.3 mmol/L Final    Chloride 10/12/2021 104  98 - 107 mmol/L Final    CO2 10/12/2021 24  20 - 31 mmol/L Final    Anion Gap 10/12/2021 10  9 - 17 mmol/L Final    Alkaline Phosphatase 10/12/2021 87  35 - 104 U/L Final    ALT 10/12/2021 14  5 - 33 U/L Final    AST 10/12/2021 17  <32 U/L Final    Total Bilirubin 10/12/2021 <0.15* 0.3 - 1.2 mg/dL Final    Total Protein 10/12/2021 7.0  6.4 - 8.3 g/dL Final    Albumin 10/12/2021 4.2  3.5 - 5.2 g/dL Final    Albumin/Globulin Ratio 10/12/2021 NOT REPORTED  1.0 - 2.5 Final    GFR Non- 10/12/2021 >60  >60 mL/min Final    GFR  10/12/2021 >60  >60 mL/min Final    GFR Comment 10/12/2021        Final    Comment: Average GFR for 30-36 years old:   80 mL/min/1.73sq m  Chronic Kidney Disease:   <60 mL/min/1.73sq m  Kidney failure:   <15 mL/min/1.73sq m              eGFR calculated using average adult body mass. Additional eGFR calculator available at:        [employer]            GFR Staging 10/12/2021 NOT REPORTED   Final    TSH 10/12/2021 1.55  0.30 - 5.00 mIU/L Final    Specimen Description 10/12/2021 . NASOPHARYNGEAL SWAB   Final    SARS-CoV-2, Rapid 10/12/2021 Not Detected  Not Detected Final Comment:       Rapid NAAT:  The specimen is NEGATIVE for SARS-CoV-2, the novel coronavirus associated with   COVID-19. The ID NOW COVID-19 assay is designed to detect the virus that causes COVID-19 in patients   with signs and symptoms of infection who are suspected of COVID-19. An individual without symptoms of COVID-19 and who is not shedding SARS-CoV-2 virus would   expect to have a negative (not detected) result in this assay. Negative results should be treated as presumptive and, if inconsistent with clinical signs   and symptoms or necessary for patient management,  should be tested with an alternative molecular assay. Negative results do not preclude   SARS-CoV-2 infection and   should not be used as the sole basis for patient management decisions. Fact sheet for Healthcare Providers: Elaine.[email or web address]  Fact sheet for Patients: [email or web address]          Methodology: Isothermal Nucleic Acid Amplification      Color, UA 10/12/2021 Yellow  Yellow Final    Turbidity UA 10/12/2021 Clear  Clear Final    Glucose, Ur 10/12/2021 NEGATIVE  NEGATIVE Final    Bilirubin Urine 10/12/2021 NEGATIVE  NEGATIVE Final    Ketones, Urine 10/12/2021 NEGATIVE  NEGATIVE Final    Specific [place], UA 10/12/2021 1.027  1.000 - 1.030 Final    Urine Hgb 10/12/2021 NEGATIVE  NEGATIVE Final    pH, UA 10/12/2021 6.5  5.0 - 8.0 Final    Protein, UA 10/12/2021 NEGATIVE  NEGATIVE Final    Urobilinogen, Urine 10/12/2021 Normal  Normal Final    Nitrite, Urine 10/12/2021 NEGATIVE  NEGATIVE Final    Leukocyte Esterase, Urine 10/12/2021 NEGATIVE  NEGATIVE Final    Urinalysis Comments 10/12/2021 Microscopic exam not performed based on chemical results unless requested in original order.    Final    Amphetamine Screen, Ur 10/12/2021 NEGATIVE  NEGATIVE Final    Comment:       (Positive cutoff 1000 ng/mL)                  Barbiturate Screen, Ur 10/12/2021 NEGATIVE  NEGATIVE Final    Comment:       (Positive cutoff 200 ng/mL)                  Benzodiazepine Screen, Urine 10/12/2021 NEGATIVE  NEGATIVE Final    Comment:       (Positive cutoff 200 ng/mL)                  Cocaine Metabolite, Urine 10/12/2021 NEGATIVE  NEGATIVE Final    Comment:       (Positive cutoff 300 ng/mL)                  Methadone Screen, Urine 10/12/2021 NEGATIVE  NEGATIVE Final    Comment:       (Positive cutoff 300 ng/mL)                  Opiates, Urine 10/12/2021 NEGATIVE  NEGATIVE Final    Comment:       (Positive cutoff 300 ng/mL)                  Phencyclidine, Urine 10/12/2021 NEGATIVE  NEGATIVE Final    Comment:       (Positive cutoff 25 ng/mL)                  Propoxyphene, Urine 10/12/2021 NOT REPORTED  NEGATIVE Final    Cannabinoid Scrn, Ur 10/12/2021 POSITIVE* NEGATIVE Final    Comment:       (Positive cutoff 50 ng/mL)                  Oxycodone Screen, Ur 10/12/2021 NEGATIVE  NEGATIVE Final    Comment:       (Positive cutoff 100 ng/mL)                  Methamphetamine, Urine 10/12/2021 NOT REPORTED  NEGATIVE Final    Tricyclic Antidepressants, Urine 10/12/2021 NOT REPORTED  NEGATIVE Final    MDMA, Urine 10/12/2021 NOT REPORTED  NEGATIVE Final    Buprenorphine Urine 10/12/2021 NOT REPORTED  NEGATIVE Final    Test Information 10/12/2021 Assay provides medical screening only. The absence of expected drug(s) and/or metabolite(s) may indicate diluted or adulterated urine, limitations of testing or timing of collection. Final    Comment: Testing for legal purposes should be confirmed by another method. To request confirmation   of test result, please call the lab within 7 days of sample submission.       Acetaminophen Level 10/12/2021 7* 10 - 30 ug/mL Final    Ethanol 10/12/2021 <10  <10 mg/dL Final    Ethanol percent 10/12/2021 <5.522  % Final    Salicylate Lvl 77/34/7645 <1* 3 - 10 mg/dL Final    Toxic Tricyclic Sc,Blood 06/03/6051 WRONG TEST ORDERED  NEGATIVE Final    Tricyclic Antidep,Urine 10/12/2021 NEGATIVE  NEGATIVE Final    Comment:       (Positive cutoff 1000 ng/mL)  Assay provides rapid clinical screening only. Presumptive positive results for legal   purposes should be confirmed by another method. To request confirmation, please call the   lab within 7 days of sample submission. Reviewed patient's current plan of care and vital signs with nursing staff.     Labs reviewed: [x] Yes    Medications  Current Facility-Administered Medications: prazosin (MINIPRESS) capsule 2 mg, 2 mg, Oral, Nightly  sertraline (ZOLOFT) tablet 75 mg, 75 mg, Oral, Daily  loperamide (IMODIUM) capsule 2 mg, 2 mg, Oral, 4x Daily PRN  ondansetron (ZOFRAN-ODT) disintegrating tablet 4 mg, 4 mg, Oral, Q8H PRN  pantoprazole (PROTONIX) tablet 40 mg, 40 mg, Oral, QAM AC  nicotine polacrilex (NICORETTE) gum 2 mg, 2 mg, Oral, PRN  OLANZapine zydis (ZYPREXA) disintegrating tablet 15 mg, 15 mg, Oral, Nightly  albuterol sulfate  (90 Base) MCG/ACT inhaler 2 puff, 2 puff, Inhalation, Q6H PRN  apixaban (ELIQUIS) tablet 5 mg, 5 mg, Oral, BID  cetirizine (ZYRTEC) tablet 10 mg, 10 mg, Oral, Daily  melatonin tablet 3 mg, 3 mg, Oral, Nightly PRN  divalproex (DEPAKOTE) DR tablet 500 mg, 500 mg, Oral, BID  tiZANidine (ZANAFLEX) tablet 4 mg, 4 mg, Oral, Q8H PRN  acetaminophen (TYLENOL) tablet 650 mg, 650 mg, Oral, Q4H PRN  aluminum & magnesium hydroxide-simethicone (MAALOX) 200-200-20 MG/5ML suspension 30 mL, 30 mL, Oral, Q6H PRN  hydrOXYzine (ATARAX) tablet 50 mg, 50 mg, Oral, TID PRN  haloperidol (HALDOL) tablet 5 mg, 5 mg, Oral, Q6H PRN **AND** [DISCONTINUED] LORazepam (ATIVAN) tablet 2 mg, 2 mg, Oral, Q6H PRN  haloperidol lactate (HALDOL) injection 5 mg, 5 mg, IntraMUSCular, Q6H PRN **AND** [DISCONTINUED] LORazepam (ATIVAN) injection 2 mg, 2 mg, IntraMUSCular, Q6H PRN **AND** diphenhydrAMINE (BENADRYL) injection 50 mg, 50 mg, IntraMUSCular, Q6H PRN  polyethylene glycol (GLYCOLAX) packet 17 g, 17 g, Oral, Daily PRN  traZODone (DESYREL) tablet 50 mg, 50 mg, Oral, Nightly PRN    ASSESSMENT  Severe recurrent major depressive disorder with psychotic features [facility])         PLAN  Patient symptoms are: Remains Unstable. Titrate sertraline 100 mg daily starting tomorrow. Ativan per attending physician has been discontinued as it is questionable that patient was overutilizing  Monitor need and frequency of PRN medications. Encourage participation in groups and milieu. Attempt to develop insight. Psycho-education conducted. Supportive Therapy conducted. Probable discharge is to be determined by MD.   Follow-up daily while inpatient. Patient continues to be monitored in the inpatient psychiatric facility at [facility] for safety and stabilization. Patient continues to need, on a daily basis, active treatment furnished directly by or requiring the supervision of inpatient psychiatric personnel. Electronically signed by Timo Barragan CNP on 10/18/2021 at 4:57 PM    **This report has been created using voice recognition software. It may contain minor errors which are inherent in voice recognition technology. **  I independently saw and evaluated the patient. I reviewed the midlevel provider's documentation above. Any additional comments or changes to the midlevel provider's documentation are stated below otherwise agree with assessment. The patient has been complaining of anxiety and has received multiple as needed medications over the weekend. Her Ativan has been discontinued to minimize any medication seeking behavior. The patient states she continues to have some difficulty in sleeping. Her olanzapine dose will be increased to 20 mg at nighttime. PLAN  Zoloft increased to 100 mg daily and Zyprexa increased to 20 mg at night  Attempt to develop insight  Psycho-education conducted. Supportive Therapy conducted. Probable discharge is 2-3 days.    Follow-up daily while on inpatient unit    Electronically signed by Juan Ramon Murray MD on 10/18/21 at 5:31 PM EDT

## 2021-10-18 NOTE — GROUP NOTE
Group Therapy Note    Date: 10/18/2021    Group Start Time: 1100  Group End Time: 8729  Group Topic: Cognitive Skills    REDD Farley, CTRS        Group Therapy Note    Attendees: 9/19       Pt did not participate in Cognitive Skills Group at 1100am when encouraged by RT due to resting in room. Pt was offered talk time as an alternative to group but declined.        Discipline Responsible: Psychoeducational Specialist      Signature:  Angel Luis Harvey

## 2021-10-18 NOTE — GROUP NOTE
Group Therapy Note    Date: 10/18/2021    Group Start Time: 1430  Group End Time: 7530  Group Topic: Cognitive Skills    REDD Love, [place]        Group Therapy Note    Attendees: 9/19         Pt did not participate in Cognitive Skills Group at 1430 when encouraged by RT due to resting in room. Pt was offered talk time as an alternative to group but declined.          Discipline Responsible: Psychoeducational Specialist        Signature:  Guille Rizvi

## 2021-10-18 NOTE — BH NOTE
DISCHARGE UPDATE:  - Writer provides client with application to complete for [employer]. Writer gives application/paperwork to client, she puts it on the desk and roles over. Writer encourages client to complete ASAP regarding having a safe plan at discharge.

## 2021-10-18 NOTE — GROUP NOTE
Group Therapy Note    Date: 10/18/2021    Group Start Time: 1000  Group End Time: 1020  Group Topic: Psychoeducation    [facility]    LILIAN Saldivar LSW        Group Therapy Note    patient refused to attend psychoeducational group at 10:00am after encouragement from staff.      Signature:  LILIAN Saldivar LSW

## 2021-10-19 PROCEDURE — 6370000000 HC RX 637 (ALT 250 FOR IP): Performed by: NURSE PRACTITIONER

## 2021-10-19 PROCEDURE — 6370000000 HC RX 637 (ALT 250 FOR IP): Performed by: PSYCHIATRY & NEUROLOGY

## 2021-10-19 PROCEDURE — 6370000000 HC RX 637 (ALT 250 FOR IP)

## 2021-10-19 PROCEDURE — 99232 SBSQ HOSP IP/OBS MODERATE 35: CPT | Performed by: PSYCHIATRY & NEUROLOGY

## 2021-10-19 PROCEDURE — 1240000000 HC EMOTIONAL WELLNESS R&B

## 2021-10-19 RX ADMIN — OLANZAPINE 20 MG: 10 TABLET, ORALLY DISINTEGRATING ORAL at 20:29

## 2021-10-19 RX ADMIN — HALOPERIDOL 5 MG: 5 TABLET ORAL at 16:08

## 2021-10-19 RX ADMIN — NICOTINE POLACRILEX 2 MG: 2 GUM, CHEWING BUCCAL at 16:09

## 2021-10-19 RX ADMIN — Medication 3 MG: at 20:28

## 2021-10-19 RX ADMIN — PANTOPRAZOLE SODIUM 40 MG: 40 TABLET, DELAYED RELEASE ORAL at 06:17

## 2021-10-19 RX ADMIN — TRAZODONE HYDROCHLORIDE 50 MG: 50 TABLET ORAL at 20:28

## 2021-10-19 RX ADMIN — DIVALPROEX SODIUM 500 MG: 500 TABLET, DELAYED RELEASE ORAL at 08:34

## 2021-10-19 RX ADMIN — APIXABAN 5 MG: 5 TABLET, FILM COATED ORAL at 08:34

## 2021-10-19 RX ADMIN — HYDROXYZINE HYDROCHLORIDE 50 MG: 50 TABLET, FILM COATED ORAL at 11:18

## 2021-10-19 RX ADMIN — HYDROXYZINE HYDROCHLORIDE 50 MG: 50 TABLET, FILM COATED ORAL at 20:28

## 2021-10-19 RX ADMIN — PRAZOSIN HYDROCHLORIDE 2 MG: 1 CAPSULE ORAL at 20:29

## 2021-10-19 RX ADMIN — CETIRIZINE HYDROCHLORIDE 10 MG: 10 TABLET, FILM COATED ORAL at 08:34

## 2021-10-19 RX ADMIN — TIZANIDINE 4 MG: 4 TABLET ORAL at 11:18

## 2021-10-19 RX ADMIN — SERTRALINE HYDROCHLORIDE 100 MG: 100 TABLET ORAL at 08:34

## 2021-10-19 RX ADMIN — NICOTINE POLACRILEX 2 MG: 2 GUM, CHEWING BUCCAL at 17:28

## 2021-10-19 RX ADMIN — APIXABAN 5 MG: 5 TABLET, FILM COATED ORAL at 20:29

## 2021-10-19 RX ADMIN — TIZANIDINE 4 MG: 4 TABLET ORAL at 20:28

## 2021-10-19 RX ADMIN — ACETAMINOPHEN 650 MG: 325 TABLET ORAL at 18:40

## 2021-10-19 RX ADMIN — DIVALPROEX SODIUM 500 MG: 500 TABLET, DELAYED RELEASE ORAL at 20:28

## 2021-10-19 RX ADMIN — NICOTINE POLACRILEX 2 MG: 2 GUM, CHEWING BUCCAL at 19:02

## 2021-10-19 ASSESSMENT — PAIN - FUNCTIONAL ASSESSMENT: PAIN_FUNCTIONAL_ASSESSMENT: 0-10

## 2021-10-19 ASSESSMENT — PAIN SCALES - GENERAL
PAINLEVEL_OUTOF10: 3
PAINLEVEL_OUTOF10: 0

## 2021-10-19 NOTE — PLAN OF CARE
Problem: Tobacco Use:  Goal: Inpatient tobacco use cessation counseling participation  Description: Inpatient tobacco use cessation counseling participation  10/19/2021 1030 by Rosa Avalos RN  Outcome: Ongoing  Note: Patient given tobacco quitline number 86035236423 at this time, refusing to call at this time, states \" I just dont want to quit now\"- patient given information as to the dangers of long term tobacco use. Continue to reinforce the importance of tobacco cessation. 10/18/2021 2043 by Bora Rolon RN  Outcome: Ongoing     Problem: Depressive Behavior With or Without Suicide Precautions:  Goal: Ability to disclose and discuss suicidal ideas will improve  Description: Ability to disclose and discuss suicidal ideas will improve  10/19/2021 1030 by Rosa Avalos RN  Outcome: Ongoing  Note: Pt denies thoughts of self harm and is agreeable to seeking out should thoughts of self harm arise. Safe environment maintained. Q15 minute checks for safety cont per unit policy. Will cont to monitor for safety and provides support and reassurance as needed. 10/18/2021 2043 by Bora Rolon RN  Outcome: Ongoing     Problem: Altered Mood, Psychotic Behavior:  Goal: Able to verbalize decrease in frequency and intensity of hallucinations  Description: Able to verbalize decrease in frequency and intensity of hallucinations  10/19/2021 1030 by Rosa Avalos RN  Outcome: Ongoing  Note: Patient is currently denying hallucinations and is reality based during 1:1 talk times. 10/18/2021 2043 by Bora Rolon RN  Outcome: Ongoing     Problem: Tobacco Use:  Intervention: Tobacco-use cessation counseling  Note: Patient is currently not requesting her prescribed smoking cessation medication. Problem: Altered Mood, Psychotic Behavior:  Intervention: Assess behavior for possible injury to self  Note: Pt is currently not attempting to inflict self harm.    Intervention: Promote group participation  Note: Patient is refusing to attend group even after much encouragement from staff. Intervention: Supervise medication intake  Note: Pt is medication compliant, and received meds per drs orders. Safety checks are maintained every 15 minutes, irregular intervals, and shift change.

## 2021-10-19 NOTE — PROGRESS NOTES
Behavioral Services                                              Medicare Re-Certification    I certify that the inpatient psychiatric hospital services furnished since the previous certification/re-certification were, and continue to be, medically necessary for;    [x] (1) Treatment which could reasonably be expected to improve the patient's condition,    [x] (2) Or for diagnostic study. Estimated length of stay/service -3 to 5 days    Plan for post-hospital care -outpatient care    This patient continues to need, on a daily basis, active treatment furnished directly by or requiring the supervision of inpatient psychiatric personnel.     Electronically signed by Melva Monae MD on 10/19/2021 at 4:46 PM

## 2021-10-19 NOTE — BH NOTE
Patient is agitated as evidence by pacing, wringing hands and pressure speech. Patient requested her 5mg oral of Haldol and received the medication. Patient is currently sitting in the day area, and showing no signs of distress. Safety checks are maintained every 15 minutes and at irregular intervals.

## 2021-10-19 NOTE — GROUP NOTE
Group Therapy Note    Date: 10/19/2021    Group Start Time: 1430  Group End Time: 1520  Group Topic: Cognitive Skills    REDD Farley, CTRS        Group Therapy Note    Attendees: 7/13         Pt did not participate in Cognitive Skills Group at 1430 when encouraged by RT due to resting in room. Pt was offered talk time as an alternative to group but declined.          Discipline Responsible: Psychoeducational Specialist        Signature:  Angel Luis Harvey
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
## 2021-10-19 NOTE — PLAN OF CARE
Problem: Depressive Behavior With or Without Suicide Precautions:  Goal: Ability to disclose and discuss suicidal ideas will improve  Description: Ability to disclose and discuss suicidal ideas will improve  10/18/2021 2043 by Carter Guzman RN  Outcome: Ongoing     Problem: Altered Mood, Psychotic Behavior:  Goal: Able to verbalize decrease in frequency and intensity of hallucinations  Description: Able to verbalize decrease in frequency and intensity of hallucinations  10/18/2021 2043 by Carter Guzman RN  Outcome: Ongoing     Patient denied any hallucinations, suicidal or homicidal thoughts. She reported worsening anxiety and depression and requested oral prns. Patient did not show any outward signs of anxiety. RN and patient discussed the difference between short and long term care, and the benefits of following up long term. Patient took their medications as prescribed. Safety precautions in place and Q 15 minute checks completed.

## 2021-10-20 PROCEDURE — 99232 SBSQ HOSP IP/OBS MODERATE 35: CPT | Performed by: PSYCHIATRY & NEUROLOGY

## 2021-10-20 PROCEDURE — 6370000000 HC RX 637 (ALT 250 FOR IP)

## 2021-10-20 PROCEDURE — 6370000000 HC RX 637 (ALT 250 FOR IP): Performed by: PSYCHIATRY & NEUROLOGY

## 2021-10-20 PROCEDURE — 6370000000 HC RX 637 (ALT 250 FOR IP): Performed by: NURSE PRACTITIONER

## 2021-10-20 PROCEDURE — 1240000000 HC EMOTIONAL WELLNESS R&B

## 2021-10-20 RX ORDER — OLANZAPINE 10 MG/1
25 TABLET, ORALLY DISINTEGRATING ORAL NIGHTLY
Status: DISCONTINUED | OUTPATIENT
Start: 2021-10-20 | End: 2021-10-27

## 2021-10-20 RX ADMIN — ACETAMINOPHEN 650 MG: 325 TABLET ORAL at 18:40

## 2021-10-20 RX ADMIN — APIXABAN 5 MG: 5 TABLET, FILM COATED ORAL at 21:05

## 2021-10-20 RX ADMIN — HYDROXYZINE HYDROCHLORIDE 50 MG: 50 TABLET, FILM COATED ORAL at 21:05

## 2021-10-20 RX ADMIN — NICOTINE POLACRILEX 2 MG: 2 GUM, CHEWING BUCCAL at 13:45

## 2021-10-20 RX ADMIN — HYDROXYZINE HYDROCHLORIDE 50 MG: 50 TABLET, FILM COATED ORAL at 14:23

## 2021-10-20 RX ADMIN — HALOPERIDOL 5 MG: 5 TABLET ORAL at 18:40

## 2021-10-20 RX ADMIN — DIVALPROEX SODIUM 500 MG: 500 TABLET, DELAYED RELEASE ORAL at 21:05

## 2021-10-20 RX ADMIN — OLANZAPINE 25 MG: 10 TABLET, ORALLY DISINTEGRATING ORAL at 21:06

## 2021-10-20 RX ADMIN — NICOTINE POLACRILEX 2 MG: 2 GUM, CHEWING BUCCAL at 17:28

## 2021-10-20 RX ADMIN — TRAZODONE HYDROCHLORIDE 50 MG: 50 TABLET ORAL at 21:05

## 2021-10-20 RX ADMIN — NICOTINE POLACRILEX 2 MG: 2 GUM, CHEWING BUCCAL at 19:30

## 2021-10-20 RX ADMIN — TIZANIDINE 4 MG: 4 TABLET ORAL at 21:05

## 2021-10-20 RX ADMIN — CETIRIZINE HYDROCHLORIDE 10 MG: 10 TABLET, FILM COATED ORAL at 09:19

## 2021-10-20 RX ADMIN — DIVALPROEX SODIUM 500 MG: 500 TABLET, DELAYED RELEASE ORAL at 09:19

## 2021-10-20 RX ADMIN — NICOTINE POLACRILEX 2 MG: 2 GUM, CHEWING BUCCAL at 10:53

## 2021-10-20 RX ADMIN — HALOPERIDOL 5 MG: 5 TABLET ORAL at 12:24

## 2021-10-20 RX ADMIN — SERTRALINE HYDROCHLORIDE 100 MG: 100 TABLET ORAL at 09:19

## 2021-10-20 RX ADMIN — PRAZOSIN HYDROCHLORIDE 2 MG: 1 CAPSULE ORAL at 21:05

## 2021-10-20 RX ADMIN — APIXABAN 5 MG: 5 TABLET, FILM COATED ORAL at 09:19

## 2021-10-20 RX ADMIN — TIZANIDINE 4 MG: 4 TABLET ORAL at 09:19

## 2021-10-20 RX ADMIN — Medication 3 MG: at 21:05

## 2021-10-20 ASSESSMENT — PAIN SCALES - GENERAL
PAINLEVEL_OUTOF10: 2
PAINLEVEL_OUTOF10: 6

## 2021-10-20 NOTE — PLAN OF CARE
Problem: Depressive Behavior With or Without Suicide Precautions:  Goal: Ability to disclose and discuss suicidal ideas will improve  Description: Ability to disclose and discuss suicidal ideas will improve  10/19/2021 2336 by Chandrika Posadas  Outcome: Ongoing  Note: Pt denies thoughts of self harm and is agreeable to seeking out should thoughts of self harm arise. Safe environment maintained. Q15 minute checks for safety cont per unit policy. Will cont to monitor for safety and provides support and reassurance as needed. Problem: Altered Mood, Psychotic Behavior:  Goal: Able to verbalize decrease in frequency and intensity of hallucinations  Description: Able to verbalize decrease in frequency and intensity of hallucinations  10/19/2021 2336 by Chandrika Posadas  Outcome: Ongoing  Note: Patient denies hallucinations at this time. Out in the milieu, social with peers. Flat. Compliant with all prescribed medications for this shift. Safety checks maintained q15min and irregular rounding.
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
## 2021-10-20 NOTE — PROGRESS NOTES
Pharmacy Med Education Group Note    Date: 10/20/21  Start Time: 1430  End Time: 7643    Number Participants in Group:  3    Goal:  Patient will demonstrate an understanding of the medications intended purpose and possible adverse effects  Topic: [place] for Pharmacy Med Ed Group    Discipline Responsible:     OT  AT  [facility].  RT     X Other       Participation Level:     None  Minimal      X Active Listener    X Interactive    Monopolizing         Participation Quality:    X Appropriate  Inappropriate     X       Attentive        Intrusive          Sharing        Resistant          Supportive        Lethargic       Affective:     X Congruent  Incongruent  Blunted  Flat    Constricted  Anxious  Elated  Angry    Labile  Depressed  Other         Cognitive:    X Alert  Oriented PPTP     Concentration   X G  F  P   Attention Span   X G  F  P   Short-Term Memory   X G  F  P   Long-Term Memory  G  F  P   ProblemSolving/  Decision Making  G  F  P   Ability to Process  Information   X G  F  P      Contributing Factors             Delusional             Hallucinating             Flight of Ideas             Other:       Modes of Intervention:    X Education   X Support  Exploration    Clarifying  Problem Solving  Confrontation    Socialization  Limit Setting  Reality Testing    Activity  Movement  Media    Other:            Response to Learning:    X Able to verbalize current knowledge/experience    Able to verbalize/acknowledge new learning    Able to retain information    Capable of insight    Able to change behavior    Progressing to goal    Other:        Comments:     Jaclyn Rajan RPH,PharmD,  10/20/2021, 3:15 PM

## 2021-10-20 NOTE — BH NOTE
Patient complains of headache and request Tylenol, and due to feeling agitated patient also request Haldol

## 2021-10-20 NOTE — FLOWSHEET NOTE
*Patient participated in [facility]        10/20/21 1420   Encounter Summary   Services provided to: Patient   Referral/Consult From: Rounding   Continue Visiting   (10/20/21)   Complexity of Encounter Moderate   Length of Encounter 30 minutes   Spiritual Assessment Completed Yes   Spiritual/Mu-ism   Type Spiritual support   Assessment Calm; Approachable   Intervention Active listening;Prayer   Outcome Receptive;Engaged in conversation;Expressed gratitude

## 2021-10-20 NOTE — PLAN OF CARE
Problem: Tobacco Use:  Goal: Inpatient tobacco use cessation counseling participation  Description: Inpatient tobacco use cessation counseling participation  Outcome: Ongoing   patient uses Nicorette gum to control tobacco cravings   Problem: Depressive Behavior With or Without Suicide Precautions:  Goal: Ability to disclose and discuss suicidal ideas will improve  Description: Ability to disclose and discuss suicidal ideas will improve  10/20/2021 1212 by Marva Lugo LPN  Outcome: Ongoing   Holly Chiu is seen in her room affect is flat and she reports depression at a 9. Endorses fleeting suicidal thoughts but agrees to be safe on the unit. Took medications, out social in [place]. Reports poor sleep and no issues with appetite.  15 minute safety checks continue

## 2021-10-20 NOTE — BH NOTE
Patient requesting Hadol for increased anxiety and agitation, concerns over starting ECT and housing issues

## 2021-10-20 NOTE — GROUP NOTE
Group Therapy Note    Date: 10/20/2021    Group Start Time: 1000  Group End Time: 0739  Group Topic: Psychotherapy    [facility] DAV[facility] Paz        Group Therapy Note    Attendees: 5/16         Patient's Goal:  PT will demonstrate increased interpersonal interaction and a clear understanding on multiple types of coping skills relating to the here-and-now therapeutic practice. Notes:  Patient is making progress, AEB participating in group discussion, actively listening, and supporting other group members. PT participates in group and encourages others to participate     Status After Intervention:  Improved    Participation Level: Active Listener and Interactive    Participation Quality: Appropriate and Attentive      Speech:  normal      Thought Process/Content: Logical      Affective Functioning: Flat      Mood: depressed      Level of consciousness:  Alert, Oriented x4 and Attentive      Response to Learning: Able to verbalize/acknowledge new learning and Progressing to goal      Endings: None Reported    Modes of Intervention: Support, Socialization, Exploration, Clarifying and Problem-solving      Discipline Responsible: /Counselor      Signature:   Eliza Paz

## 2021-10-21 LAB
VALPROIC ACID LEVEL: 71 UG/ML (ref 50–125)
VALPROIC DATE LAST DOSE: NORMAL
VALPROIC DOSE AMOUNT: NORMAL
VALPROIC TIME LAST DOSE: 2105

## 2021-10-21 PROCEDURE — 6370000000 HC RX 637 (ALT 250 FOR IP)

## 2021-10-21 PROCEDURE — 6370000000 HC RX 637 (ALT 250 FOR IP): Performed by: NURSE PRACTITIONER

## 2021-10-21 PROCEDURE — 6370000000 HC RX 637 (ALT 250 FOR IP): Performed by: PSYCHIATRY & NEUROLOGY

## 2021-10-21 PROCEDURE — 1240000000 HC EMOTIONAL WELLNESS R&B

## 2021-10-21 PROCEDURE — APPSS30 APP SPLIT SHARED TIME 16-30 MINUTES: Performed by: NURSE PRACTITIONER

## 2021-10-21 PROCEDURE — 99232 SBSQ HOSP IP/OBS MODERATE 35: CPT | Performed by: PSYCHIATRY & NEUROLOGY

## 2021-10-21 PROCEDURE — 36415 COLL VENOUS BLD VENIPUNCTURE: CPT

## 2021-10-21 PROCEDURE — 80164 ASSAY DIPROPYLACETIC ACD TOT: CPT

## 2021-10-21 RX ADMIN — NICOTINE POLACRILEX 2 MG: 2 GUM, CHEWING BUCCAL at 17:20

## 2021-10-21 RX ADMIN — CETIRIZINE HYDROCHLORIDE 10 MG: 10 TABLET, FILM COATED ORAL at 10:59

## 2021-10-21 RX ADMIN — SERTRALINE HYDROCHLORIDE 100 MG: 100 TABLET ORAL at 10:59

## 2021-10-21 RX ADMIN — ACETAMINOPHEN 650 MG: 325 TABLET ORAL at 18:00

## 2021-10-21 RX ADMIN — PRAZOSIN HYDROCHLORIDE 2 MG: 1 CAPSULE ORAL at 21:19

## 2021-10-21 RX ADMIN — APIXABAN 5 MG: 5 TABLET, FILM COATED ORAL at 21:18

## 2021-10-21 RX ADMIN — PANTOPRAZOLE SODIUM 40 MG: 40 TABLET, DELAYED RELEASE ORAL at 10:59

## 2021-10-21 RX ADMIN — NICOTINE POLACRILEX 2 MG: 2 GUM, CHEWING BUCCAL at 14:28

## 2021-10-21 RX ADMIN — ACETAMINOPHEN 650 MG: 325 TABLET ORAL at 12:48

## 2021-10-21 RX ADMIN — HYDROXYZINE HYDROCHLORIDE 50 MG: 50 TABLET, FILM COATED ORAL at 21:19

## 2021-10-21 RX ADMIN — HYDROXYZINE HYDROCHLORIDE 50 MG: 50 TABLET, FILM COATED ORAL at 12:06

## 2021-10-21 RX ADMIN — TIZANIDINE 4 MG: 4 TABLET ORAL at 21:22

## 2021-10-21 RX ADMIN — TIZANIDINE 4 MG: 4 TABLET ORAL at 12:06

## 2021-10-21 RX ADMIN — OLANZAPINE 25 MG: 10 TABLET, ORALLY DISINTEGRATING ORAL at 21:18

## 2021-10-21 RX ADMIN — HYDROXYZINE HYDROCHLORIDE 50 MG: 50 TABLET, FILM COATED ORAL at 17:59

## 2021-10-21 RX ADMIN — NICOTINE POLACRILEX 2 MG: 2 GUM, CHEWING BUCCAL at 20:09

## 2021-10-21 RX ADMIN — DIVALPROEX SODIUM 500 MG: 500 TABLET, DELAYED RELEASE ORAL at 10:59

## 2021-10-21 RX ADMIN — NICOTINE POLACRILEX 2 MG: 2 GUM, CHEWING BUCCAL at 12:57

## 2021-10-21 RX ADMIN — TRAZODONE HYDROCHLORIDE 50 MG: 50 TABLET ORAL at 21:17

## 2021-10-21 RX ADMIN — APIXABAN 5 MG: 5 TABLET, FILM COATED ORAL at 10:59

## 2021-10-21 RX ADMIN — ONDANSETRON 4 MG: 4 TABLET, ORALLY DISINTEGRATING ORAL at 17:20

## 2021-10-21 ASSESSMENT — PAIN SCALES - GENERAL
PAINLEVEL_OUTOF10: 6
PAINLEVEL_OUTOF10: 2
PAINLEVEL_OUTOF10: 3
PAINLEVEL_OUTOF10: 7

## 2021-10-21 NOTE — GROUP NOTE
Group Therapy Note    Date: 10/21/2021    Group Start Time: 1330  Group End Time: 1400  Group Topic: Group Documentation    [facility]  [address], RN        Group Therapy Note    Attendees:          Patient's Goal:  Learning to face anxiety    Notes:  New ways to deal with anxiety    Status After Intervention:  Unchanged    Participation Level: Monopolizing    Participation Quality: Sharing      Speech:  normal      Thought Process/Content: Logical      Affective Functioning: Congruent      Mood: anxious      Level of consciousness:  Alert      Response to Learning: Able to retain information      Endings: None Reported    Modes of Intervention: Education      Discipline Responsible: Registered Nurse      Signature:  Myron Gomez RN

## 2021-10-21 NOTE — GROUP NOTE
Group Therapy Note    Date: 10/21/2021    Group Start Time: 1430  Group End Time: 0123  Group Topic: Cognitive Skills    [facility]    Sierra Cervantes, [place]        Group Therapy Note    Attendees: 7/13         Patient's Goal:  To increase social interaction and to practice self expression, expressing feelings and exploring positive change, coping skills and resources in 6 domains of wellness, and communication skills. Notes: . Pt was able to practice self expression, expressing feelings and exploring positive change, coping skills and resources in 6 domains of wellness, and communication skills through creative expression. Pt was pulled from group to talk with Provider but returned. Pt is abrasive in tone at times, grandiose r/t peers and task. Pt states \"Is this the same thing\" r/t group topic. Pt reassured group topic was different than last group she attended. Pt completed task very quickly and when encouraged to wait for peers to complete their art work, and then participate in discussion-pt became impatient and left group stating, \"I'm done, I'm going to go and lie down I have a headache\". Pt was up on unit as group ended and engaged in a dice game with peers. Pt did not share individually and did not engage in group discussion with peers.         Status After Intervention:  Improved     Participation Level: Rapidly writes down ideas on paper-limited in content/exploration of topic     Participation Quality: Impatient, difficulty engaging with peers in a positive manner        Speech:  abrasive tone at times      Thought Process/Content: impatient, somewhat grandiose/dismissive toward peers at times     Affective Functioning: Blunted     Mood: impatient,dismissiive of peers in group, can be overbearing with peers at times     Level of consciousness:  Alert, and Attentive to own task briefly        Response to Learning:  Able express current knowledge but appears apathetic et unmotivated, does not remain in group to explore/acknowledge/verbalize new learning, and Progressing to goal        Endings: None Reported     Modes of Intervention: Education, Support, Socialization, Exploration, Clarifying and Problem-solving        Discipline Responsible: Psychoeducational Specialist        Signature:  Buddy Rankin

## 2021-10-21 NOTE — PLAN OF CARE
Problem: Tobacco Use:  Goal: Inpatient tobacco use cessation counseling participation  Description: Inpatient tobacco use cessation counseling participation  10/20/2021 2255 by Baltazar Sawyer RN  Outcome: Ongoing  Patient refused tobacco use cessation counseling. Problem: Depressive Behavior With or Without Suicide Precautions:  Goal: Ability to disclose and discuss suicidal ideas will improve  Description: Ability to disclose and discuss suicidal ideas will improve  10/20/2021 2255 by Baltazar Sawyer RN  Outcome: Ongoing  Patient stated she was having some fleeting suicidal ideations. Patient stated she had no plan and that she felt safe with staff on the unit. Patient observed every 15 minutes and as needed for safety checks. Problem: Altered Mood, Psychotic Behavior:  Goal: Able to verbalize decrease in frequency and intensity of hallucinations  Description: Able to verbalize decrease in frequency and intensity of hallucinations  Outcome: Ongoing  Patient denied hallucinations. Patient showed no signs of having hallucinations. Will continue to monitor.
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
## 2021-10-21 NOTE — GROUP NOTE
Group Therapy Note    Date: 10/21/2021    Group Start Time: 1000  Group End Time: 1030  Group Topic: Psychotherapy    [facility] DAV[facility] Bliss        Group Therapy Note       Patient refused to attend psychotherapy group after encouragement from staff. 1:1 talk time offered but refused. Signature:   Yenifer Bliss

## 2021-10-21 NOTE — PLAN OF CARE
Problem: Tobacco Use:  Goal: Inpatient tobacco use cessation counseling participation  Description: Inpatient tobacco use cessation counseling participation  Outcome: Ongoing   Patient uses Nicorette gum to control tobacco cravings   : Depressive Behavior With or Without Suicide Precautions:  Goal: Ability to disclose and discuss suicidal ideas will improve  Description: Ability to disclose and discuss suicidal ideas will improve  Outcome: Ongoing   Horace Concepcion is seen in the dayroom affect is flat, continues to having anxiety and depression, ECTS scheduled for tomorrow morning. Fleeting suicidal thoughts agrees to remain safe on the unit.  Attends groups, Takes meds

## 2021-10-21 NOTE — GROUP NOTE
Group Therapy Note    Date: 10/21/2021    Group Start Time: 1100  Group End Time: 3614  Group Topic: Cognitive Skills    REDD Tanner, CTRS        Group Therapy Note    Attendees: 7/13         Patient's Goal:  To increase social interaction and to practice problem solving, and communication skills. Notes: Pt participated fully in group task . Pt was able to practice problem solving, and communication skills independently . Pt was initially pleasant and supportive of peers but appears to become frustrated when another peer is able to answer more questions correctly and more quickly than she does. Pt remains in behavioral control but removes self from group and returns some 20 mins later. Status After Intervention:  Improved      Participation Level:  Active Listener and Interactive     Participation Quality: Attentive, Sharing,and Supportive        Speech:  Normal     Thought Process/Content: Logical ,linear         Affective Functioning: Congruent, brightens         Mood: Euthymic initially, then appears frustrated and removes self from group when another peer is more successful in task        Level of consciousness:  Alert, and Attentive         Response to Learning: Able to verbalize current knowledge/experience, Able to verbalize/acknowledge new learning,  and Progressing to goal        Endings: None Reported     Modes of Intervention: Education, Support, Socialization, Exploration, Clarifying and Problem-solving        Discipline Responsible: Psychoeducational Specialist        Signature:  Travis Pulliam
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
## 2021-10-22 ENCOUNTER — APPOINTMENT (OUTPATIENT)
Dept: POSTOP/PACU | Age: 41
DRG: 885 | End: 2021-10-22
Payer: MEDICARE

## 2021-10-22 ENCOUNTER — ANESTHESIA (OUTPATIENT)
Dept: POSTOP/PACU | Age: 41
End: 2021-10-22

## 2021-10-22 ENCOUNTER — ANESTHESIA EVENT (OUTPATIENT)
Dept: POSTOP/PACU | Age: 41
End: 2021-10-22

## 2021-10-22 VITALS — TEMPERATURE: 96.8 F | OXYGEN SATURATION: 94 %

## 2021-10-22 LAB — GLUCOSE BLD-MCNC: 100 MG/DL (ref 65–105)

## 2021-10-22 PROCEDURE — 3700000001 HC ADD 15 MINUTES (ANESTHESIA)

## 2021-10-22 PROCEDURE — 90870 ELECTROCONVULSIVE THERAPY: CPT

## 2021-10-22 PROCEDURE — 6370000000 HC RX 637 (ALT 250 FOR IP): Performed by: NURSE PRACTITIONER

## 2021-10-22 PROCEDURE — 82947 ASSAY GLUCOSE BLOOD QUANT: CPT

## 2021-10-22 PROCEDURE — 6370000000 HC RX 637 (ALT 250 FOR IP)

## 2021-10-22 PROCEDURE — 2580000003 HC RX 258: Performed by: PSYCHIATRY & NEUROLOGY

## 2021-10-22 PROCEDURE — 99222 1ST HOSP IP/OBS MODERATE 55: CPT | Performed by: INTERNAL MEDICINE

## 2021-10-22 PROCEDURE — APPSS30 APP SPLIT SHARED TIME 16-30 MINUTES

## 2021-10-22 PROCEDURE — 7100000001 HC PACU RECOVERY - ADDTL 15 MIN

## 2021-10-22 PROCEDURE — 6370000000 HC RX 637 (ALT 250 FOR IP): Performed by: PSYCHIATRY & NEUROLOGY

## 2021-10-22 PROCEDURE — 3700000000 HC ANESTHESIA ATTENDED CARE

## 2021-10-22 PROCEDURE — GZB2ZZZ ELECTROCONVULSIVE THERAPY, BILATERAL-SINGLE SEIZURE: ICD-10-PCS | Performed by: PSYCHIATRY & NEUROLOGY

## 2021-10-22 PROCEDURE — 7100000000 HC PACU RECOVERY - FIRST 15 MIN

## 2021-10-22 PROCEDURE — 90870 ELECTROCONVULSIVE THERAPY: CPT | Performed by: PSYCHIATRY & NEUROLOGY

## 2021-10-22 PROCEDURE — 2500000003 HC RX 250 WO HCPCS: Performed by: NURSE ANESTHETIST, CERTIFIED REGISTERED

## 2021-10-22 PROCEDURE — 1240000000 HC EMOTIONAL WELLNESS R&B

## 2021-10-22 PROCEDURE — 6370000000 HC RX 637 (ALT 250 FOR IP): Performed by: INTERNAL MEDICINE

## 2021-10-22 PROCEDURE — 6360000002 HC RX W HCPCS: Performed by: NURSE ANESTHETIST, CERTIFIED REGISTERED

## 2021-10-22 PROCEDURE — 99232 SBSQ HOSP IP/OBS MODERATE 35: CPT | Performed by: PSYCHIATRY & NEUROLOGY

## 2021-10-22 RX ORDER — AMOXICILLIN AND CLAVULANATE POTASSIUM 500; 125 MG/1; MG/1
1 TABLET, FILM COATED ORAL EVERY 12 HOURS SCHEDULED
Status: DISPENSED | OUTPATIENT
Start: 2021-10-22 | End: 2021-11-01

## 2021-10-22 RX ORDER — SODIUM CHLORIDE, SODIUM LACTATE, POTASSIUM CHLORIDE, CALCIUM CHLORIDE 600; 310; 30; 20 MG/100ML; MG/100ML; MG/100ML; MG/100ML
INJECTION, SOLUTION INTRAVENOUS CONTINUOUS
Status: DISCONTINUED | OUTPATIENT
Start: 2021-10-22 | End: 2021-11-03 | Stop reason: HOSPADM

## 2021-10-22 RX ORDER — SUCCINYLCHOLINE/SOD CL,ISO/PF 200MG/10ML
SYRINGE (ML) INTRAVENOUS
Status: DISPENSED
Start: 2021-10-22 | End: 2021-10-22

## 2021-10-22 RX ORDER — SUCCINYLCHOLINE CHLORIDE 20 MG/ML
INJECTION INTRAMUSCULAR; INTRAVENOUS PRN
Status: DISCONTINUED | OUTPATIENT
Start: 2021-10-22 | End: 2021-10-22 | Stop reason: SDUPTHER

## 2021-10-22 RX ADMIN — HYDROXYZINE HYDROCHLORIDE 50 MG: 50 TABLET, FILM COATED ORAL at 10:16

## 2021-10-22 RX ADMIN — SERTRALINE HYDROCHLORIDE 100 MG: 100 TABLET ORAL at 09:16

## 2021-10-22 RX ADMIN — TIZANIDINE 4 MG: 4 TABLET ORAL at 20:45

## 2021-10-22 RX ADMIN — HYDROXYZINE HYDROCHLORIDE 50 MG: 50 TABLET, FILM COATED ORAL at 16:26

## 2021-10-22 RX ADMIN — SUCCINYLCHOLINE CHLORIDE 80 MG: 20 INJECTION, SOLUTION INTRAMUSCULAR; INTRAVENOUS at 08:01

## 2021-10-22 RX ADMIN — TIZANIDINE 4 MG: 4 TABLET ORAL at 09:26

## 2021-10-22 RX ADMIN — APIXABAN 5 MG: 5 TABLET, FILM COATED ORAL at 09:16

## 2021-10-22 RX ADMIN — NICOTINE POLACRILEX 2 MG: 2 GUM, CHEWING BUCCAL at 10:49

## 2021-10-22 RX ADMIN — ACETAMINOPHEN 650 MG: 325 TABLET ORAL at 12:56

## 2021-10-22 RX ADMIN — HYDROXYZINE HYDROCHLORIDE 50 MG: 50 TABLET, FILM COATED ORAL at 20:46

## 2021-10-22 RX ADMIN — NICOTINE POLACRILEX 2 MG: 2 GUM, CHEWING BUCCAL at 20:54

## 2021-10-22 RX ADMIN — PANTOPRAZOLE SODIUM 40 MG: 40 TABLET, DELAYED RELEASE ORAL at 09:16

## 2021-10-22 RX ADMIN — SODIUM CHLORIDE, POTASSIUM CHLORIDE, SODIUM LACTATE AND CALCIUM CHLORIDE: 600; 310; 30; 20 INJECTION, SOLUTION INTRAVENOUS at 07:33

## 2021-10-22 RX ADMIN — PRAZOSIN HYDROCHLORIDE 2 MG: 1 CAPSULE ORAL at 20:45

## 2021-10-22 RX ADMIN — ACETAMINOPHEN 650 MG: 325 TABLET ORAL at 08:43

## 2021-10-22 RX ADMIN — ONDANSETRON 4 MG: 4 TABLET, ORALLY DISINTEGRATING ORAL at 09:15

## 2021-10-22 RX ADMIN — OLANZAPINE 25 MG: 10 TABLET, ORALLY DISINTEGRATING ORAL at 20:44

## 2021-10-22 RX ADMIN — TRAZODONE HYDROCHLORIDE 50 MG: 50 TABLET ORAL at 20:43

## 2021-10-22 RX ADMIN — APIXABAN 5 MG: 5 TABLET, FILM COATED ORAL at 20:43

## 2021-10-22 RX ADMIN — Medication 80 MG: at 08:01

## 2021-10-22 RX ADMIN — CETIRIZINE HYDROCHLORIDE 10 MG: 10 TABLET, FILM COATED ORAL at 09:16

## 2021-10-22 RX ADMIN — ACETAMINOPHEN 650 MG: 325 TABLET ORAL at 06:08

## 2021-10-22 RX ADMIN — AMOXICILLIN AND CLAVULANATE POTASSIUM 1 TABLET: 500; 125 TABLET, FILM COATED ORAL at 20:45

## 2021-10-22 RX ADMIN — ACETAMINOPHEN 650 MG: 325 TABLET ORAL at 18:50

## 2021-10-22 ASSESSMENT — PAIN SCALES - GENERAL
PAINLEVEL_OUTOF10: 0
PAINLEVEL_OUTOF10: 2
PAINLEVEL_OUTOF10: 5
PAINLEVEL_OUTOF10: 3
PAINLEVEL_OUTOF10: 5
PAINLEVEL_OUTOF10: 0
PAINLEVEL_OUTOF10: 6
PAINLEVEL_OUTOF10: 0

## 2021-10-22 ASSESSMENT — PAIN - FUNCTIONAL ASSESSMENT: PAIN_FUNCTIONAL_ASSESSMENT: ACTIVITIES ARE NOT PREVENTED

## 2021-10-22 ASSESSMENT — PAIN DESCRIPTION - PAIN TYPE: TYPE: SURGICAL PAIN;REFERRED PAIN

## 2021-10-22 ASSESSMENT — PAIN DESCRIPTION - PROGRESSION: CLINICAL_PROGRESSION: RESOLVED

## 2021-10-22 ASSESSMENT — PAIN DESCRIPTION - FREQUENCY: FREQUENCY: INTERMITTENT

## 2021-10-22 ASSESSMENT — PAIN DESCRIPTION - LOCATION: LOCATION: HEAD

## 2021-10-22 ASSESSMENT — PAIN DESCRIPTION - ONSET: ONSET: UNABLE TO TELL

## 2021-10-22 ASSESSMENT — PAIN DESCRIPTION - DESCRIPTORS: DESCRIPTORS: HEADACHE
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
## 2021-10-22 NOTE — PLAN OF CARE
Problem: Tobacco Use:  Goal: Inpatient tobacco use cessation counseling participation  Description: Inpatient tobacco use cessation counseling participation  10/21/2021 2341 by Rivera Manuel LPN  Outcome: Ongoing     Problem: Depressive Behavior With or Without Suicide Precautions:  Goal: Ability to disclose and discuss suicidal ideas will improve  Description: Ability to disclose and discuss suicidal ideas will improve  10/21/2021 2341 by Rivera Manuel LPN  Outcome: Ongoing  Note: Patients admits to feelings of anxiety and depression. Problem: Altered Mood, Psychotic Behavior:  Goal: Able to verbalize decrease in frequency and intensity of hallucinations  Description: Able to verbalize decrease in frequency and intensity of hallucinations  Outcome: Ongoing  Note: Patient is in a pleasant mood during the shift.

## 2021-10-22 NOTE — BH NOTE
Patient off unit    Patient transferred off unit to PACU with [facility] staff for ECT. Per PACU staff request, pt moved to earlier appointment time.

## 2021-10-22 NOTE — PROGRESS NOTES
BP CUFF DEFLATED LEFT ANKLE    PULSE WIDTH- 1.0  FREQUENCY- 40  DURATION % POWER- 30  CURRENT- 0.9  MOTOR-22  EEG- 67  STATIC- 1260  DYNAMIC- 240

## 2021-10-22 NOTE — ANESTHESIA POSTPROCEDURE EVALUATION
POST- ANESTHESIA EVALUATION       Pt Name: Michelle King  MRN: [id number]  YOB: 1980  Date of evaluation: 10/22/2021  Time:  10:40 AM      BP (!) 143/85   Pulse 101   Temp 97.4 °F (36.3 °C) (Oral)   Resp 14   Ht 5' 6\" (1.676 m)   Wt 230 lb (104.3 kg)   SpO2 98%   BMI 37.12 kg/m²      Consciousness Level  Awake  Cardiopulmonary Status  Stable  Pain Adequately Treated YES  Nausea / Vomiting  NO  Adequate Hydration  YES  Anesthesia Related Complications NONE      Electronically signed by Hernán Pastrana MD on 10/22/2021 at 10:40 AM       Department of Anesthesiology  Postprocedure Note    Patient: Michelle King  MRN: [id number]  YOB: 1980  Date of evaluation: 10/22/2021  Time:  10:40 AM     Procedure Summary     Date: 10/22/21 Room / Location: [address] [place] [address] PACU    Anesthesia Start: 1686 Anesthesia Stop: 0808    Procedure: ECT W/ ANESTHESIA Diagnosis: Major depressive disorder, recurrent, severe with psychotic symptoms    Scheduled Providers:  Responsible Provider: Hernán Pastrana MD    Anesthesia Type: general ASA Status: 3          Anesthesia Type: general    Sara Phase I: Sara Score: 10    Sara Phase II:      Last vitals: Reviewed and per EMR flowsheets.        Anesthesia Post Evaluation
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
## 2021-10-22 NOTE — PROCEDURES
Bilateral ECT Procedure Report  Mike Castillo   10/22/2021  1980         Attending:Melchor Ortiz MD  Preprocedure diagnosis: Major depressive disorder, recurrent, severe with psychotic symptoms (F33.3)  Postprocedure diagnosis: SAME AS PRE-PROCEDURE DIAGNOSIS  Treatment Number: This is treatment # 1   Patient Status: Inpatient   Type of ECT: Bilateral Brief Pulse  Medications  Preprocedure: Tylenol 650mg  Anesthetic: Methohexital 80 mg  Paralytic: Succinylcholine 80 mg  Post procedure: none needed     Cuff placement: Left Lower Extremity    Thymatron Settings 1st Stimulus:     Pulse width: 1.0 ms   Frequency:  40 hz   % Power:   30 %   Static Impedance: 1260 ohms             Dynamic Impedance: 2402 ohms             Motor Seizure Duration: 22 seconds  EEG Seizure Duration: 67 seconds                 The patient's ECT treatment was performed using Thymatron machine  . Timeout:  Time out was performed using two patient identifiers and confirmation of procedure. Patient Preparation: Preprocedure documentation, labs, H&P were all verified and reviewed. The patient was placed in supine position. EEG leads were placed for monitoring of seizure. Jewish areas bilaterally cleaned and prepped. Pre-Tac solution was applied over stimulus electrode site. Thymapad's then applied to stimulus electrode site bilaterally with the center of the lead placed approximately 1 inch superior to the midpoint of line between canthus and the tragus bilaterally. The patient was pre-oxygenated. Procedure in detail: Medications were administered by anesthesia using intravenous access at the doses listed previously in this summary. Anesthetic administered and once confirmation the patient is anesthetized, the patient's blood pressure cuff on lower extremity was inflated to 100mmHg in excess of patient's blood pressure. At this time, the neuromuscular blockade was administered intravenously by anesthesia.   Upper extremity fasciculation were verified followed by lower extremity fasciculation. Once fasciculations terminated, confirmation of affective neuromuscular blockade demonstrated by absence of withdrawal reflex. Bite blocks were put in place. Static impedance was tested and within appropriate limits. Thymatron settings were confirmed with nursing staff. Staff was cleared from the patient and dose of ECT stimulus was delivered. Motor seizure activity  was observed at duration noted and terminated. EEG seizure activity was observed of duration noted that was confirmed via monitoring EEG and terminated with appropriate postictal suppression noted on EEG. Static impedance and dynamic impedance were documented. Tourniquet on  lower extremity was released and recovery procedures initiated. The patient was mask ventilated during the procedure with no significant drops in oxygenation saturation and tolerated the procedure well without any notable adverse effects. Condition: The patient was in stable condition with stable vital signs and able to follow commands when moved to recovery.
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
## 2021-10-22 NOTE — BH NOTE
Patient returns from ECT, vitals 143/85 P79 T97.9 R 15, complains of jaw discomfort, given Tylenol in post op.  Patient is in dining area eating breakfast.

## 2021-10-22 NOTE — PLAN OF CARE
Problem: Tobacco Use:  Goal: Inpatient tobacco use cessation counseling participation  Description: Inpatient tobacco use cessation counseling participation  Outcome: Ongoing   Patient uses Nicorette gum for tobacco cravings   Problem: Altered Mood, Psychotic Behavior:  Goal: Able to verbalize decrease in frequency and intensity of hallucinations  Description: Able to verbalize decrease in frequency and intensity of hallucinations  Outcome: Ongoing   Ladean French is seen in the dayroom affect is flat, she endorses depression and anxiety,  reports fleeting suicidal thoughts, agrees to be safe on the unit. Takes medications and frequently requests PRN medications. Social with peers, good hygiene.  15 minute safety checks continue

## 2021-10-22 NOTE — PROGRESS NOTES
Daily Progress Note  10/22/2021    Patient Name: Michelle King    CHIEF COMPLAINT: Suicidal ideation         SUBJECTIVE:      Patient is seen today for a follow up assessment. Patient is compliant with scheduled medications. Patient has not received emergency medications in the past 24 hours. Patient received her first round of ECT this morning. She was agreeable to interview in the afternoon today. She does complain of some jaw pain and a headache. Patient was reminded to take some Tylenol for this and to let Dr. Mona Appiah know so that they can possibly give her something prior to treatments next time. Patient endorses high depression today and rates it as an 8 (010 scale 0 being on 10 being the worst). She also endorses anxiety and rates this as a 9 (010 scale 0 being on 10 being the worst). She states that anxiety may be due to the ECT and the pain from it. She reports that she had a hard time falling asleep last night and feels tired today. She reports that her appetite has been decreased. Patient endorses active suicidal ideation without current plan or intent. She denies homicidal ideation. She is able to contract for safety on this unit but would feel unsafe if she were off the unit. She endorses auditory hallucinations of \"voices for my past\" but reports these are somewhat improved. She denies visual hallucinations. She denies paranoia. She denies medication side effects or medical concerns at this time. Patient will continue to receive ECT treatments Monday Wednesday and Friday while in inpatient unit. Writer encouraged patient to attend groups on the unit. At this time, the patient is not appropriate for a lower level of care. There is risk of decompensation and patient warrants further hospitalization for safety and stabilization.     Appetite:  [] Normal/Adequate/Unchanged  [] Increased  [x] Decreased      Sleep:       [] Normal/Adequate/Unchanged  [] Fair  [x] Poor      Group Attendance on Unit:   [x] Yes  [] Selectively    [] No    Medication Side Effects:  Patient denies any medication side effects at the time of assessment. Mental Status Exam  Level of consciousness: Alert and awake. Appearance: Appropriate attire for setting, resting in bed, with fair  grooming and hygiene. Behavior/Motor: Approachable, slight psychomotor  slowing  Attitude toward examiner: Cooperative, attentive, good eye contact. Speech: Normal rate, normal volume, normal tone. Mood:  Patient reports \" depressed and anxious\". Affect: Mood congruent  Thought processes: Linear and coherent. Thought content: Denies homicidal ideation. Suicidal Ideation: Active suicidal ideations, without current plan or intent, contracts for safety on the unit. Delusions: No evidence of delusions. Denies paranoia. Perceptual Disturbance: Patient does not appear to be responding to internal stimuli. Endorses auditory hallucinations. Denies visual hallucinations. Cognition: Oriented to self, location, time, and situation. Memory: Intact. Insight & Judgement: Poor. Data   height is 5' 6\" (1.676 m) and weight is 230 lb (104.3 kg). Her oral temperature is 97.4 °F (36.3 °C). Her blood pressure is 143/85 (abnormal) and her pulse is 101. Her respiration is 14 and oxygen saturation is 98%.    Labs:   Admission on 10/12/2021   Component Date Value Ref Range Status    WBC 10/12/2021 6.8  3.5 - 11.0 k/uL Final    RBC 10/12/2021 4.45  4.0 - 5.2 m/uL Final    Hemoglobin 10/12/2021 13.5  12.0 - 16.0 g/dL Final    Hematocrit 10/12/2021 40.2  36 - 46 % Final    MCV 10/12/2021 90.3  80 - 100 fL Final    MCH 10/12/2021 30.4  26 - 34 pg Final    MCHC 10/12/2021 33.6  31 - 37 g/dL Final    RDW 10/12/2021 13.3  11.5 - 14.9 % Final    Platelets 86/55/1505 271  150 - 450 k/uL Final    MPV 10/12/2021 6.8  6.0 - 12.0 fL Final    NRBC Automated 10/12/2021 NOT REPORTED  per 100 WBC Final    Differential Type 10/12/2021 NOT REPORTED   Final    Immature Granulocytes 10/12/2021 NOT REPORTED  0 % Final    Absolute Immature Granulocyte 10/12/2021 NOT REPORTED  0.00 - 0.30 k/uL Final    WBC Morphology 10/12/2021 NOT REPORTED   Final    RBC Morphology 10/12/2021 NOT REPORTED   Final    Platelet Estimate 62/93/7488 NOT REPORTED   Final    Seg Neutrophils 10/12/2021 48  36 - 66 % Final    Lymphocytes 10/12/2021 47* 24 - 44 % Final    Monocytes 10/12/2021 5  1 - 7 % Final    Eosinophils % 10/12/2021 0  0 - 4 % Final    Basophils 10/12/2021 0  0 - 2 % Final    Segs Absolute 10/12/2021 3.26  1.3 - 9.1 k/uL Final    Absolute Lymph # 10/12/2021 3.20  1.0 - 4.8 k/uL Final    Absolute Mono # 10/12/2021 0.34  0.1 - 1.3 k/uL Final    Absolute Eos # 10/12/2021 0.00  0.0 - 0.4 k/uL Final    Basophils Absolute 10/12/2021 0.00  0.0 - 0.2 k/uL Final    Morphology 10/12/2021 Normal   Final    Glucose 10/12/2021 101* 70 - 99 mg/dL Final    BUN 10/12/2021 23* 6 - 20 mg/dL Final    CREATININE 10/12/2021 0.80  0.50 - 0.90 mg/dL Final    Bun/Cre Ratio 10/12/2021 NOT REPORTED  9 - 20 Final    Calcium 10/12/2021 9.1  8.6 - 10.4 mg/dL Final    Sodium 10/12/2021 138  135 - 144 mmol/L Final    Potassium 10/12/2021 4.4  3.7 - 5.3 mmol/L Final    Chloride 10/12/2021 104  98 - 107 mmol/L Final    CO2 10/12/2021 24  20 - 31 mmol/L Final    Anion Gap 10/12/2021 10  9 - 17 mmol/L Final    Alkaline Phosphatase 10/12/2021 87  35 - 104 U/L Final    ALT 10/12/2021 14  5 - 33 U/L Final    AST 10/12/2021 17  <32 U/L Final    Total Bilirubin 10/12/2021 <0.15* 0.3 - 1.2 mg/dL Final    Total Protein 10/12/2021 7.0  6.4 - 8.3 g/dL Final    Albumin 10/12/2021 4.2  3.5 - 5.2 g/dL Final    Albumin/Globulin Ratio 10/12/2021 NOT REPORTED  1.0 - 2.5 Final    GFR Non- 10/12/2021 >60  >60 mL/min Final    GFR  10/12/2021 >60  >60 mL/min Final    GFR Comment 10/12/2021        Final    Comment: Average GFR for 30-39 years old:   80 mL/min/1.73sq m  Chronic Kidney Disease:   <60 mL/min/1.73sq m  Kidney failure:   <15 mL/min/1.73sq m              eGFR calculated using average adult body mass. Additional eGFR calculator available at:        [employer]            GFR Staging 10/12/2021 NOT REPORTED   Final    TSH 10/12/2021 1.55  0.30 - 5.00 mIU/L Final    Specimen Description 10/12/2021 . NASOPHARYNGEAL SWAB   Final    SARS-CoV-2, Rapid 10/12/2021 Not Detected  Not Detected Final    Comment:       Rapid NAAT:  The specimen is NEGATIVE for SARS-CoV-2, the novel coronavirus associated with   COVID-19. The ID NOW COVID-19 assay is designed to detect the virus that causes COVID-19 in patients   with signs and symptoms of infection who are suspected of COVID-19. An individual without symptoms of COVID-19 and who is not shedding SARS-CoV-2 virus would   expect to have a negative (not detected) result in this assay. Negative results should be treated as presumptive and, if inconsistent with clinical signs   and symptoms or necessary for patient management,  should be tested with an alternative molecular assay. Negative results do not preclude   SARS-CoV-2 infection and   should not be used as the sole basis for patient management decisions.          Fact sheet for Healthcare Providers: Elaine.[email or web address]  Fact sheet for Patients: Elaine.tayla          Methodology: Isothermal Nucleic Acid Amplification      Color, UA 10/12/2021 Yellow  Yellow Final    Turbidity UA 10/12/2021 Clear  Clear Final    Glucose, Ur 10/12/2021 NEGATIVE  NEGATIVE Final    Bilirubin Urine 10/12/2021 NEGATIVE  NEGATIVE Final    Ketones, Urine 10/12/2021 NEGATIVE  NEGATIVE Final    Specific [place], UA 10/12/2021 1.027  1.000 - 1.030 Final    Urine Hgb 10/12/2021 NEGATIVE  NEGATIVE Final    pH, UA 10/12/2021 6.5  5.0 - 8.0 Final    Protein, UA 10/12/2021 NEGATIVE Base) MCG/ACT inhaler 2 puff, 2 puff, Inhalation, Q6H PRN  apixaban (ELIQUIS) tablet 5 mg, 5 mg, Oral, BID  cetirizine (ZYRTEC) tablet 10 mg, 10 mg, Oral, Daily  melatonin tablet 3 mg, 3 mg, Oral, Nightly PRN  tiZANidine (ZANAFLEX) tablet 4 mg, 4 mg, Oral, Q8H PRN  acetaminophen (TYLENOL) tablet 650 mg, 650 mg, Oral, Q4H PRN  aluminum & magnesium hydroxide-simethicone (MAALOX) 200-200-20 MG/5ML suspension 30 mL, 30 mL, Oral, Q6H PRN  hydrOXYzine (ATARAX) tablet 50 mg, 50 mg, Oral, TID PRN  haloperidol (HALDOL) tablet 5 mg, 5 mg, Oral, Q6H PRN **AND** [DISCONTINUED] LORazepam (ATIVAN) tablet 2 mg, 2 mg, Oral, Q6H PRN  haloperidol lactate (HALDOL) injection 5 mg, 5 mg, IntraMUSCular, Q6H PRN **AND** [DISCONTINUED] LORazepam (ATIVAN) injection 2 mg, 2 mg, IntraMUSCular, Q6H PRN **AND** diphenhydrAMINE (BENADRYL) injection 50 mg, 50 mg, IntraMUSCular, Q6H PRN  polyethylene glycol (GLYCOLAX) packet 17 g, 17 g, Oral, Daily PRN  traZODone (DESYREL) tablet 50 mg, 50 mg, Oral, Nightly PRN    ASSESSMENT  Severe recurrent major depressive disorder with psychotic features [facility]         PLAN  Patient symptoms are: Remains Unstable. Continue current medication regimen. Continue ECT treatments   Monitor need and frequency of PRN medications. Encourage participation in groups and milieu. Attempt to develop insight. Psycho-education conducted. Supportive Therapy conducted. Probable discharge is to be determined by MD.   Follow-up daily while inpatient. Patient continues to be monitored in the inpatient psychiatric facility at [facility] for safety and stabilization. Patient continues to need, on a daily basis, active treatment furnished directly by or requiring the supervision of inpatient psychiatric personnel. Electronically signed by DAMARIS Cordova CNP on 10/22/2021 at 2:48 PM    **This report has been created using voice recognition software.  It may contain minor errors which are inherent in voice recognition technology. **    I independently saw and evaluated the patient. I reviewed the midlevel provider's documentation above. Any additional comments or changes to the midlevel provider's documentation are stated below otherwise agree with assessment. The patient was seen at bedside. She has had ECT this morning. She has got jaw pain from that. She is also feeling tired. The patient has been sleeping well at night despite discontinuing her Depakote. She continues to have anxiety ,depressive symptoms and suicidal thoughts. PLAN  Medications as noted above  Attempt to develop insight  Psycho-education conducted. Supportive Therapy conducted. Probable discharge is 3-5 days.    Follow-up daily while on inpatient unit    Electronically signed by Cornell Swann MD on 10/22/21 at 7:49 PM EDT

## 2021-10-22 NOTE — GROUP NOTE
Group Therapy Note    Date: 10/22/2021    Group Start Time: 1000  Group End Time: 1030  Group Topic: Psychotherapy    [facility]    Aide Vidales        Group Therapy Note    Attendees: *4/16         Patient's Goal:  PT will participate actively in group discussion using conversation cubes. Notes:  Patient is making progress, AEB participating in group discussion, actively listening, and supporting other group members. PT participates in group and encourages others to participate     Status After Intervention:  Improved    Participation Level: Active Listener and Interactive    Participation Quality: Appropriate and Sharing      Speech:  normal      Thought Process/Content: Linear      Affective Functioning: Flat      Mood: euphoric      Level of consciousness:  Alert, Oriented x4 and Attentive      Response to Learning: Able to verbalize/acknowledge new learning and Progressing to goal      Endings: None Reported    Modes of Intervention: Support, Socialization, Exploration, Clarifying and Problem-solving      Discipline Responsible: /Counselor      Signature:   Aide Vidales

## 2021-10-23 PROCEDURE — 6370000000 HC RX 637 (ALT 250 FOR IP): Performed by: INTERNAL MEDICINE

## 2021-10-23 PROCEDURE — 6370000000 HC RX 637 (ALT 250 FOR IP): Performed by: NURSE PRACTITIONER

## 2021-10-23 PROCEDURE — 6370000000 HC RX 637 (ALT 250 FOR IP): Performed by: PSYCHIATRY & NEUROLOGY

## 2021-10-23 PROCEDURE — 99232 SBSQ HOSP IP/OBS MODERATE 35: CPT | Performed by: INTERNAL MEDICINE

## 2021-10-23 PROCEDURE — 1240000000 HC EMOTIONAL WELLNESS R&B

## 2021-10-23 PROCEDURE — 99231 SBSQ HOSP IP/OBS SF/LOW 25: CPT | Performed by: NURSE PRACTITIONER

## 2021-10-23 PROCEDURE — 6370000000 HC RX 637 (ALT 250 FOR IP)

## 2021-10-23 RX ORDER — FLUTICASONE PROPIONATE 50 MCG
1 SPRAY, SUSPENSION (ML) NASAL DAILY
Status: DISCONTINUED | OUTPATIENT
Start: 2021-10-23 | End: 2021-11-03 | Stop reason: HOSPADM

## 2021-10-23 RX ADMIN — APIXABAN 5 MG: 5 TABLET, FILM COATED ORAL at 20:52

## 2021-10-23 RX ADMIN — FLUTICASONE PROPIONATE 1 SPRAY: 50 SPRAY, METERED NASAL at 20:57

## 2021-10-23 RX ADMIN — HYDROXYZINE HYDROCHLORIDE 50 MG: 50 TABLET, FILM COATED ORAL at 19:30

## 2021-10-23 RX ADMIN — AMOXICILLIN AND CLAVULANATE POTASSIUM 1 TABLET: 500; 125 TABLET, FILM COATED ORAL at 20:52

## 2021-10-23 RX ADMIN — PANTOPRAZOLE SODIUM 40 MG: 40 TABLET, DELAYED RELEASE ORAL at 08:16

## 2021-10-23 RX ADMIN — NICOTINE POLACRILEX 2 MG: 2 GUM, CHEWING BUCCAL at 18:39

## 2021-10-23 RX ADMIN — NICOTINE POLACRILEX 2 MG: 2 GUM, CHEWING BUCCAL at 16:08

## 2021-10-23 RX ADMIN — HYDROXYZINE HYDROCHLORIDE 50 MG: 50 TABLET, FILM COATED ORAL at 13:55

## 2021-10-23 RX ADMIN — HALOPERIDOL 5 MG: 5 TABLET ORAL at 23:03

## 2021-10-23 RX ADMIN — TRAZODONE HYDROCHLORIDE 50 MG: 50 TABLET ORAL at 20:52

## 2021-10-23 RX ADMIN — CETIRIZINE HYDROCHLORIDE 10 MG: 10 TABLET, FILM COATED ORAL at 08:16

## 2021-10-23 RX ADMIN — OLANZAPINE 25 MG: 10 TABLET, ORALLY DISINTEGRATING ORAL at 20:52

## 2021-10-23 RX ADMIN — ACETAMINOPHEN 650 MG: 325 TABLET ORAL at 08:16

## 2021-10-23 RX ADMIN — TIZANIDINE 4 MG: 4 TABLET ORAL at 19:31

## 2021-10-23 RX ADMIN — AMOXICILLIN AND CLAVULANATE POTASSIUM 1 TABLET: 500; 125 TABLET, FILM COATED ORAL at 08:16

## 2021-10-23 RX ADMIN — APIXABAN 5 MG: 5 TABLET, FILM COATED ORAL at 08:16

## 2021-10-23 RX ADMIN — ACETAMINOPHEN 650 MG: 325 TABLET ORAL at 19:30

## 2021-10-23 RX ADMIN — TIZANIDINE 4 MG: 4 TABLET ORAL at 08:16

## 2021-10-23 RX ADMIN — PRAZOSIN HYDROCHLORIDE 2 MG: 1 CAPSULE ORAL at 20:52

## 2021-10-23 RX ADMIN — SERTRALINE HYDROCHLORIDE 100 MG: 100 TABLET ORAL at 08:16

## 2021-10-23 RX ADMIN — NICOTINE POLACRILEX 2 MG: 2 GUM, CHEWING BUCCAL at 20:55

## 2021-10-23 RX ADMIN — ACETAMINOPHEN 650 MG: 325 TABLET ORAL at 15:42

## 2021-10-23 ASSESSMENT — PAIN SCALES - GENERAL
PAINLEVEL_OUTOF10: 3
PAINLEVEL_OUTOF10: 2
PAINLEVEL_OUTOF10: 3
PAINLEVEL_OUTOF10: 7
PAINLEVEL_OUTOF10: 7

## 2021-10-23 ASSESSMENT — PAIN - FUNCTIONAL ASSESSMENT
PAIN_FUNCTIONAL_ASSESSMENT: 0-10
PAIN_FUNCTIONAL_ASSESSMENT: 0-10

## 2021-10-23 NOTE — PLAN OF CARE
Problem: Depressive Behavior With or Without Suicide Precautions:  Goal: Ability to disclose and discuss suicidal ideas will improve  Description: Ability to disclose and discuss suicidal ideas will improve  10/23/2021 0933 by Connor Rice LPN  Outcome: Ongoing  Note: Patient admits to suicidal thoughts with no plan. Patient contracts for safety. Patient is depressed and anxious. Will continue to monitor. Problem: Altered Mood, Psychotic Behavior:  Goal: Able to verbalize decrease in frequency and intensity of hallucinations  Description: Able to verbalize decrease in frequency and intensity of hallucinations  10/23/2021 0933 by Connor Rice LPN  Outcome: Ongoing  Note: Patient denies any current hallucinations. Will continue to monitor.

## 2021-10-23 NOTE — PLAN OF CARE
Problem: Tobacco Use:  Goal: Inpatient tobacco use cessation counseling participation  Description: Inpatient tobacco use cessation counseling participation  10/22/2021 2101 by Elsi Conti LPN  Outcome: Ongoing     Problem: Depressive Behavior With or Without Suicide Precautions:  Goal: Ability to disclose and discuss suicidal ideas will improve  Description: Ability to disclose and discuss suicidal ideas will improve  Outcome: Ongoing  Note: Patient admits to having feelings of depression and anxiety. Problem: Altered Mood, Psychotic Behavior:  Goal: Able to verbalize decrease in frequency and intensity of hallucinations  Description: Able to verbalize decrease in frequency and intensity of hallucinations  10/22/2021 2101 by Elsi Conti LPN  Outcome: Ongoing  Note: Patient is in a pleasant, very brightened mood.

## 2021-10-23 NOTE — PLAN OF CARE
Problem: Depressive Behavior With or Without Suicide Precautions:  Goal: Ability to disclose and discuss suicidal ideas will improve  Description: Ability to disclose and discuss suicidal ideas will improve  10/23/2021 0944 by Tristin Giraldo RN  Outcome: Ongoing  10/23/2021 0933 by Alistair Yen LPN  Outcome: Ongoing  Note: Patient admits to suicidal thoughts with no plan. Patient contracts for safety. Patient is depressed and anxious. Will continue to monitor. 10/22/2021 2101 by Rivera Manuel LPN  Outcome: Ongoing  Note: Patient admits to having feelings of depression and anxiety. Problem: Altered Mood, Psychotic Behavior:  Goal: Able to verbalize decrease in frequency and intensity of hallucinations  Description: Able to verbalize decrease in frequency and intensity of hallucinations  10/23/2021 0944 by Tristin Giraldo RN  Outcome: Ongoing  10/23/2021 0933 by Alistair Yen LPN  Outcome: Ongoing  Note: Patient denies any current hallucinations. Will continue to monitor. 10/22/2021 2101 by Rivera Manuel LPN  Outcome: Ongoing  Note: Patient is in a pleasant, very brightened mood. Pt has times where she is social and appears joyful. Then she has times where she is isolative and states she is hearing voices, suicidal and depressed.

## 2021-10-23 NOTE — PROGRESS NOTES
Daily Progress Note  10/23/2021    Patient Name: Trista Henry    CHIEF COMPLAINT: Suicidal ideation         SUBJECTIVE:    Patient is seen today for a follow up assessment in her assigned room. Patient is compliant with scheduled medications. She has not received emergency medications in the past 24 hours. Radha Grullon is endorsing some noticeable improvement in her mood today. She states \"I am not as depressed or anxious today, and I have not thought about suicide\". She is hopeful that this is related to commencement of ECT treatment. She is endorsing minimal cognitive side effects but reports forgetting her friends name who visited on the unit yesterday. She does not find this to be distressing. She endorses muscular jaw pain related to treatment but this has subsided today. She will continue with ECT treatments on Monday. She is also not experiencing auditory hallucinations during conversation and feels they have decreased in intensity and frequency. She has been spending time in the day area and is social and her interactions with peers and staff have been appropriate. She is currently being treated for an ear infection and this has been causing her some discomfort. She agrees to contract for safety on the unit however at this time she is unable to contract for safety in the community. Appetite:  [] Normal/Adequate/Unchanged  [] Increased  [x] Decreased      Sleep:       [] Normal/Adequate/Unchanged  [x] Fair  [] Poor      Group Attendance on Unit:   [x] Yes  [] Selectively    [] No    Medication Side Effects:  Patient denies any medication side effects at the time of assessment. Mental Status Exam  Level of consciousness: Alert and awake. Appearance: Appropriate attire for setting, seated on bed, with fair  grooming and hygiene. Behavior/Motor: Approachable, no psychomotor abnormalities observed  Attitude toward examiner: Cooperative, attentive, good eye contact.   Speech: Spontaneous, normal rate volume and tone  Mood: Anxious  Affect: Mood congruent  Thought processes: Linear and coherent. Thought content: Denies homicidal ideation. Suicidal Ideation: Improving, no plan or intent  Delusions: No evidence of delusions. Denies paranoia. Perceptual Disturbance: Patient does not appear to be responding to internal stimuli. Endorses improved auditory hallucinations. Denies visual hallucinations. Cognition: Oriented to self, location, time, and situation. Memory: Intact. Insight & Judgement: Poor. Data   height is 5' 6\" (1.676 m) and weight is 230 lb (104.3 kg). Her infrared temperature is 97.5 °F (36.4 °C). Her blood pressure is 128/73 and her pulse is 69. Her respiration is 14 and oxygen saturation is 98%.    Labs:   Admission on 10/12/2021   Component Date Value Ref Range Status    WBC 10/12/2021 6.8  3.5 - 11.0 k/uL Final    RBC 10/12/2021 4.45  4.0 - 5.2 m/uL Final    Hemoglobin 10/12/2021 13.5  12.0 - 16.0 g/dL Final    Hematocrit 10/12/2021 40.2  36 - 46 % Final    MCV 10/12/2021 90.3  80 - 100 fL Final    MCH 10/12/2021 30.4  26 - 34 pg Final    MCHC 10/12/2021 33.6  31 - 37 g/dL Final    RDW 10/12/2021 13.3  11.5 - 14.9 % Final    Platelets 74/49/8158 271  150 - 450 k/uL Final    MPV 10/12/2021 6.8  6.0 - 12.0 fL Final    NRBC Automated 10/12/2021 NOT REPORTED  per 100 WBC Final    Differential Type 10/12/2021 NOT REPORTED   Final    Immature Granulocytes 10/12/2021 NOT REPORTED  0 % Final    Absolute Immature Granulocyte 10/12/2021 NOT REPORTED  0.00 - 0.30 k/uL Final    WBC Morphology 10/12/2021 NOT REPORTED   Final    RBC Morphology 10/12/2021 NOT REPORTED   Final    Platelet Estimate 42/07/9894 NOT REPORTED   Final    Seg Neutrophils 10/12/2021 48  36 - 66 % Final    Lymphocytes 10/12/2021 47* 24 - 44 % Final    Monocytes 10/12/2021 5  1 - 7 % Final    Eosinophils % 10/12/2021 0  0 - 4 % Final    Basophils 10/12/2021 0  0 - 2 % Final    Segs Absolute 10/12/2021 3. 26  1.3 - 9.1 k/uL Final    Absolute Lymph # 10/12/2021 3.20  1.0 - 4.8 k/uL Final    Absolute Mono # 10/12/2021 0.34  0.1 - 1.3 k/uL Final    Absolute Eos # 10/12/2021 0.00  0.0 - 0.4 k/uL Final    Basophils Absolute 10/12/2021 0.00  0.0 - 0.2 k/uL Final    Morphology 10/12/2021 Normal   Final    Glucose 10/12/2021 101* 70 - 99 mg/dL Final    BUN 10/12/2021 23* 6 - 20 mg/dL Final    CREATININE 10/12/2021 0.80  0.50 - 0.90 mg/dL Final    Bun/Cre Ratio 10/12/2021 NOT REPORTED  9 - 20 Final    Calcium 10/12/2021 9.1  8.6 - 10.4 mg/dL Final    Sodium 10/12/2021 138  135 - 144 mmol/L Final    Potassium 10/12/2021 4.4  3.7 - 5.3 mmol/L Final    Chloride 10/12/2021 104  98 - 107 mmol/L Final    CO2 10/12/2021 24  20 - 31 mmol/L Final    Anion Gap 10/12/2021 10  9 - 17 mmol/L Final    Alkaline Phosphatase 10/12/2021 87  35 - 104 U/L Final    ALT 10/12/2021 14  5 - 33 U/L Final    AST 10/12/2021 17  <32 U/L Final    Total Bilirubin 10/12/2021 <0.15* 0.3 - 1.2 mg/dL Final    Total Protein 10/12/2021 7.0  6.4 - 8.3 g/dL Final    Albumin 10/12/2021 4.2  3.5 - 5.2 g/dL Final    Albumin/Globulin Ratio 10/12/2021 NOT REPORTED  1.0 - 2.5 Final    GFR Non- 10/12/2021 >60  >60 mL/min Final    GFR  10/12/2021 >60  >60 mL/min Final    GFR Comment 10/12/2021        Final    Comment: Average GFR for 30-36 years old:   80 mL/min/1.73sq m  Chronic Kidney Disease:   <60 mL/min/1.73sq m  Kidney failure:   <15 mL/min/1.73sq m              eGFR calculated using average adult body mass. Additional eGFR calculator available at:        [email or web address]            GFR Staging 10/12/2021 NOT REPORTED   Final    TSH 10/12/2021 1.55  0.30 - 5.00 mIU/L Final    Specimen Description 10/12/2021 . NASOPHARYNGEAL SWAB   Final    SARS-CoV-2, Rapid 10/12/2021 Not Detected  Not Detected Final    Comment:       Rapid NAAT:  The specimen is NEGATIVE for SARS-CoV-2, the novel coronavirus associated with   COVID-19. The ID NOW COVID-19 assay is designed to detect the virus that causes COVID-19 in patients   with signs and symptoms of infection who are suspected of COVID-19. An individual without symptoms of COVID-19 and who is not shedding SARS-CoV-2 virus would   expect to have a negative (not detected) result in this assay. Negative results should be treated as presumptive and, if inconsistent with clinical signs   and symptoms or necessary for patient management,  should be tested with an alternative molecular assay. Negative results do not preclude   SARS-CoV-2 infection and   should not be used as the sole basis for patient management decisions. Fact sheet for Healthcare Providers: [employer]  Fact sheet for Patients: [employer]          Methodology: Isothermal Nucleic Acid Amplification      Color, UA 10/12/2021 Yellow  Yellow Final    Turbidity UA 10/12/2021 Clear  Clear Final    Glucose, Ur 10/12/2021 NEGATIVE  NEGATIVE Final    Bilirubin Urine 10/12/2021 NEGATIVE  NEGATIVE Final    Ketones, Urine 10/12/2021 NEGATIVE  NEGATIVE Final    Specific [place], UA 10/12/2021 1.027  1.000 - 1.030 Final    Urine Hgb 10/12/2021 NEGATIVE  NEGATIVE Final    pH, UA 10/12/2021 6.5  5.0 - 8.0 Final    Protein, UA 10/12/2021 NEGATIVE  NEGATIVE Final    Urobilinogen, Urine 10/12/2021 Normal  Normal Final    Nitrite, Urine 10/12/2021 NEGATIVE  NEGATIVE Final    Leukocyte Esterase, Urine 10/12/2021 NEGATIVE  NEGATIVE Final    Urinalysis Comments 10/12/2021 Microscopic exam not performed based on chemical results unless requested in original order.    Final    Amphetamine Screen, Ur 10/12/2021 NEGATIVE  NEGATIVE Final    Comment:       (Positive cutoff 1000 ng/mL)                  Barbiturate Screen, Ur 10/12/2021 NEGATIVE  NEGATIVE Final    Comment:       (Positive cutoff 200 ng/mL)  Benzodiazepine Screen, Urine 10/12/2021 NEGATIVE  NEGATIVE Final    Comment:       (Positive cutoff 200 ng/mL)                  Cocaine Metabolite, Urine 10/12/2021 NEGATIVE  NEGATIVE Final    Comment:       (Positive cutoff 300 ng/mL)                  Methadone Screen, Urine 10/12/2021 NEGATIVE  NEGATIVE Final    Comment:       (Positive cutoff 300 ng/mL)                  Opiates, Urine 10/12/2021 NEGATIVE  NEGATIVE Final    Comment:       (Positive cutoff 300 ng/mL)                  Phencyclidine, Urine 10/12/2021 NEGATIVE  NEGATIVE Final    Comment:       (Positive cutoff 25 ng/mL)                  Propoxyphene, Urine 10/12/2021 NOT REPORTED  NEGATIVE Final    Cannabinoid Scrn, Ur 10/12/2021 POSITIVE* NEGATIVE Final    Comment:       (Positive cutoff 50 ng/mL)                  Oxycodone Screen, Ur 10/12/2021 NEGATIVE  NEGATIVE Final    Comment:       (Positive cutoff 100 ng/mL)                  Methamphetamine, Urine 10/12/2021 NOT REPORTED  NEGATIVE Final    Tricyclic Antidepressants, Urine 10/12/2021 NOT REPORTED  NEGATIVE Final    MDMA, Urine 10/12/2021 NOT REPORTED  NEGATIVE Final    Buprenorphine Urine 10/12/2021 NOT REPORTED  NEGATIVE Final    Test Information 10/12/2021 Assay provides medical screening only. The absence of expected drug(s) and/or metabolite(s) may indicate diluted or adulterated urine, limitations of testing or timing of collection. Final    Comment: Testing for legal purposes should be confirmed by another method. To request confirmation   of test result, please call the lab within 7 days of sample submission.       Acetaminophen Level 10/12/2021 7* 10 - 30 ug/mL Final    Ethanol 10/12/2021 <10  <10 mg/dL Final    Ethanol percent 10/12/2021 <9.787  % Final    Salicylate Lvl 53/39/8790 <1* 3 - 10 mg/dL Final    Toxic Tricyclic Sc,Blood 19/81/5762 WRONG TEST ORDERED  NEGATIVE Final    Tricyclic Antidep,Urine 64/64/1630 NEGATIVE  NEGATIVE Final    Comment: (Positive cutoff 1000 ng/mL)  Assay provides rapid clinical screening only. Presumptive positive results for legal   purposes should be confirmed by another method. To request confirmation, please call the   lab within 7 days of sample submission.  Valproic Acid Lvl 10/21/2021 71  50 - 125 ug/mL Final    Valproic Dose amount 10/21/2021 500 MG   Final    Valproic Date last dose 10/21/2021 [id number],[id number]   Final    Valproic Time last dose 10/21/2021 2,105   Final    POC Glucose 10/22/2021 100  65 - 105 mg/dL Final         Reviewed patient's current plan of care and vital signs with nursing staff.     Labs reviewed: [x] Yes    Medications  Current Facility-Administered Medications: lactated ringers infusion, , IntraVENous, Continuous  amoxicillin-clavulanate (AUGMENTIN) 500-125 MG per tablet 1 tablet, 1 tablet, Oral, 2 times per day  OLANZapine zydis (ZYPREXA) disintegrating tablet 25 mg, 25 mg, Oral, Nightly  sertraline (ZOLOFT) tablet 100 mg, 100 mg, Oral, Daily  prazosin (MINIPRESS) capsule 2 mg, 2 mg, Oral, Nightly  loperamide (IMODIUM) capsule 2 mg, 2 mg, Oral, 4x Daily PRN  ondansetron (ZOFRAN-ODT) disintegrating tablet 4 mg, 4 mg, Oral, Q8H PRN  pantoprazole (PROTONIX) tablet 40 mg, 40 mg, Oral, QAM AC  nicotine polacrilex (NICORETTE) gum 2 mg, 2 mg, Oral, PRN  albuterol sulfate  (90 Base) MCG/ACT inhaler 2 puff, 2 puff, Inhalation, Q6H PRN  apixaban (ELIQUIS) tablet 5 mg, 5 mg, Oral, BID  cetirizine (ZYRTEC) tablet 10 mg, 10 mg, Oral, Daily  melatonin tablet 3 mg, 3 mg, Oral, Nightly PRN  tiZANidine (ZANAFLEX) tablet 4 mg, 4 mg, Oral, Q8H PRN  acetaminophen (TYLENOL) tablet 650 mg, 650 mg, Oral, Q4H PRN  aluminum & magnesium hydroxide-simethicone (MAALOX) 200-200-20 MG/5ML suspension 30 mL, 30 mL, Oral, Q6H PRN  hydrOXYzine (ATARAX) tablet 50 mg, 50 mg, Oral, TID PRN  haloperidol (HALDOL) tablet 5 mg, 5 mg, Oral, Q6H PRN **AND** [DISCONTINUED] LORazepam (ATIVAN) tablet 2 mg, 2 mg, Oral, Q6H PRN  haloperidol lactate (HALDOL) injection 5 mg, 5 mg, IntraMUSCular, Q6H PRN **AND** [DISCONTINUED] LORazepam (ATIVAN) injection 2 mg, 2 mg, IntraMUSCular, Q6H PRN **AND** diphenhydrAMINE (BENADRYL) injection 50 mg, 50 mg, IntraMUSCular, Q6H PRN  polyethylene glycol (GLYCOLAX) packet 17 g, 17 g, Oral, Daily PRN  traZODone (DESYREL) tablet 50 mg, 50 mg, Oral, Nightly PRN    ASSESSMENT  Severe recurrent major depressive disorder with psychotic features ([facility] [address]         PLAN  Patient symptoms are: Improving  Continue current medication regimen. Continue ECT treatments, next appointment Monday, 10/25/2021  Internal medicine following for otitis media  Monitor need and frequency of PRN medications. Encourage participation in groups and milieu. Attempt to develop insight. Psycho-education conducted. Supportive Therapy conducted. Probable discharge is to be determined by MD.   Follow-up daily while inpatient. Patient continues to be monitored in the inpatient psychiatric facility at [facility] for safety and stabilization. Patient continues to need, on a daily basis, active treatment furnished directly by or requiring the supervision of inpatient psychiatric personnel. Electronically signed by DAMARIS Foy CNP on 10/23/2021 at 4:44 PM    **This report has been created using voice recognition software. It may contain minor errors which are inherent in voice recognition technology. **

## 2021-10-24 PROCEDURE — 6370000000 HC RX 637 (ALT 250 FOR IP): Performed by: PSYCHIATRY & NEUROLOGY

## 2021-10-24 PROCEDURE — 99231 SBSQ HOSP IP/OBS SF/LOW 25: CPT | Performed by: INTERNAL MEDICINE

## 2021-10-24 PROCEDURE — 99231 SBSQ HOSP IP/OBS SF/LOW 25: CPT | Performed by: NURSE PRACTITIONER

## 2021-10-24 PROCEDURE — 6370000000 HC RX 637 (ALT 250 FOR IP): Performed by: NURSE PRACTITIONER

## 2021-10-24 PROCEDURE — 1240000000 HC EMOTIONAL WELLNESS R&B

## 2021-10-24 PROCEDURE — 6370000000 HC RX 637 (ALT 250 FOR IP)

## 2021-10-24 PROCEDURE — 6370000000 HC RX 637 (ALT 250 FOR IP): Performed by: INTERNAL MEDICINE

## 2021-10-24 RX ADMIN — APIXABAN 5 MG: 5 TABLET, FILM COATED ORAL at 10:52

## 2021-10-24 RX ADMIN — AMOXICILLIN AND CLAVULANATE POTASSIUM 1 TABLET: 500; 125 TABLET, FILM COATED ORAL at 21:10

## 2021-10-24 RX ADMIN — PANTOPRAZOLE SODIUM 40 MG: 40 TABLET, DELAYED RELEASE ORAL at 10:52

## 2021-10-24 RX ADMIN — NICOTINE POLACRILEX 2 MG: 2 GUM, CHEWING BUCCAL at 11:18

## 2021-10-24 RX ADMIN — NICOTINE POLACRILEX 2 MG: 2 GUM, CHEWING BUCCAL at 19:55

## 2021-10-24 RX ADMIN — APIXABAN 5 MG: 5 TABLET, FILM COATED ORAL at 21:10

## 2021-10-24 RX ADMIN — FLUTICASONE PROPIONATE 1 SPRAY: 50 SPRAY, METERED NASAL at 10:52

## 2021-10-24 RX ADMIN — TIZANIDINE 4 MG: 4 TABLET ORAL at 13:33

## 2021-10-24 RX ADMIN — SERTRALINE HYDROCHLORIDE 100 MG: 100 TABLET ORAL at 10:52

## 2021-10-24 RX ADMIN — TRAZODONE HYDROCHLORIDE 50 MG: 50 TABLET ORAL at 21:10

## 2021-10-24 RX ADMIN — CETIRIZINE HYDROCHLORIDE 10 MG: 10 TABLET, FILM COATED ORAL at 10:52

## 2021-10-24 RX ADMIN — PRAZOSIN HYDROCHLORIDE 2 MG: 1 CAPSULE ORAL at 21:10

## 2021-10-24 RX ADMIN — ACETAMINOPHEN 650 MG: 325 TABLET ORAL at 17:35

## 2021-10-24 RX ADMIN — POLYETHYLENE GLYCOL 3350 17 G: 17 POWDER, FOR SOLUTION ORAL at 10:52

## 2021-10-24 RX ADMIN — ONDANSETRON 4 MG: 4 TABLET, ORALLY DISINTEGRATING ORAL at 22:07

## 2021-10-24 RX ADMIN — Medication 3 MG: at 21:10

## 2021-10-24 RX ADMIN — TIZANIDINE 4 MG: 4 TABLET ORAL at 21:10

## 2021-10-24 RX ADMIN — OLANZAPINE 25 MG: 10 TABLET, ORALLY DISINTEGRATING ORAL at 21:11

## 2021-10-24 RX ADMIN — NICOTINE POLACRILEX 2 MG: 2 GUM, CHEWING BUCCAL at 12:34

## 2021-10-24 RX ADMIN — AMOXICILLIN AND CLAVULANATE POTASSIUM 1 TABLET: 500; 125 TABLET, FILM COATED ORAL at 10:52

## 2021-10-24 RX ADMIN — HYDROXYZINE HYDROCHLORIDE 50 MG: 50 TABLET, FILM COATED ORAL at 21:10

## 2021-10-24 RX ADMIN — HYDROXYZINE HYDROCHLORIDE 50 MG: 50 TABLET, FILM COATED ORAL at 12:11

## 2021-10-24 ASSESSMENT — PAIN SCALES - GENERAL
PAINLEVEL_OUTOF10: 4
PAINLEVEL_OUTOF10: 5

## 2021-10-24 NOTE — PLAN OF CARE
Problem: Altered Mood, Psychotic Behavior:  Goal: Able to verbalize decrease in frequency and intensity of hallucinations  Description: Able to verbalize decrease in frequency and intensity of hallucinations  Outcome: Ongoing  Note: Patient denies hallucinations at this time. Admits to fleeting suicidal thoughts but agreeable to staying safe while on the unit. Compliant with all prescribed medications for this shift. Safety checks maintained q15min and irregular rounding. Problem: Depressive Behavior With or Without Suicide Precautions:  Goal: Ability to disclose and discuss suicidal ideas will improve  Description: Ability to disclose and discuss suicidal ideas will improve  Outcome: Ongoing  Note: Pt admits to having thoughts of self harm. Agreeable to seeking out staff should feelings increase. Able to identify positive coping skills and plan for safety. Will cont to monitor q15 minutes for safety and provide support and reassurance as needed.

## 2021-10-24 NOTE — PLAN OF CARE
Problem: Depressive Behavior With or Without Suicide Precautions:  Goal: Ability to disclose and discuss suicidal ideas will improve  Description: Ability to disclose and discuss suicidal ideas will improve  10/24/2021 0844 by Alissa Orozco RN  Outcome: Ongoing  10/24/2021 0010 by Freddy Jimenez  Outcome: Ongoing  Note: Pt admits to having thoughts of self harm. Agreeable to seeking out staff should feelings increase. Able to identify positive coping skills and plan for safety. Will cont to monitor q15 minutes for safety and provide support and reassurance as needed. Problem: Altered Mood, Psychotic Behavior:  Goal: Able to verbalize decrease in frequency and intensity of hallucinations  Description: Able to verbalize decrease in frequency and intensity of hallucinations  10/24/2021 0844 by Alissa Orozco RN  Outcome: Ongoing  10/24/2021 0010 by Freddy Jimenez  Outcome: Ongoing  Note: Patient denies hallucinations at this time. Admits to fleeting suicidal thoughts but agreeable to staying safe while on the unit. Compliant with all prescribed medications for this shift. Safety checks maintained q15min and irregular rounding. Pt is able to verbalize her suicidal thoughts. Pt states that she just needs to sleep and feels safe in bed right now. She is able to contract for safety.

## 2021-10-24 NOTE — BH NOTE
Patient reports feeling \"agitated and restless. \" Patient reports difficulty getting comfortable. Talk time, word puzzles and snack offered. Patient asking for haldol. Oral haldol given without incident.

## 2021-10-24 NOTE — PROGRESS NOTES
Daily Progress Note  10/24/2021    Patient Name: Isabel Barber    CHIEF COMPLAINT: Suicidal ideation         SUBJECTIVE:    Patient is seen today for a follow up assessment in her assigned room. Patient is compliant with scheduled medications. She has not received emergency medications in the past 24 hours. Modesta Miranda continues to endorse noticeable improvement in her mood today. She feels her symptoms of depression and anxiety continue to improve and she reports being in a better mood. She again reports she is no longer experiencing suicidal ideation. She is very pleased that her first ECT treatment has had such a positive effect on her symptoms. She states that she is excited for her next treatment session tomorrow and that the doctor wants her to have at least 6 sessions before she is discharged. She is not reporting any cognitive side effects from ECT treatment today. She endorses spending some time in the day area this morning and played cards with peers. She stated she slept through breakfast but did eat a good lunch today. Patient agrees to contract for safety on the unit. Appetite:  [x] Normal/Adequate/Unchanged  [] Increased  [] Decreased      Sleep:       [] Normal/Adequate/Unchanged  [x] Fair  [] Poor      Group Attendance on Unit:   [x] Yes  [] Selectively    [] No    Medication Side Effects:  Patient denies any medication side effects at the time of assessment. Mental Status Exam  Level of consciousness: Alert and awake. Appearance: Appropriate attire for setting, resting in bed, with fair  grooming and hygiene. Behavior/Motor: Approachable, no psychomotor abnormalities observed   Attitude toward examiner: Cooperative, attentive, good eye contact. Speech: Spontaneous, normal rate, volume, and tone  Mood: Euthymic  Affect: Mood congruent  Thought processes: Linear and coherent. Thought content: Denies homicidal ideation.   Suicidal Ideation: Improved, no plan or  Basophils Absolute 10/12/2021 0.00  0.0 - 0.2 k/uL Final    Morphology 10/12/2021 Normal   Final    Glucose 10/12/2021 101* 70 - 99 mg/dL Final    BUN 10/12/2021 23* 6 - 20 mg/dL Final    CREATININE 10/12/2021 0.80  0.50 - 0.90 mg/dL Final    Bun/Cre Ratio 10/12/2021 NOT REPORTED  9 - 20 Final    Calcium 10/12/2021 9.1  8.6 - 10.4 mg/dL Final    Sodium 10/12/2021 138  135 - 144 mmol/L Final    Potassium 10/12/2021 4.4  3.7 - 5.3 mmol/L Final    Chloride 10/12/2021 104  98 - 107 mmol/L Final    CO2 10/12/2021 24  20 - 31 mmol/L Final    Anion Gap 10/12/2021 10  9 - 17 mmol/L Final    Alkaline Phosphatase 10/12/2021 87  35 - 104 U/L Final    ALT 10/12/2021 14  5 - 33 U/L Final    AST 10/12/2021 17  <32 U/L Final    Total Bilirubin 10/12/2021 <0.15* 0.3 - 1.2 mg/dL Final    Total Protein 10/12/2021 7.0  6.4 - 8.3 g/dL Final    Albumin 10/12/2021 4.2  3.5 - 5.2 g/dL Final    Albumin/Globulin Ratio 10/12/2021 NOT REPORTED  1.0 - 2.5 Final    GFR Non- 10/12/2021 >60  >60 mL/min Final    GFR  10/12/2021 >60  >60 mL/min Final    GFR Comment 10/12/2021        Final    Comment: Average GFR for 30-36 years old:   80 mL/min/1.73sq m  Chronic Kidney Disease:   <60 mL/min/1.73sq m  Kidney failure:   <15 mL/min/1.73sq m              eGFR calculated using average adult body mass. Additional eGFR calculator available at:        [employer]            GFR Staging 10/12/2021 NOT REPORTED   Final    TSH 10/12/2021 1.55  0.30 - 5.00 mIU/L Final    Specimen Description 10/12/2021 . NASOPHARYNGEAL SWAB   Final    SARS-CoV-2, Rapid 10/12/2021 Not Detected  Not Detected Final    Comment:       Rapid NAAT:  The specimen is NEGATIVE for SARS-CoV-2, the novel coronavirus associated with   COVID-19.         The ID NOW COVID-19 assay is designed to detect the virus that causes COVID-19 in patients   with signs and symptoms of infection who are suspected of COVID-19. An individual without symptoms of COVID-19 and who is not shedding SARS-CoV-2 virus would   expect to have a negative (not detected) result in this assay. Negative results should be treated as presumptive and, if inconsistent with clinical signs   and symptoms or necessary for patient management,  should be tested with an alternative molecular assay. Negative results do not preclude   SARS-CoV-2 infection and   should not be used as the sole basis for patient management decisions. Fact sheet for Healthcare Providers: Elaine.[email or web address]  Fact sheet for Patients: Elaine.[email or web address]          Methodology: Isothermal Nucleic Acid Amplification      Color, UA 10/12/2021 Yellow  Yellow Final    Turbidity UA 10/12/2021 Clear  Clear Final    Glucose, Ur 10/12/2021 NEGATIVE  NEGATIVE Final    Bilirubin Urine 10/12/2021 NEGATIVE  NEGATIVE Final    Ketones, Urine 10/12/2021 NEGATIVE  NEGATIVE Final    Specific [place], UA 10/12/2021 1.027  1.000 - 1.030 Final    Urine Hgb 10/12/2021 NEGATIVE  NEGATIVE Final    pH, UA 10/12/2021 6.5  5.0 - 8.0 Final    Protein, UA 10/12/2021 NEGATIVE  NEGATIVE Final    Urobilinogen, Urine 10/12/2021 Normal  Normal Final    Nitrite, Urine 10/12/2021 NEGATIVE  NEGATIVE Final    Leukocyte Esterase, Urine 10/12/2021 NEGATIVE  NEGATIVE Final    Urinalysis Comments 10/12/2021 Microscopic exam not performed based on chemical results unless requested in original order.    Final    Amphetamine Screen, Ur 10/12/2021 NEGATIVE  NEGATIVE Final    Comment:       (Positive cutoff 1000 ng/mL)                  Barbiturate Screen, Ur 10/12/2021 NEGATIVE  NEGATIVE Final    Comment:       (Positive cutoff 200 ng/mL)                  Benzodiazepine Screen, Urine 10/12/2021 NEGATIVE  NEGATIVE Final    Comment:       (Positive cutoff 200 ng/mL)                  Cocaine Metabolite, Urine 10/12/2021 NEGATIVE  NEGATIVE Final Comment:       (Positive cutoff 300 ng/mL)                  Methadone Screen, Urine 10/12/2021 NEGATIVE  NEGATIVE Final    Comment:       (Positive cutoff 300 ng/mL)                  Opiates, Urine 10/12/2021 NEGATIVE  NEGATIVE Final    Comment:       (Positive cutoff 300 ng/mL)                  Phencyclidine, Urine 10/12/2021 NEGATIVE  NEGATIVE Final    Comment:       (Positive cutoff 25 ng/mL)                  Propoxyphene, Urine 10/12/2021 NOT REPORTED  NEGATIVE Final    Cannabinoid Scrn, Ur 10/12/2021 POSITIVE* NEGATIVE Final    Comment:       (Positive cutoff 50 ng/mL)                  Oxycodone Screen, Ur 10/12/2021 NEGATIVE  NEGATIVE Final    Comment:       (Positive cutoff 100 ng/mL)                  Methamphetamine, Urine 10/12/2021 NOT REPORTED  NEGATIVE Final    Tricyclic Antidepressants, Urine 10/12/2021 NOT REPORTED  NEGATIVE Final    MDMA, Urine 10/12/2021 NOT REPORTED  NEGATIVE Final    Buprenorphine Urine 10/12/2021 NOT REPORTED  NEGATIVE Final    Test Information 10/12/2021 Assay provides medical screening only. The absence of expected drug(s) and/or metabolite(s) may indicate diluted or adulterated urine, limitations of testing or timing of collection. Final    Comment: Testing for legal purposes should be confirmed by another method. To request confirmation   of test result, please call the lab within 7 days of sample submission.  Acetaminophen Level 10/12/2021 7* 10 - 30 ug/mL Final    Ethanol 10/12/2021 <10  <10 mg/dL Final    Ethanol percent 10/12/2021 <8.999  % Final    Salicylate Lvl 94/73/7526 <1* 3 - 10 mg/dL Final    Toxic Tricyclic Sc,Blood 86/43/3764 WRONG TEST ORDERED  NEGATIVE Final    Tricyclic Antidep,Urine 63/92/4348 NEGATIVE  NEGATIVE Final    Comment:       (Positive cutoff 1000 ng/mL)  Assay provides rapid clinical screening only. Presumptive positive results for legal   purposes should be confirmed by another method.   To request confirmation, please call the   lab within 7 days of sample submission.  Valproic Acid Lvl 10/21/2021 71  50 - 125 ug/mL Final    Valproic Dose amount 10/21/2021 500 MG   Final    Valproic Date last dose 10/21/2021 98,278,527   Final    Valproic Time last dose 10/21/2021 2,105   Final    POC Glucose 10/22/2021 100  65 - 105 mg/dL Final         Reviewed patient's current plan of care and vital signs with nursing staff.     Labs reviewed: [x] Yes    Medications  Current Facility-Administered Medications: fluticasone (FLONASE) 50 MCG/ACT nasal spray 1 spray, 1 spray, Each Nostril, Daily  lactated ringers infusion, , IntraVENous, Continuous  amoxicillin-clavulanate (AUGMENTIN) 500-125 MG per tablet 1 tablet, 1 tablet, Oral, 2 times per day  OLANZapine zydis (ZYPREXA) disintegrating tablet 25 mg, 25 mg, Oral, Nightly  sertraline (ZOLOFT) tablet 100 mg, 100 mg, Oral, Daily  prazosin (MINIPRESS) capsule 2 mg, 2 mg, Oral, Nightly  loperamide (IMODIUM) capsule 2 mg, 2 mg, Oral, 4x Daily PRN  ondansetron (ZOFRAN-ODT) disintegrating tablet 4 mg, 4 mg, Oral, Q8H PRN  pantoprazole (PROTONIX) tablet 40 mg, 40 mg, Oral, QAM AC  nicotine polacrilex (NICORETTE) gum 2 mg, 2 mg, Oral, PRN  albuterol sulfate  (90 Base) MCG/ACT inhaler 2 puff, 2 puff, Inhalation, Q6H PRN  apixaban (ELIQUIS) tablet 5 mg, 5 mg, Oral, BID  cetirizine (ZYRTEC) tablet 10 mg, 10 mg, Oral, Daily  melatonin tablet 3 mg, 3 mg, Oral, Nightly PRN  tiZANidine (ZANAFLEX) tablet 4 mg, 4 mg, Oral, Q8H PRN  acetaminophen (TYLENOL) tablet 650 mg, 650 mg, Oral, Q4H PRN  aluminum & magnesium hydroxide-simethicone (MAALOX) 200-200-20 MG/5ML suspension 30 mL, 30 mL, Oral, Q6H PRN  hydrOXYzine (ATARAX) tablet 50 mg, 50 mg, Oral, TID PRN  haloperidol (HALDOL) tablet 5 mg, 5 mg, Oral, Q6H PRN **AND** [DISCONTINUED] LORazepam (ATIVAN) tablet 2 mg, 2 mg, Oral, Q6H PRN  haloperidol lactate (HALDOL) injection 5 mg, 5 mg, IntraMUSCular, Q6H PRN **AND** [DISCONTINUED] LORazepam (ATIVAN) injection 2 mg, 2 mg, IntraMUSCular, Q6H PRN **AND** diphenhydrAMINE (BENADRYL) injection 50 mg, 50 mg, IntraMUSCular, Q6H PRN  polyethylene glycol (GLYCOLAX) packet 17 g, 17 g, Oral, Daily PRN  traZODone (DESYREL) tablet 50 mg, 50 mg, Oral, Nightly PRN    ASSESSMENT  Severe recurrent major depressive disorder with psychotic features ([facility]         PLAN  Patient symptoms are: Improving  Continue current medication regimen. Continue ECT treatments, next appointment Monday, 10/25/2021  Internal medicine following for otitis media  Monitor need and frequency of PRN medications. Encourage participation in groups and milieu. Attempt to develop insight. Psycho-education conducted. Supportive Therapy conducted. Probable discharge is to be determined by MD.   Follow-up daily while inpatient. Patient continues to be monitored in the inpatient psychiatric facility at [facility] for safety and stabilization. Patient continues to need, on a daily basis, active treatment furnished directly by or requiring the supervision of inpatient psychiatric personnel. Electronically signed by DAMARIS Hampton CNP on 10/24/2021 at 3:52 PM    **This report has been created using voice recognition software. It may contain minor errors which are inherent in voice recognition technology. **
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
## 2021-10-24 NOTE — PROGRESS NOTES
Sleep apnea     CPAP nightly    Uterine cancer ([facility] [address]     Wears glasses         Past Surgical History:     Past Surgical History:   Procedure Laterality Date    BLADDER SURGERY      several    BREAST LUMPECTOMY Bilateral     CYSTOSCOPY      HYSTERECTOMY      SACRAL NERVE STIMULATOR LEAD PLACEMENT N/A 6/21/2017    SACRAL NERVE STIMULATOR IMPLANT STAGE 1- OFFICE NOTIFYING REP, C-ARM performed by Preston Abbott MD at [address] N/A 7/5/2017    SACRAL NERVE STIMULATOR IMPLANT STAGE 2 performed by Preston Abbott MD at [address] N/A 12/1/2017    LEAD AND GENERATOR REMOVAL, STAGE 1 AND 2 INTERSTIM, C-ARM   NSA= GENERAL VS MAC, OFFICE NOTIFIED REP. performed by Preston Abbott MD at [facility]      from thigh to chin    DANILO AND BSO  2012, 2014    TONSILLECTOMY AND ADENOIDECTOMY          Medications Prior to Admission:     Prior to Admission medications    Medication Sig Start Date End Date Taking?  Authorizing Provider   divalproex (DEPAKOTE) 500 MG DR tablet Take 500 mg by mouth 2 times daily   Yes Historical Provider, MD   pantoprazole (PROTONIX) 40 MG tablet Take 40 mg by mouth daily   Yes Historical Provider, MD   OLANZapine zydis (ZYPREXA) 10 MG disintegrating tablet Take 10 mg by mouth 2 times daily   Yes Historical Provider, MD   fluticasone (FLONASE) 50 MCG/ACT nasal spray 1 spray by Each Nostril route daily   Yes Historical Provider, MD   melatonin 5 MG TABS tablet Take 5 mg by mouth nightly as needed   Yes Historical Provider, MD   apixaban (ELIQUIS) 5 MG TABS tablet Take 5 mg by mouth 2 times daily   Yes Historical Provider, MD   traZODone (DESYREL) 50 MG tablet Take 50 mg by mouth nightly   Yes Historical Provider, MD   hydrOXYzine (VISTARIL) 50 MG capsule Take 50 mg by mouth 3 times daily as needed for Anxiety   Yes Historical Provider, MD   acetaminophen (TYLENOL) 500 MG tablet Take 1,000 mg by mouth 2 times daily   Yes Historical Provider, MD   ondansetron (ZOFRAN-ODT) 8 MG TBDP disintegrating tablet Place 8 mg under the tongue every 8 hours as needed for Nausea or Vomiting   Yes Historical Provider, MD   albuterol sulfate HFA (VENTOLIN HFA) 108 (90 Base) MCG/ACT inhaler Inhale 2 puffs into the lungs every 6 hours as needed for Wheezing   Yes Historical Provider, MD   tiZANidine (ZANAFLEX) 4 MG tablet Take 4 mg by mouth every 8 hours as needed   Yes Historical Provider, MD   cetirizine (ZYRTEC) 10 MG tablet Take 10 mg by mouth daily   Yes Historical Provider, MD        Allergies:     Latex, Abilify [aripiprazole], Aspirin, Geodon [ziprasidone hcl], Lithium, Morphine, Doxycycline, Seroquel [quetiapine fumarate], Thorazine [chlorpromazine], Tramadol, and Adhesive tape    Social History:     Tobacco:    reports that she has been smoking cigarettes. She has a 25.00 pack-year smoking history. She has never used smokeless tobacco.  Alcohol:      reports current alcohol use. Drug Use:  reports current drug use. Drug: Marijuana. Family History:     Family History   Problem Relation Age of Onset    Breast Cancer Mother     Endometrial Cancer Mother     Ovarian Cancer Mother     High Blood Pressure Mother     Kidney Disease Sister     Cancer Sister     Cancer Maternal Aunt     Heart Disease Maternal Aunt     No Known Problems Father     Heart Attack Brother     Heart Failure Maternal Grandmother     Alzheimer's Disease Maternal Grandmother     Other Sister         Brain aneurysm    Seizures Son        Review of Systems:     Positive and Negative as described in HPI. CONSTITUTIONAL:  negative for fevers, chills, sweats, fatigue, weight loss  HEENT: Right ear pain deafness and discharge RESPIRATORY:  negative for shortness of breath, cough, congestion, wheezing. CARDIOVASCULAR:  negative for chest pain, palpitations.   GASTROINTESTINAL:  negative for nausea, vomiting, diarrhea, constipation, change in bowel habits, abdominal pain   GENITOURINARY:  negative for difficulty of urination, burning with urination, frequency   INTEGUMENT:  negative for rash, skin lesions, easy bruising   HEMATOLOGIC/LYMPHATIC:  negative for swelling/edema   ALLERGIC/IMMUNOLOGIC:  negative for urticaria , itching  ENDOCRINE:  negative increase in drinking, increase in urination, hot or cold intolerance  MUSCULOSKELETAL:  negative joint pains, muscle aches, swelling of joints  NEUROLOGICAL:  negative for headaches, dizziness, lightheadedness, numbness, pain, tingling extremities      Physical Exam:     /65   Pulse 68   Temp 98.6 °F (37 °C) (Oral)   Resp 14   Ht 5' 6\" (1.676 m)   Wt 230 lb (104.3 kg)   SpO2 98%   BMI 37.12 kg/m²   Temp (24hrs), Av.6 °F (37 °C), Min:98.6 °F (37 °C), Max:98.6 °F (37 °C)    Recent Labs     10/22/21  0612   POCGLU 100     No intake or output data in the 24 hours ending 10/23/21 2008    General Appearance:  alert, well appearing, and in no acute distress  Mental status: oriented to person, place, and time with normal affect  Head:  normocephalic, atraumatic. Eye: no icterus, redness, pupils equal and reactive, extraocular eye movements intact, conjunctiva clear  Ear: Narrow external auditory canal and no discharge noted mild tenderness on tragus pressure , redness of right TM present next nose: DNS to right  Mouth: mucous membranes moist  Neck: supple, no carotid bruits, thyroid not palpable  Lungs: Bilateral equal air entry, clear to ausculation, no wheezing, rales or rhonchi, normal effort  Cardiovascular: normal rate, regular rhythm, no murmur, gallop, rub.   Abdomen: Soft, nontender, nondistended, normal bowel sounds, no hepatomegaly or splenomegaly  Neurologic: There are no new focal motor or sensory deficits, normal muscle tone and bulk, no abnormal sensation, normal speech, cranial nerves II through XII grossly intact  Skin: No gross lesions, rashes, bruising or bleeding on exposed skin area  Extremities:  peripheral pulses palpable, no pedal edema or calf pain with palpation      Investigations:      Laboratory Testing:  No results found for this or any previous visit (from the past 24 hour(s)). Consultations:   IP CONSULT TO SOCIAL WORK  IP CONSULT TO PSYCHIATRY  IP CONSULT TO INTERNAL MEDICINE  Assessment :      Primary Problem  Severe recurrent major depressive disorder with psychotic features [facility])    Active Hospital Problems    Diagnosis Date Noted    [facility] with [facility] [F33.3] 10/13/2021    Alcohol abuse [F10.10] 10/13/2021    Marijuana abuse [F12.10] 10/13/2021    Depression with suicidal ideation [F32. A, R45.851] 10/13/2021    Bipolar depression ([facility] [address] [F31.9] 10/13/2021    Cyst of left kidney [N28.1]     DVT (deep venous thrombosis) (HCC) [I82.409]     Endometrial cancer (HCC) [C54.1]     SIADH (syndrome of inappropriate ADH production) ([facility] [E22.2]     Seizures ([facility] [address] [R56.9]     PTSD (post-traumatic stress disorder) [F43.10] 01/28/2016       Plan:     72-year-old lady is class II obesity BMI 37.12 history of multiple DVTs on Eliquis 5 mg twice a day  Last DVT last year no active bleeding    Counseled for low-salt diet exercise and weight loss  Smoker nicotine patch  We will order comprehensive metabolic panel  Right ear pain and discharge mild tenderness ontragus pressure and mastoid  Oral antibiotic Augmentin 875 p.o. twice daily for 10 days  10/23   Patient still complaining of pain in right ear  On antibiotic  We will start on Flonase  Has DNS to right, likely because of recurrent infections, need to follow-up with ENT as outpatient  Patient had multiple injuries to her Nella Flower MD  10/23/2021  8:08 PM    Copy sent to Dr. Madison Guerrero primary care provider on file. Please note that this chart was generated using voice recognition Dragon dictation software.   Although every effort was made to ensure the accuracy of this automated transcription, some errors in transcription may have occurred.

## 2021-10-25 ENCOUNTER — ANESTHESIA (OUTPATIENT)
Dept: POSTOP/PACU | Age: 41
End: 2021-10-25

## 2021-10-25 ENCOUNTER — APPOINTMENT (OUTPATIENT)
Dept: POSTOP/PACU | Age: 41
DRG: 885 | End: 2021-10-25
Payer: MEDICARE

## 2021-10-25 ENCOUNTER — ANESTHESIA EVENT (OUTPATIENT)
Dept: POSTOP/PACU | Age: 41
End: 2021-10-25

## 2021-10-25 VITALS — DIASTOLIC BLOOD PRESSURE: 72 MMHG | TEMPERATURE: 96.8 F | OXYGEN SATURATION: 97 % | SYSTOLIC BLOOD PRESSURE: 150 MMHG

## 2021-10-25 LAB — GLUCOSE BLD-MCNC: 116 MG/DL (ref 65–105)

## 2021-10-25 PROCEDURE — 6370000000 HC RX 637 (ALT 250 FOR IP): Performed by: PSYCHIATRY & NEUROLOGY

## 2021-10-25 PROCEDURE — 6370000000 HC RX 637 (ALT 250 FOR IP): Performed by: NURSE PRACTITIONER

## 2021-10-25 PROCEDURE — 2500000003 HC RX 250 WO HCPCS

## 2021-10-25 PROCEDURE — 7100000001 HC PACU RECOVERY - ADDTL 15 MIN

## 2021-10-25 PROCEDURE — 3700000000 HC ANESTHESIA ATTENDED CARE

## 2021-10-25 PROCEDURE — 99232 SBSQ HOSP IP/OBS MODERATE 35: CPT | Performed by: PSYCHIATRY & NEUROLOGY

## 2021-10-25 PROCEDURE — 90870 ELECTROCONVULSIVE THERAPY: CPT | Performed by: PSYCHIATRY & NEUROLOGY

## 2021-10-25 PROCEDURE — 82947 ASSAY GLUCOSE BLOOD QUANT: CPT

## 2021-10-25 PROCEDURE — 6370000000 HC RX 637 (ALT 250 FOR IP)

## 2021-10-25 PROCEDURE — 2580000003 HC RX 258: Performed by: ANESTHESIOLOGY

## 2021-10-25 PROCEDURE — 3700000001 HC ADD 15 MINUTES (ANESTHESIA)

## 2021-10-25 PROCEDURE — 90870 ELECTROCONVULSIVE THERAPY: CPT

## 2021-10-25 PROCEDURE — 6360000002 HC RX W HCPCS

## 2021-10-25 PROCEDURE — 1240000000 HC EMOTIONAL WELLNESS R&B

## 2021-10-25 PROCEDURE — 6370000000 HC RX 637 (ALT 250 FOR IP): Performed by: INTERNAL MEDICINE

## 2021-10-25 PROCEDURE — 7100000000 HC PACU RECOVERY - FIRST 15 MIN

## 2021-10-25 PROCEDURE — APPSS15 APP SPLIT SHARED TIME 0-15 MINUTES: Performed by: NURSE PRACTITIONER

## 2021-10-25 PROCEDURE — GZB2ZZZ ELECTROCONVULSIVE THERAPY, BILATERAL-SINGLE SEIZURE: ICD-10-PCS | Performed by: PSYCHIATRY & NEUROLOGY

## 2021-10-25 RX ORDER — SODIUM CHLORIDE, SODIUM LACTATE, POTASSIUM CHLORIDE, CALCIUM CHLORIDE 600; 310; 30; 20 MG/100ML; MG/100ML; MG/100ML; MG/100ML
INJECTION, SOLUTION INTRAVENOUS CONTINUOUS
Status: DISCONTINUED | OUTPATIENT
Start: 2021-10-25 | End: 2021-11-03 | Stop reason: HOSPADM

## 2021-10-25 RX ORDER — KETOROLAC TROMETHAMINE 30 MG/ML
INJECTION, SOLUTION INTRAMUSCULAR; INTRAVENOUS PRN
Status: DISCONTINUED | OUTPATIENT
Start: 2021-10-25 | End: 2021-10-25 | Stop reason: SDUPTHER

## 2021-10-25 RX ORDER — KETOROLAC TROMETHAMINE 30 MG/ML
INJECTION, SOLUTION INTRAMUSCULAR; INTRAVENOUS
Status: COMPLETED
Start: 2021-10-25 | End: 2021-10-25

## 2021-10-25 RX ORDER — LABETALOL HYDROCHLORIDE 5 MG/ML
INJECTION, SOLUTION INTRAVENOUS PRN
Status: DISCONTINUED | OUTPATIENT
Start: 2021-10-25 | End: 2021-10-25

## 2021-10-25 RX ORDER — SUCCINYLCHOLINE/SOD CL,ISO/PF 200MG/10ML
SYRINGE (ML) INTRAVENOUS
Status: COMPLETED
Start: 2021-10-25 | End: 2021-10-25

## 2021-10-25 RX ORDER — SUCCINYLCHOLINE/SOD CL,ISO/PF 100 MG/5ML
SYRINGE (ML) INTRAVENOUS PRN
Status: DISCONTINUED | OUTPATIENT
Start: 2021-10-25 | End: 2021-10-25 | Stop reason: SDUPTHER

## 2021-10-25 RX ADMIN — TIZANIDINE 4 MG: 4 TABLET ORAL at 09:52

## 2021-10-25 RX ADMIN — TIZANIDINE 4 MG: 4 TABLET ORAL at 17:56

## 2021-10-25 RX ADMIN — AMOXICILLIN AND CLAVULANATE POTASSIUM 1 TABLET: 500; 125 TABLET, FILM COATED ORAL at 20:33

## 2021-10-25 RX ADMIN — SERTRALINE HYDROCHLORIDE 100 MG: 100 TABLET ORAL at 09:08

## 2021-10-25 RX ADMIN — FLUTICASONE PROPIONATE 1 SPRAY: 50 SPRAY, METERED NASAL at 09:08

## 2021-10-25 RX ADMIN — Medication 80 MG: at 08:00

## 2021-10-25 RX ADMIN — PANTOPRAZOLE SODIUM 40 MG: 40 TABLET, DELAYED RELEASE ORAL at 09:07

## 2021-10-25 RX ADMIN — SODIUM CHLORIDE, POTASSIUM CHLORIDE, SODIUM LACTATE AND CALCIUM CHLORIDE: 600; 310; 30; 20 INJECTION, SOLUTION INTRAVENOUS at 07:06

## 2021-10-25 RX ADMIN — AMOXICILLIN AND CLAVULANATE POTASSIUM 1 TABLET: 500; 125 TABLET, FILM COATED ORAL at 09:07

## 2021-10-25 RX ADMIN — HYDROXYZINE HYDROCHLORIDE 50 MG: 50 TABLET, FILM COATED ORAL at 10:46

## 2021-10-25 RX ADMIN — ACETAMINOPHEN 650 MG: 325 TABLET ORAL at 17:55

## 2021-10-25 RX ADMIN — PRAZOSIN HYDROCHLORIDE 2 MG: 1 CAPSULE ORAL at 20:32

## 2021-10-25 RX ADMIN — ACETAMINOPHEN 650 MG: 325 TABLET ORAL at 06:19

## 2021-10-25 RX ADMIN — APIXABAN 5 MG: 5 TABLET, FILM COATED ORAL at 20:33

## 2021-10-25 RX ADMIN — TRAZODONE HYDROCHLORIDE 50 MG: 50 TABLET ORAL at 20:40

## 2021-10-25 RX ADMIN — ACETAMINOPHEN 650 MG: 325 TABLET ORAL at 10:28

## 2021-10-25 RX ADMIN — APIXABAN 5 MG: 5 TABLET, FILM COATED ORAL at 09:08

## 2021-10-25 RX ADMIN — NICOTINE POLACRILEX 2 MG: 2 GUM, CHEWING BUCCAL at 12:20

## 2021-10-25 RX ADMIN — HYDROXYZINE HYDROCHLORIDE 50 MG: 50 TABLET, FILM COATED ORAL at 20:40

## 2021-10-25 RX ADMIN — OLANZAPINE 25 MG: 10 TABLET, ORALLY DISINTEGRATING ORAL at 20:32

## 2021-10-25 RX ADMIN — KETOROLAC TROMETHAMINE 15 MG: 30 INJECTION, SOLUTION INTRAMUSCULAR; INTRAVENOUS at 07:57

## 2021-10-25 RX ADMIN — NICOTINE POLACRILEX 2 MG: 2 GUM, CHEWING BUCCAL at 10:03

## 2021-10-25 RX ADMIN — Medication 80 MG: at 07:59

## 2021-10-25 RX ADMIN — CETIRIZINE HYDROCHLORIDE 10 MG: 10 TABLET, FILM COATED ORAL at 09:07

## 2021-10-25 ASSESSMENT — PAIN DESCRIPTION - PROGRESSION: CLINICAL_PROGRESSION: OTHER (COMMENT)

## 2021-10-25 ASSESSMENT — PAIN SCALES - GENERAL
PAINLEVEL_OUTOF10: 6
PAINLEVEL_OUTOF10: 6
PAINLEVEL_OUTOF10: 0
PAINLEVEL_OUTOF10: 5
PAINLEVEL_OUTOF10: 4
PAINLEVEL_OUTOF10: 0

## 2021-10-25 ASSESSMENT — PAIN DESCRIPTION - ONSET: ONSET: OTHER (COMMENT)

## 2021-10-25 ASSESSMENT — PAIN DESCRIPTION - FREQUENCY: FREQUENCY: OTHER (COMMENT)

## 2021-10-25 ASSESSMENT — PAIN - FUNCTIONAL ASSESSMENT: PAIN_FUNCTIONAL_ASSESSMENT: ACTIVITIES ARE NOT PREVENTED

## 2021-10-25 ASSESSMENT — PAIN DESCRIPTION - DESCRIPTORS: DESCRIPTORS: OTHER (COMMENT)

## 2021-10-25 ASSESSMENT — PAIN DESCRIPTION - LOCATION: LOCATION: OTHER (COMMENT)

## 2021-10-25 ASSESSMENT — PAIN DESCRIPTION - PAIN TYPE: TYPE: OTHER (COMMENT)

## 2021-10-25 NOTE — PLAN OF CARE
Problem: Altered Mood, Psychotic Behavior:  Goal: Able to verbalize decrease in frequency and intensity of hallucinations  Description: Able to verbalize decrease in frequency and intensity of hallucinations  10/25/2021 1031 by Sonido Philippe RN  Outcome: Ongoing  Note: Patient denies any thoughts of self harm or hallucinations. Out for meals and attending groups. Reports a mild headache from ECT treatment this morning but reports it went well. Medication compliant and behavior controlled.

## 2021-10-25 NOTE — PROCEDURES
Bilateral ECT Procedure Report  Marilia Bentley   10/25/2021  1980         Attending:Melchor Ortiz MD  Preprocedure diagnosis: Major depressive disorder, recurrent, severe with psychotic symptoms (F33.3)  Postprocedure diagnosis: SAME AS PRE-PROCEDURE DIAGNOSIS  Treatment Number: This is treatment # 2   Patient Status: Inpatient   Type of ECT: Bilateral Brief Pulse  Medications  Preprocedure: Tylenol 650mg and Toradol 15mg  Anesthetic: Methohexital 80 mg  Paralytic: Succinylcholine 80 mg  Post procedure: none needed     Cuff placement: Left Lower Extremity    Thymatron Settings 1st Stimulus:     Pulse width: 1.0 ms   Frequency:  40 hz   % Power:   30 %   Static Impedance: 1420 ohms             Dynamic Impedance: 240 ohms             Motor Seizure Duration: 40 seconds  EEG Seizure Duration: 70 seconds                 The patient's ECT treatment was performed using Thymatron machine  . Timeout:  Time out was performed using two patient identifiers and confirmation of procedure. Patient Preparation: Preprocedure documentation, labs, H&P were all verified and reviewed. The patient was placed in supine position. EEG leads were placed for monitoring of seizure. Mouthcard areas bilaterally cleaned and prepped. Pre-Tac solution was applied over stimulus electrode site. Thymapad's then applied to stimulus electrode site bilaterally with the center of the lead placed approximately 1 inch superior to the midpoint of line between canthus and the tragus bilaterally. The patient was pre-oxygenated. Procedure in detail: Medications were administered by anesthesia using intravenous access at the doses listed previously in this summary. Anesthetic administered and once confirmation the patient is anesthetized, the patient's blood pressure cuff on lower extremity was inflated to 100mmHg in excess of patient's blood pressure. At this time, the neuromuscular blockade was administered intravenously by anesthesia.   Upper extremity fasciculation were verified followed by lower extremity fasciculation. Once fasciculations terminated, confirmation of affective neuromuscular blockade demonstrated by absence of withdrawal reflex. Bite blocks were put in place. Static impedance was tested and within appropriate limits. Thymatron settings were confirmed with nursing staff. Staff was cleared from the patient and dose of ECT stimulus was delivered. Motor seizure activity  was observed at duration noted and terminated. EEG seizure activity was observed of duration noted that was confirmed via monitoring EEG and terminated with appropriate postictal suppression noted on EEG. Static impedance and dynamic impedance were documented. Tourniquet on  lower extremity was released and recovery procedures initiated. The patient was mask ventilated during the procedure with no significant drops in oxygenation saturation and tolerated the procedure well without any notable adverse effects. Condition: The patient was in stable condition with stable vital signs and able to follow commands when moved to recovery.

## 2021-10-25 NOTE — PROGRESS NOTES
BP CUFF DEFLATED LEFT ANKLE    PULSE WIDTH-  1.0  FREQUENCY-  40  DURATION % POWER-30  CURRENT- 0.9  MOTOR- 40  EEG-70  STATIC-1420  DYNAMIC-240

## 2021-10-25 NOTE — GROUP NOTE
Group Therapy Note    Date: 10/25/2021    Group Start Time: 1000  Group End Time: 1030  Group Topic: Psychotherapy    [facility]    Yenifer Bliss        Group Therapy Note    Attendees: 4/19         Patient's Goal: PT will demonstrate increased interpersonal interaction and a clear understanding on multiple types of coping skills relating to the here-and-now therapeutic practice. Notes:  :  Patient is making progress, AEB participating in group discussion, actively listening, and supporting other group members. PT participates in group and encourages others to participate     Status After Intervention:  Unchanged    Participation Level: Active Listener and Interactive    Participation Quality: Appropriate, Attentive and Supportive      Speech:  normal      Thought Process/Content: Logical      Affective Functioning: Flat      Mood: depressed      Level of consciousness:  Alert, Oriented x4 and Attentive      Response to Learning: Able to verbalize/acknowledge new learning and Progressing to goal      Endings: None Reported    Modes of Intervention: Support, Socialization, Exploration, Clarifying and Problem-solving      Discipline Responsible: /Counselor      Signature:   Yenifer Bliss

## 2021-10-25 NOTE — PLAN OF CARE
Problem: Tobacco Use:  Goal: Inpatient tobacco use cessation counseling participation  Description: Inpatient tobacco use cessation counseling participation  Outcome: Ongoing  Patient refused tobacco use cessation counseling. Problem: Depressive Behavior With or Without Suicide Precautions:  Goal: Ability to disclose and discuss suicidal ideas will improve  Description: Ability to disclose and discuss suicidal ideas will improve  Outcome: Ongoing  Patient stated she was having fleeting suicidal ideations. Patient denied having a plan and contracted for safety with staff. Patient monitored every 15 minutes and as needed for safety checks. Problem: Altered Mood, Psychotic Behavior:  Goal: Able to verbalize decrease in frequency and intensity of hallucinations  Description: Able to verbalize decrease in frequency and intensity of hallucinations  Outcome: Ongoing  Patient denied hallucinations. Patient showed no signs or symptoms of responding to internal stimuli.

## 2021-10-25 NOTE — GROUP NOTE
Group Therapy Note    Date: 10/25/2021    Group Start Time: 1100  Group End Time: 1150  Group Topic: Cognitive Skills    [facility]    [facility]        Group Therapy Note    Attendees: 8/18         Patient's Goal:  To increase social interaction and to practice decision making, and communication skills. Notes: Pt participated fully in group task . Pt was able to practice decision making, and communication skills independently . Pt was pleasant and supportive of peers . Status After Intervention:  Improved      Participation Level: Active Listener and Interactive     Participation Quality: Attentive, Sharing,and Supportive        Speech:  Normal     Thought Process/Content: Logical ,linear, pt has minimal patience with slower to respond peers but holds in negative comments -did not control this several days ago. Affective Functioning: Congruent, brightens         Mood: Euthymic, impatient x2 toward end of group but remains in control when irritated by slower to process peers.         Level of consciousness:  Alert, and Attentive         Response to Learning: Able to verbalize current knowledge/experience, Able to verbalize/acknowledge new learning,  and Progressing to goal        Endings: None Reported     Modes of Intervention: Education, Support, Socialization, Exploration, Clarifying and Problem-solving        Discipline Responsible: Psychoeducational Specialist        Signature:  Salena Quinones

## 2021-10-25 NOTE — PROGRESS NOTES
[facility] OR    SKIN GRAFT      from thigh to chin    DANILO AND BSO  2012, 2014    TONSILLECTOMY AND ADENOIDECTOMY        Medications Prior to Admission: divalproex (DEPAKOTE) 500 MG DR tablet, Take 500 mg by mouth 2 times daily  pantoprazole (PROTONIX) 40 MG tablet, Take 40 mg by mouth daily  OLANZapine zydis (ZYPREXA) 10 MG disintegrating tablet, Take 10 mg by mouth 2 times daily  fluticasone (FLONASE) 50 MCG/ACT nasal spray, 1 spray by Each Nostril route daily  melatonin 5 MG TABS tablet, Take 5 mg by mouth nightly as needed  apixaban (ELIQUIS) 5 MG TABS tablet, Take 5 mg by mouth 2 times daily  traZODone (DESYREL) 50 MG tablet, Take 50 mg by mouth nightly  hydrOXYzine (VISTARIL) 50 MG capsule, Take 50 mg by mouth 3 times daily as needed for Anxiety  acetaminophen (TYLENOL) 500 MG tablet, Take 1,000 mg by mouth 2 times daily  ondansetron (ZOFRAN-ODT) 8 MG TBDP disintegrating tablet, Place 8 mg under the tongue every 8 hours as needed for Nausea or Vomiting  albuterol sulfate HFA (VENTOLIN HFA) 108 (90 Base) MCG/ACT inhaler, Inhale 2 puffs into the lungs every 6 hours as needed for Wheezing  tiZANidine (ZANAFLEX) 4 MG tablet, Take 4 mg by mouth every 8 hours as needed  cetirizine (ZYRTEC) 10 MG tablet, Take 10 mg by mouth daily  Allergies   Allergen Reactions    Latex Anaphylaxis and Hives    Abilify [Aripiprazole] Other (See Comments)     agitation    Aspirin Shortness Of Breath    Geodon [Ziprasidone Hcl] Anaphylaxis    Lithium Anaphylaxis    Morphine Anaphylaxis and Hives    Phenobarbital Anaphylaxis    Doxycycline Hives    Seroquel [Quetiapine Fumarate] Other (See Comments)     Suicidal    Thorazine [Chlorpromazine]     Tramadol Other (See Comments)     Epilepsy    Adhesive Tape Rash     Paper tape - causes blistering/rash  paper        Social History     Tobacco Use    Smoking status: Current Every Day Smoker     Packs/day: 1.00     Years: 25.00     Pack years: 25.00     Types: Cigarettes    Smokeless tobacco: Never Used   Substance Use Topics    Alcohol use: Yes     Comment: rare      Family History   Problem Relation Age of Onset    Breast Cancer Mother     Endometrial Cancer Mother     Ovarian Cancer Mother     High Blood Pressure Mother     Kidney Disease Sister     Cancer Sister     Cancer Maternal Aunt     Heart Disease Maternal Aunt     No Known Problems Father     Heart Attack Brother     Heart Failure Maternal Grandmother     Alzheimer's Disease Maternal Grandmother     Other Sister         Brain aneurysm    Seizures Son           Review Of Systems:       Psychiatric Review Of Systems:  Positive for improving depression, appetite and sleep okay, improving suicidal ideation      Objective:       Mental Status Evaluation:  Appearance:  age appropriate   Behavior:  Psychomotor Retardation improving   Speech:  normal volume, rate and tone   Mood:  \"better\"   Affect:  normal and blunted   Thought Process:  Linear and organized   Thought Content:  Suicidal ideation improving   Sensorium:  person, place, time/date and situation   Cognition:  Mild cognitive impairment   Insight:  fair   Judgment:  fair     Assessment:     Diagnosis: Severe recurrent major depressive disorder with psychotic features    Plan:     Continue ECT three times a week while Ms. Thais Lopez is inpatient. We will then consider transitioning to outpatient if she is able to coordinate transportation appropriately based on housing options. Update consent, labs and H&P every 30 days while undergoing ECT treatment. Patient verbalizes understanding of risks/benefits/alternatives to ECT. Last informed consent signed on 10/22/2021.

## 2021-10-25 NOTE — BH NOTE
DISCHARGE PLANNING UPDATE:  - Writer receives a call from REBIScan at [facility] stating that they do not believe client would be a good fit for their program due to daily activities, chores and responsibilities. REBIScan did agree to re-visit possible admission later in the week as long as client continues to improve. - Writer speaks with client about [facility] and will continue to work with her on placement either later in the week after additional treatments as well as an improvement in mood and decrease in depression.  - She also states she has made an agreement with Anu, a past [facility] client who is also getting ECT treatments and she will take her back and forth for her treatments.

## 2021-10-25 NOTE — ANESTHESIA POSTPROCEDURE EVALUATION
Department of Anesthesiology  Postprocedure Note    Patient: Kota Cazares  MRN: [id number]  YOB: 1980  Date of evaluation: 10/25/2021  Time:  2:29 PM     Procedure Summary     Date: 10/25/21 Room / Location: [facility] PACU    Anesthesia Start: 4334 Anesthesia Stop: 7225    Procedure: ECT W/ ANESTHESIA Diagnosis: Major depressive disorder, recurrent, severe with psychotic symptoms    Scheduled Providers:  Responsible Provider: Bharathi Cornelius MD    Anesthesia Type: general ASA Status: 3          Anesthesia Type: general    Sara Phase I: Sara Score: 10    Sara Phase II:      Last vitals: Reviewed and per EMR flowsheets.        Anesthesia Post Evaluation    Comments: POST- ANESTHESIA EVALUATION       Pt Name: Kota Cazares  MRN: [id number]  YOB: 1980  Date of evaluation: 10/25/2021  Time:  2:29 PM      BP (!) 143/88   Pulse 79   Temp 98 °F (36.7 °C)   Resp 16   Ht 5' 6\" (1.676 m)   Wt 230 lb (104.3 kg)   SpO2 96%   BMI 37.12 kg/m²      Consciousness Level  Awake  Cardiopulmonary Status  Stable  Pain Adequately Treated YES  Nausea / Vomiting  NO  Adequate Hydration  YES  Anesthesia Related Complications NONE      Electronically signed by Bharathi Cornelius MD on 10/25/2021 at 2:29 PM

## 2021-10-25 NOTE — GROUP NOTE
Group Therapy Note    Date: 10/25/2021    Group Start Time: 2947  Group End Time: 0945  Group Topic: Healthy Living/Wellness    [facility]    Janice Delvalle RN        Group Therapy Note    Attendees: 8         Patient's Goal:  \"to stay positive, journal, and rest\"    Notes:      Status After Intervention:  Improved    Participation Level:  Active Listener, Interactive and Monopolizing    Participation Quality: Appropriate, Attentive, Sharing and Supportive      Speech:  normal      Thought Process/Content: Logical  Linear      Affective Functioning: Congruent      Mood: anxious and depressed      Level of consciousness:  Alert, Oriented x4 and Attentive      Response to Learning: Able to retain information and Capable of insight      Endings: None Reported    Modes of Intervention: Problem-solving      Discipline Responsible: Registered Nurse      Signature:  Janice Dlevalle RN

## 2021-10-25 NOTE — GROUP NOTE
Group Therapy Note    Date: 10/25/2021    Group Start Time: 1430  Group End Time: 7931  Group Topic: Cognitive Skills    REDD Lee, [place]        Group Therapy Note    Attendees: 6/18         Pt did not participate in Cognitive Skills Group at 1430 when encouraged by RT due to resting in room. Pt was offered talk time as an alternative to group but declined.          Discipline Responsible: Psychoeducational Specialist        Signature:  Aniket Hand
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
## 2021-10-26 ENCOUNTER — ANESTHESIA EVENT (OUTPATIENT)
Dept: POSTOP/PACU | Age: 41
End: 2021-10-26

## 2021-10-26 LAB
ANION GAP SERPL CALCULATED.3IONS-SCNC: 9 MMOL/L (ref 9–17)
BUN BLDV-MCNC: 18 MG/DL (ref 6–20)
BUN/CREAT BLD: NORMAL (ref 9–20)
CALCIUM SERPL-MCNC: 9.2 MG/DL (ref 8.6–10.4)
CHLORIDE BLD-SCNC: 107 MMOL/L (ref 98–107)
CO2: 26 MMOL/L (ref 20–31)
CREAT SERPL-MCNC: 0.8 MG/DL (ref 0.5–0.9)
GFR AFRICAN AMERICAN: >60 ML/MIN
GFR NON-AFRICAN AMERICAN: >60 ML/MIN
GFR SERPL CREATININE-BSD FRML MDRD: NORMAL ML/MIN/{1.73_M2}
GFR SERPL CREATININE-BSD FRML MDRD: NORMAL ML/MIN/{1.73_M2}
GLUCOSE BLD-MCNC: 86 MG/DL (ref 70–99)
POTASSIUM SERPL-SCNC: 4.7 MMOL/L (ref 3.7–5.3)
SODIUM BLD-SCNC: 142 MMOL/L (ref 135–144)

## 2021-10-26 PROCEDURE — 1240000000 HC EMOTIONAL WELLNESS R&B

## 2021-10-26 PROCEDURE — 80048 BASIC METABOLIC PNL TOTAL CA: CPT

## 2021-10-26 PROCEDURE — 6370000000 HC RX 637 (ALT 250 FOR IP): Performed by: PSYCHIATRY & NEUROLOGY

## 2021-10-26 PROCEDURE — 6370000000 HC RX 637 (ALT 250 FOR IP): Performed by: NURSE PRACTITIONER

## 2021-10-26 PROCEDURE — 6370000000 HC RX 637 (ALT 250 FOR IP): Performed by: INTERNAL MEDICINE

## 2021-10-26 PROCEDURE — 6370000000 HC RX 637 (ALT 250 FOR IP)

## 2021-10-26 PROCEDURE — 36415 COLL VENOUS BLD VENIPUNCTURE: CPT

## 2021-10-26 PROCEDURE — 99232 SBSQ HOSP IP/OBS MODERATE 35: CPT | Performed by: PSYCHIATRY & NEUROLOGY

## 2021-10-26 RX ADMIN — AMOXICILLIN AND CLAVULANATE POTASSIUM 1 TABLET: 500; 125 TABLET, FILM COATED ORAL at 11:06

## 2021-10-26 RX ADMIN — Medication: at 21:23

## 2021-10-26 RX ADMIN — NICOTINE POLACRILEX 2 MG: 2 GUM, CHEWING BUCCAL at 17:21

## 2021-10-26 RX ADMIN — SERTRALINE HYDROCHLORIDE 100 MG: 100 TABLET ORAL at 11:06

## 2021-10-26 RX ADMIN — APIXABAN 5 MG: 5 TABLET, FILM COATED ORAL at 11:06

## 2021-10-26 RX ADMIN — TIZANIDINE 4 MG: 4 TABLET ORAL at 20:35

## 2021-10-26 RX ADMIN — NICOTINE POLACRILEX 2 MG: 2 GUM, CHEWING BUCCAL at 16:08

## 2021-10-26 RX ADMIN — NICOTINE POLACRILEX 2 MG: 2 GUM, CHEWING BUCCAL at 13:28

## 2021-10-26 RX ADMIN — AMOXICILLIN AND CLAVULANATE POTASSIUM 1 TABLET: 500; 125 TABLET, FILM COATED ORAL at 20:36

## 2021-10-26 RX ADMIN — OLANZAPINE 25 MG: 10 TABLET, ORALLY DISINTEGRATING ORAL at 20:35

## 2021-10-26 RX ADMIN — Medication: at 19:35

## 2021-10-26 RX ADMIN — CETIRIZINE HYDROCHLORIDE 10 MG: 10 TABLET, FILM COATED ORAL at 11:06

## 2021-10-26 RX ADMIN — TIZANIDINE 4 MG: 4 TABLET ORAL at 12:37

## 2021-10-26 RX ADMIN — HYDROXYZINE HYDROCHLORIDE 50 MG: 50 TABLET, FILM COATED ORAL at 11:05

## 2021-10-26 RX ADMIN — POLYETHYLENE GLYCOL 3350 17 G: 17 POWDER, FOR SOLUTION ORAL at 12:37

## 2021-10-26 RX ADMIN — Medication: at 15:50

## 2021-10-26 RX ADMIN — HYDROXYZINE HYDROCHLORIDE 50 MG: 50 TABLET, FILM COATED ORAL at 19:35

## 2021-10-26 RX ADMIN — FLUTICASONE PROPIONATE 1 SPRAY: 50 SPRAY, METERED NASAL at 11:06

## 2021-10-26 RX ADMIN — TRAZODONE HYDROCHLORIDE 50 MG: 50 TABLET ORAL at 20:35

## 2021-10-26 RX ADMIN — APIXABAN 5 MG: 5 TABLET, FILM COATED ORAL at 20:36

## 2021-10-26 RX ADMIN — Medication: at 17:16

## 2021-10-26 RX ADMIN — PRAZOSIN HYDROCHLORIDE 2 MG: 1 CAPSULE ORAL at 20:35

## 2021-10-26 RX ADMIN — PANTOPRAZOLE SODIUM 40 MG: 40 TABLET, DELAYED RELEASE ORAL at 11:06

## 2021-10-26 RX ADMIN — ACETAMINOPHEN 650 MG: 325 TABLET ORAL at 11:05

## 2021-10-26 RX ADMIN — ACETAMINOPHEN 650 MG: 325 TABLET ORAL at 21:23

## 2021-10-26 RX ADMIN — Medication 3 MG: at 20:35

## 2021-10-26 ASSESSMENT — PAIN SCALES - GENERAL
PAINLEVEL_OUTOF10: 6
PAINLEVEL_OUTOF10: 3
PAINLEVEL_OUTOF10: 4

## 2021-10-26 ASSESSMENT — PAIN DESCRIPTION - PAIN TYPE: TYPE: ACUTE PAIN

## 2021-10-26 ASSESSMENT — PAIN DESCRIPTION - LOCATION: LOCATION: OTHER (COMMENT)

## 2021-10-26 NOTE — PROGRESS NOTES
Daily Progress Note  10/26/2021    Patient Name: Rony Barrett    CHIEF COMPLAINT: Suicidal ideation         SUBJECTIVE:    Patient is seen today for a follow up assessment bedside. Patient is compliant with scheduled medications. She has not received emergency medications in the past 24 hours. Deatrice Pain was evaluated at bedside today, she reports some tongue pain and did visibly bite her tongue yesterday during ECT. She reports that she is still able to eat and swallow okay. She reports that the Toradol did help with the jaw pain. She continues to endorse depression though feels that it is somewhat improved since starting ECT. She reports that she is more down today because she [facility] and worries that she will not have a safe discharge plan to maintain sobriety. She is at high risk for decompensation with relapse without a structured plan in place. She reports that the [facility]  [facility]26 would not take her because they could not accommodate ECT. She was encouraged to focus arrangements on maintaining sober living and that we would continue ECT while inpatient and not transition to outpatient if it will interfere with her recovery. She did verbalize understanding and agreement with this. She continues to deny any side effects to her current medication regimen and will have another ECT tomorrow morning. Appetite:  [x] Normal/Adequate/Unchanged  [] Increased  [] Decreased      Sleep:       [] Normal/Adequate/Unchanged  [x] Fair  [] Poor      Group Attendance on Unit:   [] Yes  [x] Selectively    [] No    Medication Side Effects:  Patient denies any medication side effects at the time of assessment. Mental Status Exam  Level of consciousness: Alert and awake. Appearance: Appropriate attire for setting, resting in bed, eyes covered with a mask, with fair  grooming and hygiene.    Behavior/Motor: Approachable, pleasant  Attitude toward examiner: Cooperative, somewhat attentive, fair eye contact. Speech: Normal rate, volume, and tone  Mood: \"Bummed\"  Affect: Mood congruent, somewhat withdrawn  Thought processes: Linear and coherent. Thought content: Denies homicidal ideation. Suicidal Ideation: Improved, no plan or intent, unable to contract for safety outside of hospital setting at present  Delusions: No evidence of delusions. Denies paranoia. Perceptual Disturbance: Patient does not appear to be responding to internal stimuli. Denies auditory hallucinations. Denies visual hallucinations. Cognition: Oriented to self, location, time, and situation. Memory: Intact. Insight & Judgement: Poor. Data   height is 5' 6\" (1.676 m) and weight is 230 lb (104.3 kg). Her oral temperature is 97.2 °F (36.2 °C). Her blood pressure is 130/71 and her pulse is 95. Her respiration is 14 and oxygen saturation is 96%.    Labs:   Admission on 10/12/2021   Component Date Value Ref Range Status    WBC 10/12/2021 6.8  3.5 - 11.0 k/uL Final    RBC 10/12/2021 4.45  4.0 - 5.2 m/uL Final    Hemoglobin 10/12/2021 13.5  12.0 - 16.0 g/dL Final    Hematocrit 10/12/2021 40.2  36 - 46 % Final    MCV 10/12/2021 90.3  80 - 100 fL Final    MCH 10/12/2021 30.4  26 - 34 pg Final    MCHC 10/12/2021 33.6  31 - 37 g/dL Final    RDW 10/12/2021 13.3  11.5 - 14.9 % Final    Platelets 46/60/6528 271  150 - 450 k/uL Final    MPV 10/12/2021 6.8  6.0 - 12.0 fL Final    NRBC Automated 10/12/2021 NOT REPORTED  per 100 WBC Final    Differential Type 10/12/2021 NOT REPORTED   Final    Immature Granulocytes 10/12/2021 NOT REPORTED  0 % Final    Absolute Immature Granulocyte 10/12/2021 NOT REPORTED  0.00 - 0.30 k/uL Final    WBC Morphology 10/12/2021 NOT REPORTED   Final    RBC Morphology 10/12/2021 NOT REPORTED   Final    Platelet Estimate 60/61/1588 NOT REPORTED   Final    Seg Neutrophils 10/12/2021 48  36 - 66 % Final    Lymphocytes 10/12/2021 47* 24 - 44 % Final    Monocytes 10/12/2021 5  1 - 7 % Rapid 10/12/2021 Not Detected  Not Detected Final    Comment:       Rapid NAAT:  The specimen is NEGATIVE for SARS-CoV-2, the novel coronavirus associated with   COVID-19. The ID NOW COVID-19 assay is designed to detect the virus that causes COVID-19 in patients   with signs and symptoms of infection who are suspected of COVID-19. An individual without symptoms of COVID-19 and who is not shedding SARS-CoV-2 virus would   expect to have a negative (not detected) result in this assay. Negative results should be treated as presumptive and, if inconsistent with clinical signs   and symptoms or necessary for patient management,  should be tested with an alternative molecular assay. Negative results do not preclude   SARS-CoV-2 infection and   should not be used as the sole basis for patient management decisions. Fact sheet for Healthcare Providers: [email or web address]  Fact sheet for Patients: [email or web address]          Methodology: Isothermal Nucleic Acid Amplification      Color, UA 10/12/2021 Yellow  Yellow Final    Turbidity UA 10/12/2021 Clear  Clear Final    Glucose, Ur 10/12/2021 NEGATIVE  NEGATIVE Final    Bilirubin Urine 10/12/2021 NEGATIVE  NEGATIVE Final    Ketones, Urine 10/12/2021 NEGATIVE  NEGATIVE Final    Specific [place], UA 10/12/2021 1.027  1.000 - 1.030 Final    Urine Hgb 10/12/2021 NEGATIVE  NEGATIVE Final    pH, UA 10/12/2021 6.5  5.0 - 8.0 Final    Protein, UA 10/12/2021 NEGATIVE  NEGATIVE Final    Urobilinogen, Urine 10/12/2021 Normal  Normal Final    Nitrite, Urine 10/12/2021 NEGATIVE  NEGATIVE Final    Leukocyte Esterase, Urine 10/12/2021 NEGATIVE  NEGATIVE Final    Urinalysis Comments 10/12/2021 Microscopic exam not performed based on chemical results unless requested in original order.    Final    Amphetamine Screen, Ur 10/12/2021 NEGATIVE  NEGATIVE Final    Comment:       (Positive cutoff 1000 ng/mL)                  Barbiturate Screen, Ur 10/12/2021 NEGATIVE  NEGATIVE Final    Comment:       (Positive cutoff 200 ng/mL)                  Benzodiazepine Screen, Urine 10/12/2021 NEGATIVE  NEGATIVE Final    Comment:       (Positive cutoff 200 ng/mL)                  Cocaine Metabolite, Urine 10/12/2021 NEGATIVE  NEGATIVE Final    Comment:       (Positive cutoff 300 ng/mL)                  Methadone Screen, Urine 10/12/2021 NEGATIVE  NEGATIVE Final    Comment:       (Positive cutoff 300 ng/mL)                  Opiates, Urine 10/12/2021 NEGATIVE  NEGATIVE Final    Comment:       (Positive cutoff 300 ng/mL)                  Phencyclidine, Urine 10/12/2021 NEGATIVE  NEGATIVE Final    Comment:       (Positive cutoff 25 ng/mL)                  Propoxyphene, Urine 10/12/2021 NOT REPORTED  NEGATIVE Final    Cannabinoid Scrn, Ur 10/12/2021 POSITIVE* NEGATIVE Final    Comment:       (Positive cutoff 50 ng/mL)                  Oxycodone Screen, Ur 10/12/2021 NEGATIVE  NEGATIVE Final    Comment:       (Positive cutoff 100 ng/mL)                  Methamphetamine, Urine 10/12/2021 NOT REPORTED  NEGATIVE Final    Tricyclic Antidepressants, Urine 10/12/2021 NOT REPORTED  NEGATIVE Final    MDMA, Urine 10/12/2021 NOT REPORTED  NEGATIVE Final    Buprenorphine Urine 10/12/2021 NOT REPORTED  NEGATIVE Final    Test Information 10/12/2021 Assay provides medical screening only. The absence of expected drug(s) and/or metabolite(s) may indicate diluted or adulterated urine, limitations of testing or timing of collection. Final    Comment: Testing for legal purposes should be confirmed by another method. To request confirmation   of test result, please call the lab within 7 days of sample submission.       Acetaminophen Level 10/12/2021 7* 10 - 30 ug/mL Final    Ethanol 10/12/2021 <10  <10 mg/dL Final    Ethanol percent 10/12/2021 <9.461  % Final    Salicylate Lvl 77/95/2028 <1* 3 - 10 mg/dL Final    Toxic Tricyclic Sc,Blood 48/68/6367 PRN  lactated ringers infusion, , IntraVENous, Continuous  fluticasone (FLONASE) 50 MCG/ACT nasal spray 1 spray, 1 spray, Each Nostril, Daily  lactated ringers infusion, , IntraVENous, Continuous  amoxicillin-clavulanate (AUGMENTIN) 500-125 MG per tablet 1 tablet, 1 tablet, Oral, 2 times per day  OLANZapine zydis (ZYPREXA) disintegrating tablet 25 mg, 25 mg, Oral, Nightly  sertraline (ZOLOFT) tablet 100 mg, 100 mg, Oral, Daily  prazosin (MINIPRESS) capsule 2 mg, 2 mg, Oral, Nightly  loperamide (IMODIUM) capsule 2 mg, 2 mg, Oral, 4x Daily PRN  ondansetron (ZOFRAN-ODT) disintegrating tablet 4 mg, 4 mg, Oral, Q8H PRN  pantoprazole (PROTONIX) tablet 40 mg, 40 mg, Oral, QAM AC  nicotine polacrilex (NICORETTE) gum 2 mg, 2 mg, Oral, PRN  albuterol sulfate  (90 Base) MCG/ACT inhaler 2 puff, 2 puff, Inhalation, Q6H PRN  apixaban (ELIQUIS) tablet 5 mg, 5 mg, Oral, BID  cetirizine (ZYRTEC) tablet 10 mg, 10 mg, Oral, Daily  melatonin tablet 3 mg, 3 mg, Oral, Nightly PRN  tiZANidine (ZANAFLEX) tablet 4 mg, 4 mg, Oral, Q8H PRN  acetaminophen (TYLENOL) tablet 650 mg, 650 mg, Oral, Q4H PRN  aluminum & magnesium hydroxide-simethicone (MAALOX) 200-200-20 MG/5ML suspension 30 mL, 30 mL, Oral, Q6H PRN  hydrOXYzine (ATARAX) tablet 50 mg, 50 mg, Oral, TID PRN  haloperidol (HALDOL) tablet 5 mg, 5 mg, Oral, Q6H PRN **AND** [DISCONTINUED] LORazepam (ATIVAN) tablet 2 mg, 2 mg, Oral, Q6H PRN  haloperidol lactate (HALDOL) injection 5 mg, 5 mg, IntraMUSCular, Q6H PRN **AND** [DISCONTINUED] LORazepam (ATIVAN) injection 2 mg, 2 mg, IntraMUSCular, Q6H PRN **AND** diphenhydrAMINE (BENADRYL) injection 50 mg, 50 mg, IntraMUSCular, Q6H PRN  polyethylene glycol (GLYCOLAX) packet 17 g, 17 g, Oral, Daily PRN  traZODone (DESYREL) tablet 50 mg, 50 mg, Oral, Nightly PRN    ASSESSMENT  Severe recurrent major depressive disorder with psychotic features ([facility] [address] [address]         PLAN  Patient symptoms are: Improving  Continue current medication regimen.    Continue

## 2021-10-26 NOTE — PLAN OF CARE
Problem: Depressive Behavior With or Without Suicide Precautions:  Goal: Ability to disclose and discuss suicidal ideas will improve  Description: Ability to disclose and discuss suicidal ideas will improve  Outcome: Ongoing  Note: Patient denies suicidal ideation.

## 2021-10-26 NOTE — GROUP NOTE
Group Therapy Note    Date: 10/26/2021    Group Start Time: 1100  Group End Time: 1150  Group Topic: Cognitive Skills    [facility]    LUISANA Vanegas        Group Therapy Note    Attendees: 8/16         Patient's Goal:  To increase social interaction and to practice problem solving, concentration, and communication skills. Notes: Pt participated fully in group task . Pt was able to practice problem solving, concentration, and communication skills independently . Pt is critical of peers \"not following rules\" of task until peer politely informs pt of newer set of rules for task -peer tone was very polite. Pt's tone is harsh with peers at times . Status After Intervention:  Improved      Participation Level: Active Listener and Interactive     Participation Quality: Attentive, Sharing,and Supportive        Speech:  Normal r/t own participation in task but is critical and judgmental of peers' abilities at times but does redirect .      Thought Process/Content: Logical ,linear         Affective Functioning: Congruent, brightens         Mood: Euthymic mostly, critical/grandiose at times r/t correcting peers but redirects easily-decreased since time of admission        Level of consciousness:  Alert, and Attentive         Response to Learning: Able to verbalize current knowledge/experience, Able to verbalize/acknowledge new learning,  and Progressing to goal        Endings: None Reported     Modes of Intervention: Education, Support, Socialization, Exploration, Clarifying and Problem-solving        Discipline Responsible: Psychoeducational Specialist        Signature:  LUISANA Evans

## 2021-10-26 NOTE — GROUP NOTE
Group Therapy Note    Date: 10/26/2021    Group Start Time: 1440  Group End Time: 1520  Group Topic: Relaxation    REDD BHAMADEO Garcia, CTRS        Group Therapy Note    Attendees: 7/13         Patient's Goal:  To increase social interaction and to practice relaxation through creative expression and music, mindfulness, and communication skills. Notes: Pt participated fully in group task . Pt was able to practice relaxation through creative expression and music, mindfulness, and communication skills independently . Pt was pleasant and supportive of peers . Status After Intervention:  Improved      Participation Level:  Active Listener and Interactive     Participation Quality: Attentive, Sharing,and Supportive        Speech:  Normal     Thought Process/Content: Logical ,linear         Affective Functioning: Congruent, brightens         Mood: Euthymic        Level of consciousness:  Alert, and Attentive         Response to Learning: Able to verbalize current knowledge/experience, Able to verbalize/acknowledge new learning,  and Progressing to goal        Endings: None Reported     Modes of Intervention: Education, Support, Socialization, Exploration, Clarifying and Problem-solving        Discipline Responsible: Psychoeducational Specialist        Signature:  Wil aJrrett

## 2021-10-26 NOTE — PLAN OF CARE
Problem: Depressive Behavior With or Without Suicide Precautions:  Goal: Ability to disclose and discuss suicidal ideas will improve  Description: Ability to disclose and discuss suicidal ideas will improve  Outcome: Ongoing   Patient verbalizes she is having fleeting thoughts of suicide. Her depression and anxiety are still present but the are at a 6/10. Believes her procedures are helping with her suicidal thoughts. Patient appear more brightened than usual.  Problem: Altered Mood, Psychotic Behavior:  Goal: Able to verbalize decrease in frequency and intensity of hallucinations  Description: Able to verbalize decrease in frequency and intensity of hallucinations  10/25/2021 2054 by Ranjeet Zamudio LPN  Outcome: Ongoing   Patient states she has not had any hallucinations today, audio or visual. Patient states she will seek assistance from staff if voices and visuals become more than she can deal with. Will continue to monitor for any adverse effects of medication or altered behaviors. Safety provided by 15 min checks.

## 2021-10-27 ENCOUNTER — ANESTHESIA (OUTPATIENT)
Dept: POSTOP/PACU | Age: 41
End: 2021-10-27

## 2021-10-27 ENCOUNTER — HOSPITAL ENCOUNTER (OUTPATIENT)
Dept: POSTOP/PACU | Age: 41
Discharge: HOME | DRG: 885 | End: 2021-10-27
Payer: MEDICARE

## 2021-10-27 VITALS
SYSTOLIC BLOOD PRESSURE: 145 MMHG | BODY MASS INDEX: 37.12 KG/M2 | RESPIRATION RATE: 16 BRPM | HEART RATE: 60 BPM | WEIGHT: 230 LBS | TEMPERATURE: 98 F | OXYGEN SATURATION: 97 % | DIASTOLIC BLOOD PRESSURE: 80 MMHG

## 2021-10-27 VITALS — OXYGEN SATURATION: 100 %

## 2021-10-27 LAB — GLUCOSE BLD-MCNC: 107 MG/DL (ref 65–105)

## 2021-10-27 PROCEDURE — 6370000000 HC RX 637 (ALT 250 FOR IP): Performed by: PSYCHIATRY & NEUROLOGY

## 2021-10-27 PROCEDURE — 6370000000 HC RX 637 (ALT 250 FOR IP): Performed by: INTERNAL MEDICINE

## 2021-10-27 PROCEDURE — 90870 ELECTROCONVULSIVE THERAPY: CPT

## 2021-10-27 PROCEDURE — 6360000002 HC RX W HCPCS

## 2021-10-27 PROCEDURE — 6370000000 HC RX 637 (ALT 250 FOR IP): Performed by: NURSE PRACTITIONER

## 2021-10-27 PROCEDURE — GZB2ZZZ ELECTROCONVULSIVE THERAPY, BILATERAL-SINGLE SEIZURE: ICD-10-PCS | Performed by: PSYCHIATRY & NEUROLOGY

## 2021-10-27 PROCEDURE — 1240000000 HC EMOTIONAL WELLNESS R&B

## 2021-10-27 PROCEDURE — 82947 ASSAY GLUCOSE BLOOD QUANT: CPT

## 2021-10-27 PROCEDURE — 90870 ELECTROCONVULSIVE THERAPY: CPT | Performed by: PSYCHIATRY & NEUROLOGY

## 2021-10-27 PROCEDURE — 7100000000 HC PACU RECOVERY - FIRST 15 MIN

## 2021-10-27 PROCEDURE — 3700000001 HC ADD 15 MINUTES (ANESTHESIA)

## 2021-10-27 PROCEDURE — 2580000003 HC RX 258: Performed by: ANESTHESIOLOGY

## 2021-10-27 PROCEDURE — 3700000000 HC ANESTHESIA ATTENDED CARE

## 2021-10-27 PROCEDURE — 7100000001 HC PACU RECOVERY - ADDTL 15 MIN

## 2021-10-27 PROCEDURE — 99232 SBSQ HOSP IP/OBS MODERATE 35: CPT | Performed by: PSYCHIATRY & NEUROLOGY

## 2021-10-27 PROCEDURE — 2500000003 HC RX 250 WO HCPCS

## 2021-10-27 PROCEDURE — 6370000000 HC RX 637 (ALT 250 FOR IP)

## 2021-10-27 RX ORDER — SUCCINYLCHOLINE CHLORIDE 20 MG/ML
INJECTION INTRAMUSCULAR; INTRAVENOUS
Status: DISPENSED
Start: 2021-10-27 | End: 2021-10-27

## 2021-10-27 RX ORDER — KETOROLAC TROMETHAMINE 30 MG/ML
INJECTION, SOLUTION INTRAMUSCULAR; INTRAVENOUS
Status: DISPENSED
Start: 2021-10-27 | End: 2021-10-27

## 2021-10-27 RX ORDER — SODIUM CHLORIDE 9 MG/ML
25 INJECTION, SOLUTION INTRAVENOUS PRN
Status: DISCONTINUED | OUTPATIENT
Start: 2021-10-27 | End: 2021-10-28 | Stop reason: HOSPADM

## 2021-10-27 RX ORDER — SUCCINYLCHOLINE CHLORIDE 20 MG/ML
INJECTION INTRAMUSCULAR; INTRAVENOUS PRN
Status: DISCONTINUED | OUTPATIENT
Start: 2021-10-27 | End: 2021-10-27 | Stop reason: SDUPTHER

## 2021-10-27 RX ORDER — SODIUM CHLORIDE, SODIUM LACTATE, POTASSIUM CHLORIDE, CALCIUM CHLORIDE 600; 310; 30; 20 MG/100ML; MG/100ML; MG/100ML; MG/100ML
INJECTION, SOLUTION INTRAVENOUS CONTINUOUS
Status: DISCONTINUED | OUTPATIENT
Start: 2021-10-27 | End: 2021-10-28 | Stop reason: HOSPADM

## 2021-10-27 RX ORDER — LIDOCAINE HYDROCHLORIDE 10 MG/ML
1 INJECTION, SOLUTION EPIDURAL; INFILTRATION; INTRACAUDAL; PERINEURAL
Status: ACTIVE | OUTPATIENT
Start: 2021-10-27 | End: 2021-10-27

## 2021-10-27 RX ORDER — KETOROLAC TROMETHAMINE 30 MG/ML
INJECTION, SOLUTION INTRAMUSCULAR; INTRAVENOUS PRN
Status: DISCONTINUED | OUTPATIENT
Start: 2021-10-27 | End: 2021-10-27 | Stop reason: SDUPTHER

## 2021-10-27 RX ORDER — SODIUM CHLORIDE 0.9 % (FLUSH) 0.9 %
10 SYRINGE (ML) INJECTION PRN
Status: DISCONTINUED | OUTPATIENT
Start: 2021-10-27 | End: 2021-10-28 | Stop reason: HOSPADM

## 2021-10-27 RX ORDER — OLANZAPINE 10 MG/1
30 TABLET, ORALLY DISINTEGRATING ORAL NIGHTLY
Status: DISCONTINUED | OUTPATIENT
Start: 2021-10-28 | End: 2021-11-03 | Stop reason: HOSPADM

## 2021-10-27 RX ADMIN — TIZANIDINE 4 MG: 4 TABLET ORAL at 19:14

## 2021-10-27 RX ADMIN — CETIRIZINE HYDROCHLORIDE 10 MG: 10 TABLET, FILM COATED ORAL at 08:53

## 2021-10-27 RX ADMIN — NICOTINE POLACRILEX 2 MG: 2 GUM, CHEWING BUCCAL at 12:10

## 2021-10-27 RX ADMIN — TIZANIDINE 4 MG: 4 TABLET ORAL at 08:56

## 2021-10-27 RX ADMIN — PANTOPRAZOLE SODIUM 40 MG: 40 TABLET, DELAYED RELEASE ORAL at 10:04

## 2021-10-27 RX ADMIN — ACETAMINOPHEN 650 MG: 325 TABLET ORAL at 15:31

## 2021-10-27 RX ADMIN — OLANZAPINE 25 MG: 10 TABLET, ORALLY DISINTEGRATING ORAL at 20:27

## 2021-10-27 RX ADMIN — HYDROXYZINE HYDROCHLORIDE 50 MG: 50 TABLET, FILM COATED ORAL at 15:31

## 2021-10-27 RX ADMIN — AMOXICILLIN AND CLAVULANATE POTASSIUM 1 TABLET: 500; 125 TABLET, FILM COATED ORAL at 08:53

## 2021-10-27 RX ADMIN — SODIUM CHLORIDE, POTASSIUM CHLORIDE, SODIUM LACTATE AND CALCIUM CHLORIDE: 600; 310; 30; 20 INJECTION, SOLUTION INTRAVENOUS at 07:30

## 2021-10-27 RX ADMIN — ALBUTEROL SULFATE 2 PUFF: 90 AEROSOL, METERED RESPIRATORY (INHALATION) at 09:33

## 2021-10-27 RX ADMIN — KETOROLAC TROMETHAMINE 15 MG: 30 INJECTION, SOLUTION INTRAMUSCULAR; INTRAVENOUS at 07:51

## 2021-10-27 RX ADMIN — SERTRALINE HYDROCHLORIDE 100 MG: 100 TABLET ORAL at 08:52

## 2021-10-27 RX ADMIN — HYDROXYZINE HYDROCHLORIDE 50 MG: 50 TABLET, FILM COATED ORAL at 09:33

## 2021-10-27 RX ADMIN — TRAZODONE HYDROCHLORIDE 50 MG: 50 TABLET ORAL at 20:27

## 2021-10-27 RX ADMIN — ACETAMINOPHEN 650 MG: 325 TABLET ORAL at 06:26

## 2021-10-27 RX ADMIN — NICOTINE POLACRILEX 2 MG: 2 GUM, CHEWING BUCCAL at 14:22

## 2021-10-27 RX ADMIN — SUCCINYLCHOLINE CHLORIDE 80 MG: 20 INJECTION, SOLUTION INTRAMUSCULAR; INTRAVENOUS at 07:53

## 2021-10-27 RX ADMIN — APIXABAN 5 MG: 5 TABLET, FILM COATED ORAL at 20:28

## 2021-10-27 RX ADMIN — PRAZOSIN HYDROCHLORIDE 2 MG: 1 CAPSULE ORAL at 20:27

## 2021-10-27 RX ADMIN — ACETAMINOPHEN 650 MG: 325 TABLET ORAL at 20:29

## 2021-10-27 RX ADMIN — APIXABAN 5 MG: 5 TABLET, FILM COATED ORAL at 08:53

## 2021-10-27 RX ADMIN — AMOXICILLIN AND CLAVULANATE POTASSIUM 1 TABLET: 500; 125 TABLET, FILM COATED ORAL at 20:27

## 2021-10-27 RX ADMIN — NICOTINE POLACRILEX 2 MG: 2 GUM, CHEWING BUCCAL at 17:25

## 2021-10-27 RX ADMIN — Medication 80 MG: at 07:53

## 2021-10-27 RX ADMIN — ACETAMINOPHEN 650 MG: 325 TABLET ORAL at 10:28

## 2021-10-27 RX ADMIN — FLUTICASONE PROPIONATE 1 SPRAY: 50 SPRAY, METERED NASAL at 09:33

## 2021-10-27 ASSESSMENT — PAIN SCALES - GENERAL
PAINLEVEL_OUTOF10: 0
PAINLEVEL_OUTOF10: 8
PAINLEVEL_OUTOF10: 0
PAINLEVEL_OUTOF10: 3
PAINLEVEL_OUTOF10: 8
PAINLEVEL_OUTOF10: 8
PAINLEVEL_OUTOF10: 0
PAINLEVEL_OUTOF10: 6

## 2021-10-27 ASSESSMENT — PAIN DESCRIPTION - PROGRESSION: CLINICAL_PROGRESSION: GRADUALLY WORSENING

## 2021-10-27 ASSESSMENT — PAIN DESCRIPTION - ONSET: ONSET: ON-GOING

## 2021-10-27 ASSESSMENT — PAIN - FUNCTIONAL ASSESSMENT
PAIN_FUNCTIONAL_ASSESSMENT: 0-10
PAIN_FUNCTIONAL_ASSESSMENT: ACTIVITIES ARE NOT PREVENTED

## 2021-10-27 ASSESSMENT — PAIN DESCRIPTION - FREQUENCY: FREQUENCY: CONTINUOUS

## 2021-10-27 ASSESSMENT — PAIN DESCRIPTION - LOCATION
LOCATION: GENERALIZED
LOCATION: HEAD

## 2021-10-27 ASSESSMENT — LIFESTYLE VARIABLES: SMOKING_STATUS: 1

## 2021-10-27 ASSESSMENT — PAIN DESCRIPTION - PAIN TYPE: TYPE: ACUTE PAIN

## 2021-10-27 ASSESSMENT — PAIN DESCRIPTION - ORIENTATION: ORIENTATION: RIGHT;LEFT

## 2021-10-27 ASSESSMENT — PAIN DESCRIPTION - DESCRIPTORS: DESCRIPTORS: ACHING;CONSTANT

## 2021-10-27 NOTE — FLOWSHEET NOTE
*Patient participated in [facility]        10/27/21 1415   Encounter Summary   Services provided to: Patient   Referral/Consult From: Rounding   Continue Visiting   (10/27/21)   Complexity of Encounter Moderate   Length of Encounter 30 minutes   Spiritual Assessment Completed Yes   Spiritual/Moravian   Type Spiritual support   Assessment Calm; Approachable   Intervention Active listening;Prayer   Outcome Receptive;Engaged in conversation

## 2021-10-27 NOTE — PROCEDURES
Bilateral ECT Procedure Report  Keagan Rae   10/27/2021  1980         Attending:Melchor Ortiz MD  Preprocedure diagnosis: Major depressive disorder, recurrent, severe with psychotic symptoms (F33.3)  Postprocedure diagnosis: SAME AS PRE-PROCEDURE DIAGNOSIS  Treatment Number: This is treatment # 3   Patient Status: Outpatient   Type of ECT: Bilateral Brief Pulse  Medications  Preprocedure: Tylenol 650mg and Toradol 15mg  Anesthetic: Methohexital 80 mg  Paralytic: Succinylcholine 80 mg  Post procedure: none needed     Cuff placement: Left Lower Extremity    Thymatron Settings 1st Stimulus:     Pulse width: 1.0 ms   Frequency:  40 hz   % Power:   30 %   Static Impedance: 1180 ohms             Dynamic Impedance: 230 ohms             Motor Seizure Duration: 38 seconds  EEG Seizure Duration: 71 seconds                 The patient's ECT treatment was performed using Thymatron machine  . Timeout:  Time out was performed using two patient identifiers and confirmation of procedure. Patient Preparation: Preprocedure documentation, labs, H&P were all verified and reviewed. The patient was placed in supine position. EEG leads were placed for monitoring of seizure. Finley areas bilaterally cleaned and prepped. Pre-Tac solution was applied over stimulus electrode site. Thymapad's then applied to stimulus electrode site bilaterally with the center of the lead placed approximately 1 inch superior to the midpoint of line between canthus and the tragus bilaterally. The patient was pre-oxygenated. Procedure in detail: Medications were administered by anesthesia using intravenous access at the doses listed previously in this summary. Anesthetic administered and once confirmation the patient is anesthetized, the patient's blood pressure cuff on lower extremity was inflated to 100mmHg in excess of patient's blood pressure. At this time, the neuromuscular blockade was administered intravenously by anesthesia. Upper extremity fasciculation were verified followed by lower extremity fasciculation. Once fasciculations terminated, confirmation of affective neuromuscular blockade demonstrated by absence of withdrawal reflex. Bite blocks were put in place. Static impedance was tested and within appropriate limits. Thymatron settings were confirmed with nursing staff. Staff was cleared from the patient and dose of ECT stimulus was delivered. Motor seizure activity  was observed at duration noted and terminated. EEG seizure activity was observed of duration noted that was confirmed via monitoring EEG and terminated with appropriate postictal suppression noted on EEG. Static impedance and dynamic impedance were documented. Tourniquet on  lower extremity was released and recovery procedures initiated. The patient was mask ventilated during the procedure with no significant drops in oxygenation saturation and tolerated the procedure well without any notable adverse effects. Condition: The patient was in stable condition with stable vital signs and able to follow commands when moved to recovery.

## 2021-10-27 NOTE — PROGRESS NOTES
BP CUFF DEFLATED LEFT ANKLE    PULSE WIDTH- 1.0   FREQUENCY- 40  DURATION % POWER- 30  CURRENT- 0.9  MOTOR- 38  EEG- 71  STATIC- 1180  DYNAMIC- 230

## 2021-10-27 NOTE — PLAN OF CARE
Problem: Depressive Behavior With or Without Suicide Precautions:  Goal: Ability to disclose and discuss suicidal ideas will improve  Description: Ability to disclose and discuss suicidal ideas will improve  Outcome: Ongoing   Patient states fleeting suicidal ideation at this time, but is able to contract for safety on the unit, and has agreed to seek out staff should the thoughts arise. She states a plan to overdose when she leaves the hospital, due to feeling like her medications are not working and the ECTs are not working either. She states her mood is more depressed today, but she is having less anxiety. Problem: Altered Mood, Psychotic Behavior:  Goal: Able to verbalize decrease in frequency and intensity of hallucinations  Description: Able to verbalize decrease in frequency and intensity of hallucinations  Outcome: Ongoing   Patient states auditory hallucinations at this time. She states voices stating that \"you do not deserve to live in anymore\". Patient is fifteen minute safety check. Problem: Pain:  Goal: Pain level will decrease  Description: Pain level will decrease  Outcome: Ongoing   Patient states a headache after her ECTs today.  She is accepting of as needed pain medication

## 2021-10-27 NOTE — GROUP NOTE
Group Therapy Note    Date: 10/27/2021    Group Start Time: 0900  Group End Time: 0930  Group Topic: Group Documentation    [facility]  [address], RN        Group Therapy Note    Attendees:          Patient's Goal:  Setting a goal for the day  Notes:  Goals group    Status After Intervention:  Unchanged    Participation Level: Interactive    Participation Quality: Sharing      Speech:  normal      Thought Process/Content: Logical      Affective Functioning: Congruent      Mood: anxious      Level of consciousness:  Alert      Response to Learning: Able to retain information      Endings: None Reported    Modes of Intervention: Education      Discipline Responsible: Registered Nurse      Signature:  Luly Marquez RN

## 2021-10-27 NOTE — GROUP NOTE
Group Therapy Note    Date: 10/27/2021    Group Start Time: 1000  Group End Time: 1030  Group Topic: Psychotherapy    [facility]    Leah Devlin        Group Therapy Note    Attendees: *6/16         Patient's Goal:  PT will participate actively in group discussion using self esteem cards. Notes: :  Patient is making progress, AEB participating in group discussion, actively listening, and supporting other group members. PT participates in group and encourages others to participate     Status After Intervention:  Improved    Participation Level: Active Listener and Interactive    Participation Quality: Appropriate, Attentive and Sharing      Speech:  normal      Thought Process/Content: Logical      Affective Functioning: Flat      Mood: elevated      Level of consciousness:  Alert, Oriented x4 and Attentive      Response to Learning: Able to verbalize/acknowledge new learning and Progressing to goal      Endings: None Reported    Modes of Intervention: Support, Socialization, Exploration, Clarifying and Problem-solving      Discipline Responsible: /Counselor      Signature:   Leah Devlin

## 2021-10-27 NOTE — ANESTHESIA POSTPROCEDURE EVALUATION
Department of Anesthesiology  Postprocedure Note    Patient: Trista Henry  MRN: [id number]  YOB: 1980  Date of evaluation: 10/27/2021  Time:  9:06 AM     Procedure Summary     Date: 10/27/21 Room / Location: [facility] PACU    Anesthesia Start: 6219 Anesthesia Stop: 0805    Procedure: ECT W/ ANESTHESIA Diagnosis: Major depressive disorder, recurrent, severe with psychotic symptoms    Scheduled Providers:  Responsible Provider: Debbi Baldwin MD    Anesthesia Type: general ASA Status: 3          Anesthesia Type: general    Sara Phase I: Sara Score: 10    Sara Phase II:      Last vitals: Reviewed and per EMR flowsheets.        Anesthesia Post Evaluation    Comments: POST- ANESTHESIA EVALUATION       Pt Name: Trista Henry  MRN: [id number]  YOB: 1980  Date of evaluation: 10/27/2021  Time:  9:06 AM      BP (!) 145/80   Pulse 60   Temp 98 °F (36.7 °C)   Resp 16   Wt 230 lb (104.3 kg)   SpO2 97%   BMI 37.12 kg/m²      Consciousness Level  Awake  Cardiopulmonary Status  Stable  Pain Adequately Treated YES  Nausea / Vomiting  NO  Adequate Hydration  YES  Anesthesia Related Complications NONE      Electronically signed by Debbi Baldwin MD on 10/27/2021 at 9:06 AM

## 2021-10-27 NOTE — PROGRESS NOTES
Daily Progress Note  10/27/2021    Patient Name: Teri Kim    CHIEF COMPLAINT: Suicidal ideation         SUBJECTIVE:    Patient is seen today for a follow up assessment bedside. Patient is compliant with scheduled medications. She has not received emergency medications in the past 24 hours. Kiran Finley was evaluated today in the common area. She received ECT earlier today, she reports that she was a little groggy after the treatment but feels physically better today compared to Monday. She reports improvement in jaw pain and headache. She denies biting her tongue today. She continues to endorse depression and suicidal ideation. She reports some frustration with her discharge plan, she continues to try to get into some type of sober living. She reports that she did discuss increasing her medication with the physician as well as restarting DBT once discharged. She reports historically that DBT was very beneficial.  She is anxious about trying another ECT treatment, we will likely continue through Friday while continuing to really focus on recovery and therapy as her outpatient regimen. She is agreeable with this plan of care as she is adamant that she cannot contract for safety today outside of the unit. Appetite:  [x] Normal/Adequate/Unchanged  [] Increased  [] Decreased      Sleep:       [] Normal/Adequate/Unchanged  [x] Fair  [] Poor      Group Attendance on Unit:   [] Yes  [x] Selectively    [] No    Medication Side Effects:  Patient denies any medication side effects at the time of assessment. Mental Status Exam  Level of consciousness: Alert and awake. Appearance: Appropriate attire for setting, sitting at table, with fair  grooming and hygiene. Behavior/Motor: Approachable, pleasant, social with peers  Attitude toward examiner: Cooperative, attentive, good eye contact.    Speech: Normal rate, volume, and tone  Mood: \"Suicidal\"  Affect: Dysthymic  Thought processes: Linear and coherent. Thought content: Denies homicidal ideation. Suicidal Ideation: Continues to endorse active ideation though reports that it is better than yesterday, no plan or intent, unable to contract for safety outside of hospital setting at present  Delusions: No evidence of delusions. Denies paranoia. Perceptual Disturbance: Patient does not appear to be responding to internal stimuli. Denies auditory hallucinations. Denies visual hallucinations. Cognition: Oriented to self, location, time, and situation. Memory: Intact. Insight & Judgement: Poor. Data   height is 5' 6\" (1.676 m) and weight is 230 lb (104.3 kg). Her temporal temperature is 97.6 °F (36.4 °C). Her blood pressure is 128/63 and her pulse is 74. Her respiration is 14 and oxygen saturation is 96%.    Labs:   Admission on 10/12/2021   Component Date Value Ref Range Status    WBC 10/12/2021 6.8  3.5 - 11.0 k/uL Final    RBC 10/12/2021 4.45  4.0 - 5.2 m/uL Final    Hemoglobin 10/12/2021 13.5  12.0 - 16.0 g/dL Final    Hematocrit 10/12/2021 40.2  36 - 46 % Final    MCV 10/12/2021 90.3  80 - 100 fL Final    MCH 10/12/2021 30.4  26 - 34 pg Final    MCHC 10/12/2021 33.6  31 - 37 g/dL Final    RDW 10/12/2021 13.3  11.5 - 14.9 % Final    Platelets 09/51/9195 271  150 - 450 k/uL Final    MPV 10/12/2021 6.8  6.0 - 12.0 fL Final    NRBC Automated 10/12/2021 NOT REPORTED  per 100 WBC Final    Differential Type 10/12/2021 NOT REPORTED   Final    Immature Granulocytes 10/12/2021 NOT REPORTED  0 % Final    Absolute Immature Granulocyte 10/12/2021 NOT REPORTED  0.00 - 0.30 k/uL Final    WBC Morphology 10/12/2021 NOT REPORTED   Final    RBC Morphology 10/12/2021 NOT REPORTED   Final    Platelet Estimate 17/30/4738 NOT REPORTED   Final    Seg Neutrophils 10/12/2021 48  36 - 66 % Final    Lymphocytes 10/12/2021 47* 24 - 44 % Final    Monocytes 10/12/2021 5  1 - 7 % Final    Eosinophils % 10/12/2021 0  0 - 4 % Final    Basophils 10/12/2021 0 0 - 2 % Final    Segs Absolute 10/12/2021 3.26  1.3 - 9.1 k/uL Final    Absolute Lymph # 10/12/2021 3.20  1.0 - 4.8 k/uL Final    Absolute Mono # 10/12/2021 0.34  0.1 - 1.3 k/uL Final    Absolute Eos # 10/12/2021 0.00  0.0 - 0.4 k/uL Final    Basophils Absolute 10/12/2021 0.00  0.0 - 0.2 k/uL Final    Morphology 10/12/2021 Normal   Final    Glucose 10/12/2021 101* 70 - 99 mg/dL Final    BUN 10/12/2021 23* 6 - 20 mg/dL Final    CREATININE 10/12/2021 0.80  0.50 - 0.90 mg/dL Final    Bun/Cre Ratio 10/12/2021 NOT REPORTED  9 - 20 Final    Calcium 10/12/2021 9.1  8.6 - 10.4 mg/dL Final    Sodium 10/12/2021 138  135 - 144 mmol/L Final    Potassium 10/12/2021 4.4  3.7 - 5.3 mmol/L Final    Chloride 10/12/2021 104  98 - 107 mmol/L Final    CO2 10/12/2021 24  20 - 31 mmol/L Final    Anion Gap 10/12/2021 10  9 - 17 mmol/L Final    Alkaline Phosphatase 10/12/2021 87  35 - 104 U/L Final    ALT 10/12/2021 14  5 - 33 U/L Final    AST 10/12/2021 17  <32 U/L Final    Total Bilirubin 10/12/2021 <0.15* 0.3 - 1.2 mg/dL Final    Total Protein 10/12/2021 7.0  6.4 - 8.3 g/dL Final    Albumin 10/12/2021 4.2  3.5 - 5.2 g/dL Final    Albumin/Globulin Ratio 10/12/2021 NOT REPORTED  1.0 - 2.5 Final    GFR Non- 10/12/2021 >60  >60 mL/min Final    GFR  10/12/2021 >60  >60 mL/min Final    GFR Comment 10/12/2021        Final    Comment: Average GFR for 30-36 years old:   80 mL/min/1.73sq m  Chronic Kidney Disease:   <60 mL/min/1.73sq m  Kidney failure:   <15 mL/min/1.73sq m              eGFR calculated using average adult body mass. Additional eGFR calculator available at:        reQwip.br            GFR Staging 10/12/2021 NOT REPORTED   Final    TSH 10/12/2021 1.55  0.30 - 5.00 mIU/L Final    Specimen Description 10/12/2021 . NASOPHARYNGEAL SWAB   Final    SARS-CoV-2, Rapid 10/12/2021 Not Detected  Not Detected Final    Comment:       Rapid NAAT: The specimen is NEGATIVE for SARS-CoV-2, the novel coronavirus associated with   COVID-19. The ID NOW COVID-19 assay is designed to detect the virus that causes COVID-19 in patients   with signs and symptoms of infection who are suspected of COVID-19. An individual without symptoms of COVID-19 and who is not shedding SARS-CoV-2 virus would   expect to have a negative (not detected) result in this assay. Negative results should be treated as presumptive and, if inconsistent with clinical signs   and symptoms or necessary for patient management,  should be tested with an alternative molecular assay. Negative results do not preclude   SARS-CoV-2 infection and   should not be used as the sole basis for patient management decisions. Fact sheet for Healthcare Providers: Elaine.[email or web address]  Fact sheet for Patients: Elaine.tayla          Methodology: Isothermal Nucleic Acid Amplification      Color, UA 10/12/2021 Yellow  Yellow Final    Turbidity UA 10/12/2021 Clear  Clear Final    Glucose, Ur 10/12/2021 NEGATIVE  NEGATIVE Final    Bilirubin Urine 10/12/2021 NEGATIVE  NEGATIVE Final    Ketones, Urine 10/12/2021 NEGATIVE  NEGATIVE Final    Specific [place], UA 10/12/2021 1.027  1.000 - 1.030 Final    Urine Hgb 10/12/2021 NEGATIVE  NEGATIVE Final    pH, UA 10/12/2021 6.5  5.0 - 8.0 Final    Protein, UA 10/12/2021 NEGATIVE  NEGATIVE Final    Urobilinogen, Urine 10/12/2021 Normal  Normal Final    Nitrite, Urine 10/12/2021 NEGATIVE  NEGATIVE Final    Leukocyte Esterase, Urine 10/12/2021 NEGATIVE  NEGATIVE Final    Urinalysis Comments 10/12/2021 Microscopic exam not performed based on chemical results unless requested in original order.    Final    Amphetamine Screen, Ur 10/12/2021 NEGATIVE  NEGATIVE Final    Comment:       (Positive cutoff 1000 ng/mL)                  Barbiturate Screen, Ur 10/12/2021 NEGATIVE  NEGATIVE Final    Comment: (Positive cutoff 200 ng/mL)                  Benzodiazepine Screen, Urine 10/12/2021 NEGATIVE  NEGATIVE Final    Comment:       (Positive cutoff 200 ng/mL)                  Cocaine Metabolite, Urine 10/12/2021 NEGATIVE  NEGATIVE Final    Comment:       (Positive cutoff 300 ng/mL)                  Methadone Screen, Urine 10/12/2021 NEGATIVE  NEGATIVE Final    Comment:       (Positive cutoff 300 ng/mL)                  Opiates, Urine 10/12/2021 NEGATIVE  NEGATIVE Final    Comment:       (Positive cutoff 300 ng/mL)                  Phencyclidine, Urine 10/12/2021 NEGATIVE  NEGATIVE Final    Comment:       (Positive cutoff 25 ng/mL)                  Propoxyphene, Urine 10/12/2021 NOT REPORTED  NEGATIVE Final    Cannabinoid Scrn, Ur 10/12/2021 POSITIVE* NEGATIVE Final    Comment:       (Positive cutoff 50 ng/mL)                  Oxycodone Screen, Ur 10/12/2021 NEGATIVE  NEGATIVE Final    Comment:       (Positive cutoff 100 ng/mL)                  Methamphetamine, Urine 10/12/2021 NOT REPORTED  NEGATIVE Final    Tricyclic Antidepressants, Urine 10/12/2021 NOT REPORTED  NEGATIVE Final    MDMA, Urine 10/12/2021 NOT REPORTED  NEGATIVE Final    Buprenorphine Urine 10/12/2021 NOT REPORTED  NEGATIVE Final    Test Information 10/12/2021 Assay provides medical screening only. The absence of expected drug(s) and/or metabolite(s) may indicate diluted or adulterated urine, limitations of testing or timing of collection. Final    Comment: Testing for legal purposes should be confirmed by another method. To request confirmation   of test result, please call the lab within 7 days of sample submission.       Acetaminophen Level 10/12/2021 7* 10 - 30 ug/mL Final    Ethanol 10/12/2021 <10  <10 mg/dL Final    Ethanol percent 10/12/2021 <9.710  % Final    Salicylate Lvl 28/22/0531 <1* 3 - 10 mg/dL Final    Toxic Tricyclic Sc,Blood 72/12/9743 WRONG TEST ORDERED  NEGATIVE Final    Tricyclic Antidep,Urine 05/31/5263 NEGATIVE  NEGATIVE Final    Comment:       (Positive cutoff 1000 ng/mL)  Assay provides rapid clinical screening only. Presumptive positive results for legal   purposes should be confirmed by another method. To request confirmation, please call the   lab within 7 days of sample submission.  Valproic Acid Lvl 10/21/2021 71  50 - 125 ug/mL Final    Valproic Dose amount 10/21/2021 500 MG   Final    Valproic Date last dose 10/21/2021 18,380,465   Final    Valproic Time last dose 10/21/2021 2,105   Final    POC Glucose 10/22/2021 100  65 - 105 mg/dL Final    POC Glucose 10/25/2021 116* 65 - 105 mg/dL Final    Glucose 10/26/2021 86  70 - 99 mg/dL Final    BUN 10/26/2021 18  6 - 20 mg/dL Final    CREATININE 10/26/2021 0.80  0.50 - 0.90 mg/dL Final    Bun/Cre Ratio 10/26/2021 NOT REPORTED  9 - 20 Final    Calcium 10/26/2021 9.2  8.6 - 10.4 mg/dL Final    Sodium 10/26/2021 142  135 - 144 mmol/L Final    Potassium 10/26/2021 4.7  3.7 - 5.3 mmol/L Final    Chloride 10/26/2021 107  98 - 107 mmol/L Final    CO2 10/26/2021 26  20 - 31 mmol/L Final    Anion Gap 10/26/2021 9  9 - 17 mmol/L Final    GFR Non- 10/26/2021 >60  >60 mL/min Final    GFR  10/26/2021 >60  >60 mL/min Final    GFR Comment 10/26/2021        Final    Comment: Average GFR for 30-36 years old:   80 mL/min/1.73sq m  Chronic Kidney Disease:   <60 mL/min/1.73sq m  Kidney failure:   <15 mL/min/1.73sq m              eGFR calculated using average adult body mass. Additional eGFR calculator available at:        [employer]            GFR Staging 10/26/2021 NOT REPORTED   Final    POC Glucose 10/27/2021 107* 65 - 105 mg/dL Final         Reviewed patient's current plan of care and vital signs with nursing staff.     Labs reviewed: [x] Yes    Medications  Current Facility-Administered Medications: benzocaine (ORAJEL) 10 % mucosal gel, , Mouth/Throat, PRN  lactated ringers infusion, , IntraVENous, Continuous  fluticasone (FLONASE) 50 MCG/ACT nasal spray 1 spray, 1 spray, Each Nostril, Daily  lactated ringers infusion, , IntraVENous, Continuous  amoxicillin-clavulanate (AUGMENTIN) 500-125 MG per tablet 1 tablet, 1 tablet, Oral, 2 times per day  OLANZapine zydis (ZYPREXA) disintegrating tablet 25 mg, 25 mg, Oral, Nightly  sertraline (ZOLOFT) tablet 100 mg, 100 mg, Oral, Daily  prazosin (MINIPRESS) capsule 2 mg, 2 mg, Oral, Nightly  loperamide (IMODIUM) capsule 2 mg, 2 mg, Oral, 4x Daily PRN  ondansetron (ZOFRAN-ODT) disintegrating tablet 4 mg, 4 mg, Oral, Q8H PRN  pantoprazole (PROTONIX) tablet 40 mg, 40 mg, Oral, QAM AC  nicotine polacrilex (NICORETTE) gum 2 mg, 2 mg, Oral, PRN  albuterol sulfate  (90 Base) MCG/ACT inhaler 2 puff, 2 puff, Inhalation, Q6H PRN  apixaban (ELIQUIS) tablet 5 mg, 5 mg, Oral, BID  cetirizine (ZYRTEC) tablet 10 mg, 10 mg, Oral, Daily  melatonin tablet 3 mg, 3 mg, Oral, Nightly PRN  tiZANidine (ZANAFLEX) tablet 4 mg, 4 mg, Oral, Q8H PRN  acetaminophen (TYLENOL) tablet 650 mg, 650 mg, Oral, Q4H PRN  aluminum & magnesium hydroxide-simethicone (MAALOX) 200-200-20 MG/5ML suspension 30 mL, 30 mL, Oral, Q6H PRN  hydrOXYzine (ATARAX) tablet 50 mg, 50 mg, Oral, TID PRN  haloperidol (HALDOL) tablet 5 mg, 5 mg, Oral, Q6H PRN **AND** [DISCONTINUED] LORazepam (ATIVAN) tablet 2 mg, 2 mg, Oral, Q6H PRN  haloperidol lactate (HALDOL) injection 5 mg, 5 mg, IntraMUSCular, Q6H PRN **AND** [DISCONTINUED] LORazepam (ATIVAN) injection 2 mg, 2 mg, IntraMUSCular, Q6H PRN **AND** diphenhydrAMINE (BENADRYL) injection 50 mg, 50 mg, IntraMUSCular, Q6H PRN  polyethylene glycol (GLYCOLAX) packet 17 g, 17 g, Oral, Daily PRN  traZODone (DESYREL) tablet 50 mg, 50 mg, Oral, Nightly PRN  Facility-Administered Medications Ordered in Other Encounters: lactated ringers infusion, , IntraVENous, Continuous  sodium chloride flush 0.9 % injection 10 mL, 10 mL, IntraVENous, PRN  0.9 % sodium chloride infusion, 25 mL, IntraVENous, PRN  lidocaine PF 1 % injection 1 mL, 1 mL, IntraDERmal, Once PRN  succinylcholine (ANECTINE) 20 MG/ML injection, , ,   ketorolac (TORADOL) 30 MG/ML injection, , ,   methohexital (BREVITAL SODIUM) 100 MG/10ML injection, , ,     ASSESSMENT  Severe recurrent major depressive disorder with psychotic features [facility])         PLAN  Patient symptoms are: Improving  Continue current medication regimen. Consider further titration of Zoloft  Continue index course of ECT, 3 times a week while inpatient, unlikely to transition to outpatient ECT secondary to lack of support  Monitor need and frequency of PRN medications. Encourage participation in groups and milieu. Attempt to develop insight. Psycho-education conducted. Supportive Therapy conducted. Probable discharge is to be determined by MD, will focus on sober living as part of discharge and outpatient DBT  Follow-up daily while inpatient. Patient continues to be monitored in the inpatient psychiatric facility at [facility] for safety and stabilization. Patient continues to need, on a daily basis, active treatment furnished directly by or requiring the supervision of inpatient psychiatric personnel. Electronically signed by DAMARIS Sampson CNP on 10/27/2021 at 6:15 PM    **This report has been created using voice recognition software. It may contain minor errors which are inherent in voice recognition technology. **    I independently saw and evaluated the patient. I reviewed the midlevel provider's documentation above. Any additional comments or changes to the midlevel provider's documentation are stated below otherwise agree with assessment. The patient continues to report high levels of anxiety. She also has difficulties with sleep. I have encouraged the patient to consider DBT. She has had DBT in the past but did not practice the skills.   We will increase the dose of her olanzapine to 30 mg nightly. PLAN  Medications as noted above  Attempt to develop insight  Psycho-education conducted. Supportive Therapy conducted.   Probable discharge is 3-9 days  Follow-up daily while on inpatient unit    Electronically signed by Christen Hugo MD on 10/27/21 at 8:40 PM EDT

## 2021-10-27 NOTE — PROGRESS NOTES
Behavioral Services                                              Medicare Re-Certification    I certify that the inpatient psychiatric hospital services furnished since the previous certification/re-certification were, and continue to be, medically necessary for;    [x] (1) Treatment which could reasonably be expected to improve the patient's condition,    [x] (2) Or for diagnostic study. Estimated length of stay/service -3-5 days    Plan for post-hospital care -outpatient care. This patient continues to need, on a daily basis, active treatment furnished directly by or requiring the supervision of inpatient psychiatric personnel.     Electronically signed by Edy Alamo MD on 10/27/2021 at 1:45 PM

## 2021-10-27 NOTE — GROUP NOTE
HS Goal Group     Date: October, 26 2021   Group Start Time: 2000   Group End Time: 2040   [facility]    Attendees: 7/13     Group Topic: HS Goal Group   Notes: Patient participated in HS goal group. Status After Intervention: Improved     Participation Level:  Active Listener and Interactive     Participation Quality: Appropriate, attentive and supportive     Speech: Normal     Thought Process/Content: Logical and linear     Affective Functioning: Congruent     Mood: WDL     Level of consciousness: Oriented x4     Response to Learning: Progressing to goal; able to verbalize current knowledge/experience, acknowledge new learning, retain information and change behavior Endings: None reported     Modes of Intervention: Education and exploration      Discipline Responsible: Behavioral Health Tech   Signature: BRODY Lima

## 2021-10-27 NOTE — GROUP NOTE
Group Therapy Note    Date: 10/27/2021    Group Start Time: 1100  Group End Time: 8596  Group Topic: Cognitive Skills    [facility]    LUISANA James        Group Therapy Note    Attendees: 5/15         Patient's Goal:  To increase social interaction and to practice problem solving, concentration, and communication skills. Notes: Pt participated fully in group task . Pt was able to practice problem solving, concentration, and communication skills independently Pt is pleasant and supportive of peers. Status After Intervention:  Improved      Participation Level:  Active Listener and Interactive     Participation Quality: Attentive, Sharing,and Supportive        Speech:  Normal      Thought Process/Content: Logical ,linear         Affective Functioning: Congruent, brightens         Mood: Euthymic         Level of consciousness:  Alert, and Attentive         Response to Learning: Able to verbalize current knowledge/experience, Able to verbalize/acknowledge new learning,  and Progressing to goal        Endings: None Reported     Modes of Intervention: Education, Support, Socialization, Exploration, Clarifying and Problem-solving        Discipline Responsible: Psychoeducational Specialist        Signature:  Libia Vincent

## 2021-10-27 NOTE — ANESTHESIA PRE PROCEDURE
Department of Anesthesiology  Preprocedure Note       Name:  Mike Castillo   Age:  46 y.o.  :  1980                                          MRN:  [id number]         Date:  10/27/2021      Surgeon: * No surgeons listed *    Procedure: * No procedures listed *    Medications prior to admission:   Prior to Admission medications    Medication Sig Start Date End Date Taking? Authorizing Provider   divalproex (DEPAKOTE) 500 MG DR tablet Take 500 mg by mouth 2 times daily    Historical Provider, MD   pantoprazole (PROTONIX) 40 MG tablet Take 40 mg by mouth daily    Historical Provider, MD   OLANZapine zydis (ZYPREXA) 10 MG disintegrating tablet Take 10 mg by mouth 2 times daily    Historical Provider, MD   fluticasone (FLONASE) 50 MCG/ACT nasal spray 1 spray by Each Nostril route daily    Historical Provider, MD   melatonin 5 MG TABS tablet Take 5 mg by mouth nightly as needed    Historical Provider, MD   apixaban (ELIQUIS) 5 MG TABS tablet Take 5 mg by mouth 2 times daily    Historical Provider, MD   traZODone (DESYREL) 50 MG tablet Take 50 mg by mouth nightly    Historical Provider, MD   hydrOXYzine (VISTARIL) 50 MG capsule Take 50 mg by mouth 3 times daily as needed for Anxiety    Historical Provider, MD   acetaminophen (TYLENOL) 500 MG tablet Take 1,000 mg by mouth 2 times daily    Historical Provider, MD   ondansetron (ZOFRAN-ODT) 8 MG TBDP disintegrating tablet Place 8 mg under the tongue every 8 hours as needed for Nausea or Vomiting    Historical Provider, MD   albuterol sulfate HFA (VENTOLIN HFA) 108 (90 Base) MCG/ACT inhaler Inhale 2 puffs into the lungs every 6 hours as needed for Wheezing    Historical Provider, MD   tiZANidine (ZANAFLEX) 4 MG tablet Take 4 mg by mouth every 8 hours as needed    Historical Provider, MD   cetirizine (ZYRTEC) 10 MG tablet Take 10 mg by mouth daily    Historical Provider, MD       Current medications:    No current facility-administered medications for this visit.      No current outpatient medications on file.      Facility-Administered Medications Ordered in Other Visits   Medication Dose Route Frequency Provider Last Rate Last Admin    lactated ringers infusion   IntraVENous Continuous Mary Jaffe MD        sodium chloride flush 0.9 % injection 10 mL  10 mL IntraVENous PRN Mary Jaffe MD        0.9 % sodium chloride infusion  25 mL IntraVENous PRN Mary Jaffe MD        lidocaine PF 1 % injection 1 mL  1 mL IntraDERmal Once PRN Mary Jaffe MD        benzocaine (ORAJEL) 10 % mucosal gel   Mouth/Throat PRN Holland Encarnacion MD   Given at 10/26/21 2123    lactated ringers infusion   IntraVENous Continuous Tete Hess MD   Stopped at 10/25/21 0839    fluticasone (FLONASE) 50 MCG/ACT nasal spray 1 spray  1 spray Each Nostril Daily Kiah Benitez MD   1 spray at 10/26/21 1106    lactated ringers infusion   IntraVENous Continuous Sofy Cottrell  mL/hr at 10/22/21 0733 Restarted at 10/22/21 0805    amoxicillin-clavulanate (AUGMENTIN) 500-125 MG per tablet 1 tablet  1 tablet Oral 2 times per day Shubham Van MD   1 tablet at 10/26/21 2036    OLANZapine zydis (ZYPREXA) disintegrating tablet 25 mg  25 mg Oral Nightly Holland Encarnacion MD   25 mg at 10/26/21 2035    sertraline (ZOLOFT) tablet 100 mg  100 mg Oral Daily DAMARIS Veliz CNP   100 mg at 10/26/21 1106    prazosin (MINIPRESS) capsule 2 mg  2 mg Oral Nightly MaryDAMARIS Santa CNP   2 mg at 10/26/21 2035    loperamide (IMODIUM) capsule 2 mg  2 mg Oral 4x Daily PRN Trey Wagner MD   2 mg at 10/16/21 1945    ondansetron (ZOFRAN-ODT) disintegrating tablet 4 mg  4 mg Oral Q8H PRN Trey Wagner MD   4 mg at 10/24/21 2207    pantoprazole (PROTONIX) tablet 40 mg  40 mg Oral QAM AC DAMARIS Veliz - CNP   40 mg at 10/26/21 1106    nicotine polacrilex (NICORETTE) gum 2 mg  2 mg Oral PRN Holland Encarnacion MD   2 mg at 10/26/21 1721    albuterol sulfate  (90 Base) MCG/ACT inhaler 2 puff  2 puff Inhalation Q6H PRN Jesi Sierral, APRN - CNP        apixaban (ELIQUIS) tablet 5 mg  5 mg Oral BID Jesi Marielal, APRN - CNP   5 mg at 10/26/21 2036    cetirizine (ZYRTEC) tablet 10 mg  10 mg Oral Daily Jesi Freddie, APRN - CNP   10 mg at 10/26/21 1106    melatonin tablet 3 mg  3 mg Oral Nightly PRN Jesi Camel, APRN - CNP   3 mg at 10/26/21 2035    tiZANidine (ZANAFLEX) tablet 4 mg  4 mg Oral Q8H PRN Lexi Guardado MD   4 mg at 10/26/21 2035    acetaminophen (TYLENOL) tablet 650 mg  650 mg Oral Q4H PRN Jesi Sierral, APRN - CNP   650 mg at 10/27/21 0626    aluminum & magnesium hydroxide-simethicone (MAALOX) 200-200-20 MG/5ML suspension 30 mL  30 mL Oral Q6H PRN Jesi Sierral, APRN - CNP        hydrOXYzine (ATARAX) tablet 50 mg  50 mg Oral TID PRN Jesi Sierral, APRN - CNP   50 mg at 10/26/21 1935    haloperidol (HALDOL) tablet 5 mg  5 mg Oral Q6H PRN Jesi Camel, APRN - CNP   5 mg at 10/23/21 2303    haloperidol lactate (HALDOL) injection 5 mg  5 mg IntraMUSCular Q6H PRN Jesi Sierral, APRN - CNP        And    diphenhydrAMINE (BENADRYL) injection 50 mg  50 mg IntraMUSCular Q6H PRN Jesi Sierral, APRN - CNP        polyethylene glycol (GLYCOLAX) packet 17 g  17 g Oral Daily PRN Jesi Marielal, APRN - CNP   17 g at 10/26/21 1237    traZODone (DESYREL) tablet 50 mg  50 mg Oral Nightly PRN Lexi Guardado MD   50 mg at 10/26/21 2035       Allergies:     Allergies   Allergen Reactions    Latex Anaphylaxis and Hives    Abilify [Aripiprazole] Other (See Comments)     agitation    Aspirin Shortness Of Breath    Geodon [Ziprasidone Hcl] Anaphylaxis    Lithium Anaphylaxis    Morphine Anaphylaxis and Hives    Phenobarbital Anaphylaxis    Doxycycline Hives    Seroquel [Quetiapine Fumarate] Other (See Comments)     Suicidal    Thorazine [Chlorpromazine]     Tramadol Other (See Comments)     Epilepsy    Adhesive Tape Rash     Paper tape - causes ([facility] [place] [address]     Pain     back    Pain management     PTSD (post-traumatic stress disorder)     Restless leg syndrome     Seizures ([facility] [place] [address]     last seizure 11/2016    SIADH (syndrome of inappropriate ADH production) (HCC)     Sleep apnea     CPAP nightly    Uterine cancer ([facility] [place] [address]     Wears glasses        Past Surgical History:        Procedure Laterality Date    BLADDER SURGERY      several    BREAST LUMPECTOMY Bilateral     CYSTOSCOPY      HYSTERECTOMY      SACRAL NERVE STIMULATOR LEAD PLACEMENT N/A 6/21/2017    SACRAL NERVE STIMULATOR IMPLANT STAGE 1- OFFICE NOTIFYING REP, C-ARM performed by Carol Hernandez MD at [address] N/A 7/5/2017    SACRAL NERVE STIMULATOR IMPLANT STAGE 2 performed by Carol Hernandez MD at [address] N/A 12/1/2017    LEAD AND GENERATOR REMOVAL, STAGE 1 AND 2 INTERSTIM, C-ARM   NSA= GENERAL VS MAC, OFFICE NOTIFIED REP. performed by [facility] MD at [facility]      from thigh to chin    DANILO AND BSO  2012, 2014    TONSILLECTOMY AND ADENOIDECTOMY         Social History:    Social History     Tobacco Use    Smoking status: Current Every Day Smoker     Packs/day: 1.00     Years: 25.00     Pack years: 25.00     Types: Cigarettes    Smokeless tobacco: Never Used   Substance Use Topics    Alcohol use: Yes     Comment: rare                                Ready to quit: Not Answered  Counseling given: Not Answered      Vital Signs (Current): There were no vitals filed for this visit.                                            BP Readings from Last 3 Encounters:   10/27/21 132/78   10/25/21 (!) 150/72   11/14/20 119/83       NPO Status:                                                                                 BMI:   Wt Readings from Last 3 Encounters:   10/27/21 230 lb (104.3 kg)   11/14/20 181 lb (82.1 kg)   10/05/20 135 lb (61.2 kg)     There is no height or weight on file to calculate BMI.    CBC:   Lab Results   Component Value Date    WBC 6.8 10/12/2021    RBC 4.45 10/12/2021    RBC 4.60 11/01/2016    HGB 13.5 10/12/2021    HCT 40.2 10/12/2021    MCV 90.3 10/12/2021    RDW 13.3 10/12/2021     10/12/2021     03/11/2012       CMP:   Lab Results   Component Value Date     10/26/2021    K 4.7 10/26/2021     10/26/2021    CO2 26 10/26/2021    BUN 18 10/26/2021    CREATININE 0.80 10/26/2021    GFRAA >60 10/26/2021    LABGLOM >60 10/26/2021    GLUCOSE 86 10/26/2021    GLUCOSE 128 11/01/2016    PROT 7.0 10/12/2021    CALCIUM 9.2 10/26/2021    BILITOT <0.15 10/12/2021    ALKPHOS 87 10/12/2021    AST 17 10/12/2021    ALT 14 10/12/2021       POC Tests:   Recent Labs     10/27/21  0625   POCGLU 107*       Coags:   Lab Results   Component Value Date    PROTIME 11.6 11/13/2020    INR 0.9 11/13/2020    APTT 31.6 11/13/2020       HCG (If Applicable):   Lab Results   Component Value Date    PREGTESTUR NEGATIVE 11/10/2016    HCG NEGATIVE 12/01/2017        ABGs: No results found for: PHART, PO2ART, CTP8NWA, XCM5JCD, BEART, R3ECEYQI     Type & Screen (If Applicable):  No results found for: LABABO, LABRH    Drug/Infectious Status (If Applicable):  No results found for: HIV, HEPCAB    COVID-19 Screening (If Applicable):   Lab Results   Component Value Date    COVID19 Not Detected 10/12/2021           Anesthesia Evaluation  Patient summary reviewed and Nursing notes reviewed history of anesthetic complications (tongue trauma from biting after last ECT): Airway: Mallampati: II  TM distance: >3 FB   Neck ROM: full   Dental:          Pulmonary:normal exam  breath sounds clear to auscultation  (+) COPD:  sleep apnea:  asthma: current smoker          Patient did not smoke on day of surgery.                  Cardiovascular:Negative CV ROS            Rhythm: regular  Rate: normal                    Neuro/Psych:   (+) seizures (no longer on meds): well controlled, neuromuscular disease:, headaches:, psychiatric history:depression/anxiety              ROS comment: PTSD  Fibromyalgia GI/Hepatic/Renal:   (+) GERD:, renal disease: CRI, morbid obesity          Endo/Other:    (+) : arthritis: OA., electrolyte abnormalities, malignancy/cancer. Abdominal:             Vascular:   + DVT, . Other Findings:               Anesthesia Plan      general     ASA 3       Induction: intravenous. MIPS: Prophylactic antiemetics administered. Anesthetic plan and risks discussed with patient. Plan discussed with CRNA.                   Debbi Baldwin MD   10/27/2021

## 2021-10-27 NOTE — PLAN OF CARE
Problem: Altered Mood, Psychotic Behavior:  Goal: Able to verbalize decrease in frequency and intensity of hallucinations  Description: Able to verbalize decrease in frequency and intensity of hallucinations  Outcome: Ongoing     Problem: Depressive Behavior With or Without Suicide Precautions:  Goal: Ability to disclose and discuss suicidal ideas will improve  Description: Ability to disclose and discuss suicidal ideas will improve  10/26/2021 2144 by Kulwinder Encarnacion RN  Outcome: Ongoing     Patient reported that her anxiety and depression are a bit worse than earlier today. She denies hallucinations, suicidal or homicidal thoughts. Patent was social with peers. She is aware and ready for ECT in the morning. Patient took her medications as prescribed. Safety precautions in place and Q 15 minute checks completed.

## 2021-10-28 PROCEDURE — 6370000000 HC RX 637 (ALT 250 FOR IP): Performed by: PSYCHIATRY & NEUROLOGY

## 2021-10-28 PROCEDURE — 6370000000 HC RX 637 (ALT 250 FOR IP): Performed by: INTERNAL MEDICINE

## 2021-10-28 PROCEDURE — 6370000000 HC RX 637 (ALT 250 FOR IP)

## 2021-10-28 PROCEDURE — 1240000000 HC EMOTIONAL WELLNESS R&B

## 2021-10-28 PROCEDURE — 99232 SBSQ HOSP IP/OBS MODERATE 35: CPT | Performed by: PSYCHIATRY & NEUROLOGY

## 2021-10-28 PROCEDURE — 6370000000 HC RX 637 (ALT 250 FOR IP): Performed by: NURSE PRACTITIONER

## 2021-10-28 RX ADMIN — HYDROXYZINE HYDROCHLORIDE 50 MG: 50 TABLET, FILM COATED ORAL at 18:34

## 2021-10-28 RX ADMIN — HYDROXYZINE HYDROCHLORIDE 50 MG: 50 TABLET, FILM COATED ORAL at 12:29

## 2021-10-28 RX ADMIN — AMOXICILLIN AND CLAVULANATE POTASSIUM 1 TABLET: 500; 125 TABLET, FILM COATED ORAL at 12:30

## 2021-10-28 RX ADMIN — PANTOPRAZOLE SODIUM 40 MG: 40 TABLET, DELAYED RELEASE ORAL at 06:16

## 2021-10-28 RX ADMIN — CETIRIZINE HYDROCHLORIDE 10 MG: 10 TABLET, FILM COATED ORAL at 12:29

## 2021-10-28 RX ADMIN — AMOXICILLIN AND CLAVULANATE POTASSIUM 1 TABLET: 500; 125 TABLET, FILM COATED ORAL at 20:34

## 2021-10-28 RX ADMIN — NICOTINE POLACRILEX 2 MG: 2 GUM, CHEWING BUCCAL at 17:35

## 2021-10-28 RX ADMIN — POLYETHYLENE GLYCOL 3350 17 G: 17 POWDER, FOR SOLUTION ORAL at 20:39

## 2021-10-28 RX ADMIN — NICOTINE POLACRILEX 2 MG: 2 GUM, CHEWING BUCCAL at 19:55

## 2021-10-28 RX ADMIN — FLUTICASONE PROPIONATE 1 SPRAY: 50 SPRAY, METERED NASAL at 12:30

## 2021-10-28 RX ADMIN — TIZANIDINE 4 MG: 4 TABLET ORAL at 12:28

## 2021-10-28 RX ADMIN — SERTRALINE HYDROCHLORIDE 100 MG: 100 TABLET ORAL at 12:29

## 2021-10-28 RX ADMIN — APIXABAN 5 MG: 5 TABLET, FILM COATED ORAL at 20:34

## 2021-10-28 RX ADMIN — OLANZAPINE 30 MG: 10 TABLET, ORALLY DISINTEGRATING ORAL at 20:34

## 2021-10-28 RX ADMIN — APIXABAN 5 MG: 5 TABLET, FILM COATED ORAL at 12:30

## 2021-10-28 RX ADMIN — ACETAMINOPHEN 650 MG: 325 TABLET ORAL at 12:29

## 2021-10-28 RX ADMIN — TRAZODONE HYDROCHLORIDE 50 MG: 50 TABLET ORAL at 20:34

## 2021-10-28 RX ADMIN — Medication 3 MG: at 20:34

## 2021-10-28 RX ADMIN — HYDROXYZINE HYDROCHLORIDE 50 MG: 50 TABLET, FILM COATED ORAL at 20:34

## 2021-10-28 RX ADMIN — PRAZOSIN HYDROCHLORIDE 2 MG: 1 CAPSULE ORAL at 20:34

## 2021-10-28 ASSESSMENT — PAIN DESCRIPTION - LOCATION: LOCATION: OTHER (COMMENT)

## 2021-10-28 ASSESSMENT — PAIN SCALES - GENERAL
PAINLEVEL_OUTOF10: 4
PAINLEVEL_OUTOF10: 6

## 2021-10-28 ASSESSMENT — PAIN DESCRIPTION - DESCRIPTORS: DESCRIPTORS: THROBBING;TENDER

## 2021-10-28 ASSESSMENT — PAIN DESCRIPTION - PAIN TYPE: TYPE: ACUTE PAIN

## 2021-10-28 ASSESSMENT — PAIN - FUNCTIONAL ASSESSMENT: PAIN_FUNCTIONAL_ASSESSMENT: 0-10

## 2021-10-28 NOTE — GROUP NOTE
to only last 10 mins of group, did not discuss topic with peers        Endings: None Reported     Modes of Intervention: Education, Support, Socialization, Exploration, Clarifying and Problem-solving        Discipline Responsible: Psychoeducational Specialist        Signature:  Serg Hernandez

## 2021-10-28 NOTE — PLAN OF CARE
Problem: Depressive Behavior With or Without Suicide Precautions:  Goal: Ability to disclose and discuss suicidal ideas will improve  Description: Ability to disclose and discuss suicidal ideas will improve  10/27/2021 2149 by Trevon Betancourt RN  Outcome: Ongoing     Problem: Altered Mood, Psychotic Behavior:  Goal: Able to verbalize decrease in frequency and intensity of hallucinations  Description: Able to verbalize decrease in frequency and intensity of hallucinations  10/27/2021 2149 by Trevon Betancourt RN  Outcome: Ongoing     Problem: Pain:  Goal: Pain level will decrease  Description: Pain level will decrease  10/27/2021 2149 by Trevon Betancourt RN  Outcome: Ongoing     Patient reports increasing depression, anxiety, hallucinations, and suicidal thoughts. She was social with staff and peers. Patient stated the antibiotics are not working as her ear feels worse and she now has a bladder infection. Patient took her medications as prescribed. No self harm noted this shift. Safety precautions in place and Q 15 minute checks completed.

## 2021-10-28 NOTE — PLAN OF CARE
Problem: Tobacco Use:  Goal: Inpatient tobacco use cessation counseling participation  Description: Inpatient tobacco use cessation counseling participation  Outcome: Ongoing  Note: Patient given tobacco quitline number 26082521340 at this time, refusing to call at this time, states \" I just dont want to quit now\"- patient given information as to the dangers of long term tobacco use. Continue to reinforce the importance of tobacco cessation. Problem: Depressive Behavior With or Without Suicide Precautions:  Goal: Ability to disclose and discuss suicidal ideas will improve  Description: Ability to disclose and discuss suicidal ideas will improve  Outcome: Ongoing  Note: Patient reports fleeting suicidal thoughts with no plan while inpatient. Patient also reports auditory hallucinations that are saying negative things and reports an increase in depression. Patient refused breakfast this morning and has been in bed all shift except to eat lunch. Safe environment maintained. Q15 minute checks for safety continued per unit policy. Will continue to monitor for safety and provide support and reassurance as needed. Problem: Altered Mood, Psychotic Behavior:  Goal: Able to verbalize decrease in frequency and intensity of hallucinations  Description: Able to verbalize decrease in frequency and intensity of hallucinations  Outcome: Ongoing     Problem: Pain:  Goal: Pain level will decrease  Description: Pain level will decrease  Outcome: Ongoing  Note: Patient requested PRN Tylenol x1 this shift for tongue pain. Patient stated that Tylenol is more effective than oragel.

## 2021-10-28 NOTE — PROGRESS NOTES
Daily Progress Note  10/28/2021    Patient Name: Cornelious Cheadle    CHIEF COMPLAINT: Suicidal ideation         SUBJECTIVE:    Patient is seen today for a follow up assessment bedside. Patient is compliant with scheduled medications. She has not received emergency medications in the past 24 hours. James Brown was evaluated today bedside, she reports that her mood is a little better today. She does feel that ECT has been beneficial with her depression. She reports that the idea of transitioning to sober living/rehabilitation does not still help for success in the future. While she has suicidal ideation it is improving and very situational based on her plan of care. She worries that if she returns to the same environment that she will relapse which leads to intense suicidal thoughts. She feels that with the current medication regimen and an ECT her mood is improving and she is hopeful that she will find an appropriate place to discharge to work on maintaining sobriety. She has been attending some groups. She denies any significant side effects from ECT with regards to cognition the reports that she does have a sore tongue after biting it during her procedure on Monday. She maintains medication compliance and reports that she is tolerating her recently titrated dose. She remains agreeable to continuing her index course of inpatient ECT. Appetite:  [x] Fair  [] Increased  [] Decreased      Sleep:       [] Normal/Adequate/Unchanged  [x] Fair  [] Poor      Group Attendance on Unit:   [] Yes  [x] Selectively    [] No    Medication Side Effects:  Patient denies any medication side effects at the time of assessment. Mental Status Exam  Level of consciousness: Alert and awake. Appearance: Appropriate attire for setting, resting in bed, with fair  grooming and hygiene. Behavior/Motor: Approachable, pleasant  Attitude toward examiner: Cooperative, attentive, good eye contact.    Speech: Normal rate, volume, and tone  Mood: \"Better\"  Affect: Congruent  Thought processes: Linear and coherent. Thought content: Denies homicidal ideation. Suicidal Ideation: Endorses improving suicidal ideation, contracts for safety   delusions: No evidence of delusions. Denies paranoia. Perceptual Disturbance: Patient does not appear to be responding to internal stimuli. Denies auditory hallucinations. Denies visual hallucinations. Cognition: Oriented to self, location, time, and situation. Memory: Intact. Insight & Judgement: Poor. Data   height is 5' 6\" (1.676 m) and weight is 230 lb (104.3 kg). Her oral temperature is 98 °F (36.7 °C). Her blood pressure is 129/88 and her pulse is 94. Her respiration is 14 and oxygen saturation is 96%.    Labs:   Admission on 10/12/2021   Component Date Value Ref Range Status    WBC 10/12/2021 6.8  3.5 - 11.0 k/uL Final    RBC 10/12/2021 4.45  4.0 - 5.2 m/uL Final    Hemoglobin 10/12/2021 13.5  12.0 - 16.0 g/dL Final    Hematocrit 10/12/2021 40.2  36 - 46 % Final    MCV 10/12/2021 90.3  80 - 100 fL Final    MCH 10/12/2021 30.4  26 - 34 pg Final    MCHC 10/12/2021 33.6  31 - 37 g/dL Final    RDW 10/12/2021 13.3  11.5 - 14.9 % Final    Platelets 62/49/8971 271  150 - 450 k/uL Final    MPV 10/12/2021 6.8  6.0 - 12.0 fL Final    NRBC Automated 10/12/2021 NOT REPORTED  per 100 WBC Final    Differential Type 10/12/2021 NOT REPORTED   Final    Immature Granulocytes 10/12/2021 NOT REPORTED  0 % Final    Absolute Immature Granulocyte 10/12/2021 NOT REPORTED  0.00 - 0.30 k/uL Final    WBC Morphology 10/12/2021 NOT REPORTED   Final    RBC Morphology 10/12/2021 NOT REPORTED   Final    Platelet Estimate 09/57/7330 NOT REPORTED   Final    Seg Neutrophils 10/12/2021 48  36 - 66 % Final    Lymphocytes 10/12/2021 47* 24 - 44 % Final    Monocytes 10/12/2021 5  1 - 7 % Final    Eosinophils % 10/12/2021 0  0 - 4 % Final    Basophils 10/12/2021 0  0 - 2 % Final    Segs Absolute 10/12/2021 3.26  1.3 - 9.1 k/uL Final    Absolute Lymph # 10/12/2021 3.20  1.0 - 4.8 k/uL Final    Absolute Mono # 10/12/2021 0.34  0.1 - 1.3 k/uL Final    Absolute Eos # 10/12/2021 0.00  0.0 - 0.4 k/uL Final    Basophils Absolute 10/12/2021 0.00  0.0 - 0.2 k/uL Final    Morphology 10/12/2021 Normal   Final    Glucose 10/12/2021 101* 70 - 99 mg/dL Final    BUN 10/12/2021 23* 6 - 20 mg/dL Final    CREATININE 10/12/2021 0.80  0.50 - 0.90 mg/dL Final    Bun/Cre Ratio 10/12/2021 NOT REPORTED  9 - 20 Final    Calcium 10/12/2021 9.1  8.6 - 10.4 mg/dL Final    Sodium 10/12/2021 138  135 - 144 mmol/L Final    Potassium 10/12/2021 4.4  3.7 - 5.3 mmol/L Final    Chloride 10/12/2021 104  98 - 107 mmol/L Final    CO2 10/12/2021 24  20 - 31 mmol/L Final    Anion Gap 10/12/2021 10  9 - 17 mmol/L Final    Alkaline Phosphatase 10/12/2021 87  35 - 104 U/L Final    ALT 10/12/2021 14  5 - 33 U/L Final    AST 10/12/2021 17  <32 U/L Final    Total Bilirubin 10/12/2021 <0.15* 0.3 - 1.2 mg/dL Final    Total Protein 10/12/2021 7.0  6.4 - 8.3 g/dL Final    Albumin 10/12/2021 4.2  3.5 - 5.2 g/dL Final    Albumin/Globulin Ratio 10/12/2021 NOT REPORTED  1.0 - 2.5 Final    GFR Non- 10/12/2021 >60  >60 mL/min Final    GFR  10/12/2021 >60  >60 mL/min Final    GFR Comment 10/12/2021        Final    Comment: Average GFR for 30-36 years old:   80 mL/min/1.73sq m  Chronic Kidney Disease:   <60 mL/min/1.73sq m  Kidney failure:   <15 mL/min/1.73sq m              eGFR calculated using average adult body mass. Additional eGFR calculator available at:        [employer]            GFR Staging 10/12/2021 NOT REPORTED   Final    TSH 10/12/2021 1.55  0.30 - 5.00 mIU/L Final    Specimen Description 10/12/2021 . NASOPHARYNGEAL SWAB   Final    SARS-CoV-2, Rapid 10/12/2021 Not Detected  Not Detected Final    Comment:       Rapid NAAT:  The specimen is NEGATIVE for SARS-CoV-2, the novel coronavirus associated with   COVID-19. The ID NOW COVID-19 assay is designed to detect the virus that causes COVID-19 in patients   with signs and symptoms of infection who are suspected of COVID-19. An individual without symptoms of COVID-19 and who is not shedding SARS-CoV-2 virus would   expect to have a negative (not detected) result in this assay. Negative results should be treated as presumptive and, if inconsistent with clinical signs   and symptoms or necessary for patient management,  should be tested with an alternative molecular assay. Negative results do not preclude   SARS-CoV-2 infection and   should not be used as the sole basis for patient management decisions. Fact sheet for Healthcare Providers: [employer]  Fact sheet for Patients: [employer]          Methodology: Isothermal Nucleic Acid Amplification      Color, UA 10/12/2021 Yellow  Yellow Final    Turbidity UA 10/12/2021 Clear  Clear Final    Glucose, Ur 10/12/2021 NEGATIVE  NEGATIVE Final    Bilirubin Urine 10/12/2021 NEGATIVE  NEGATIVE Final    Ketones, Urine 10/12/2021 NEGATIVE  NEGATIVE Final    Specific Deb[place], UA 10/12/2021 1.027  1.000 - 1.030 Final    Urine Hgb 10/12/2021 NEGATIVE  NEGATIVE Final    pH, UA 10/12/2021 6.5  5.0 - 8.0 Final    Protein, UA 10/12/2021 NEGATIVE  NEGATIVE Final    Urobilinogen, Urine 10/12/2021 Normal  Normal Final    Nitrite, Urine 10/12/2021 NEGATIVE  NEGATIVE Final    Leukocyte Esterase, Urine 10/12/2021 NEGATIVE  NEGATIVE Final    Urinalysis Comments 10/12/2021 Microscopic exam not performed based on chemical results unless requested in original order.    Final    Amphetamine Screen, Ur 10/12/2021 NEGATIVE  NEGATIVE Final    Comment:       (Positive cutoff 1000 ng/mL)                  Barbiturate Screen, Ur 10/12/2021 NEGATIVE  NEGATIVE Final    Comment:       (Positive cutoff 200 ng/mL)  Benzodiazepine Screen, Urine 10/12/2021 NEGATIVE  NEGATIVE Final    Comment:       (Positive cutoff 200 ng/mL)                  Cocaine Metabolite, Urine 10/12/2021 NEGATIVE  NEGATIVE Final    Comment:       (Positive cutoff 300 ng/mL)                  Methadone Screen, Urine 10/12/2021 NEGATIVE  NEGATIVE Final    Comment:       (Positive cutoff 300 ng/mL)                  Opiates, Urine 10/12/2021 NEGATIVE  NEGATIVE Final    Comment:       (Positive cutoff 300 ng/mL)                  Phencyclidine, Urine 10/12/2021 NEGATIVE  NEGATIVE Final    Comment:       (Positive cutoff 25 ng/mL)                  Propoxyphene, Urine 10/12/2021 NOT REPORTED  NEGATIVE Final    Cannabinoid Scrn, Ur 10/12/2021 POSITIVE* NEGATIVE Final    Comment:       (Positive cutoff 50 ng/mL)                  Oxycodone Screen, Ur 10/12/2021 NEGATIVE  NEGATIVE Final    Comment:       (Positive cutoff 100 ng/mL)                  Methamphetamine, Urine 10/12/2021 NOT REPORTED  NEGATIVE Final    Tricyclic Antidepressants, Urine 10/12/2021 NOT REPORTED  NEGATIVE Final    MDMA, Urine 10/12/2021 NOT REPORTED  NEGATIVE Final    Buprenorphine Urine 10/12/2021 NOT REPORTED  NEGATIVE Final    Test Information 10/12/2021 Assay provides medical screening only. The absence of expected drug(s) and/or metabolite(s) may indicate diluted or adulterated urine, limitations of testing or timing of collection. Final    Comment: Testing for legal purposes should be confirmed by another method. To request confirmation   of test result, please call the lab within 7 days of sample submission.       Acetaminophen Level 10/12/2021 7* 10 - 30 ug/mL Final    Ethanol 10/12/2021 <10  <10 mg/dL Final    Ethanol percent 10/12/2021 <4.665  % Final    Salicylate Lvl 89/95/6328 <1* 3 - 10 mg/dL Final    Toxic Tricyclic Sc,Blood 37/07/3717 WRONG TEST ORDERED  NEGATIVE Final    Tricyclic Antidep,Urine 93/86/7893 NEGATIVE  NEGATIVE Final    Comment: (Positive cutoff 1000 ng/mL)  Assay provides rapid clinical screening only. Presumptive positive results for legal   purposes should be confirmed by another method. To request confirmation, please call the   lab within 7 days of sample submission.  Valproic Acid Lvl 10/21/2021 71  50 - 125 ug/mL Final    Valproic Dose amount 10/21/2021 500 MG   Final    Valproic Date last dose 10/21/2021 91,827,076   Final    Valproic Time last dose 10/21/2021 2,105   Final    POC Glucose 10/22/2021 100  65 - 105 mg/dL Final    POC Glucose 10/25/2021 116* 65 - 105 mg/dL Final    Glucose 10/26/2021 86  70 - 99 mg/dL Final    BUN 10/26/2021 18  6 - 20 mg/dL Final    CREATININE 10/26/2021 0.80  0.50 - 0.90 mg/dL Final    Bun/Cre Ratio 10/26/2021 NOT REPORTED  9 - 20 Final    Calcium 10/26/2021 9.2  8.6 - 10.4 mg/dL Final    Sodium 10/26/2021 142  135 - 144 mmol/L Final    Potassium 10/26/2021 4.7  3.7 - 5.3 mmol/L Final    Chloride 10/26/2021 107  98 - 107 mmol/L Final    CO2 10/26/2021 26  20 - 31 mmol/L Final    Anion Gap 10/26/2021 9  9 - 17 mmol/L Final    GFR Non- 10/26/2021 >60  >60 mL/min Final    GFR  10/26/2021 >60  >60 mL/min Final    GFR Comment 10/26/2021        Final    Comment: Average GFR for 30-36 years old:   80 mL/min/1.73sq m  Chronic Kidney Disease:   <60 mL/min/1.73sq m  Kidney failure:   <15 mL/min/1.73sq m              eGFR calculated using average adult body mass. Additional eGFR calculator available at:        [employer]            GFR Staging 10/26/2021 NOT REPORTED   Final    POC Glucose 10/27/2021 107* 65 - 105 mg/dL Final         Reviewed patient's current plan of care and vital signs with nursing staff.     Labs reviewed: [x] Yes    Medications  Current Facility-Administered Medications: OLANZapine zydis (ZYPREXA) disintegrating tablet 30 mg, 30 mg, Oral, Nightly  benzocaine (ORAJEL) 10 % mucosal gel, , Mouth/Throat, PRN  lactated ringers infusion, , IntraVENous, Continuous  fluticasone (FLONASE) 50 MCG/ACT nasal spray 1 spray, 1 spray, Each Nostril, Daily  lactated ringers infusion, , IntraVENous, Continuous  amoxicillin-clavulanate (AUGMENTIN) 500-125 MG per tablet 1 tablet, 1 tablet, Oral, 2 times per day  sertraline (ZOLOFT) tablet 100 mg, 100 mg, Oral, Daily  prazosin (MINIPRESS) capsule 2 mg, 2 mg, Oral, Nightly  loperamide (IMODIUM) capsule 2 mg, 2 mg, Oral, 4x Daily PRN  ondansetron (ZOFRAN-ODT) disintegrating tablet 4 mg, 4 mg, Oral, Q8H PRN  pantoprazole (PROTONIX) tablet 40 mg, 40 mg, Oral, QAM AC  nicotine polacrilex (NICORETTE) gum 2 mg, 2 mg, Oral, PRN  albuterol sulfate  (90 Base) MCG/ACT inhaler 2 puff, 2 puff, Inhalation, Q6H PRN  apixaban (ELIQUIS) tablet 5 mg, 5 mg, Oral, BID  cetirizine (ZYRTEC) tablet 10 mg, 10 mg, Oral, Daily  melatonin tablet 3 mg, 3 mg, Oral, Nightly PRN  tiZANidine (ZANAFLEX) tablet 4 mg, 4 mg, Oral, Q8H PRN  acetaminophen (TYLENOL) tablet 650 mg, 650 mg, Oral, Q4H PRN  aluminum & magnesium hydroxide-simethicone (MAALOX) 200-200-20 MG/5ML suspension 30 mL, 30 mL, Oral, Q6H PRN  hydrOXYzine (ATARAX) tablet 50 mg, 50 mg, Oral, TID PRN  haloperidol (HALDOL) tablet 5 mg, 5 mg, Oral, Q6H PRN **AND** [DISCONTINUED] LORazepam (ATIVAN) tablet 2 mg, 2 mg, Oral, Q6H PRN  haloperidol lactate (HALDOL) injection 5 mg, 5 mg, IntraMUSCular, Q6H PRN **AND** [DISCONTINUED] LORazepam (ATIVAN) injection 2 mg, 2 mg, IntraMUSCular, Q6H PRN **AND** diphenhydrAMINE (BENADRYL) injection 50 mg, 50 mg, IntraMUSCular, Q6H PRN  polyethylene glycol (GLYCOLAX) packet 17 g, 17 g, Oral, Daily PRN  traZODone (DESYREL) tablet 50 mg, 50 mg, Oral, Nightly PRN    ASSESSMENT  Severe recurrent major depressive disorder with psychotic features ([facility] [address]         PLAN  Patient symptoms are: Improving  Olanzapine titrated, first dose tonight  Continue index course of ECT, 3 times a week while inpatient, unlikely to transition to outpatient ECT secondary to lack of support  Monitor need and frequency of PRN medications. Encourage participation in groups and milieu. Attempt to develop insight. Psycho-education conducted. Supportive Therapy conducted. Probable discharge is to be determined by MD, will focus on sober living as part of discharge and outpatient DBT  Follow-up daily while inpatient. Patient continues to be monitored in the inpatient psychiatric facility at [facility] for safety and stabilization. Patient continues to need, on a daily basis, active treatment furnished directly by or requiring the supervision of inpatient psychiatric personnel. Electronically signed by DAMARIS Rodriguez CNP on 10/28/2021 at 5:06 PM    **This report has been created using voice recognition software. It may contain minor errors which are inherent in voice recognition technology. **    I independently saw and evaluated the patient. I reviewed the midlevel provider's documentation above. Any additional comments or changes to the midlevel provider's documentation are stated below otherwise agree with assessment. The patient is uncertain if she has noticed any improvement in her mood with ECT. She continues to report high levels of anxiety and difficulty in sleeping. She wants to continue ECT. She continues to report suicidal ideation. PLAN  Medications as noted above  Attempt to develop insight  Psycho-education conducted. Supportive Therapy conducted.   Probable discharge is 1-3 days  Follow-up daily while on inpatient unit    Electronically signed by Maine Rodriguez MD on 10/28/21 at 10:09 PM EDT

## 2021-10-29 ENCOUNTER — ANESTHESIA (OUTPATIENT)
Dept: POSTOP/PACU | Age: 41
End: 2021-10-29

## 2021-10-29 ENCOUNTER — ANESTHESIA EVENT (OUTPATIENT)
Dept: POSTOP/PACU | Age: 41
End: 2021-10-29

## 2021-10-29 ENCOUNTER — APPOINTMENT (OUTPATIENT)
Dept: POSTOP/PACU | Age: 41
DRG: 885 | End: 2021-10-29
Payer: MEDICARE

## 2021-10-29 VITALS — OXYGEN SATURATION: 95 % | DIASTOLIC BLOOD PRESSURE: 76 MMHG | SYSTOLIC BLOOD PRESSURE: 140 MMHG

## 2021-10-29 LAB — GLUCOSE BLD-MCNC: 112 MG/DL (ref 65–105)

## 2021-10-29 PROCEDURE — 6370000000 HC RX 637 (ALT 250 FOR IP): Performed by: NURSE PRACTITIONER

## 2021-10-29 PROCEDURE — 3700000000 HC ANESTHESIA ATTENDED CARE

## 2021-10-29 PROCEDURE — 99232 SBSQ HOSP IP/OBS MODERATE 35: CPT | Performed by: PSYCHIATRY & NEUROLOGY

## 2021-10-29 PROCEDURE — 6370000000 HC RX 637 (ALT 250 FOR IP): Performed by: INTERNAL MEDICINE

## 2021-10-29 PROCEDURE — 6370000000 HC RX 637 (ALT 250 FOR IP): Performed by: PSYCHIATRY & NEUROLOGY

## 2021-10-29 PROCEDURE — 6360000002 HC RX W HCPCS: Performed by: NURSE ANESTHETIST, CERTIFIED REGISTERED

## 2021-10-29 PROCEDURE — 2500000003 HC RX 250 WO HCPCS: Performed by: NURSE ANESTHETIST, CERTIFIED REGISTERED

## 2021-10-29 PROCEDURE — 6370000000 HC RX 637 (ALT 250 FOR IP)

## 2021-10-29 PROCEDURE — 99232 SBSQ HOSP IP/OBS MODERATE 35: CPT | Performed by: INTERNAL MEDICINE

## 2021-10-29 PROCEDURE — 2580000003 HC RX 258: Performed by: ANESTHESIOLOGY

## 2021-10-29 PROCEDURE — 7100000000 HC PACU RECOVERY - FIRST 15 MIN

## 2021-10-29 PROCEDURE — 90870 ELECTROCONVULSIVE THERAPY: CPT | Performed by: PSYCHIATRY & NEUROLOGY

## 2021-10-29 PROCEDURE — 82947 ASSAY GLUCOSE BLOOD QUANT: CPT

## 2021-10-29 PROCEDURE — 90870 ELECTROCONVULSIVE THERAPY: CPT

## 2021-10-29 PROCEDURE — GZB2ZZZ ELECTROCONVULSIVE THERAPY, BILATERAL-SINGLE SEIZURE: ICD-10-PCS | Performed by: PSYCHIATRY & NEUROLOGY

## 2021-10-29 PROCEDURE — 3700000001 HC ADD 15 MINUTES (ANESTHESIA)

## 2021-10-29 PROCEDURE — 7100000001 HC PACU RECOVERY - ADDTL 15 MIN

## 2021-10-29 PROCEDURE — APPSS45 APP SPLIT SHARED TIME 31-45 MINUTES: Performed by: PSYCHIATRY & NEUROLOGY

## 2021-10-29 PROCEDURE — 1240000000 HC EMOTIONAL WELLNESS R&B

## 2021-10-29 RX ORDER — SUCCINYLCHOLINE CHLORIDE 20 MG/ML
INJECTION INTRAMUSCULAR; INTRAVENOUS PRN
Status: DISCONTINUED | OUTPATIENT
Start: 2021-10-29 | End: 2021-10-29 | Stop reason: SDUPTHER

## 2021-10-29 RX ORDER — SODIUM CHLORIDE, SODIUM LACTATE, POTASSIUM CHLORIDE, CALCIUM CHLORIDE 600; 310; 30; 20 MG/100ML; MG/100ML; MG/100ML; MG/100ML
INJECTION, SOLUTION INTRAVENOUS CONTINUOUS
Status: DISCONTINUED | OUTPATIENT
Start: 2021-10-29 | End: 2021-11-03 | Stop reason: HOSPADM

## 2021-10-29 RX ORDER — KETOROLAC TROMETHAMINE 30 MG/ML
INJECTION, SOLUTION INTRAMUSCULAR; INTRAVENOUS PRN
Status: DISCONTINUED | OUTPATIENT
Start: 2021-10-29 | End: 2021-10-29 | Stop reason: SDUPTHER

## 2021-10-29 RX ORDER — SUCCINYLCHOLINE CHLORIDE 20 MG/ML
INJECTION INTRAMUSCULAR; INTRAVENOUS
Status: COMPLETED
Start: 2021-10-29 | End: 2021-10-29

## 2021-10-29 RX ORDER — KETOROLAC TROMETHAMINE 30 MG/ML
INJECTION, SOLUTION INTRAMUSCULAR; INTRAVENOUS
Status: COMPLETED
Start: 2021-10-29 | End: 2021-10-29

## 2021-10-29 RX ADMIN — SUCCINYLCHOLINE CHLORIDE 80 MG: 20 INJECTION, SOLUTION INTRAMUSCULAR; INTRAVENOUS at 07:29

## 2021-10-29 RX ADMIN — SERTRALINE HYDROCHLORIDE 100 MG: 100 TABLET ORAL at 08:54

## 2021-10-29 RX ADMIN — OLANZAPINE 30 MG: 10 TABLET, ORALLY DISINTEGRATING ORAL at 20:35

## 2021-10-29 RX ADMIN — PANTOPRAZOLE SODIUM 40 MG: 40 TABLET, DELAYED RELEASE ORAL at 08:55

## 2021-10-29 RX ADMIN — CETIRIZINE HYDROCHLORIDE 10 MG: 10 TABLET, FILM COATED ORAL at 08:54

## 2021-10-29 RX ADMIN — AMOXICILLIN AND CLAVULANATE POTASSIUM 1 TABLET: 500; 125 TABLET, FILM COATED ORAL at 20:35

## 2021-10-29 RX ADMIN — Medication 80 MG: at 07:28

## 2021-10-29 RX ADMIN — ACETAMINOPHEN 650 MG: 325 TABLET ORAL at 10:43

## 2021-10-29 RX ADMIN — APIXABAN 5 MG: 5 TABLET, FILM COATED ORAL at 08:54

## 2021-10-29 RX ADMIN — AMOXICILLIN AND CLAVULANATE POTASSIUM 1 TABLET: 500; 125 TABLET, FILM COATED ORAL at 08:54

## 2021-10-29 RX ADMIN — KETOROLAC TROMETHAMINE 15 MG: 30 INJECTION, SOLUTION INTRAMUSCULAR; INTRAVENOUS at 07:25

## 2021-10-29 RX ADMIN — SODIUM CHLORIDE, POTASSIUM CHLORIDE, SODIUM LACTATE AND CALCIUM CHLORIDE: 600; 310; 30; 20 INJECTION, SOLUTION INTRAVENOUS at 07:09

## 2021-10-29 RX ADMIN — APIXABAN 5 MG: 5 TABLET, FILM COATED ORAL at 20:35

## 2021-10-29 RX ADMIN — ACETAMINOPHEN 650 MG: 325 TABLET ORAL at 17:48

## 2021-10-29 RX ADMIN — PRAZOSIN HYDROCHLORIDE 2 MG: 1 CAPSULE ORAL at 20:35

## 2021-10-29 RX ADMIN — HYDROXYZINE HYDROCHLORIDE 50 MG: 50 TABLET, FILM COATED ORAL at 19:45

## 2021-10-29 RX ADMIN — TIZANIDINE 4 MG: 4 TABLET ORAL at 10:43

## 2021-10-29 RX ADMIN — TRAZODONE HYDROCHLORIDE 50 MG: 50 TABLET ORAL at 20:35

## 2021-10-29 RX ADMIN — FLUTICASONE PROPIONATE 1 SPRAY: 50 SPRAY, METERED NASAL at 08:54

## 2021-10-29 RX ADMIN — TIZANIDINE 4 MG: 4 TABLET ORAL at 20:35

## 2021-10-29 RX ADMIN — ACETAMINOPHEN 650 MG: 325 TABLET ORAL at 06:18

## 2021-10-29 RX ADMIN — NICOTINE POLACRILEX 2 MG: 2 GUM, CHEWING BUCCAL at 19:14

## 2021-10-29 ASSESSMENT — PAIN DESCRIPTION - PAIN TYPE
TYPE: ACUTE PAIN

## 2021-10-29 ASSESSMENT — PAIN SCALES - GENERAL
PAINLEVEL_OUTOF10: 0
PAINLEVEL_OUTOF10: 6
PAINLEVEL_OUTOF10: 0
PAINLEVEL_OUTOF10: 8
PAINLEVEL_OUTOF10: 0
PAINLEVEL_OUTOF10: 10
PAINLEVEL_OUTOF10: 0

## 2021-10-29 ASSESSMENT — LIFESTYLE VARIABLES: SMOKING_STATUS: 1

## 2021-10-29 ASSESSMENT — PAIN - FUNCTIONAL ASSESSMENT
PAIN_FUNCTIONAL_ASSESSMENT: 0-10
PAIN_FUNCTIONAL_ASSESSMENT: 0-10

## 2021-10-29 ASSESSMENT — PAIN DESCRIPTION - LOCATION
LOCATION: HEAD

## 2021-10-29 NOTE — GROUP NOTE
Group Therapy Note    Date: 10/28/2021    Group Start Time: 2010  Group End Time: 2030  Group Topic: Wrap-Up    [facility]    Jericho Salazar RN      Group Therapy Note    Attendees: 7/16    Patient's Goal:    1. To be able to reflect on daily unit activities/experiences. 2.  To review accomplished daily goals and be encouraged to set new goals for the next day. 3.  To improve interpersonal interaction through socialization. Notes:  Pt attended and actively participated in Wrap-Up/Goal Review group this evening. Status After Intervention:  Improved     Participation Level:  Active Listener and Interactive     Participation Quality: Appropriate, Attentive and Sharing     Speech:  normal     Thought Process/Content: Logical     Affective Functioning: Congruent     Mood: euthymic     Level of consciousness:  Alert, Oriented x4 and Attentive     Response to Learning: Able to verbalize current knowledge/experience, Able to verbalize/acknowledge new learning     Endings: None Reported     Modes of Intervention: Education, Support and Socialization     Discipline Responsible: Registered Nurse        Signature:  Jericho Salazar RN

## 2021-10-29 NOTE — GROUP NOTE
Group Therapy Note    Date: 10/29/2021    Group Start Time: 0900  Group End Time: 0930  Group Topic: Community Meeting    STYANELIS Mendez, [address]        Group Therapy Note    Attendees: 6/16            Patient's Goal:  To increase social interaction and to explore and identify short term goals r/t wellness and recovery. RT discussed group schedule and unit structure/resources and encouraged pts to be engaged in their treatment. Pts were given opportunities to ask questions. Notes: Pt participated in group task and was able to identify short term goals r/t wellness and recovery. Pt is pleasant and supportive of peers        Status After Intervention:  Improved     Participation Level:  Active Listener and Interactive     Participation Quality: Appropriate, Attentive, Sharing         Speech:   Normal     Thought Process/Content: Logical,linear     Affective Functioning: Congruent, bright     Mood: euthymic, jokes with peers and students        Level of consciousness:  Alert, and Attentive        Response to Learning: Able to verbalize current knowledge/experience, Able to verbalize/acknowledge new learning, and Progressing to goal        Endings: None Reported     Modes of Intervention: Education, Support, Socialization, Exploration, Clarifying and Problem-solving        Discipline Responsible: Psychoeducational Specialist        Signature:  Chrissie Clemons, [place]

## 2021-10-29 NOTE — PLAN OF CARE
level will decrease  Description: Pain level will decrease  10/28/2021 2345 by Rebecca Pitts RN  Outcome: Ongoing  Note: Patient reports minor pain in her tongue this evening due to bruising from ECT therapy. Patient declines medication or non-pharmacologic measures at this time.

## 2021-10-29 NOTE — PROCEDURES
Bilateral ECT Procedure Report  Michelle King   10/29/2021  1980         Attending:Melchor Ortiz MD  Preprocedure diagnosis: Major depressive disorder, recurrent, severe with psychotic symptoms (F33.3)  Postprocedure diagnosis: SAME AS PRE-PROCEDURE DIAGNOSIS  Treatment Number: This is treatment # 3   Patient Status: Inpatient   Type of ECT: Bilateral Brief Pulse  Medications  Preprocedure: Tylenol 650mg and Toradol 15mg  Anesthetic: Methohexital 80 mg  Paralytic: Succinylcholine 80 mg  Post procedure: none needed     Cuff placement: Left Lower Extremity    Thymatron Settings 1st Stimulus:     Pulse width: 1.0 ms   Frequency:  40 hz   % Power:   30 %   Static Impedance: 1400 ohms             Dynamic Impedance: 240 ohms             Motor Seizure Duration: 39 seconds  EEG Seizure Duration: 70 seconds                 The patient's ECT treatment was performed using Thymatron machine  . Timeout:  Time out was performed using two patient identifiers and confirmation of procedure. Patient Preparation: Preprocedure documentation, labs, H&P were all verified and reviewed. The patient was placed in supine position. EEG leads were placed for monitoring of seizure. Saxe areas bilaterally cleaned and prepped. Pre-Tac solution was applied over stimulus electrode site. Thymapad's then applied to stimulus electrode site bilaterally with the center of the lead placed approximately 1 inch superior to the midpoint of line between canthus and the tragus bilaterally. The patient was pre-oxygenated. Procedure in detail: Medications were administered by anesthesia using intravenous access at the doses listed previously in this summary. Anesthetic administered and once confirmation the patient is anesthetized, the patient's blood pressure cuff on lower extremity was inflated to 100mmHg in excess of patient's blood pressure. At this time, the neuromuscular blockade was administered intravenously by anesthesia.   Upper extremity fasciculation were verified followed by lower extremity fasciculation. Once fasciculations terminated, confirmation of affective neuromuscular blockade demonstrated by absence of withdrawal reflex. Bite blocks were put in place. Static impedance was tested and within appropriate limits. Thymatron settings were confirmed with nursing staff. Staff was cleared from the patient and dose of ECT stimulus was delivered. Motor seizure activity  was observed at duration noted and terminated. EEG seizure activity was observed of duration noted that was confirmed via monitoring EEG and terminated with appropriate postictal suppression noted on EEG. Static impedance and dynamic impedance were documented. Tourniquet on  lower extremity was released and recovery procedures initiated. The patient was mask ventilated during the procedure with no significant drops in oxygenation saturation and tolerated the procedure well without any notable adverse effects. Condition: The patient was in stable condition with stable vital signs and able to follow commands when moved to recovery.
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
## 2021-10-29 NOTE — PLAN OF CARE
Problem: Depressive Behavior With or Without Suicide Precautions:  Goal: Ability to disclose and discuss suicidal ideas will improve  Description: Ability to disclose and discuss suicidal ideas will improve  10/29/2021 1111 by Ansley Goodwin RN  Outcome: Ongoing  Patient is alert, observed in day area. Patient is flat on approach. Patient continues to admit to having suicidal thoughts. Patient denies plan, contacts for safety. Patient denies thoughts of wanting to harm others. Patient admits to hearing voices, does not further elaborate on content. Patient reports having anxiety and depression, 5/10 with 10 being the worst. Patient reports her anxiety and depression is due to [facility] triggers. \" Patient has been visible on unit, social with peers, attends unit programming. Patient reports adequate sleep and appetite. Patient is medication compliant, denies any side effects. Hygiene is appropriate. No further concerns voiced. Will continue to monitor.

## 2021-10-29 NOTE — PROGRESS NOTES
Daily Progress Note  10/29/2021    Patient Name: Kenzie Calderon    CHIEF COMPLAINT: Suicidal ideation         SUBJECTIVE:    Patient is seen today for a follow up assessment. Patient is compliant with scheduled medications. She has not received emergency medications in the past 24 hours. Karena Vasquez was seen today for a follow up assessment. She reports improved mood and clearer thoughts after ECT this morning. She reports a headache related to ECT treatment, but received Tylenol and a muscle relaxer a few minutes prior to the assessment. She denies any significant side effects from ECT with regards to cognition. She requests a urinalysis and reports she has been having urinary frequency and \"almost peed myself\". She also reports her ear has not been improving with ATB. Karena Vasquez reports getting good sleep and states it is \"much better\" than before ECT. She reports her appetite is increasing. While she has suicidal ideation it is improving and very situational based on her plan of care. She reports no intent. Karena Vasquez discusses concern over living arrangements at discharge and states she is not yet ready for discharge. Discussed options for sober living and/or rehabilitation when discharge ready. Patient states she is very concerned regarding overdosing when she leaves and states she knows she will overdose without proper living arrangements. She feels that with the current medication regimen and an ECT her mood is improving and she is hopeful that she will find an appropriate place to discharge to work on maintaining sobriety. She has been attending some groups. She maintains medication compliance and reports that she is tolerating her recently titrated dose. She remains agreeable to continuing her index course of inpatient ECT with 2 more rounds.     Appetite:  [x] Fair  [] Increased  [] Decreased      Sleep:       [] Normal/Adequate/Unchanged  [x] Fair  [] Poor      Group Attendance on Unit:   [] Yes  [x] Selectively [] No    Medication Side Effects:  Patient denies any medication side effects at the time of assessment. Mental Status Exam  Level of consciousness: Alert and awake. Appearance: Appropriate attire for setting, resting in bed, with fair  grooming and hygiene. Behavior/Motor: Approachable, pleasant  Attitude toward examiner: Cooperative, attentive, good eye contact. Speech: Normal rate, volume, and tone  Mood: \"Better\"  Affect: Congruent  Thought processes: Linear and coherent. Thought content: Denies homicidal ideation. Suicidal Ideation: Endorses improving suicidal ideation, contracts for safety   delusions: No evidence of delusions. Denies paranoia. Perceptual Disturbance: Patient does not appear to be responding to internal stimuli. Denies auditory hallucinations. Denies visual hallucinations. Cognition: Oriented to self, location, time, and situation. Memory: Intact. Insight & Judgement: Poor. Data   height is 5' 6\" (1.676 m) and weight is 230 lb (104.3 kg). Her oral temperature is 97.9 °F (36.6 °C). Her blood pressure is 142/89 (abnormal) and her pulse is 82. Her respiration is 18 and oxygen saturation is 98%.    Labs:   Admission on 10/12/2021   Component Date Value Ref Range Status    WBC 10/12/2021 6.8  3.5 - 11.0 k/uL Final    RBC 10/12/2021 4.45  4.0 - 5.2 m/uL Final    Hemoglobin 10/12/2021 13.5  12.0 - 16.0 g/dL Final    Hematocrit 10/12/2021 40.2  36 - 46 % Final    MCV 10/12/2021 90.3  80 - 100 fL Final    MCH 10/12/2021 30.4  26 - 34 pg Final    MCHC 10/12/2021 33.6  31 - 37 g/dL Final    RDW 10/12/2021 13.3  11.5 - 14.9 % Final    Platelets 74/47/8644 271  150 - 450 k/uL Final    MPV 10/12/2021 6.8  6.0 - 12.0 fL Final    NRBC Automated 10/12/2021 NOT REPORTED  per 100 WBC Final    Differential Type 10/12/2021 NOT REPORTED   Final    Immature Granulocytes 10/12/2021 NOT REPORTED  0 % Final    Absolute Immature Granulocyte 10/12/2021 NOT REPORTED  0.00 - 0.30 k/uL Final WBC Morphology 10/12/2021 NOT REPORTED   Final    RBC Morphology 10/12/2021 NOT REPORTED   Final    Platelet Estimate 37/74/3870 NOT REPORTED   Final    Seg Neutrophils 10/12/2021 48  36 - 66 % Final    Lymphocytes 10/12/2021 47* 24 - 44 % Final    Monocytes 10/12/2021 5  1 - 7 % Final    Eosinophils % 10/12/2021 0  0 - 4 % Final    Basophils 10/12/2021 0  0 - 2 % Final    Segs Absolute 10/12/2021 3.26  1.3 - 9.1 k/uL Final    Absolute Lymph # 10/12/2021 3.20  1.0 - 4.8 k/uL Final    Absolute Mono # 10/12/2021 0.34  0.1 - 1.3 k/uL Final    Absolute Eos # 10/12/2021 0.00  0.0 - 0.4 k/uL Final    Basophils Absolute 10/12/2021 0.00  0.0 - 0.2 k/uL Final    Morphology 10/12/2021 Normal   Final    Glucose 10/12/2021 101* 70 - 99 mg/dL Final    BUN 10/12/2021 23* 6 - 20 mg/dL Final    CREATININE 10/12/2021 0.80  0.50 - 0.90 mg/dL Final    Bun/Cre Ratio 10/12/2021 NOT REPORTED  9 - 20 Final    Calcium 10/12/2021 9.1  8.6 - 10.4 mg/dL Final    Sodium 10/12/2021 138  135 - 144 mmol/L Final    Potassium 10/12/2021 4.4  3.7 - 5.3 mmol/L Final    Chloride 10/12/2021 104  98 - 107 mmol/L Final    CO2 10/12/2021 24  20 - 31 mmol/L Final    Anion Gap 10/12/2021 10  9 - 17 mmol/L Final    Alkaline Phosphatase 10/12/2021 87  35 - 104 U/L Final    ALT 10/12/2021 14  5 - 33 U/L Final    AST 10/12/2021 17  <32 U/L Final    Total Bilirubin 10/12/2021 <0.15* 0.3 - 1.2 mg/dL Final    Total Protein 10/12/2021 7.0  6.4 - 8.3 g/dL Final    Albumin 10/12/2021 4.2  3.5 - 5.2 g/dL Final    Albumin/Globulin Ratio 10/12/2021 NOT REPORTED  1.0 - 2.5 Final    GFR Non- 10/12/2021 >60  >60 mL/min Final    GFR  10/12/2021 >60  >60 mL/min Final    GFR Comment 10/12/2021        Final    Comment: Average GFR for 30-36 years old:   80 mL/min/1.73sq m  Chronic Kidney Disease:   <60 mL/min/1.73sq m  Kidney failure:   <15 mL/min/1.73sq m              eGFR calculated using average adult body mass.  Additional eGFR calculator available at:        [employer]            GFR Staging 10/12/2021 NOT REPORTED   Final    TSH 10/12/2021 1.55  0.30 - 5.00 mIU/L Final    Specimen Description 10/12/2021 . NASOPHARYNGEAL SWAB   Final    SARS-CoV-2, Rapid 10/12/2021 Not Detected  Not Detected Final    Comment:       Rapid NAAT:  The specimen is NEGATIVE for SARS-CoV-2, the novel coronavirus associated with   COVID-19. The ID NOW COVID-19 assay is designed to detect the virus that causes COVID-19 in patients   with signs and symptoms of infection who are suspected of COVID-19. An individual without symptoms of COVID-19 and who is not shedding SARS-CoV-2 virus would   expect to have a negative (not detected) result in this assay. Negative results should be treated as presumptive and, if inconsistent with clinical signs   and symptoms or necessary for patient management,  should be tested with an alternative molecular assay. Negative results do not preclude   SARS-CoV-2 infection and   should not be used as the sole basis for patient management decisions.          Fact sheet for Healthcare Providers: Raphael  Fact sheet for Patients: Elaine.tayla          Methodology: Isothermal Nucleic Acid Amplification      Color, UA 10/12/2021 Yellow  Yellow Final    Turbidity UA 10/12/2021 Clear  Clear Final    Glucose, Ur 10/12/2021 NEGATIVE  NEGATIVE Final    Bilirubin Urine 10/12/2021 NEGATIVE  NEGATIVE Final    Ketones, Urine 10/12/2021 NEGATIVE  NEGATIVE Final    Specific Gravity, UA 10/12/2021 1.027  1.000 - 1.030 Final    Urine Hgb 10/12/2021 NEGATIVE  NEGATIVE Final    pH, UA 10/12/2021 6.5  5.0 - 8.0 Final    Protein, UA 10/12/2021 NEGATIVE  NEGATIVE Final    Urobilinogen, Urine 10/12/2021 Normal  Normal Final    Nitrite, Urine 10/12/2021 NEGATIVE  NEGATIVE Final    Leukocyte Esterase, Urine 10/12/2021 NEGATIVE  NEGATIVE Final    Urinalysis Comments 10/12/2021 Microscopic exam not performed based on chemical results unless requested in original order. Final    Amphetamine Screen, Ur 10/12/2021 NEGATIVE  NEGATIVE Final    Comment:       (Positive cutoff 1000 ng/mL)                  Barbiturate Screen, Ur 10/12/2021 NEGATIVE  NEGATIVE Final    Comment:       (Positive cutoff 200 ng/mL)                  Benzodiazepine Screen, Urine 10/12/2021 NEGATIVE  NEGATIVE Final    Comment:       (Positive cutoff 200 ng/mL)                  Cocaine Metabolite, Urine 10/12/2021 NEGATIVE  NEGATIVE Final    Comment:       (Positive cutoff 300 ng/mL)                  Methadone Screen, Urine 10/12/2021 NEGATIVE  NEGATIVE Final    Comment:       (Positive cutoff 300 ng/mL)                  Opiates, Urine 10/12/2021 NEGATIVE  NEGATIVE Final    Comment:       (Positive cutoff 300 ng/mL)                  Phencyclidine, Urine 10/12/2021 NEGATIVE  NEGATIVE Final    Comment:       (Positive cutoff 25 ng/mL)                  Propoxyphene, Urine 10/12/2021 NOT REPORTED  NEGATIVE Final    Cannabinoid Scrn, Ur 10/12/2021 POSITIVE* NEGATIVE Final    Comment:       (Positive cutoff 50 ng/mL)                  Oxycodone Screen, Ur 10/12/2021 NEGATIVE  NEGATIVE Final    Comment:       (Positive cutoff 100 ng/mL)                  Methamphetamine, Urine 10/12/2021 NOT REPORTED  NEGATIVE Final    Tricyclic Antidepressants, Urine 10/12/2021 NOT REPORTED  NEGATIVE Final    MDMA, Urine 10/12/2021 NOT REPORTED  NEGATIVE Final    Buprenorphine Urine 10/12/2021 NOT REPORTED  NEGATIVE Final    Test Information 10/12/2021 Assay provides medical screening only. The absence of expected drug(s) and/or metabolite(s) may indicate diluted or adulterated urine, limitations of testing or timing of collection. Final    Comment: Testing for legal purposes should be confirmed by another method. To request confirmation   of test result, please call the lab within 7 days of sample submission.       Acetaminophen Level 10/12/2021 7* 10 - 30 ug/mL Final    Ethanol 10/12/2021 <10  <10 mg/dL Final    Ethanol percent 10/12/2021 <0.096  % Final    Salicylate Lvl 89/12/8603 <1* 3 - 10 mg/dL Final    Toxic Tricyclic Sc,Blood 80/59/6509 WRONG TEST ORDERED  NEGATIVE Final    Tricyclic Antidep,Urine 79/04/9768 NEGATIVE  NEGATIVE Final    Comment:       (Positive cutoff 1000 ng/mL)  Assay provides rapid clinical screening only. Presumptive positive results for legal   purposes should be confirmed by another method. To request confirmation, please call the   lab within 7 days of sample submission. Valproic Acid Lvl 10/21/2021 71  50 - 125 ug/mL Final    Valproic Dose amount 10/21/2021 500 MG   Final    Valproic Date last dose 10/21/2021 48,846,781   Final    Valproic Time last dose 10/21/2021 2,105   Final    POC Glucose 10/22/2021 100  65 - 105 mg/dL Final    POC Glucose 10/25/2021 116* 65 - 105 mg/dL Final    Glucose 10/26/2021 86  70 - 99 mg/dL Final    BUN 10/26/2021 18  6 - 20 mg/dL Final    CREATININE 10/26/2021 0.80  0.50 - 0.90 mg/dL Final    Bun/Cre Ratio 10/26/2021 NOT REPORTED  9 - 20 Final    Calcium 10/26/2021 9.2  8.6 - 10.4 mg/dL Final    Sodium 10/26/2021 142  135 - 144 mmol/L Final    Potassium 10/26/2021 4.7  3.7 - 5.3 mmol/L Final    Chloride 10/26/2021 107  98 - 107 mmol/L Final    CO2 10/26/2021 26  20 - 31 mmol/L Final    Anion Gap 10/26/2021 9  9 - 17 mmol/L Final    GFR Non- 10/26/2021 >60  >60 mL/min Final    GFR  10/26/2021 >60  >60 mL/min Final    GFR Comment 10/26/2021        Final    Comment: Average GFR for 30-36 years old:   80 mL/min/1.73sq m  Chronic Kidney Disease:   <60 mL/min/1.73sq m  Kidney failure:   <15 mL/min/1.73sq m              eGFR calculated using average adult body mass.  Additional eGFR calculator available at:        [employer]            GFR Staging 10/26/2021 NOT REPORTED   Final    POC Glucose 10/27/2021 107* 65 - 105 mg/dL Final    POC Glucose 10/29/2021 112* 65 - 105 mg/dL Final         Reviewed patient's current plan of care and vital signs with nursing staff.     Labs reviewed: [x] Yes    Medications  Current Facility-Administered Medications: lactated ringers infusion, , IntraVENous, Continuous  OLANZapine zydis (ZYPREXA) disintegrating tablet 30 mg, 30 mg, Oral, Nightly  benzocaine (ORAJEL) 10 % mucosal gel, , Mouth/Throat, PRN  lactated ringers infusion, , IntraVENous, Continuous  fluticasone (FLONASE) 50 MCG/ACT nasal spray 1 spray, 1 spray, Each Nostril, Daily  lactated ringers infusion, , IntraVENous, Continuous  amoxicillin-clavulanate (AUGMENTIN) 500-125 MG per tablet 1 tablet, 1 tablet, Oral, 2 times per day  sertraline (ZOLOFT) tablet 100 mg, 100 mg, Oral, Daily  prazosin (MINIPRESS) capsule 2 mg, 2 mg, Oral, Nightly  loperamide (IMODIUM) capsule 2 mg, 2 mg, Oral, 4x Daily PRN  ondansetron (ZOFRAN-ODT) disintegrating tablet 4 mg, 4 mg, Oral, Q8H PRN  pantoprazole (PROTONIX) tablet 40 mg, 40 mg, Oral, QAM AC  nicotine polacrilex (NICORETTE) gum 2 mg, 2 mg, Oral, PRN  albuterol sulfate  (90 Base) MCG/ACT inhaler 2 puff, 2 puff, Inhalation, Q6H PRN  apixaban (ELIQUIS) tablet 5 mg, 5 mg, Oral, BID  cetirizine (ZYRTEC) tablet 10 mg, 10 mg, Oral, Daily  melatonin tablet 3 mg, 3 mg, Oral, Nightly PRN  tiZANidine (ZANAFLEX) tablet 4 mg, 4 mg, Oral, Q8H PRN  acetaminophen (TYLENOL) tablet 650 mg, 650 mg, Oral, Q4H PRN  aluminum & magnesium hydroxide-simethicone (MAALOX) 200-200-20 MG/5ML suspension 30 mL, 30 mL, Oral, Q6H PRN  hydrOXYzine (ATARAX) tablet 50 mg, 50 mg, Oral, TID PRN  haloperidol (HALDOL) tablet 5 mg, 5 mg, Oral, Q6H PRN **AND** [DISCONTINUED] LORazepam (ATIVAN) tablet 2 mg, 2 mg, Oral, Q6H PRN  haloperidol lactate (HALDOL) injection 5 mg, 5 mg, IntraMUSCular, Q6H PRN **AND** [DISCONTINUED] LORazepam (ATIVAN) injection 2 mg, 2 mg, IntraMUSCular, Q6H PRN **AND** diphenhydrAMINE (BENADRYL) injection 50 mg, 50 mg, IntraMUSCular, Q6H PRN  polyethylene glycol (GLYCOLAX) packet 17 g, 17 g, Oral, Daily PRN  traZODone (DESYREL) tablet 50 mg, 50 mg, Oral, Nightly PRN    ASSESSMENT  Severe recurrent major depressive disorder with psychotic features ([facility] [address]         PLAN  Patient symptoms are: Improving  Continue with current medication regimen  Continue index course of ECT, 3 times a week while inpatient, unlikely to transition to outpatient ECT secondary to lack of support  Monitor need and frequency of PRN medications. Encourage participation in groups and milieu. Attempt to develop insight. Psycho-education conducted. Supportive Therapy conducted. Probable discharge is to be determined by MD, will focus on sober living as part of discharge and outpatient DBT  Follow-up daily while inpatient. Patient continues to be monitored in the inpatient psychiatric facility at [facility] for safety and stabilization. Patient continues to need, on a daily basis, active treatment furnished directly by or requiring the supervision of inpatient psychiatric personnel. Electronically signed by DAMARIS Randle CNP on 10/29/2021 at 11:58 AM    **This report has been created using voice recognition software. It may contain minor errors which are inherent in voice recognition technology. **      I independently saw and evaluated the patient. I reviewed the midlevel provider's documentation above. Any additional comments or changes to the midlevel provider's documentation are stated below otherwise agree with assessment. The patient was discussed with the multidisciplinary team.  She is very fearful about discharge. The patient wants to do 6 sessions of ECT in the hospital.  She has no way of coming back for outpatient ECT. The patient was seen at bedside. The patient appeared to be awake but she kept her eyes closed throughout my presence and did not answer any of my questions.       PLAN  Medications as noted above  Attempt to develop insight  Psycho-education conducted. Supportive Therapy conducted.   Probable discharge is 5-9 days  Follow-up daily while on inpatient unit    Electronically signed by Cj Barrios MD on 10/29/21 at 5:15 PM EDT

## 2021-10-29 NOTE — ANESTHESIA POSTPROCEDURE EVALUATION
POST- ANESTHESIA EVALUATION       Pt Name: Isabel Barber  MRN: [id number]  YOB: 1980  Date of evaluation: 10/29/2021  Time:  9:26 AM      /84   Pulse 83   Temp 98.3 °F (36.8 °C) (Oral)   Resp 16   Ht 5' 6\" (1.676 m)   Wt 230 lb (104.3 kg)   SpO2 96%   BMI 37.12 kg/m²      Consciousness Level  Awake  Cardiopulmonary Status  Stable  Pain Adequately Treated YES  Nausea / Vomiting  NO  Adequate Hydration  YES  Anesthesia Related Complications NONE      Electronically signed by Zaid Singleton MD on 10/29/2021 at 9:26 AM       Department of Anesthesiology  Postprocedure Note    Patient: Isabel Barber  MRN: [id number]  YOB: 1980  Date of evaluation: 10/29/2021  Time:  9:26 AM     Procedure Summary     Date: 10/29/21 Room / Location: [facility] PACU    Anesthesia Start: 2898 Anesthesia Stop: 9263    Procedure: ECT W/ ANESTHESIA Diagnosis: Major depressive disorder, recurrent, severe with psychotic symptoms    Scheduled Providers:  Responsible Provider: Zaid Singleton MD    Anesthesia Type: general ASA Status: 3          Anesthesia Type: general    Sara Phase I: Sara Score: 10    Sara Phase II:      Last vitals: Reviewed and per EMR flowsheets.        Anesthesia Post Evaluation

## 2021-10-29 NOTE — GROUP NOTE
Group Therapy Note    Date: 10/29/2021    Group Start Time: 1430  Group End Time: 5589  Group Topic: Cognitive Skills    REDD Byrnes, CTRS        Group Therapy Note    Attendees: 8/15         Pt did not participate in Cognitive Skills Group at 1430 when encouraged by RT due to resting in room. Pt was offered talk time as an alternative to group but declined.          Discipline Responsible: Psychoeducational Specialist        Signature:  Buddy Rnakin

## 2021-10-29 NOTE — GROUP NOTE
Group Therapy Note    Date: 10/29/2021    Group Start Time: 1100  Group End Time: 1877  Group Topic: Cognitive Skills    REDD Tanner, CTRS        Group Therapy Note    Attendees: 10/15         Patient's Goal:  To increase social interaction and to practice decision making, concentration, and communication skills. Notes: Pt participated fully in group task . Pt was able to practice decsion making, concentration, and communication skills independently Pt is pleasant and supportive of peers. Status After Intervention:  Improved      Participation Level:  Active Listener and Interactive     Participation Quality: Attentive, Sharing,and Supportive        Speech:  Normal      Thought Process/Content: Logical ,linear         Affective Functioning: Congruent, brightens         Mood: Euthymic         Level of consciousness:  Alert, and Attentive         Response to Learning: Able to verbalize current knowledge/experience, Able to verbalize/acknowledge new learning,  and Progressing to goal        Endings: None Reported     Modes of Intervention: Education, Support, Socialization, Exploration, Clarifying and Problem-solving        Discipline Responsible: Psychoeducational Specialist        Signature:  Travis Pulliam

## 2021-10-29 NOTE — CARE COORDINATION
DISCHARGE UPDATE:  - Client is due to get 2-more ECT treatments next week, Monday and Wednesday, with a discharge for Thursday due to not currently having a safe place to live as well as unable to obtain outpatient ECT treatment.   - Writer has sent paperwork to [facility] and due to increase in depression they both denied her entrance into their program.  Writer to contact Jessicajoshuashoshana Howard at [facility] with an update on Tuesday and they might re-consider her into their program.  - Writer sends applications/information for placement on this date/time to [employer], [facility], [facility], [address] [facility] and [facility] for next week placement

## 2021-10-29 NOTE — PROGRESS NOTES
BP CUFF DEFLATED LEFT ANKLE    PULSE WIDTH-  1.0  FREQUENCY-  40  DURATION % POWER-30  CURRENT- 0.9  MOTOR- 39  EEG-70  STATIC-1400  DYNAMIC-240
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
## 2021-10-30 LAB
-: ABNORMAL
AMORPHOUS: ABNORMAL
BACTERIA: ABNORMAL
BILIRUBIN URINE: NEGATIVE
CASTS UA: ABNORMAL /LPF
COLOR: YELLOW
COMMENT UA: ABNORMAL
CRYSTALS, UA: ABNORMAL /HPF
EPITHELIAL CELLS UA: ABNORMAL /HPF
GLUCOSE URINE: NEGATIVE
KETONES, URINE: NEGATIVE
LEUKOCYTE ESTERASE, URINE: ABNORMAL
MUCUS: ABNORMAL
NITRITE, URINE: NEGATIVE
OTHER OBSERVATIONS UA: ABNORMAL
PH UA: 5 (ref 5–8)
PROTEIN UA: NEGATIVE
RBC UA: ABNORMAL /HPF
RENAL EPITHELIAL, UA: ABNORMAL /HPF
SPECIFIC GRAVITY UA: 1.02 (ref 1–1.03)
TRICHOMONAS: ABNORMAL
TURBIDITY: CLEAR
URINE HGB: ABNORMAL
UROBILINOGEN, URINE: NORMAL
WBC UA: ABNORMAL /HPF
YEAST: ABNORMAL

## 2021-10-30 PROCEDURE — 6370000000 HC RX 637 (ALT 250 FOR IP): Performed by: PSYCHIATRY & NEUROLOGY

## 2021-10-30 PROCEDURE — 6370000000 HC RX 637 (ALT 250 FOR IP): Performed by: INTERNAL MEDICINE

## 2021-10-30 PROCEDURE — 1240000000 HC EMOTIONAL WELLNESS R&B

## 2021-10-30 PROCEDURE — 6370000000 HC RX 637 (ALT 250 FOR IP): Performed by: NURSE PRACTITIONER

## 2021-10-30 PROCEDURE — 6370000000 HC RX 637 (ALT 250 FOR IP)

## 2021-10-30 PROCEDURE — 81001 URINALYSIS AUTO W/SCOPE: CPT

## 2021-10-30 PROCEDURE — 99232 SBSQ HOSP IP/OBS MODERATE 35: CPT | Performed by: PSYCHIATRY & NEUROLOGY

## 2021-10-30 PROCEDURE — APPSS30 APP SPLIT SHARED TIME 16-30 MINUTES: Performed by: PSYCHIATRY & NEUROLOGY

## 2021-10-30 RX ADMIN — APIXABAN 5 MG: 5 TABLET, FILM COATED ORAL at 21:18

## 2021-10-30 RX ADMIN — TIZANIDINE 4 MG: 4 TABLET ORAL at 21:19

## 2021-10-30 RX ADMIN — HYDROXYZINE HYDROCHLORIDE 50 MG: 50 TABLET, FILM COATED ORAL at 12:19

## 2021-10-30 RX ADMIN — PANTOPRAZOLE SODIUM 40 MG: 40 TABLET, DELAYED RELEASE ORAL at 06:55

## 2021-10-30 RX ADMIN — OLANZAPINE 30 MG: 10 TABLET, ORALLY DISINTEGRATING ORAL at 21:19

## 2021-10-30 RX ADMIN — PRAZOSIN HYDROCHLORIDE 2 MG: 1 CAPSULE ORAL at 21:19

## 2021-10-30 RX ADMIN — NICOTINE POLACRILEX 2 MG: 2 GUM, CHEWING BUCCAL at 17:44

## 2021-10-30 RX ADMIN — ALBUTEROL SULFATE 2 PUFF: 90 AEROSOL, METERED RESPIRATORY (INHALATION) at 21:22

## 2021-10-30 RX ADMIN — HYDROXYZINE HYDROCHLORIDE 50 MG: 50 TABLET, FILM COATED ORAL at 19:07

## 2021-10-30 RX ADMIN — NICOTINE POLACRILEX 2 MG: 2 GUM, CHEWING BUCCAL at 12:19

## 2021-10-30 RX ADMIN — AMOXICILLIN AND CLAVULANATE POTASSIUM 1 TABLET: 500; 125 TABLET, FILM COATED ORAL at 21:19

## 2021-10-30 RX ADMIN — FLUTICASONE PROPIONATE 1 SPRAY: 50 SPRAY, METERED NASAL at 21:18

## 2021-10-30 RX ADMIN — TRAZODONE HYDROCHLORIDE 50 MG: 50 TABLET ORAL at 21:19

## 2021-10-30 ASSESSMENT — PAIN SCALES - GENERAL: PAINLEVEL_OUTOF10: 0

## 2021-10-30 NOTE — PLAN OF CARE
Problem: Depressive Behavior With or Without Suicide Precautions:  Goal: Ability to disclose and discuss suicidal ideas will improve  Description: Ability to disclose and discuss suicidal ideas will improve  Outcome: Ongoing     Problem: Altered Mood, Psychotic Behavior:  Goal: Able to verbalize decrease in frequency and intensity of hallucinations  Description: Able to verbalize decrease in frequency and intensity of hallucinations  Outcome: Ongoing     Problem: Pain:  Goal: Pain level will decrease  Description: Pain level will decrease  Outcome: Ongoing   Patient denies homicidal ideation this shift, reports thoughts to harm self but is able to remain in behavior control. Patient is out and social with peers and accepting of scheduled and PRN medications. Patient does become irritable with peer when receiving her medications as she is educated on what she had ordered. Patient then turns to another peer that she had been socializing with, overhearing that peer is on the same medication, and begins to discuss with peer. Patient provided with Zanaflex for pain verbalizes effectiveness.

## 2021-10-30 NOTE — GROUP NOTE
Group Therapy Note    Date: 10/29/2021    Group Start Time: 2005  Group End Time: 2035  Group Topic: Wrap-Up    REDD Mccartney        Group Therapy Note    Attendees: 6/15             Status After Intervention:  Improved    Participation Level:  Active Listener    Participation Quality: Appropriate      Speech:  normal      Thought Process/Content: Logical      Affective Functioning: Congruent      Mood: elevated      Level of consciousness:  Alert      Response to Learning: Able to verbalize current knowledge/experience      Endings: None Reported    Modes of Intervention: Support and Socialization      Discipline Responsible: Behavorial Health Tech      Signature:  Maximino Issa

## 2021-10-30 NOTE — GROUP NOTE
Group Therapy Note    Date: 10/30/2021    Group Start Time: 1400  Group End Time: 1500  Group Topic: Cognitive Skills    REDD Fung, CTRS        Group Therapy Note    Attendees: 10/13         Patient's Goal:  To increase social interaction and to practice problem solving, concentration, and communication skills. Notes: Pt participated fully in group task . Pt was able to practice problem solving, concentration, and communication skills independently Pt is pleasant and supportive of peers. Status After Intervention:  Improved      Participation Level:  Active Listener and Interactive     Participation Quality: Attentive, Sharing,and Supportive        Speech:  Normal      Thought Process/Content: Logical ,linear         Affective Functioning: Congruent, brightens         Mood: Euthymic         Level of consciousness:  Alert, and Attentive         Response to Learning: Able to verbalize current knowledge/experience, Able to verbalize/acknowledge new learning,  and Progressing to goal        Endings: None Reported     Modes of Intervention: Education, Support, Socialization, Exploration, Clarifying and Problem-solving        Discipline Responsible: Psychoeducational Specialist        Signature:  Yony Segundo

## 2021-10-30 NOTE — GROUP NOTE
Group Therapy Note    Date: 10/30/2021    Group Start Time: 1040  Group End Time: 1120  Group Topic: Psychoeducation     Group Psychoeducation Note:     RED[facility]    Rooms [phone] (8/15 patients total)    SW meets with client 1:1 to discuss discharge/safety planning as well as other community supportive services social work can assist them with prior to their discharge. SW provides each client a journal and prompts to help them process depression and anxiety. Client was pleasant and cooperative during 1:1 clinical intervention. SW also provides each client with options of crossword puzzles, word searches, sudoku, and therapeutic coloring pages for additional afternoon activities. Patient's Goal:  To engage in 1:1 and discuss discharge/safety planning as well as the importance of community mental health follow-up appointments. SW remind clients to complete discharge/safety plan and return to  as well as to review at time of discharge to make sure they have information on the community resources they need. Signature:  Pamela Salinas.  Lisa Bales, MSW, LSW

## 2021-10-30 NOTE — PROGRESS NOTES
Daily Progress Note  10/30/2021    Patient Name: Marilia Bentley    CHIEF COMPLAINT: Suicidal ideation         SUBJECTIVE:    Nursing team report the patient has maintained medication adherence and has been without acute behavioral changes in the last 24 hours. \"Dipika\" he is agreeable to assessment in the privacy of [employer]. She reports that her mood is \"better \"and that she experienced an increase in effective sleep due to nonearly morning waking associated with ECT treatment today. She reports that she appreciates this and it has allowed her to be more active and present on milieu and participate in group programming. Patient does have several questions related to her current medications and we review and update. She is reminded of why her Depakote was discontinued to not interfere with seizure threshold at ECT. She feels that the olanzapine sertraline combination is currently effective in managing her mood. She continues to endorse suicidal ideation but is able to contract for safety on unit. She continues to be forward thinking and appreciates working with the social work team to establish a safe discharge plan. She denies having questions or concerns at this time. Appetite:  [x] Fair  [] Increased  [] Decreased      Sleep:       [] Normal/Adequate/Unchanged  [x] Fair  [] Poor      Group Attendance on Unit:   [] Yes  [x] Selectively    [] No    Medication Side Effects:  Patient denies any medication side effects at the time of assessment. Mental Status Exam  Level of consciousness: Alert and awake. Appearance: Appropriate attire for setting, sitting in chair, with good grooming and hygiene. Behavior/Motor: Approachable, pleasant  Attitude toward examiner: Cooperative, attentive, good eye contact. Speech: Normal rate, volume, and tone  Mood: \"Better\"  Affect: Congruent  Thought processes: Linear and coherent. Thought content: Denies homicidal ideation.   Suicidal Ideation: Endorses improving suicidal ideation, contracts for safety   delusions: No evidence of delusions. Denies paranoia. Perceptual Disturbance: Patient does not appear to be responding to internal stimuli. Denies auditory hallucinations. Denies visual hallucinations. Cognition: Oriented to self, location, time, and situation. Memory: Intact. Insight & Judgement: Poor. Data   height is 5' 6\" (1.676 m) and weight is 230 lb (104.3 kg). Her oral temperature is 97.9 °F (36.6 °C). Her blood pressure is 131/77 and her pulse is 71. Her respiration is 14 and oxygen saturation is 98%.    Labs:   Admission on 10/12/2021   Component Date Value Ref Range Status    WBC 10/12/2021 6.8  3.5 - 11.0 k/uL Final    RBC 10/12/2021 4.45  4.0 - 5.2 m/uL Final    Hemoglobin 10/12/2021 13.5  12.0 - 16.0 g/dL Final    Hematocrit 10/12/2021 40.2  36 - 46 % Final    MCV 10/12/2021 90.3  80 - 100 fL Final    MCH 10/12/2021 30.4  26 - 34 pg Final    MCHC 10/12/2021 33.6  31 - 37 g/dL Final    RDW 10/12/2021 13.3  11.5 - 14.9 % Final    Platelets 88/68/2307 271  150 - 450 k/uL Final    MPV 10/12/2021 6.8  6.0 - 12.0 fL Final    NRBC Automated 10/12/2021 NOT REPORTED  per 100 WBC Final    Differential Type 10/12/2021 NOT REPORTED   Final    Immature Granulocytes 10/12/2021 NOT REPORTED  0 % Final    Absolute Immature Granulocyte 10/12/2021 NOT REPORTED  0.00 - 0.30 k/uL Final    WBC Morphology 10/12/2021 NOT REPORTED   Final    RBC Morphology 10/12/2021 NOT REPORTED   Final    Platelet Estimate 36/37/1416 NOT REPORTED   Final    Seg Neutrophils 10/12/2021 48  36 - 66 % Final    Lymphocytes 10/12/2021 47* 24 - 44 % Final    Monocytes 10/12/2021 5  1 - 7 % Final    Eosinophils % 10/12/2021 0  0 - 4 % Final    Basophils 10/12/2021 0  0 - 2 % Final    Segs Absolute 10/12/2021 3.26  1.3 - 9.1 k/uL Final    Absolute Lymph # 10/12/2021 3.20  1.0 - 4.8 k/uL Final    Absolute Mono # 10/12/2021 0.34  0.1 - 1.3 k/uL Final    Absolute Eos # 10/12/2021 0.00  0.0 - 0.4 k/uL Final    Basophils Absolute 10/12/2021 0.00  0.0 - 0.2 k/uL Final    Morphology 10/12/2021 Normal   Final    Glucose 10/12/2021 101* 70 - 99 mg/dL Final    BUN 10/12/2021 23* 6 - 20 mg/dL Final    CREATININE 10/12/2021 0.80  0.50 - 0.90 mg/dL Final    Bun/Cre Ratio 10/12/2021 NOT REPORTED  9 - 20 Final    Calcium 10/12/2021 9.1  8.6 - 10.4 mg/dL Final    Sodium 10/12/2021 138  135 - 144 mmol/L Final    Potassium 10/12/2021 4.4  3.7 - 5.3 mmol/L Final    Chloride 10/12/2021 104  98 - 107 mmol/L Final    CO2 10/12/2021 24  20 - 31 mmol/L Final    Anion Gap 10/12/2021 10  9 - 17 mmol/L Final    Alkaline Phosphatase 10/12/2021 87  35 - 104 U/L Final    ALT 10/12/2021 14  5 - 33 U/L Final    AST 10/12/2021 17  <32 U/L Final    Total Bilirubin 10/12/2021 <0.15* 0.3 - 1.2 mg/dL Final    Total Protein 10/12/2021 7.0  6.4 - 8.3 g/dL Final    Albumin 10/12/2021 4.2  3.5 - 5.2 g/dL Final    Albumin/Globulin Ratio 10/12/2021 NOT REPORTED  1.0 - 2.5 Final    GFR Non- 10/12/2021 >60  >60 mL/min Final    GFR  10/12/2021 >60  >60 mL/min Final    GFR Comment 10/12/2021        Final    Comment: Average GFR for 30-36 years old:   80 mL/min/1.73sq m  Chronic Kidney Disease:   <60 mL/min/1.73sq m  Kidney failure:   <15 mL/min/1.73sq m              eGFR calculated using average adult body mass. Additional eGFR calculator available at:        [employer]            GFR Staging 10/12/2021 NOT REPORTED   Final    TSH 10/12/2021 1.55  0.30 - 5.00 mIU/L Final    Specimen Description 10/12/2021 . NASOPHARYNGEAL SWAB   Final    SARS-CoV-2, Rapid 10/12/2021 Not Detected  Not Detected Final    Comment:       Rapid NAAT:  The specimen is NEGATIVE for SARS-CoV-2, the novel coronavirus associated with   COVID-19.         The ID NOW COVID-19 assay is designed to detect the virus that causes COVID-19 in patients   with signs and symptoms of infection who are suspected of COVID-19. An individual without symptoms of COVID-19 and who is not shedding SARS-CoV-2 virus would   expect to have a negative (not detected) result in this assay. Negative results should be treated as presumptive and, if inconsistent with clinical signs   and symptoms or necessary for patient management,  should be tested with an alternative molecular assay. Negative results do not preclude   SARS-CoV-2 infection and   should not be used as the sole basis for patient management decisions. Fact sheet for Healthcare Providers: Elaine.es  Fact sheet for Patients: Elaine.es          Methodology: Isothermal Nucleic Acid Amplification      Color, UA 10/12/2021 Yellow  Yellow Final    Turbidity UA 10/12/2021 Clear  Clear Final    Glucose, Ur 10/12/2021 NEGATIVE  NEGATIVE Final    Bilirubin Urine 10/12/2021 NEGATIVE  NEGATIVE Final    Ketones, Urine 10/12/2021 NEGATIVE  NEGATIVE Final    Specific [place], UA 10/12/2021 1.027  1.000 - 1.030 Final    Urine Hgb 10/12/2021 NEGATIVE  NEGATIVE Final    pH, UA 10/12/2021 6.5  5.0 - 8.0 Final    Protein, UA 10/12/2021 NEGATIVE  NEGATIVE Final    Urobilinogen, Urine 10/12/2021 Normal  Normal Final    Nitrite, Urine 10/12/2021 NEGATIVE  NEGATIVE Final    Leukocyte Esterase, Urine 10/12/2021 NEGATIVE  NEGATIVE Final    Urinalysis Comments 10/12/2021 Microscopic exam not performed based on chemical results unless requested in original order.    Final    Amphetamine Screen, Ur 10/12/2021 NEGATIVE  NEGATIVE Final    Comment:       (Positive cutoff 1000 ng/mL)                  Barbiturate Screen, Ur 10/12/2021 NEGATIVE  NEGATIVE Final    Comment:       (Positive cutoff 200 ng/mL)                  Benzodiazepine Screen, Urine 10/12/2021 NEGATIVE  NEGATIVE Final    Comment:       (Positive cutoff 200 ng/mL)                  Cocaine Metabolite, Urine 10/12/2021 NEGATIVE  NEGATIVE Final    Comment:       (Positive cutoff 300 ng/mL)                  Methadone Screen, Urine 10/12/2021 NEGATIVE  NEGATIVE Final    Comment:       (Positive cutoff 300 ng/mL)                  Opiates, Urine 10/12/2021 NEGATIVE  NEGATIVE Final    Comment:       (Positive cutoff 300 ng/mL)                  Phencyclidine, Urine 10/12/2021 NEGATIVE  NEGATIVE Final    Comment:       (Positive cutoff 25 ng/mL)                  Propoxyphene, Urine 10/12/2021 NOT REPORTED  NEGATIVE Final    Cannabinoid Scrn, Ur 10/12/2021 POSITIVE* NEGATIVE Final    Comment:       (Positive cutoff 50 ng/mL)                  Oxycodone Screen, Ur 10/12/2021 NEGATIVE  NEGATIVE Final    Comment:       (Positive cutoff 100 ng/mL)                  Methamphetamine, Urine 10/12/2021 NOT REPORTED  NEGATIVE Final    Tricyclic Antidepressants, Urine 10/12/2021 NOT REPORTED  NEGATIVE Final    MDMA, Urine 10/12/2021 NOT REPORTED  NEGATIVE Final    Buprenorphine Urine 10/12/2021 NOT REPORTED  NEGATIVE Final    Test Information 10/12/2021 Assay provides medical screening only. The absence of expected drug(s) and/or metabolite(s) may indicate diluted or adulterated urine, limitations of testing or timing of collection. Final    Comment: Testing for legal purposes should be confirmed by another method. To request confirmation   of test result, please call the lab within 7 days of sample submission.  Acetaminophen Level 10/12/2021 7* 10 - 30 ug/mL Final    Ethanol 10/12/2021 <10  <10 mg/dL Final    Ethanol percent 10/12/2021 <4.543  % Final    Salicylate Lvl 54/85/0789 <1* 3 - 10 mg/dL Final    Toxic Tricyclic Sc,Blood 61/06/6486 WRONG TEST ORDERED  NEGATIVE Final    Tricyclic Antidep,Urine 57/80/5572 NEGATIVE  NEGATIVE Final    Comment:       (Positive cutoff 1000 ng/mL)  Assay provides rapid clinical screening only.   Presumptive positive results for legal   purposes should be confirmed by another method. To request confirmation, please call the   lab within 7 days of sample submission.  Valproic Acid Lvl 10/21/2021 71  50 - 125 ug/mL Final    Valproic Dose amount 10/21/2021 500 MG   Final    Valproic Date last dose 10/21/2021 48,499,339   Final    Valproic Time last dose 10/21/2021 2,105   Final    POC Glucose 10/22/2021 100  65 - 105 mg/dL Final    POC Glucose 10/25/2021 116* 65 - 105 mg/dL Final    Glucose 10/26/2021 86  70 - 99 mg/dL Final    BUN 10/26/2021 18  6 - 20 mg/dL Final    CREATININE 10/26/2021 0.80  0.50 - 0.90 mg/dL Final    Bun/Cre Ratio 10/26/2021 NOT REPORTED  9 - 20 Final    Calcium 10/26/2021 9.2  8.6 - 10.4 mg/dL Final    Sodium 10/26/2021 142  135 - 144 mmol/L Final    Potassium 10/26/2021 4.7  3.7 - 5.3 mmol/L Final    Chloride 10/26/2021 107  98 - 107 mmol/L Final    CO2 10/26/2021 26  20 - 31 mmol/L Final    Anion Gap 10/26/2021 9  9 - 17 mmol/L Final    GFR Non- 10/26/2021 >60  >60 mL/min Final    GFR  10/26/2021 >60  >60 mL/min Final    GFR Comment 10/26/2021        Final    Comment: Average GFR for 30-36 years old:   80 mL/min/1.73sq m  Chronic Kidney Disease:   <60 mL/min/1.73sq m  Kidney failure:   <15 mL/min/1.73sq m              eGFR calculated using average adult body mass. Additional eGFR calculator available at:        [email or web address]            GFR Staging 10/26/2021 NOT REPORTED   Final    POC Glucose 10/27/2021 107* 65 - 105 mg/dL Final    POC Glucose 10/29/2021 112* 65 - 105 mg/dL Final         Reviewed patient's current plan of care and vital signs with nursing staff.     Labs reviewed: [x] Yes    Medications  Current Facility-Administered Medications: lactated ringers infusion, , IntraVENous, Continuous  OLANZapine zydis (ZYPREXA) disintegrating tablet 30 mg, 30 mg, Oral, Nightly  benzocaine (ORAJEL) 10 % mucosal gel, , Mouth/Throat, PRN  lactated ringers infusion, , IntraVENous, Continuous  fluticasone (FLONASE) 50 MCG/ACT nasal spray 1 spray, 1 spray, Each Nostril, Daily  lactated ringers infusion, , IntraVENous, Continuous  amoxicillin-clavulanate (AUGMENTIN) 500-125 MG per tablet 1 tablet, 1 tablet, Oral, 2 times per day  sertraline (ZOLOFT) tablet 100 mg, 100 mg, Oral, Daily  prazosin (MINIPRESS) capsule 2 mg, 2 mg, Oral, Nightly  loperamide (IMODIUM) capsule 2 mg, 2 mg, Oral, 4x Daily PRN  ondansetron (ZOFRAN-ODT) disintegrating tablet 4 mg, 4 mg, Oral, Q8H PRN  pantoprazole (PROTONIX) tablet 40 mg, 40 mg, Oral, QAM AC  nicotine polacrilex (NICORETTE) gum 2 mg, 2 mg, Oral, PRN  albuterol sulfate  (90 Base) MCG/ACT inhaler 2 puff, 2 puff, Inhalation, Q6H PRN  apixaban (ELIQUIS) tablet 5 mg, 5 mg, Oral, BID  cetirizine (ZYRTEC) tablet 10 mg, 10 mg, Oral, Daily  melatonin tablet 3 mg, 3 mg, Oral, Nightly PRN  tiZANidine (ZANAFLEX) tablet 4 mg, 4 mg, Oral, Q8H PRN  acetaminophen (TYLENOL) tablet 650 mg, 650 mg, Oral, Q4H PRN  aluminum & magnesium hydroxide-simethicone (MAALOX) 200-200-20 MG/5ML suspension 30 mL, 30 mL, Oral, Q6H PRN  hydrOXYzine (ATARAX) tablet 50 mg, 50 mg, Oral, TID PRN  haloperidol (HALDOL) tablet 5 mg, 5 mg, Oral, Q6H PRN **AND** [DISCONTINUED] LORazepam (ATIVAN) tablet 2 mg, 2 mg, Oral, Q6H PRN  haloperidol lactate (HALDOL) injection 5 mg, 5 mg, IntraMUSCular, Q6H PRN **AND** [DISCONTINUED] LORazepam (ATIVAN) injection 2 mg, 2 mg, IntraMUSCular, Q6H PRN **AND** diphenhydrAMINE (BENADRYL) injection 50 mg, 50 mg, IntraMUSCular, Q6H PRN  polyethylene glycol (GLYCOLAX) packet 17 g, 17 g, Oral, Daily PRN  traZODone (DESYREL) tablet 50 mg, 50 mg, Oral, Nightly PRN    ASSESSMENT  Severe recurrent major depressive disorder with psychotic features ([facility] [address]         PLAN  Patient symptoms are: Improving  Continue with current medication regimen  Continue index course of ECT, 2 more times while inpatient, unlikely to transition to outpatient ECT secondary to lack of support  Monitor need and frequency of PRN medications. Encourage participation in groups and milieu. Attempt to develop insight. Psycho-education conducted. Supportive Therapy conducted. Probable discharge is to be determined by MD, will focus on sober living as part of discharge and outpatient DBT  Follow-up daily while inpatient. Patient continues to be monitored in the inpatient psychiatric facility at [facility] for safety and stabilization. Patient continues to need, on a daily basis, active treatment furnished directly by or requiring the supervision of inpatient psychiatric personnel. Electronically signed by DAMARIS Nieto CNP on 10/30/2021 at 4:03 PM    **This report has been created using voice recognition software. It may contain minor errors which are inherent in voice recognition technology. **    I independently saw and evaluated the patient. I reviewed the midlevel provider's documentation above. Any additional comments or changes to the midlevel provider's documentation are stated below otherwise agree with assessment. The patient has been somewhat loud on the unit. She has been participating in groups and interacting with her peers. She reports an improvement in her mood. She wants to continue having ECT. PLAN  Medications as noted above  Attempt to develop insight  Psycho-education conducted. Supportive Therapy conducted.   Probable discharge is 4-6 days  Follow-up daily while on inpatient unit    Electronically signed by Lukas Mar MD on 10/30/21 at 6:09 PM EDT

## 2021-10-31 PROCEDURE — 6370000000 HC RX 637 (ALT 250 FOR IP): Performed by: INTERNAL MEDICINE

## 2021-10-31 PROCEDURE — APPSS30 APP SPLIT SHARED TIME 16-30 MINUTES: Performed by: PSYCHIATRY & NEUROLOGY

## 2021-10-31 PROCEDURE — 6370000000 HC RX 637 (ALT 250 FOR IP): Performed by: PSYCHIATRY & NEUROLOGY

## 2021-10-31 PROCEDURE — 6370000000 HC RX 637 (ALT 250 FOR IP): Performed by: NURSE PRACTITIONER

## 2021-10-31 PROCEDURE — 6370000000 HC RX 637 (ALT 250 FOR IP)

## 2021-10-31 PROCEDURE — 99232 SBSQ HOSP IP/OBS MODERATE 35: CPT | Performed by: PSYCHIATRY & NEUROLOGY

## 2021-10-31 PROCEDURE — 1240000000 HC EMOTIONAL WELLNESS R&B

## 2021-10-31 RX ORDER — POLYETHYLENE GLYCOL 3350 17 G
2 POWDER IN PACKET (EA) ORAL
Status: DISCONTINUED | OUTPATIENT
Start: 2021-10-31 | End: 2021-11-01

## 2021-10-31 RX ADMIN — CETIRIZINE HYDROCHLORIDE 10 MG: 10 TABLET, FILM COATED ORAL at 08:40

## 2021-10-31 RX ADMIN — HYDROXYZINE HYDROCHLORIDE 50 MG: 50 TABLET, FILM COATED ORAL at 11:12

## 2021-10-31 RX ADMIN — AMOXICILLIN AND CLAVULANATE POTASSIUM 1 TABLET: 500; 125 TABLET, FILM COATED ORAL at 08:40

## 2021-10-31 RX ADMIN — HYDROXYZINE HYDROCHLORIDE 50 MG: 50 TABLET, FILM COATED ORAL at 20:56

## 2021-10-31 RX ADMIN — ACETAMINOPHEN 650 MG: 325 TABLET ORAL at 17:16

## 2021-10-31 RX ADMIN — PANTOPRAZOLE SODIUM 40 MG: 40 TABLET, DELAYED RELEASE ORAL at 06:36

## 2021-10-31 RX ADMIN — ONDANSETRON 4 MG: 4 TABLET, ORALLY DISINTEGRATING ORAL at 18:51

## 2021-10-31 RX ADMIN — APIXABAN 5 MG: 5 TABLET, FILM COATED ORAL at 08:40

## 2021-10-31 RX ADMIN — OLANZAPINE 30 MG: 10 TABLET, ORALLY DISINTEGRATING ORAL at 20:57

## 2021-10-31 RX ADMIN — TIZANIDINE 4 MG: 4 TABLET ORAL at 20:56

## 2021-10-31 RX ADMIN — ALUMINUM HYDROXIDE, MAGNESIUM HYDROXIDE, AND SIMETHICONE 30 ML: 200; 200; 20 SUSPENSION ORAL at 10:24

## 2021-10-31 RX ADMIN — AMOXICILLIN AND CLAVULANATE POTASSIUM 1 TABLET: 500; 125 TABLET, FILM COATED ORAL at 20:55

## 2021-10-31 RX ADMIN — PRAZOSIN HYDROCHLORIDE 2 MG: 1 CAPSULE ORAL at 20:57

## 2021-10-31 RX ADMIN — SERTRALINE HYDROCHLORIDE 100 MG: 100 TABLET ORAL at 08:40

## 2021-10-31 RX ADMIN — TRAZODONE HYDROCHLORIDE 50 MG: 50 TABLET ORAL at 20:55

## 2021-10-31 RX ADMIN — NICOTINE POLACRILEX 2 MG: 2 GUM, CHEWING BUCCAL at 11:12

## 2021-10-31 RX ADMIN — APIXABAN 5 MG: 5 TABLET, FILM COATED ORAL at 20:56

## 2021-10-31 RX ADMIN — HYDROXYZINE HYDROCHLORIDE 50 MG: 50 TABLET, FILM COATED ORAL at 18:07

## 2021-10-31 RX ADMIN — NICOTINE POLACRILEX 2 MG: 2 LOZENGE ORAL at 13:40

## 2021-10-31 ASSESSMENT — PAIN SCALES - GENERAL
PAINLEVEL_OUTOF10: 4
PAINLEVEL_OUTOF10: 6
PAINLEVEL_OUTOF10: 0

## 2021-10-31 NOTE — PLAN OF CARE
Problem: Altered Mood, Psychotic Behavior:  Goal: Able to verbalize decrease in frequency and intensity of hallucinations  Description: Able to verbalize decrease in frequency and intensity of hallucinations  10/31/2021 0334 by Conor Rich RN  Outcome: Ongoing     Problem: Depressive Behavior With or Without Suicide Precautions:  Goal: Ability to disclose and discuss suicidal ideas will improve  Description: Ability to disclose and discuss suicidal ideas will improve  10/31/2021 0334 by Conor Rich RN  Outcome: Ongoing     Problem: Tobacco Use:  Goal: Inpatient tobacco use cessation counseling participation  Description: Inpatient tobacco use cessation counseling participation  10/31/2021 0334 by Conor Rich RN  Outcome: Ongoing   Patient displays fluctuation in mood. She is social with peers and cooperative, accepting medication this shift. Patient denies hallucinations this shift remains focused on discharge.

## 2021-10-31 NOTE — BH NOTE
Patient refused to attend 0900 wellness group. Patient offered 1:1 talk-time. Patient refused 1:1 talk time, remained isolative to self.

## 2021-10-31 NOTE — BH NOTE
Patient complaining of indigestion and received Maalox per the providers orders. Patient is currently sitting in the day area, and showing no signs of distress.

## 2021-10-31 NOTE — PLAN OF CARE
Problem: Tobacco Use:  Goal: Inpatient tobacco use cessation counseling participation  Description: Inpatient tobacco use cessation counseling participation  10/31/2021 1136 by Armand Marcum RN  Outcome: Ongoing  Note: Patient given tobacco quitline number 59000800131 at this time, refusing to call at this time, states \" I just dont want to quit now\"- patient given information as to the dangers of long term tobacco use. Continue to reinforce the importance of tobacco cessation. 10/31/2021 0334 by Carolyn Menon RN  Outcome: Ongoing     Problem: Depressive Behavior With or Without Suicide Precautions:  Goal: Ability to disclose and discuss suicidal ideas will improve  Description: Ability to disclose and discuss suicidal ideas will improve  10/31/2021 1136 by Armand Marcum RN  Outcome: Ongoing  Note: Pt denies thoughts of self harm and is agreeable to seeking out should thoughts of self harm arise. Safe environment maintained. Q15 minute checks for safety cont per unit policy. Will cont to monitor for safety and provides support and reassurance as needed. 10/31/2021 0334 by Carolyn Menon RN  Outcome: Ongoing     Problem: Altered Mood, Psychotic Behavior:  Goal: Able to verbalize decrease in frequency and intensity of hallucinations  Description: Able to verbalize decrease in frequency and intensity of hallucinations  10/31/2021 1136 by Armand Marcum RN  Outcome: Ongoing  Note: Patient is reality based and denies having any hallucinations during 1:1 talk time. 10/31/2021 0334 by Carolyn Menon RN  Outcome: Ongoing     Problem: Pain:  Goal: Pain level will decrease  Description: Pain level will decrease  Outcome: Ongoing  Note: Patient is currently denying any pain and not requesting any pain relief medication.   Goal: Control of acute pain  Description: Control of acute pain  Outcome: Ongoing  Goal: Control of chronic pain  Description: Control of chronic pain  Outcome: Ongoing     Problem: Tobacco Use:  Intervention: Tobacco-use cessation counseling  Note: Patient is receiving 2mg nicotine for smoking cessation per providers orders. Problem: Altered Mood, Psychotic Behavior:  Intervention: Assess nutritional intake  Note: Patient is eating % of meals in the day area. Intervention: Assess behavior for possible injury to self  Note: Pt is currently not attempting to inflict self harm. Intervention: Promote group participation  Note: Patient is refusing to attend groups even after much encouragement from staff. Intervention: Supervise medication intake  Note: Pt is medication compliant, and received meds per drs orders. Safety checks are maintained every 15 minutes, irregular intervals, and shift change.

## 2021-10-31 NOTE — GROUP NOTE
Group Therapy Note    Date: 10/31/2021    Group Start Time: 1400  Group End Time: 9874  Group Topic: Cognitive Skills    [facility]    [facility], [facility]        Group Therapy Note    Attendees: 7/14         Patient's Goal:  To increase social interaction and to practice decision making, concentration, and communication skills. Notes: Pt participated fully in group task . Pt was able to practice decision making, concentration, and communication skills independently Pt is pleasant and supportive of peers. All patients were provided with a cup of candy for Halloween and encouraged to share positive holiday memories. Status After Intervention:  Improved      Participation Level:  Active Listener and Interactive     Participation Quality: Attentive, Sharing,and Supportive        Speech:  Normal      Thought Process/Content: Logical ,linear         Affective Functioning: Congruent, brightens         Mood: Euthymic         Level of consciousness:  Alert, and Attentive         Response to Learning: Able to verbalize current knowledge/experience, Able to verbalize/acknowledge new learning,  and Progressing to goal        Endings: None Reported     Modes of Intervention: Education, Support, Socialization, Exploration, Clarifying and Problem-solving        Discipline Responsible: Psychoeducational Specialist        Signature:  Salena Quinones

## 2021-10-31 NOTE — PROGRESS NOTES
Daily Progress Note  10/31/2021    Patient Name: Marilia Bentley    CHIEF COMPLAINT: Suicidal ideation         SUBJECTIVE:    Nursing team report the patient has maintained medication adherence and has been without acute behavioral changes in the last 24 hours. \"Dipika\" has been present and active on the milieu. She is engaging in groups and utilizing the exercise equipment. She is agreeable to assessment in the privacy of the [place] [address] where she continues to endorse that her mood is improving. She confirms that she will participate in 2 more ECT treatments and then possibly transition back to the community if she is feeling safe. She continues to endorse fleeting suicidal ideation but is able to contract for safety on the unit. She continues to be forward thinking and appreciates working with the social work team to establish a safe discharge plan. She denies having questions or concerns at this time. Appetite:  [x] Fair  [] Increased  [] Decreased      Sleep:       [x] Normal/Adequate/Unchanged  [] Fair  [] Poor      Group Attendance on Unit:   [x] Yes  [] Selectively    [] No    Medication Side Effects:  Patient denies any medication side effects at the time of assessment. Mental Status Exam  Level of consciousness: Alert and awake. Appearance: Appropriate attire for setting, sitting in chair, with good grooming and hygiene. Behavior/Motor: Approachable, pleasant  Attitude toward examiner: Cooperative, attentive, good eye contact. Speech: Normal rate, volume, and tone  Mood: \"Better\"  Affect: Congruent  Thought processes: Linear and coherent. Thought content: Denies homicidal ideation. Suicidal Ideation: Endorses improving suicidal ideation, contracts for safety   delusions: No evidence of delusions. Denies paranoia. Perceptual Disturbance: Patient does not appear to be responding to internal stimuli. Denies auditory hallucinations. Denies visual hallucinations.    Cognition: Oriented to self, location, time, and situation. Memory: Intact. Insight & Judgement: Poor. Data   height is 5' 6\" (1.676 m) and weight is 230 lb (104.3 kg). Her infrared temperature is 97.5 °F (36.4 °C). Her blood pressure is 134/80 and her pulse is 83. Her respiration is 14 and oxygen saturation is 98%.    Labs:   Admission on 10/12/2021   Component Date Value Ref Range Status    WBC 10/12/2021 6.8  3.5 - 11.0 k/uL Final    RBC 10/12/2021 4.45  4.0 - 5.2 m/uL Final    Hemoglobin 10/12/2021 13.5  12.0 - 16.0 g/dL Final    Hematocrit 10/12/2021 40.2  36 - 46 % Final    MCV 10/12/2021 90.3  80 - 100 fL Final    MCH 10/12/2021 30.4  26 - 34 pg Final    MCHC 10/12/2021 33.6  31 - 37 g/dL Final    RDW 10/12/2021 13.3  11.5 - 14.9 % Final    Platelets 21/04/6837 271  150 - 450 k/uL Final    MPV 10/12/2021 6.8  6.0 - 12.0 fL Final    NRBC Automated 10/12/2021 NOT REPORTED  per 100 WBC Final    Differential Type 10/12/2021 NOT REPORTED   Final    Immature Granulocytes 10/12/2021 NOT REPORTED  0 % Final    Absolute Immature Granulocyte 10/12/2021 NOT REPORTED  0.00 - 0.30 k/uL Final    WBC Morphology 10/12/2021 NOT REPORTED   Final    RBC Morphology 10/12/2021 NOT REPORTED   Final    Platelet Estimate 03/27/3246 NOT REPORTED   Final    Seg Neutrophils 10/12/2021 48  36 - 66 % Final    Lymphocytes 10/12/2021 47* 24 - 44 % Final    Monocytes 10/12/2021 5  1 - 7 % Final    Eosinophils % 10/12/2021 0  0 - 4 % Final    Basophils 10/12/2021 0  0 - 2 % Final    Segs Absolute 10/12/2021 3.26  1.3 - 9.1 k/uL Final    Absolute Lymph # 10/12/2021 3.20  1.0 - 4.8 k/uL Final    Absolute Mono # 10/12/2021 0.34  0.1 - 1.3 k/uL Final    Absolute Eos # 10/12/2021 0.00  0.0 - 0.4 k/uL Final    Basophils Absolute 10/12/2021 0.00  0.0 - 0.2 k/uL Final    Morphology 10/12/2021 Normal   Final    Glucose 10/12/2021 101* 70 - 99 mg/dL Final    BUN 10/12/2021 23* 6 - 20 mg/dL Final    CREATININE 10/12/2021 0.80  0.50 - 0.90 mg/dL Final    Bun/Cre Ratio 10/12/2021 NOT REPORTED  9 - 20 Final    Calcium 10/12/2021 9.1  8.6 - 10.4 mg/dL Final    Sodium 10/12/2021 138  135 - 144 mmol/L Final    Potassium 10/12/2021 4.4  3.7 - 5.3 mmol/L Final    Chloride 10/12/2021 104  98 - 107 mmol/L Final    CO2 10/12/2021 24  20 - 31 mmol/L Final    Anion Gap 10/12/2021 10  9 - 17 mmol/L Final    Alkaline Phosphatase 10/12/2021 87  35 - 104 U/L Final    ALT 10/12/2021 14  5 - 33 U/L Final    AST 10/12/2021 17  <32 U/L Final    Total Bilirubin 10/12/2021 <0.15* 0.3 - 1.2 mg/dL Final    Total Protein 10/12/2021 7.0  6.4 - 8.3 g/dL Final    Albumin 10/12/2021 4.2  3.5 - 5.2 g/dL Final    Albumin/Globulin Ratio 10/12/2021 NOT REPORTED  1.0 - 2.5 Final    GFR Non- 10/12/2021 >60  >60 mL/min Final    GFR  10/12/2021 >60  >60 mL/min Final    GFR Comment 10/12/2021        Final    Comment: Average GFR for 30-36 years old:   80 mL/min/1.73sq m  Chronic Kidney Disease:   <60 mL/min/1.73sq m  Kidney failure:   <15 mL/min/1.73sq m              eGFR calculated using average adult body mass. Additional eGFR calculator available at:        [employer]            GFR Staging 10/12/2021 NOT REPORTED   Final    TSH 10/12/2021 1.55  0.30 - 5.00 mIU/L Final    Specimen Description 10/12/2021 . NASOPHARYNGEAL SWAB   Final    SARS-CoV-2, Rapid 10/12/2021 Not Detected  Not Detected Final    Comment:       Rapid NAAT:  The specimen is NEGATIVE for SARS-CoV-2, the novel coronavirus associated with   COVID-19. The ID NOW COVID-19 assay is designed to detect the virus that causes COVID-19 in patients   with signs and symptoms of infection who are suspected of COVID-19. An individual without symptoms of COVID-19 and who is not shedding SARS-CoV-2 virus would   expect to have a negative (not detected) result in this assay.   Negative results should be treated as presumptive and, if inconsistent with clinical signs   and symptoms or necessary for patient management,  should be tested with an alternative molecular assay. Negative results do not preclude   SARS-CoV-2 infection and   should not be used as the sole basis for patient management decisions. Fact sheet for Healthcare Providers: [email or web address]  Fact sheet for Patients: [email or web address].[email or web address]          Methodology: Isothermal Nucleic Acid Amplification      Color, UA 10/12/2021 Yellow  Yellow Final    Turbidity UA 10/12/2021 Clear  Clear Final    Glucose, Ur 10/12/2021 NEGATIVE  NEGATIVE Final    Bilirubin Urine 10/12/2021 NEGATIVE  NEGATIVE Final    Ketones, Urine 10/12/2021 NEGATIVE  NEGATIVE Final    Specific [place], UA 10/12/2021 1.027  1.000 - 1.030 Final    Urine Hgb 10/12/2021 NEGATIVE  NEGATIVE Final    pH, UA 10/12/2021 6.5  5.0 - 8.0 Final    Protein, UA 10/12/2021 NEGATIVE  NEGATIVE Final    Urobilinogen, Urine 10/12/2021 Normal  Normal Final    Nitrite, Urine 10/12/2021 NEGATIVE  NEGATIVE Final    Leukocyte Esterase, Urine 10/12/2021 NEGATIVE  NEGATIVE Final    Urinalysis Comments 10/12/2021 Microscopic exam not performed based on chemical results unless requested in original order.    Final    Amphetamine Screen, Ur 10/12/2021 NEGATIVE  NEGATIVE Final    Comment:       (Positive cutoff 1000 ng/mL)                  Barbiturate Screen, Ur 10/12/2021 NEGATIVE  NEGATIVE Final    Comment:       (Positive cutoff 200 ng/mL)                  Benzodiazepine Screen, Urine 10/12/2021 NEGATIVE  NEGATIVE Final    Comment:       (Positive cutoff 200 ng/mL)                  Cocaine Metabolite, Urine 10/12/2021 NEGATIVE  NEGATIVE Final    Comment:       (Positive cutoff 300 ng/mL)                  Methadone Screen, Urine 10/12/2021 NEGATIVE  NEGATIVE Final    Comment:       (Positive cutoff 300 ng/mL)                  Opiates, Urine 10/12/2021 NEGATIVE  NEGATIVE Final Comment:       (Positive cutoff 300 ng/mL)                  Phencyclidine, Urine 10/12/2021 NEGATIVE  NEGATIVE Final    Comment:       (Positive cutoff 25 ng/mL)                  Propoxyphene, Urine 10/12/2021 NOT REPORTED  NEGATIVE Final    Cannabinoid Scrn, Ur 10/12/2021 POSITIVE* NEGATIVE Final    Comment:       (Positive cutoff 50 ng/mL)                  Oxycodone Screen, Ur 10/12/2021 NEGATIVE  NEGATIVE Final    Comment:       (Positive cutoff 100 ng/mL)                  Methamphetamine, Urine 10/12/2021 NOT REPORTED  NEGATIVE Final    Tricyclic Antidepressants, Urine 10/12/2021 NOT REPORTED  NEGATIVE Final    MDMA, Urine 10/12/2021 NOT REPORTED  NEGATIVE Final    Buprenorphine Urine 10/12/2021 NOT REPORTED  NEGATIVE Final    Test Information 10/12/2021 Assay provides medical screening only. The absence of expected drug(s) and/or metabolite(s) may indicate diluted or adulterated urine, limitations of testing or timing of collection. Final    Comment: Testing for legal purposes should be confirmed by another method. To request confirmation   of test result, please call the lab within 7 days of sample submission.  Acetaminophen Level 10/12/2021 7* 10 - 30 ug/mL Final    Ethanol 10/12/2021 <10  <10 mg/dL Final    Ethanol percent 10/12/2021 <3.197  % Final    Salicylate Lvl 18/79/6828 <1* 3 - 10 mg/dL Final    Toxic Tricyclic Sc,Blood 50/04/1454 WRONG TEST ORDERED  NEGATIVE Final    Tricyclic Antidep,Urine 30/30/7271 NEGATIVE  NEGATIVE Final    Comment:       (Positive cutoff 1000 ng/mL)  Assay provides rapid clinical screening only. Presumptive positive results for legal   purposes should be confirmed by another method. To request confirmation, please call the   lab within 7 days of sample submission.       Valproic Acid Lvl 10/21/2021 71  50 - 125 ug/mL Final    Valproic Dose amount 10/21/2021 500 MG   Final    Valproic Date last dose 10/21/2021 [id number],[id number],[phone]   Final    Valproic Time last dose 10/21/2021 2,105   Final    POC Glucose 10/22/2021 100  65 - 105 mg/dL Final    POC Glucose 10/25/2021 116* 65 - 105 mg/dL Final    Glucose 10/26/2021 86  70 - 99 mg/dL Final    BUN 10/26/2021 18  6 - 20 mg/dL Final    CREATININE 10/26/2021 0.80  0.50 - 0.90 mg/dL Final    Bun/Cre Ratio 10/26/2021 NOT REPORTED  9 - 20 Final    Calcium 10/26/2021 9.2  8.6 - 10.4 mg/dL Final    Sodium 10/26/2021 142  135 - 144 mmol/L Final    Potassium 10/26/2021 4.7  3.7 - 5.3 mmol/L Final    Chloride 10/26/2021 107  98 - 107 mmol/L Final    CO2 10/26/2021 26  20 - 31 mmol/L Final    Anion Gap 10/26/2021 9  9 - 17 mmol/L Final    GFR Non- 10/26/2021 >60  >60 mL/min Final    GFR  10/26/2021 >60  >60 mL/min Final    GFR Comment 10/26/2021        Final    Comment: Average GFR for 30-36 years old:   80 mL/min/1.73sq m  Chronic Kidney Disease:   <60 mL/min/1.73sq m  Kidney failure:   <15 mL/min/1.73sq m              eGFR calculated using average adult body mass.  Additional eGFR calculator available at:        [employer]            GFR Staging 10/26/2021 NOT REPORTED   Final    POC Glucose 10/27/2021 107* 65 - 105 mg/dL Final    POC Glucose 10/29/2021 112* 65 - 105 mg/dL Final    Color, UA 10/30/2021 Yellow  Yellow Final    Turbidity UA 10/30/2021 Clear  Clear Final    Glucose, Ur 10/30/2021 NEGATIVE  NEGATIVE Final    Bilirubin Urine 10/30/2021 NEGATIVE  NEGATIVE Final    Ketones, Urine 10/30/2021 NEGATIVE  NEGATIVE Final    Specific [place], UA 10/30/2021 1.017  1.000 - 1.030 Final    Urine Hgb 10/30/2021 SMALL* NEGATIVE Final    pH, UA 10/30/2021 5.0  5.0 - 8.0 Final    Protein, UA 10/30/2021 NEGATIVE  NEGATIVE Final    Urobilinogen, Urine 10/30/2021 Normal  Normal Final    Nitrite, Urine 10/30/2021 NEGATIVE  NEGATIVE Final    Leukocyte Esterase, Urine 10/30/2021 SMALL* NEGATIVE Final    Urinalysis Comments 10/30/2021 NOT REPORTED   Final    - 10/30/2021        Final    WBC, UA 10/30/2021 2 TO 5  /HPF Final    RBC, UA 10/30/2021 2 TO 5  /HPF Final    Casts UA 10/30/2021 NOT REPORTED  /LPF Final    Crystals, UA 10/30/2021 NOT REPORTED  None /HPF Final    Epithelial Cells UA 10/30/2021 5 TO 10  /HPF Final    Renal Epithelial, UA 10/30/2021 NOT REPORTED  0 /HPF Final    Bacteria, UA 10/30/2021 FEW* None Final    Mucus, UA 10/30/2021 NOT REPORTED  None Final    Trichomonas, UA 10/30/2021 NOT REPORTED  None Final    Amorphous, UA 10/30/2021 NOT REPORTED  None Final    Other Observations UA 10/30/2021 NOT REPORTED  NOT REQ. Final    Yeast, UA 10/30/2021 NOT REPORTED  None Final         Reviewed patient's current plan of care and vital signs with nursing staff.     Labs reviewed: [x] Yes    Medications  Current Facility-Administered Medications: nicotine polacrilex (COMMIT) lozenge 2 mg, 2 mg, Oral, Q1H PRN  lactated ringers infusion, , IntraVENous, Continuous  OLANZapine zydis (ZYPREXA) disintegrating tablet 30 mg, 30 mg, Oral, Nightly  benzocaine (ORAJEL) 10 % mucosal gel, , Mouth/Throat, PRN  lactated ringers infusion, , IntraVENous, Continuous  fluticasone (FLONASE) 50 MCG/ACT nasal spray 1 spray, 1 spray, Each Nostril, Daily  lactated ringers infusion, , IntraVENous, Continuous  amoxicillin-clavulanate (AUGMENTIN) 500-125 MG per tablet 1 tablet, 1 tablet, Oral, 2 times per day  sertraline (ZOLOFT) tablet 100 mg, 100 mg, Oral, Daily  prazosin (MINIPRESS) capsule 2 mg, 2 mg, Oral, Nightly  loperamide (IMODIUM) capsule 2 mg, 2 mg, Oral, 4x Daily PRN  ondansetron (ZOFRAN-ODT) disintegrating tablet 4 mg, 4 mg, Oral, Q8H PRN  pantoprazole (PROTONIX) tablet 40 mg, 40 mg, Oral, QAM AC  [Held by provider] nicotine polacrilex (NICORETTE) gum 2 mg, 2 mg, Oral, PRN  albuterol sulfate  (90 Base) MCG/ACT inhaler 2 puff, 2 puff, Inhalation, Q6H PRN  apixaban (ELIQUIS) tablet 5 mg, 5 mg, Oral, BID  cetirizine (ZYRTEC) tablet 10 mg, 10 mg, Oral, Daily  melatonin tablet 3 mg, 3 mg, Oral, Nightly PRN  tiZANidine (ZANAFLEX) tablet 4 mg, 4 mg, Oral, Q8H PRN  acetaminophen (TYLENOL) tablet 650 mg, 650 mg, Oral, Q4H PRN  aluminum & magnesium hydroxide-simethicone (MAALOX) 200-200-20 MG/5ML suspension 30 mL, 30 mL, Oral, Q6H PRN  hydrOXYzine (ATARAX) tablet 50 mg, 50 mg, Oral, TID PRN  haloperidol (HALDOL) tablet 5 mg, 5 mg, Oral, Q6H PRN **AND** [DISCONTINUED] LORazepam (ATIVAN) tablet 2 mg, 2 mg, Oral, Q6H PRN  haloperidol lactate (HALDOL) injection 5 mg, 5 mg, IntraMUSCular, Q6H PRN **AND** [DISCONTINUED] LORazepam (ATIVAN) injection 2 mg, 2 mg, IntraMUSCular, Q6H PRN **AND** diphenhydrAMINE (BENADRYL) injection 50 mg, 50 mg, IntraMUSCular, Q6H PRN  polyethylene glycol (GLYCOLAX) packet 17 g, 17 g, Oral, Daily PRN  traZODone (DESYREL) tablet 50 mg, 50 mg, Oral, Nightly PRN    ASSESSMENT  Severe recurrent major depressive disorder with psychotic features ([facility] [address]         PLAN  Patient symptoms are: Improving  Continue with current medication regimen  Continue index course of ECT, 2 more times while inpatient, unlikely to transition to outpatient ECT secondary to lack of support  Monitor need and frequency of PRN medications. Encourage participation in groups and milieu. Attempt to develop insight. Psycho-education conducted. Supportive Therapy conducted. Probable discharge is to be determined by MD, will focus on sober living as part of discharge and outpatient DBT  Follow-up daily while inpatient. Patient continues to be monitored in the inpatient psychiatric facility at [facility] for safety and stabilization. Patient continues to need, on a daily basis, active treatment furnished directly by or requiring the supervision of inpatient psychiatric personnel. Electronically signed by DAMARIS Chicas CNP on 10/31/2021 at 4:47 PM    **This report has been created using voice recognition software.  It may contain minor errors which are inherent in voice recognition technology. **    I independently saw and evaluated the patient. I reviewed the midlevel provider's documentation above. Any additional comments or changes to the midlevel provider's documentation are stated below otherwise agree with assessment. The patient reports some improvement in her mood. She also shows better self-care. She has been out and about on the unit. The patient wishes to continue having her ECT. She has no possibility of having ECT as an outpatient as she has no means of getting to and from the hospital.  We will continue to give patient ECT this week. Her expected length of stay is 3 to 4 days. PLAN  Medications as noted above  Attempt to develop insight  Psycho-education conducted. Supportive Therapy conducted.   Probable discharge is 3-6 days  Follow-up daily while on inpatient unit    Electronically signed by Tra Lovell MD on 10/31/21 at 5:22 PM EDT

## 2021-10-31 NOTE — GROUP NOTE
Group Therapy Note    Date: 10/30/2021    Group Start Time: 2000  Group End Time: 2035  Group Topic: Wrap-Up    [facility]    Jericho Salazar RN      Group Therapy Note    Attendees: 9/13       Patient's Goal:    1. To be able to reflect on daily unit activities/experiences. 2.  To review accomplished daily goals and be encouraged to set new goals for the next day. 3.  To improve interpersonal interaction through socialization. Notes:  Pt attended and actively participated in Wrap-Up/Goal Review group this evening. Status After Intervention:  Improved     Participation Level:  Active Listener and Interactive     Participation Quality: Appropriate, Attentive and Sharing     Speech:  normal     Thought Process/Content: Logical     Affective Functioning: Congruent     Mood: euthymic     Level of consciousness:  Alert, Oriented x4 and Attentive     Response to Learning: Able to verbalize current knowledge/experience, Able to verbalize/acknowledge new learning, Capable of insight     Endings: None Reported     Modes of Intervention: Education, Support and Socialization     Discipline Responsible: Registered Nurse        Signature:  Jericho Salazar RN

## 2021-11-01 ENCOUNTER — ANESTHESIA (OUTPATIENT)
Dept: POSTOP/PACU | Age: 41
End: 2021-11-01

## 2021-11-01 ENCOUNTER — ANESTHESIA EVENT (OUTPATIENT)
Dept: POSTOP/PACU | Age: 41
End: 2021-11-01

## 2021-11-01 ENCOUNTER — APPOINTMENT (OUTPATIENT)
Dept: POSTOP/PACU | Age: 41
DRG: 885 | End: 2021-11-01
Payer: MEDICARE

## 2021-11-01 VITALS — OXYGEN SATURATION: 95 % | TEMPERATURE: 96.8 F

## 2021-11-01 LAB — GLUCOSE BLD-MCNC: 125 MG/DL (ref 65–105)

## 2021-11-01 PROCEDURE — 7100000001 HC PACU RECOVERY - ADDTL 15 MIN

## 2021-11-01 PROCEDURE — 6370000000 HC RX 637 (ALT 250 FOR IP): Performed by: NURSE PRACTITIONER

## 2021-11-01 PROCEDURE — 82947 ASSAY GLUCOSE BLOOD QUANT: CPT

## 2021-11-01 PROCEDURE — 90870 ELECTROCONVULSIVE THERAPY: CPT | Performed by: PSYCHIATRY & NEUROLOGY

## 2021-11-01 PROCEDURE — 99232 SBSQ HOSP IP/OBS MODERATE 35: CPT | Performed by: PSYCHIATRY & NEUROLOGY

## 2021-11-01 PROCEDURE — 2500000003 HC RX 250 WO HCPCS: Performed by: NURSE ANESTHETIST, CERTIFIED REGISTERED

## 2021-11-01 PROCEDURE — 90870 ELECTROCONVULSIVE THERAPY: CPT

## 2021-11-01 PROCEDURE — GZB2ZZZ ELECTROCONVULSIVE THERAPY, BILATERAL-SINGLE SEIZURE: ICD-10-PCS | Performed by: PSYCHIATRY & NEUROLOGY

## 2021-11-01 PROCEDURE — 6370000000 HC RX 637 (ALT 250 FOR IP): Performed by: PSYCHIATRY & NEUROLOGY

## 2021-11-01 PROCEDURE — 1240000000 HC EMOTIONAL WELLNESS R&B

## 2021-11-01 PROCEDURE — 6370000000 HC RX 637 (ALT 250 FOR IP)

## 2021-11-01 PROCEDURE — 6370000000 HC RX 637 (ALT 250 FOR IP): Performed by: INTERNAL MEDICINE

## 2021-11-01 PROCEDURE — 7100000000 HC PACU RECOVERY - FIRST 15 MIN

## 2021-11-01 PROCEDURE — APPSS30 APP SPLIT SHARED TIME 16-30 MINUTES: Performed by: PSYCHIATRY & NEUROLOGY

## 2021-11-01 PROCEDURE — 6360000002 HC RX W HCPCS: Performed by: NURSE ANESTHETIST, CERTIFIED REGISTERED

## 2021-11-01 PROCEDURE — 2580000003 HC RX 258: Performed by: ANESTHESIOLOGY

## 2021-11-01 PROCEDURE — 3700000000 HC ANESTHESIA ATTENDED CARE

## 2021-11-01 RX ORDER — MEPERIDINE HYDROCHLORIDE 25 MG/ML
12.5 INJECTION INTRAMUSCULAR; INTRAVENOUS; SUBCUTANEOUS EVERY 5 MIN PRN
Status: DISCONTINUED | OUTPATIENT
Start: 2021-11-01 | End: 2021-11-03 | Stop reason: HOSPADM

## 2021-11-01 RX ORDER — SUCCINYLCHOLINE CHLORIDE 20 MG/ML
INJECTION INTRAMUSCULAR; INTRAVENOUS PRN
Status: DISCONTINUED | OUTPATIENT
Start: 2021-11-01 | End: 2021-11-01 | Stop reason: SDUPTHER

## 2021-11-01 RX ORDER — ONDANSETRON 2 MG/ML
4 INJECTION INTRAMUSCULAR; INTRAVENOUS
Status: ACTIVE | OUTPATIENT
Start: 2021-11-01 | End: 2021-11-01

## 2021-11-01 RX ORDER — KETOROLAC TROMETHAMINE 30 MG/ML
INJECTION, SOLUTION INTRAMUSCULAR; INTRAVENOUS PRN
Status: DISCONTINUED | OUTPATIENT
Start: 2021-11-01 | End: 2021-11-01 | Stop reason: SDUPTHER

## 2021-11-01 RX ORDER — LABETALOL HYDROCHLORIDE 5 MG/ML
5 INJECTION, SOLUTION INTRAVENOUS EVERY 10 MIN PRN
Status: DISCONTINUED | OUTPATIENT
Start: 2021-11-01 | End: 2021-11-03 | Stop reason: HOSPADM

## 2021-11-01 RX ORDER — KETOROLAC TROMETHAMINE 30 MG/ML
INJECTION, SOLUTION INTRAMUSCULAR; INTRAVENOUS
Status: COMPLETED
Start: 2021-11-01 | End: 2021-11-01

## 2021-11-01 RX ORDER — SUCCINYLCHOLINE CHLORIDE 20 MG/ML
INJECTION INTRAMUSCULAR; INTRAVENOUS
Status: COMPLETED
Start: 2021-11-01 | End: 2021-11-01

## 2021-11-01 RX ORDER — DIPHENHYDRAMINE HYDROCHLORIDE 50 MG/ML
12.5 INJECTION INTRAMUSCULAR; INTRAVENOUS
Status: ACTIVE | OUTPATIENT
Start: 2021-11-01 | End: 2021-11-01

## 2021-11-01 RX ADMIN — CETIRIZINE HYDROCHLORIDE 10 MG: 10 TABLET, FILM COATED ORAL at 09:00

## 2021-11-01 RX ADMIN — HYDROXYZINE HYDROCHLORIDE 50 MG: 50 TABLET, FILM COATED ORAL at 19:23

## 2021-11-01 RX ADMIN — ACETAMINOPHEN 650 MG: 325 TABLET ORAL at 19:23

## 2021-11-01 RX ADMIN — AMOXICILLIN AND CLAVULANATE POTASSIUM 1 TABLET: 500; 125 TABLET, FILM COATED ORAL at 09:00

## 2021-11-01 RX ADMIN — ACETAMINOPHEN 650 MG: 325 TABLET ORAL at 06:19

## 2021-11-01 RX ADMIN — SUCCINYLCHOLINE CHLORIDE 80 MG: 20 INJECTION, SOLUTION INTRAMUSCULAR; INTRAVENOUS at 07:45

## 2021-11-01 RX ADMIN — HYDROXYZINE HYDROCHLORIDE 50 MG: 50 TABLET, FILM COATED ORAL at 10:03

## 2021-11-01 RX ADMIN — OLANZAPINE 30 MG: 10 TABLET, ORALLY DISINTEGRATING ORAL at 21:03

## 2021-11-01 RX ADMIN — PANTOPRAZOLE SODIUM 40 MG: 40 TABLET, DELAYED RELEASE ORAL at 09:00

## 2021-11-01 RX ADMIN — TRAZODONE HYDROCHLORIDE 50 MG: 50 TABLET ORAL at 21:03

## 2021-11-01 RX ADMIN — APIXABAN 5 MG: 5 TABLET, FILM COATED ORAL at 09:00

## 2021-11-01 RX ADMIN — ACETAMINOPHEN 650 MG: 325 TABLET ORAL at 10:43

## 2021-11-01 RX ADMIN — KETOROLAC TROMETHAMINE 15 MG: 30 INJECTION, SOLUTION INTRAMUSCULAR; INTRAVENOUS at 07:42

## 2021-11-01 RX ADMIN — FLUTICASONE PROPIONATE 1 SPRAY: 50 SPRAY, METERED NASAL at 09:00

## 2021-11-01 RX ADMIN — PRAZOSIN HYDROCHLORIDE 2 MG: 1 CAPSULE ORAL at 21:03

## 2021-11-01 RX ADMIN — Medication 80 MG: at 07:45

## 2021-11-01 RX ADMIN — SERTRALINE HYDROCHLORIDE 100 MG: 100 TABLET ORAL at 09:00

## 2021-11-01 RX ADMIN — TIZANIDINE 4 MG: 4 TABLET ORAL at 14:08

## 2021-11-01 RX ADMIN — APIXABAN 5 MG: 5 TABLET, FILM COATED ORAL at 21:03

## 2021-11-01 RX ADMIN — NICOTINE POLACRILEX 2 MG: 2 GUM, CHEWING BUCCAL at 11:07

## 2021-11-01 RX ADMIN — SODIUM CHLORIDE, POTASSIUM CHLORIDE, SODIUM LACTATE AND CALCIUM CHLORIDE: 600; 310; 30; 20 INJECTION, SOLUTION INTRAVENOUS at 07:12

## 2021-11-01 RX ADMIN — Medication 3 MG: at 21:03

## 2021-11-01 ASSESSMENT — PAIN DESCRIPTION - DESCRIPTORS: DESCRIPTORS: ACHING;DISCOMFORT

## 2021-11-01 ASSESSMENT — PAIN DESCRIPTION - ONSET: ONSET: ON-GOING

## 2021-11-01 ASSESSMENT — PAIN DESCRIPTION - PROGRESSION: CLINICAL_PROGRESSION: GRADUALLY IMPROVING

## 2021-11-01 ASSESSMENT — LIFESTYLE VARIABLES: SMOKING_STATUS: 0

## 2021-11-01 ASSESSMENT — PAIN SCALES - GENERAL
PAINLEVEL_OUTOF10: 2
PAINLEVEL_OUTOF10: 0
PAINLEVEL_OUTOF10: 6
PAINLEVEL_OUTOF10: 0
PAINLEVEL_OUTOF10: 4
PAINLEVEL_OUTOF10: 0
PAINLEVEL_OUTOF10: 6

## 2021-11-01 ASSESSMENT — PAIN DESCRIPTION - LOCATION: LOCATION: HEAD;FOOT

## 2021-11-01 ASSESSMENT — ENCOUNTER SYMPTOMS
SHORTNESS OF BREATH: 0
STRIDOR: 0

## 2021-11-01 ASSESSMENT — PAIN DESCRIPTION - FREQUENCY: FREQUENCY: CONTINUOUS

## 2021-11-01 ASSESSMENT — COPD QUESTIONNAIRES: CAT_SEVERITY: NO INTERVAL CHANGE

## 2021-11-01 ASSESSMENT — PAIN - FUNCTIONAL ASSESSMENT: PAIN_FUNCTIONAL_ASSESSMENT: ACTIVITIES ARE NOT PREVENTED

## 2021-11-01 ASSESSMENT — PAIN DESCRIPTION - PAIN TYPE: TYPE: ACUTE PAIN

## 2021-11-01 NOTE — PROGRESS NOTES
Daily Progress Note  11/1/2021    Patient Name: Nohemi Duverney    CHIEF COMPLAINT: Suicidal ideation         SUBJECTIVE:    Nursing team report the patient has maintained medication adherence and continues to utilize hydroxyzine and trazodone related to insomnia and anxiety. \"Dipika\" as she prefers to be called confirms that these medications have been beneficial and she feels well rested this morning upon awakening. She endorses a good appetite and denies side effects related to her medications. She does endorse some mild cognitive memory changes after her ECT treatments however confirms that these are not concerning or that they interfere with her functioning throughout the day. Patient continues to participate in group programming and offers that this is beneficial.  Additionally she states that she has worked with the social work team to establish AOD treatment once her ECT course is completed and her symptoms are fully stabilized. .  She continues to endorse improving suicidal ideation but is able to contract for safety on the unit. She denies questions or concerns related to her current treatment plan. Appetite:  [x] Fair  [] Increased  [] Decreased      Sleep:       [x] Normal/Adequate/Unchanged  [] Fair  [] Poor      Group Attendance on Unit:   [x] Yes  [] Selectively    [] No    Medication Side Effects:  Patient denies any medication side effects at the time of assessment. Mental Status Exam  Level of consciousness: Alert and awake. Appearance: Appropriate attire for setting, sitting in chair, with good grooming and hygiene. Behavior/Motor: Approachable, pleasant  Attitude toward examiner: Cooperative, attentive, good eye contact. Speech: Normal rate, volume, and tone  Mood: \"Pretty good\"  Affect: Congruent  Thought processes: Linear and coherent. Thought content: Denies homicidal ideation.   Suicidal Ideation: Endorses improving suicidal ideation, contracts for safety   delusions: No evidence of delusions. Denies paranoia. Perceptual Disturbance: Patient does not appear to be responding to internal stimuli. Denies auditory hallucinations. Denies visual hallucinations. Cognition: Oriented to self, location, time, and situation. Memory: Intact. As noted patient endorses mild short-term memory loss  Insight & Judgement: Showing improvement    Data   height is 5' 6\" (1.676 m) and weight is 230 lb (104.3 kg). Her temperature is 97.7 °F (36.5 °C). Her blood pressure is 136/80 and her pulse is 82. Her respiration is 15 and oxygen saturation is 98%.    Labs:   Admission on 10/12/2021   Component Date Value Ref Range Status    WBC 10/12/2021 6.8  3.5 - 11.0 k/uL Final    RBC 10/12/2021 4.45  4.0 - 5.2 m/uL Final    Hemoglobin 10/12/2021 13.5  12.0 - 16.0 g/dL Final    Hematocrit 10/12/2021 40.2  36 - 46 % Final    MCV 10/12/2021 90.3  80 - 100 fL Final    MCH 10/12/2021 30.4  26 - 34 pg Final    MCHC 10/12/2021 33.6  31 - 37 g/dL Final    RDW 10/12/2021 13.3  11.5 - 14.9 % Final    Platelets 31/09/6757 271  150 - 450 k/uL Final    MPV 10/12/2021 6.8  6.0 - 12.0 fL Final    NRBC Automated 10/12/2021 NOT REPORTED  per 100 WBC Final    Differential Type 10/12/2021 NOT REPORTED   Final    Immature Granulocytes 10/12/2021 NOT REPORTED  0 % Final    Absolute Immature Granulocyte 10/12/2021 NOT REPORTED  0.00 - 0.30 k/uL Final    WBC Morphology 10/12/2021 NOT REPORTED   Final    RBC Morphology 10/12/2021 NOT REPORTED   Final    Platelet Estimate 55/29/9569 NOT REPORTED   Final    Seg Neutrophils 10/12/2021 48  36 - 66 % Final    Lymphocytes 10/12/2021 47* 24 - 44 % Final    Monocytes 10/12/2021 5  1 - 7 % Final    Eosinophils % 10/12/2021 0  0 - 4 % Final    Basophils 10/12/2021 0  0 - 2 % Final    Segs Absolute 10/12/2021 3.26  1.3 - 9.1 k/uL Final    Absolute Lymph # 10/12/2021 3.20  1.0 - 4.8 k/uL Final    Absolute Mono # 10/12/2021 0.34  0.1 - 1.3 k/uL Final    Absolute Eos # 10/12/2021 0.00  0.0 - 0.4 k/uL Final    Basophils Absolute 10/12/2021 0.00  0.0 - 0.2 k/uL Final    Morphology 10/12/2021 Normal   Final    Glucose 10/12/2021 101* 70 - 99 mg/dL Final    BUN 10/12/2021 23* 6 - 20 mg/dL Final    CREATININE 10/12/2021 0.80  0.50 - 0.90 mg/dL Final    Bun/Cre Ratio 10/12/2021 NOT REPORTED  9 - 20 Final    Calcium 10/12/2021 9.1  8.6 - 10.4 mg/dL Final    Sodium 10/12/2021 138  135 - 144 mmol/L Final    Potassium 10/12/2021 4.4  3.7 - 5.3 mmol/L Final    Chloride 10/12/2021 104  98 - 107 mmol/L Final    CO2 10/12/2021 24  20 - 31 mmol/L Final    Anion Gap 10/12/2021 10  9 - 17 mmol/L Final    Alkaline Phosphatase 10/12/2021 87  35 - 104 U/L Final    ALT 10/12/2021 14  5 - 33 U/L Final    AST 10/12/2021 17  <32 U/L Final    Total Bilirubin 10/12/2021 <0.15* 0.3 - 1.2 mg/dL Final    Total Protein 10/12/2021 7.0  6.4 - 8.3 g/dL Final    Albumin 10/12/2021 4.2  3.5 - 5.2 g/dL Final    Albumin/Globulin Ratio 10/12/2021 NOT REPORTED  1.0 - 2.5 Final    GFR Non- 10/12/2021 >60  >60 mL/min Final    GFR  10/12/2021 >60  >60 mL/min Final    GFR Comment 10/12/2021        Final    Comment: Average GFR for 30-36 years old:   80 mL/min/1.73sq m  Chronic Kidney Disease:   <60 mL/min/1.73sq m  Kidney failure:   <15 mL/min/1.73sq m              eGFR calculated using average adult body mass. Additional eGFR calculator available at:        [email or web address]            GFR Staging 10/12/2021 NOT REPORTED   Final    TSH 10/12/2021 1.55  0.30 - 5.00 mIU/L Final    Specimen Description 10/12/2021 . NASOPHARYNGEAL SWAB   Final    SARS-CoV-2, Rapid 10/12/2021 Not Detected  Not Detected Final    Comment:       Rapid NAAT:  The specimen is NEGATIVE for SARS-CoV-2, the novel coronavirus associated with   COVID-19.         The ID NOW COVID-19 assay is designed to detect the virus that causes COVID-19 in patients   with signs and symptoms of infection who are suspected of COVID-19. An individual without symptoms of COVID-19 and who is not shedding SARS-CoV-2 virus would   expect to have a negative (not detected) result in this assay. Negative results should be treated as presumptive and, if inconsistent with clinical signs   and symptoms or necessary for patient management,  should be tested with an alternative molecular assay. Negative results do not preclude   SARS-CoV-2 infection and   should not be used as the sole basis for patient management decisions. Fact sheet for Healthcare Providers: Elaine.es  Fact sheet for Patients: Elaine.es          Methodology: Isothermal Nucleic Acid Amplification      Color, UA 10/12/2021 Yellow  Yellow Final    Turbidity UA 10/12/2021 Clear  Clear Final    Glucose, Ur 10/12/2021 NEGATIVE  NEGATIVE Final    Bilirubin Urine 10/12/2021 NEGATIVE  NEGATIVE Final    Ketones, Urine 10/12/2021 NEGATIVE  NEGATIVE Final    Specific [place], UA 10/12/2021 1.027  1.000 - 1.030 Final    Urine Hgb 10/12/2021 NEGATIVE  NEGATIVE Final    pH, UA 10/12/2021 6.5  5.0 - 8.0 Final    Protein, UA 10/12/2021 NEGATIVE  NEGATIVE Final    Urobilinogen, Urine 10/12/2021 Normal  Normal Final    Nitrite, Urine 10/12/2021 NEGATIVE  NEGATIVE Final    Leukocyte Esterase, Urine 10/12/2021 NEGATIVE  NEGATIVE Final    Urinalysis Comments 10/12/2021 Microscopic exam not performed based on chemical results unless requested in original order.    Final    Amphetamine Screen, Ur 10/12/2021 NEGATIVE  NEGATIVE Final    Comment:       (Positive cutoff 1000 ng/mL)                  Barbiturate Screen, Ur 10/12/2021 NEGATIVE  NEGATIVE Final    Comment:       (Positive cutoff 200 ng/mL)                  Benzodiazepine Screen, Urine 10/12/2021 NEGATIVE  NEGATIVE Final    Comment:       (Positive cutoff 200 ng/mL)                  Cocaine Metabolite, Urine 10/12/2021 NEGATIVE  NEGATIVE Final    Comment:       (Positive cutoff 300 ng/mL)                  Methadone Screen, Urine 10/12/2021 NEGATIVE  NEGATIVE Final    Comment:       (Positive cutoff 300 ng/mL)                  Opiates, Urine 10/12/2021 NEGATIVE  NEGATIVE Final    Comment:       (Positive cutoff 300 ng/mL)                  Phencyclidine, Urine 10/12/2021 NEGATIVE  NEGATIVE Final    Comment:       (Positive cutoff 25 ng/mL)                  Propoxyphene, Urine 10/12/2021 NOT REPORTED  NEGATIVE Final    Cannabinoid Scrn, Ur 10/12/2021 POSITIVE* NEGATIVE Final    Comment:       (Positive cutoff 50 ng/mL)                  Oxycodone Screen, Ur 10/12/2021 NEGATIVE  NEGATIVE Final    Comment:       (Positive cutoff 100 ng/mL)                  Methamphetamine, Urine 10/12/2021 NOT REPORTED  NEGATIVE Final    Tricyclic Antidepressants, Urine 10/12/2021 NOT REPORTED  NEGATIVE Final    MDMA, Urine 10/12/2021 NOT REPORTED  NEGATIVE Final    Buprenorphine Urine 10/12/2021 NOT REPORTED  NEGATIVE Final    Test Information 10/12/2021 Assay provides medical screening only. The absence of expected drug(s) and/or metabolite(s) may indicate diluted or adulterated urine, limitations of testing or timing of collection. Final    Comment: Testing for legal purposes should be confirmed by another method. To request confirmation   of test result, please call the lab within 7 days of sample submission.  Acetaminophen Level 10/12/2021 7* 10 - 30 ug/mL Final    Ethanol 10/12/2021 <10  <10 mg/dL Final    Ethanol percent 10/12/2021 <4.205  % Final    Salicylate Lvl 61/03/5495 <1* 3 - 10 mg/dL Final    Toxic Tricyclic Sc,Blood 05/67/5360 WRONG TEST ORDERED  NEGATIVE Final    Tricyclic Antidep,Urine 74/16/5413 NEGATIVE  NEGATIVE Final    Comment:       (Positive cutoff 1000 ng/mL)  Assay provides rapid clinical screening only.   Presumptive positive results for legal   purposes should be confirmed by another method. To request confirmation, please call the   lab within 7 days of sample submission.  Valproic Acid Lvl 10/21/2021 71  50 - 125 ug/mL Final    Valproic Dose amount 10/21/2021 500 MG   Final    Valproic Date last dose 10/21/2021 67,161,197   Final    Valproic Time last dose 10/21/2021 2,105   Final    POC Glucose 10/22/2021 100  65 - 105 mg/dL Final    POC Glucose 10/25/2021 116* 65 - 105 mg/dL Final    Glucose 10/26/2021 86  70 - 99 mg/dL Final    BUN 10/26/2021 18  6 - 20 mg/dL Final    CREATININE 10/26/2021 0.80  0.50 - 0.90 mg/dL Final    Bun/Cre Ratio 10/26/2021 NOT REPORTED  9 - 20 Final    Calcium 10/26/2021 9.2  8.6 - 10.4 mg/dL Final    Sodium 10/26/2021 142  135 - 144 mmol/L Final    Potassium 10/26/2021 4.7  3.7 - 5.3 mmol/L Final    Chloride 10/26/2021 107  98 - 107 mmol/L Final    CO2 10/26/2021 26  20 - 31 mmol/L Final    Anion Gap 10/26/2021 9  9 - 17 mmol/L Final    GFR Non- 10/26/2021 >60  >60 mL/min Final    GFR  10/26/2021 >60  >60 mL/min Final    GFR Comment 10/26/2021        Final    Comment: Average GFR for 30-36 years old:   80 mL/min/1.73sq m  Chronic Kidney Disease:   <60 mL/min/1.73sq m  Kidney failure:   <15 mL/min/1.73sq m              eGFR calculated using average adult body mass.  Additional eGFR calculator available at:        [employer]            GFR Staging 10/26/2021 NOT REPORTED   Final    POC Glucose 10/27/2021 107* 65 - 105 mg/dL Final    POC Glucose 10/29/2021 112* 65 - 105 mg/dL Final    Color, UA 10/30/2021 Yellow  Yellow Final    Turbidity UA 10/30/2021 Clear  Clear Final    Glucose, Ur 10/30/2021 NEGATIVE  NEGATIVE Final    Bilirubin Urine 10/30/2021 NEGATIVE  NEGATIVE Final    Ketones, Urine 10/30/2021 NEGATIVE  NEGATIVE Final    Specific [place], UA 10/30/2021 1.017  1.000 - 1.030 Final    Urine Hgb 10/30/2021 SMALL* NEGATIVE Final    pH, UA 10/30/2021 5.0  5.0 - 8.0 Final    Protein, UA 10/30/2021 NEGATIVE  NEGATIVE Final    Urobilinogen, Urine 10/30/2021 Normal  Normal Final    Nitrite, Urine 10/30/2021 NEGATIVE  NEGATIVE Final    Leukocyte Esterase, Urine 10/30/2021 SMALL* NEGATIVE Final    Urinalysis Comments 10/30/2021 NOT REPORTED   Final    - 10/30/2021        Final    WBC, UA 10/30/2021 2 TO 5  /HPF Final    RBC, UA 10/30/2021 2 TO 5  /HPF Final    Casts UA 10/30/2021 NOT REPORTED  /LPF Final    Crystals, UA 10/30/2021 NOT REPORTED  None /HPF Final    Epithelial Cells UA 10/30/2021 5 TO 10  /HPF Final    Renal Epithelial, UA 10/30/2021 NOT REPORTED  0 /HPF Final    Bacteria, UA 10/30/2021 FEW* None Final    Mucus, UA 10/30/2021 NOT REPORTED  None Final    Trichomonas, UA 10/30/2021 NOT REPORTED  None Final    Amorphous, UA 10/30/2021 NOT REPORTED  None Final    Other Observations UA 10/30/2021 NOT REPORTED  NOT REQ. Final    Yeast, UA 10/30/2021 NOT REPORTED  None Final    POC Glucose 11/01/2021 125* 65 - 105 mg/dL Final         Reviewed patient's current plan of care and vital signs with nursing staff.     Labs reviewed: [x] Yes    Medications  Current Facility-Administered Medications: meperidine (DEMEROL) injection 12.5 mg, 12.5 mg, IntraVENous, Q5 Min PRN  HYDROmorphone (DILAUDID) injection 0.5 mg, 0.5 mg, IntraVENous, Q5 Min PRN  ondansetron (ZOFRAN) injection 4 mg, 4 mg, IntraVENous, Once PRN  diphenhydrAMINE (BENADRYL) injection 12.5 mg, 12.5 mg, IntraVENous, Once PRN  labetalol (NORMODYNE;TRANDATE) injection 5 mg, 5 mg, IntraVENous, Q10 Min PRN  lactated ringers infusion, , IntraVENous, Continuous  OLANZapine zydis (ZYPREXA) disintegrating tablet 30 mg, 30 mg, Oral, Nightly  benzocaine (ORAJEL) 10 % mucosal gel, , Mouth/Throat, PRN  lactated ringers infusion, , IntraVENous, Continuous  fluticasone (FLONASE) 50 MCG/ACT nasal spray 1 spray, 1 spray, Each Nostril, Daily  lactated ringers infusion, , IntraVENous, Continuous  amoxicillin-clavulanate (AUGMENTIN) 500-125 MG per tablet 1 tablet, 1 tablet, Oral, 2 times per day  sertraline (ZOLOFT) tablet 100 mg, 100 mg, Oral, Daily  prazosin (MINIPRESS) capsule 2 mg, 2 mg, Oral, Nightly  loperamide (IMODIUM) capsule 2 mg, 2 mg, Oral, 4x Daily PRN  ondansetron (ZOFRAN-ODT) disintegrating tablet 4 mg, 4 mg, Oral, Q8H PRN  pantoprazole (PROTONIX) tablet 40 mg, 40 mg, Oral, QAM AC  nicotine polacrilex (NICORETTE) gum 2 mg, 2 mg, Oral, PRN  albuterol sulfate  (90 Base) MCG/ACT inhaler 2 puff, 2 puff, Inhalation, Q6H PRN  apixaban (ELIQUIS) tablet 5 mg, 5 mg, Oral, BID  cetirizine (ZYRTEC) tablet 10 mg, 10 mg, Oral, Daily  melatonin tablet 3 mg, 3 mg, Oral, Nightly PRN  tiZANidine (ZANAFLEX) tablet 4 mg, 4 mg, Oral, Q8H PRN  acetaminophen (TYLENOL) tablet 650 mg, 650 mg, Oral, Q4H PRN  aluminum & magnesium hydroxide-simethicone (MAALOX) 200-200-20 MG/5ML suspension 30 mL, 30 mL, Oral, Q6H PRN  hydrOXYzine (ATARAX) tablet 50 mg, 50 mg, Oral, TID PRN  haloperidol (HALDOL) tablet 5 mg, 5 mg, Oral, Q6H PRN **AND** [DISCONTINUED] LORazepam (ATIVAN) tablet 2 mg, 2 mg, Oral, Q6H PRN  haloperidol lactate (HALDOL) injection 5 mg, 5 mg, IntraMUSCular, Q6H PRN **AND** [DISCONTINUED] LORazepam (ATIVAN) injection 2 mg, 2 mg, IntraMUSCular, Q6H PRN **AND** diphenhydrAMINE (BENADRYL) injection 50 mg, 50 mg, IntraMUSCular, Q6H PRN  polyethylene glycol (GLYCOLAX) packet 17 g, 17 g, Oral, Daily PRN  traZODone (DESYREL) tablet 50 mg, 50 mg, Oral, Nightly PRN    ASSESSMENT  Severe recurrent major depressive disorder with psychotic features ([facility] [address]         PLAN  Patient symptoms are: Improving  Continue with current medication regimen  Continue index course of ECT, 1 more times while inpatient on Wednesday, unlikely to transition to outpatient ECT secondary to lack of support  Monitor need and frequency of PRN medications. Encourage participation in groups and milieu.   Attempt to develop insight. Psycho-education conducted. Supportive Therapy conducted. Probable discharge is to be determined by MD, establish plan regarding sober living as part of discharge and outpatient DBT  Follow-up daily while inpatient. Patient continues to be monitored in the inpatient psychiatric facility at [facility] for safety and stabilization. Patient continues to need, on a daily basis, active treatment furnished directly by or requiring the supervision of inpatient psychiatric personnel. Electronically signed by DAMARIS Horton CNP on 11/1/2021 at 3:58 PM    **This report has been created using voice recognition software. It may contain minor errors which are inherent in voice recognition technology. **    I independently saw and evaluated the patient. I reviewed the midlevel provider's documentation above. Any additional comments or changes to the midlevel provider's documentation are stated below otherwise agree with assessment. The patient reports an improvement in her mood with ECT. She is hopeful of continuing ECT. We discussed that we will consider discharge after her next ECT on Wednesday. She is compliant with medications and reports no side effects. PLAN  Medications as noted above  Attempt to develop insight  Psycho-education conducted. Supportive Therapy conducted. Probable discharge is 2 days.    Follow-up daily while on inpatient unit    Electronically signed by Shannan Begum MD on 11/1/21 at 5:48 PM EDT
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
## 2021-11-01 NOTE — PROGRESS NOTES
BP CUFF DEFLATED LEFT ANKLE    PULSE WIDTH- 1.0  FREQUENCY- 40  DURATION % POWER- 30  CURRENT- 0.9  MOTOR- 29  EEG- 58  STATIC- 1650  DYNAMIC- 260

## 2021-11-01 NOTE — FLOWSHEET NOTE
*Patient participated in Spirituality Group        11/01/21 1411   Encounter Summary   Services provided to: Patient   Referral/Consult From: Rounding   Continue Visiting   (11/1/21)   Complexity of Encounter Moderate   Length of Encounter 30 minutes   Spiritual/Shinto   Type Spiritual support   Assessment Calm; Approachable   Intervention Active listening   Outcome Receptive

## 2021-11-01 NOTE — PROCEDURES
Bilateral ECT Procedure Report  Mike Castillo   11/1/2021 1980         Attending:Melchor Ortiz MD  Preprocedure diagnosis: Major depressive disorder, recurrent, severe with psychotic symptoms (F33.3)  Postprocedure diagnosis: SAME AS PRE-PROCEDURE DIAGNOSIS  Treatment Number: This is treatment # 4  Patient Status: Inpatient   Type of ECT: Bilateral Brief Pulse  Medications  Preprocedure: Tylenol 650mg and Toradol 15mg  Anesthetic: Methohexital 80 mg  Paralytic: Succinylcholine 80 mg  Post procedure: none needed     Cuff placement: Left Lower Extremity    Thymatron Settings 1st Stimulus:     Pulse width: 1.0 ms   Frequency:  40 hz   % Power:   30 %   Static Impedance: 1650 ohms             Dynamic Impedance: 260 ohms             Motor Seizure Duration: 29 seconds  EEG Seizure Duration: 58 seconds                 The patient's ECT treatment was performed using Thymatron machine  . Timeout:  Time out was performed using two patient identifiers and confirmation of procedure. Patient Preparation: Preprocedure documentation, labs, H&P were all verified and reviewed. The patient was placed in supine position. EEG leads were placed for monitoring of seizure. [place] areas bilaterally cleaned and prepped. Pre-Tac solution was applied over stimulus electrode site. Thymapad's then applied to stimulus electrode site bilaterally with the center of the lead placed approximately 1 inch superior to the midpoint of line between canthus and the tragus bilaterally. The patient was pre-oxygenated. Procedure in detail: Medications were administered by anesthesia using intravenous access at the doses listed previously in this summary. Anesthetic administered and once confirmation the patient is anesthetized, the patient's blood pressure cuff on lower extremity was inflated to 100mmHg in excess of patient's blood pressure. At this time, the neuromuscular blockade was administered intravenously by anesthesia.   Upper extremity fasciculation were verified followed by lower extremity fasciculation. Once fasciculations terminated, confirmation of affective neuromuscular blockade demonstrated by absence of withdrawal reflex. Bite blocks were put in place. Static impedance was tested and within appropriate limits. Thymatron settings were confirmed with nursing staff. Staff was cleared from the patient and dose of ECT stimulus was delivered. Motor seizure activity  was observed at duration noted and terminated. EEG seizure activity was observed of duration noted that was confirmed via monitoring EEG and terminated with appropriate postictal suppression noted on EEG. Static impedance and dynamic impedance were documented. Tourniquet on  lower extremity was released and recovery procedures initiated. The patient was mask ventilated during the procedure with no significant drops in oxygenation saturation and tolerated the procedure well without any notable adverse effects. Condition: The patient was in stable condition with stable vital signs and able to follow commands when moved to recovery.

## 2021-11-01 NOTE — GROUP NOTE
Group Therapy Note    Date: 11/1/2021    Group Start Time: 1000  Group End Time: 2045  Group Topic: Psychoeducation    REDD Arrieta    Group Psychoeducation    10:00am  10:45am    [facility]    Rooms [address] (6/15)    Patient's Goal:  To actively participate in psycho-education group which relates to finding ways to understand and utilize the 10 Principles of Recovery. Each principle to be discussed and patient to identify their top 3-5. Notes:  Client participates in group as it relates to their person recovery in mental health and/or substance abuse issues. Status After Intervention:  Improved     Participation Level:  Active Listener and Interactive     Participation Quality: Appropriate, Attentive and Sharing      Speech:  normal      Thought Process/Content: Logical and Linear      Affective Functioning: Congruent      Mood: Euthymic      Level of consciousness:  Alert and Attentive      Response to Learning: Able to verbalize current knowledge/experience, Capable of insight and Progressing to goal      Endings: None Reported     Modes of Intervention: Education, Support, Socialization, Exploration, Clarifying, Problem-solving, Activity and Reality-testing      Discipline Responsible: Licensed Clinical       Signature:  Kacie Díaz MSW, LSW

## 2021-11-01 NOTE — ANESTHESIA POSTPROCEDURE EVALUATION
POST- ANESTHESIA EVALUATION       Pt Name: Kailey Potter  MRN: [id number]  YOB: 1980  Date of evaluation: 11/1/2021  Time:  11:35 AM      /80   Pulse 82   Temp 97.7 °F (36.5 °C)   Resp 15   Ht 5' 6\" (1.676 m)   Wt 230 lb (104.3 kg)   SpO2 98%   BMI 37.12 kg/m²      Consciousness Level  Awake  Cardiopulmonary Status  Stable  Pain Adequately Treated YES  Nausea / Vomiting  NO  Adequate Hydration  YES  Anesthesia Related Complications NONE      Electronically signed by Tammy Peter MD on 11/1/2021 at 11:35 AM       Department of Anesthesiology  Postprocedure Note    Patient: Kailey Potter  MRN: [id number]  YOB: 1980  Date of evaluation: 11/1/2021  Time:  11:35 AM     Procedure Summary     Date: 11/01/21 Room / Location: [address] [place] [address] PACU    Anesthesia Start: 0741 Anesthesia Stop: 2455    Procedure: ECT W/ ANESTHESIA Diagnosis: Major depressive disorder, recurrent, severe with psychotic symptoms    Scheduled Providers:  Responsible Provider: Tammy Peter MD    Anesthesia Type: general ASA Status: 2          Anesthesia Type: general    Sara Phase I: Sara Score: 10    Sara Phase II:      Last vitals: Reviewed and per EMR flowsheets.        Anesthesia Post Evaluation

## 2021-11-01 NOTE — PLAN OF CARE
Problem: Tobacco Use:  Goal: Inpatient tobacco use cessation counseling participation  Description: Inpatient tobacco use cessation counseling participation  11/1/2021 0911 by Dalila Hayes LPN  Outcome: Ongoing     Problem: Depressive Behavior With or Without Suicide Precautions:  Goal: Ability to disclose and discuss suicidal ideas will improve  Description: Ability to disclose and discuss suicidal ideas will improve  11/1/2021 0911 by Dalila Hayes LPN  Outcome: Ongoing  Note: Patient has been free of self harm and denies thoughts of harming self at this time. Patient has agreed to seek staff if he begins having thoughts of harming self or needs to talk. Q15 minute safety checks continue. Problem: Altered Mood, Psychotic Behavior:  Goal: Able to verbalize decrease in frequency and intensity of hallucinations  Description: Able to verbalize decrease in frequency and intensity of hallucinations  10/31/2021 2049 by Marlee Connor LPN  Outcome: Ongoing  Note: Mood is pleasant during the shift.

## 2021-11-01 NOTE — BH NOTE
Group Therapy Note     Date: 11/1/21     Group Start Time: 1430  Group End Time: 1450  Group Topic: Goals/safety planning      REDD [place]           Group Therapy Note     Attendees: 6/15        Patient's Goal:  Wrap Up group     Notes:  Short and long term goals     Status After Intervention:  Improved     Participation Level:  Active Listener and Interactive     Participation Quality: Appropriate, Attentive and Sharing        Speech:  normal        Thought Process/Content: Logical        Affective Functioning: Congruent        Mood: within normal limits        Level of consciousness:  Alert, Oriented x4 and Attentive        Response to Learning: Able to verbalize current knowledge/experience, Able to verbalize/acknowledge new learning, Able to retain information and Progressing to goal        Endings: None Reported     Modes of Intervention: Education, Support and Socialization
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
## 2021-11-01 NOTE — PLAN OF CARE
Problem: Tobacco Use:  Goal: Inpatient tobacco use cessation counseling participation  Description: Inpatient tobacco use cessation counseling participation  10/31/2021 2049 by Rivera Manuel LPN  Outcome: Ongoing     Problem: Depressive Behavior With or Without Suicide Precautions:  Goal: Ability to disclose and discuss suicidal ideas will improve  Description: Ability to disclose and discuss suicidal ideas will improve  10/31/2021 2049 by Rivera Manuel LPN  Outcome: Ongoing  Note: Patient admits to feelings of sadness and feeling depressed. Patient denies feelings of SI/HI. Problem: Altered Mood, Psychotic Behavior:  Goal: Able to verbalize decrease in frequency and intensity of hallucinations  Description: Able to verbalize decrease in frequency and intensity of hallucinations  10/31/2021 2049 by Rivera Manuel LPN  Outcome: Ongoing  Note: Mood is pleasant during the shift. Problem: Pain:  Goal: Pain level will decrease  Description: Pain level will decrease  10/31/2021 2049 by Rivera Manuel LPN  Outcome: Ongoing  Note: Patient c/o heaache.      Problem: Pain:  Goal: Control of chronic pain  Description: Control of chronic pain  10/31/2021 2049 by Rivera Manuel LPN  Outcome: Ongoing     Problem: Pain:  Goal: Control of acute pain  Description: Control of acute pain  10/31/2021 2049 by Rivera Manuel LPN  Outcome: Ongoing

## 2021-11-02 PROCEDURE — 6370000000 HC RX 637 (ALT 250 FOR IP): Performed by: PSYCHIATRY & NEUROLOGY

## 2021-11-02 PROCEDURE — 1240000000 HC EMOTIONAL WELLNESS R&B

## 2021-11-02 PROCEDURE — 6370000000 HC RX 637 (ALT 250 FOR IP)

## 2021-11-02 PROCEDURE — 6370000000 HC RX 637 (ALT 250 FOR IP): Performed by: NURSE PRACTITIONER

## 2021-11-02 PROCEDURE — 99232 SBSQ HOSP IP/OBS MODERATE 35: CPT | Performed by: PSYCHIATRY & NEUROLOGY

## 2021-11-02 PROCEDURE — APPSS30 APP SPLIT SHARED TIME 16-30 MINUTES: Performed by: PSYCHIATRY & NEUROLOGY

## 2021-11-02 RX ORDER — LISINOPRIL 5 MG/1
2.5 TABLET ORAL NIGHTLY
Status: DISCONTINUED | OUTPATIENT
Start: 2021-11-02 | End: 2021-11-03 | Stop reason: HOSPADM

## 2021-11-02 RX ADMIN — NICOTINE POLACRILEX 2 MG: 2 GUM, CHEWING BUCCAL at 17:25

## 2021-11-02 RX ADMIN — NICOTINE POLACRILEX 2 MG: 2 GUM, CHEWING BUCCAL at 18:47

## 2021-11-02 RX ADMIN — NICOTINE POLACRILEX 2 MG: 2 GUM, CHEWING BUCCAL at 12:07

## 2021-11-02 RX ADMIN — NICOTINE POLACRILEX 2 MG: 2 GUM, CHEWING BUCCAL at 20:43

## 2021-11-02 RX ADMIN — SERTRALINE HYDROCHLORIDE 100 MG: 100 TABLET ORAL at 12:07

## 2021-11-02 RX ADMIN — TRAZODONE HYDROCHLORIDE 50 MG: 50 TABLET ORAL at 20:43

## 2021-11-02 RX ADMIN — TIZANIDINE 4 MG: 4 TABLET ORAL at 12:07

## 2021-11-02 RX ADMIN — OLANZAPINE 30 MG: 10 TABLET, ORALLY DISINTEGRATING ORAL at 20:43

## 2021-11-02 RX ADMIN — Medication 3 MG: at 20:43

## 2021-11-02 RX ADMIN — HYDROXYZINE HYDROCHLORIDE 50 MG: 50 TABLET, FILM COATED ORAL at 19:38

## 2021-11-02 RX ADMIN — FLUTICASONE PROPIONATE 1 SPRAY: 50 SPRAY, METERED NASAL at 12:06

## 2021-11-02 RX ADMIN — LISINOPRIL 2.5 MG: 5 TABLET ORAL at 21:14

## 2021-11-02 RX ADMIN — TIZANIDINE 4 MG: 4 TABLET ORAL at 20:43

## 2021-11-02 RX ADMIN — APIXABAN 5 MG: 5 TABLET, FILM COATED ORAL at 20:46

## 2021-11-02 RX ADMIN — CETIRIZINE HYDROCHLORIDE 10 MG: 10 TABLET, FILM COATED ORAL at 12:07

## 2021-11-02 RX ADMIN — PANTOPRAZOLE SODIUM 40 MG: 40 TABLET, DELAYED RELEASE ORAL at 06:29

## 2021-11-02 RX ADMIN — HYDROXYZINE HYDROCHLORIDE 50 MG: 50 TABLET, FILM COATED ORAL at 12:20

## 2021-11-02 RX ADMIN — PRAZOSIN HYDROCHLORIDE 2 MG: 1 CAPSULE ORAL at 20:42

## 2021-11-02 RX ADMIN — ACETAMINOPHEN 650 MG: 325 TABLET ORAL at 18:47

## 2021-11-02 RX ADMIN — APIXABAN 5 MG: 5 TABLET, FILM COATED ORAL at 12:07

## 2021-11-02 ASSESSMENT — PAIN SCALES - GENERAL
PAINLEVEL_OUTOF10: 1
PAINLEVEL_OUTOF10: 3

## 2021-11-02 NOTE — PLAN OF CARE
Problem: Tobacco Use:  Goal: Inpatient tobacco use cessation counseling participation  Description: Inpatient tobacco use cessation counseling participation  11/1/2021 2218 by Maximino Nunez RN  Outcome: Ongoing  Patient refused tobacco use cessation counseling. Problem: Depressive Behavior With or Without Suicide Precautions:  Goal: Ability to disclose and discuss suicidal ideas will improve  Description: Ability to disclose and discuss suicidal ideas will improve  11/1/2021 2218 by Maximino Nunez RN  Outcome: Ongoing  Patient denied suicidal ideations. Patient observed every 15 minutes and as needed for safety checks. Problem: Altered Mood, Psychotic Behavior:  Goal: Able to verbalize decrease in frequency and intensity of hallucinations  Description: Able to verbalize decrease in frequency and intensity of hallucinations  11/1/2021 2218 by Maximino Nunez RN  Outcome: Ongoing  Patient denied hallucinations. Patient showed no signs or symptoms of responding to hallucinations. Problem: Pain:  Goal: Pain level will decrease  Description: Pain level will decrease  11/1/2021 2218 by Maximino Nunez RN  Outcome: Ongoing  Patient took PO pain medication for foot pain and a headache. Patient was satisfied. Goal: Control of acute pain  Description: Control of acute pain  Outcome: Ongoing  Patient took PO pain medication for foot pain and a headache. Patient was satisfied. Goal: Control of chronic pain  Description: Control of chronic pain  Outcome: Ongoing  Patient took PO pain medication for foot pain and a headache. Patient was satisfied.

## 2021-11-02 NOTE — GROUP NOTE
Group Therapy Note    Date: 11/2/2021    Group Start Time: 1000  Group End Time: 1030  Group Topic: Psychotherapy    [facility]    Eduardo Henry        Group Therapy Note             Patient refused to attend psychotherapy group after encouragement from staff. 1:1 talk time offered but refused. Signature:   Eduardo Henry

## 2021-11-02 NOTE — SUICIDE SAFETY PLAN
SAFETY PLAN    Warning Signs that indicate a suicidal crisis may be developing: What (situations, thoughts, feelings, body sensations, behaviors, etc.) do you experience that lets you know you are beginning to think about suicide? THINK ABOUT WHAT MIGHT WORK FOR YOU!   Go off medications   Mood is depressed and start to feel sad, hopeless, helpless, guilty, decline in self-esteem, excess worry, no interest in doing any pleasurable activities, unable to concentrate   Begin to cry over the smallest of things   Not eating or sleeping as normal   Relationship issues start happening   I become angry and start a fight   When I dont listen or respond to people in a good, positive way   Increase drug use      Internal Coping Strategies:  What things can I do (relaxation techniques, hobbies, physical activities, etc.) to take my mind off my problems without contacting another person? THINK ABOUT WHAT MIGHT WORK FOR YOU!   Go to hospital discharge appointments and follow-up with community mental health counseling   Talk with other people   Learn to identify and control your emotions by new ways   Think before you speak or act; walk away from the situation   Join a support group in person or on Social Media   Take a time-out   Take deep breaths; use relaxation techniques   Get some exercise; go for a walk   Read; listen to music; watch a [employer] movie     Coping skills/ strategies  journal/ listen to music/ go for a walk/ read a book/ watch a [employer] movie/show / crafts / video game    Grounding techniques- eat a sour candy or hot cinnamon candy / focus on colors, sounds, smells, textures on things around you / drink some herbal tea / eat a piece of dark chocolate / take a hot bath or shower / essential oils for smelling / meditate / color / arts and crafts    People whom I can ask for help: Who can I call when I need help - for example, friends, family, clergy, someone else?   THINK OF WHO MIGHT BE THE MOST HELPFUL FOR YOU!   Family and friends    Professionals or [facility] agencies I can contact during a crisis: Who can I call for help - for example, my doctor, my psychiatrist, my psychologist, a mental health provider, a suicide hotline? THINK ABOUT WHAT OTHER SUPPORT SYSTEMS THAT MIGHT WORK FOR YOU!   Suicide Prevention Lifeline: 3-191-920-TALK (2696)   [place] [facility] Team, face-to-face services, call [phone] (7348)  [address]: 2-1-1, [phone] or [address] [employer] Crisis Response Team (Crisis Intervention Team - CIT), 180.408.8021 or 9-1-1  [address] [facility], [address] [employer] of Mental [employer]ness, 9-629-781-315-983-7209  [facility] Substance Abuse [place] [address], 5-325-868-HELP (6431)   Crisis Text Line, Text 4[employer]OPE to 496151 to connect with a crisis counselor  [address] [facility], 2-233.303.1466  Jefry Destiny ([place], [address]), 6-470.592.1686   [employer] (Alcohol / Drug help)   Call the Recovery Helpline at 54 073 723 (24 hours a day - 7 days a week)   COVID-19 Emotional Support Line: [address] [facility] Emergency Services - for example, [address] [facility] suicide hotline  [address] [facility]!  [address] [facility] [address], [address] [facility] line at 348-110-CARE (2167) for 24/7 to help anyone having a mental crisis or thoughts of self-harm. The Crisis CARE number will also determine in a face-to-face screening needs to be done as well as the safest place. Once this is determined, the Crisis [facility] Team will be sent out to meet with the patient directly if required.  For [facility]  Crisis Number 458-836-1890 (HOPE)     Making the environment safe:  How can I make my environment (house/apartment/living space) safer? For example, can I remove guns, medications, and other items? THINK ABOUT WHAT MIGHT WORK FOR YOU! 1. Remove unsafe objects  2. Keep Medications in safe and secure location  3.  Plan daily goals to help remember to stay on specific medications

## 2021-11-02 NOTE — CARE COORDINATION
DISCHARGE PLANNING UPDATE:  - Writer sends Lavinia at [facility] updated paperwork for client showing improvement in mood to see if they will re-consider her for admission.  - Writer calls "[facility] and they have confirmed her clinicals and SW now waiting for placement confirmation. Leaves a voicemail for nursing.

## 2021-11-02 NOTE — GROUP NOTE
Group Therapy Note    Date: 11/2/2021    Group Start Time: 1105  Group End Time: 1935  Group Topic: Music Therapy    [facility]    Sheyla Grajeda        Group Therapy Note    Attendees: 3/14       Patient's Goal:  Patients shared preferred music and answered questions about songs as asked by this writer. Goals to increase self-expression; Engage in leisure opportunities; Increase sense of community;      Notes:  Patient attended and participated in group engaging positively with peers and staff. Patient was pleasant and engaging throughout. Patient shared songs and engaged in conversations with peers and staff. Status After Intervention:  Improved     Participation Level:  Active Listener and Interactive     Participation Quality: Appropriate, Attentive and Sharing        Speech:  normal        Thought Process/Content: Logical  Linear        Affective Functioning: Congruent            Mood: euthymic        Level of consciousness:  Alert and Attentive        Response to Learning: Able to verbalize current knowledge/experience and Progressing to goal        Endings: None Reported     Modes of Intervention: Support, Socialization, Exploration, Activity, Media and Reality-testing        Discipline Responsible: Psychoeducational Specialist            Signature:  Sheyla Grajeda

## 2021-11-02 NOTE — GROUP NOTE
Group Therapy Note    Date: 11/2/2021    Group Start Time: 1330  Group End Time: 1400  Group Topic: Group Documentation    [facility]  [address], RN        Group Therapy Note    Attendees:          Patient's Goal:  Learning to face anxiety    Notes:  New ways to deal with anxiety    Status After Intervention:  Unchanged    Participation Level: Minimal    Participation Quality: Attentive      Speech:  normal      Thought Process/Content: Logical      Affective Functioning: Congruent      Mood: depressed      Level of consciousness:  Alert      Response to Learning: Able to retain information      Endings: None Reported    Modes of Intervention: Education      Discipline Responsible: Registered Nurse      Signature:  Kelley Lopez RN

## 2021-11-02 NOTE — PROGRESS NOTES
Daily Progress Note  11/2/2021    Patient Name: Keagan Rae     Preferred name: \"Dipika\". CHIEF COMPLAINT: Suicidal ideation. SUBJECTIVE:      Medication Adherence: Patient has been medication compliant today. Emergency Medications: Patient has not required any emergency medications today. Appetite: Patient reports adequate. Sleep: Patient reports adequate and restorative sleep last night. Patient is seen today for a follow up assessment. She was agreeable to speaking with the writer in the day area today. She verbalizes plans to go to sober living house. She denies any withdrawal symptoms at the time of assessment. She endorses depression and anxiety today. She does verbalize that she has an upcoming ECT treatment on Wednesday. She states she is having \"memory problems\" as a cognitive side effect to ECT, but is unable to identify if it is short-term memory or long-term memory. Patient reports improvement in suicidal ideation without intent or plans. She denies homicidal ideation, intent, or plans. She contracts for safety on the unit. Patient endorses intermittent auditory hallucinations and reports hearing multiple voices consisting of both male and female voices. Patient states that auditory hallucination voices are sometimes recognizable. She states auditory hallucinations tell her \"that I should just kill myself\" or \"do this or do that\". Patient states auditory hallucinations tell her to hurt herself, but do not tell her to hurt others. She rates the loudness of the auditory hallucinations as a 8 out of 10 (010 scale with zero being no sound and 10 being loudest). Patient endorses intermittent visual hallucinations and reports seeing \"my dead son\". She denies paranoia. She denies delusions. She denies any medication side effects or medical concerns at the time of assessment. Writer encouraged patient to attend group on the unit.  At this time, the patient is not appropriate for a lower level of care. There is risk of decompensation and patient warrants further hospitalization for safety and stabilization. Group Attendance on Unit:   [] Yes  [x] Selectively    [] No         Mental Status Exam  Level of consciousness: Alert and awake. Appearance: Appropriate attire for setting, seated in chair, with fair  grooming and hygiene. Behavior/Motor: Approachable, no psychomotor abnormalities. Attitude toward examiner: Cooperative, attentive, good eye contact. Speech: Normal rate, normal volume, normal tone. Mood:  Patient reports \"a lot better\". Affect: Flat. Thought processes: Linear, goal directed and coherent. Thought content: Denies homicidal ideation. Suicidal Ideation: Reports improvement in suicidal ideations, without current plan or intent, contracts for safety on the unit. Delusions: No evidence of delusions. Denies paranoia. Perceptual Disturbance: Patient does not appear to be responding to internal stimuli. Endorses auditory hallucinations. Endorses visual hallucinations. Cognition: Oriented to self, location, time, and situation. Memory: Intact. Patient states she is having \"memory problems\" as a cognitive side effect to ECT, but is unable to identify if it is short-term memory or long-term memory. Insight & Judgement: Poor, but improving. Data   height is 5' 6\" (1.676 m) and weight is 230 lb (104.3 kg). Her oral temperature is 98.5 °F (36.9 °C). Her blood pressure is 137/90 (abnormal) and her pulse is 75. Her respiration is 14 and oxygen saturation is 98%.    Labs:   Admission on 10/12/2021   Component Date Value Ref Range Status    WBC 10/12/2021 6.8  3.5 - 11.0 k/uL Final    RBC 10/12/2021 4.45  4.0 - 5.2 m/uL Final    Hemoglobin 10/12/2021 13.5  12.0 - 16.0 g/dL Final    Hematocrit 10/12/2021 40.2  36 - 46 % Final    MCV 10/12/2021 90.3  80 - 100 fL Final    MCH 10/12/2021 30.4  26 - 34 pg Final    MCHC 10/12/2021 33.6  31 - 37 g/dL Final    RDW 10/12/2021 13.3  11.5 - 14.9 % Final    Platelets 24/96/3818 271  150 - 450 k/uL Final    MPV 10/12/2021 6.8  6.0 - 12.0 fL Final    NRBC Automated 10/12/2021 NOT REPORTED  per 100 WBC Final    Differential Type 10/12/2021 NOT REPORTED   Final    Immature Granulocytes 10/12/2021 NOT REPORTED  0 % Final    Absolute Immature Granulocyte 10/12/2021 NOT REPORTED  0.00 - 0.30 k/uL Final    WBC Morphology 10/12/2021 NOT REPORTED   Final    RBC Morphology 10/12/2021 NOT REPORTED   Final    Platelet Estimate 34/87/7112 NOT REPORTED   Final    Seg Neutrophils 10/12/2021 48  36 - 66 % Final    Lymphocytes 10/12/2021 47* 24 - 44 % Final    Monocytes 10/12/2021 5  1 - 7 % Final    Eosinophils % 10/12/2021 0  0 - 4 % Final    Basophils 10/12/2021 0  0 - 2 % Final    Segs Absolute 10/12/2021 3.26  1.3 - 9.1 k/uL Final    Absolute Lymph # 10/12/2021 3.20  1.0 - 4.8 k/uL Final    Absolute Mono # 10/12/2021 0.34  0.1 - 1.3 k/uL Final    Absolute Eos # 10/12/2021 0.00  0.0 - 0.4 k/uL Final    Basophils Absolute 10/12/2021 0.00  0.0 - 0.2 k/uL Final    Morphology 10/12/2021 Normal   Final    Glucose 10/12/2021 101* 70 - 99 mg/dL Final    BUN 10/12/2021 23* 6 - 20 mg/dL Final    CREATININE 10/12/2021 0.80  0.50 - 0.90 mg/dL Final    Bun/Cre Ratio 10/12/2021 NOT REPORTED  9 - 20 Final    Calcium 10/12/2021 9.1  8.6 - 10.4 mg/dL Final    Sodium 10/12/2021 138  135 - 144 mmol/L Final    Potassium 10/12/2021 4.4  3.7 - 5.3 mmol/L Final    Chloride 10/12/2021 104  98 - 107 mmol/L Final    CO2 10/12/2021 24  20 - 31 mmol/L Final    Anion Gap 10/12/2021 10  9 - 17 mmol/L Final    Alkaline Phosphatase 10/12/2021 87  35 - 104 U/L Final    ALT 10/12/2021 14  5 - 33 U/L Final    AST 10/12/2021 17  <32 U/L Final    Total Bilirubin 10/12/2021 <0.15* 0.3 - 1.2 mg/dL Final    Total Protein 10/12/2021 7.0  6.4 - 8.3 g/dL Final    Albumin 10/12/2021 4.2  3.5 - 5.2 g/dL Final    Albumin/Globulin Ratio 10/12/2021 NOT REPORTED  1.0 - 2.5 Final    GFR Non- 10/12/2021 >60  >60 mL/min Final    GFR  10/12/2021 >60  >60 mL/min Final    GFR Comment 10/12/2021        Final    Comment: Average GFR for 30-36 years old:   107 mL/min/1.73sq m  Chronic Kidney Disease:   <60 mL/min/1.73sq m  Kidney failure:   <15 mL/min/1.73sq m              eGFR calculated using average adult body mass. Additional eGFR calculator available at:        [email or web address]            GFR Staging 10/12/2021 NOT REPORTED   Final    TSH 10/12/2021 1.55  0.30 - 5.00 mIU/L Final    Specimen Description 10/12/2021 . NASOPHARYNGEAL SWAB   Final    SARS-CoV-2, Rapid 10/12/2021 Not Detected  Not Detected Final    Comment:       Rapid NAAT:  The specimen is NEGATIVE for SARS-CoV-2, the novel coronavirus associated with   COVID-19. The ID NOW COVID-19 assay is designed to detect the virus that causes COVID-19 in patients   with signs and symptoms of infection who are suspected of COVID-19. An individual without symptoms of COVID-19 and who is not shedding SARS-CoV-2 virus would   expect to have a negative (not detected) result in this assay. Negative results should be treated as presumptive and, if inconsistent with clinical signs   and symptoms or necessary for patient management,  should be tested with an alternative molecular assay. Negative results do not preclude   SARS-CoV-2 infection and   should not be used as the sole basis for patient management decisions.          Fact sheet for Healthcare Providers: Raphael  Fact sheet for Patients: Raphael          Methodology: Isothermal Nucleic Acid Amplification      Color, UA 10/12/2021 Yellow  Yellow Final    Turbidity UA 10/12/2021 Clear  Clear Final    Glucose, Ur 10/12/2021 NEGATIVE  NEGATIVE Final    Bilirubin Urine 10/12/2021 NEGATIVE  NEGATIVE Final  Ketones, Urine 10/12/2021 NEGATIVE  NEGATIVE Final    Specific [place], UA 10/12/2021 1.027  1.000 - 1.030 Final    Urine Hgb 10/12/2021 NEGATIVE  NEGATIVE Final    pH, UA 10/12/2021 6.5  5.0 - 8.0 Final    Protein, UA 10/12/2021 NEGATIVE  NEGATIVE Final    Urobilinogen, Urine 10/12/2021 Normal  Normal Final    Nitrite, Urine 10/12/2021 NEGATIVE  NEGATIVE Final    Leukocyte Esterase, Urine 10/12/2021 NEGATIVE  NEGATIVE Final    Urinalysis Comments 10/12/2021 Microscopic exam not performed based on chemical results unless requested in original order.    Final    Amphetamine Screen, Ur 10/12/2021 NEGATIVE  NEGATIVE Final    Comment:       (Positive cutoff 1000 ng/mL)                  Barbiturate Screen, Ur 10/12/2021 NEGATIVE  NEGATIVE Final    Comment:       (Positive cutoff 200 ng/mL)                  Benzodiazepine Screen, Urine 10/12/2021 NEGATIVE  NEGATIVE Final    Comment:       (Positive cutoff 200 ng/mL)                  Cocaine Metabolite, Urine 10/12/2021 NEGATIVE  NEGATIVE Final    Comment:       (Positive cutoff 300 ng/mL)                  Methadone Screen, Urine 10/12/2021 NEGATIVE  NEGATIVE Final    Comment:       (Positive cutoff 300 ng/mL)                  Opiates, Urine 10/12/2021 NEGATIVE  NEGATIVE Final    Comment:       (Positive cutoff 300 ng/mL)                  Phencyclidine, Urine 10/12/2021 NEGATIVE  NEGATIVE Final    Comment:       (Positive cutoff 25 ng/mL)                  Propoxyphene, Urine 10/12/2021 NOT REPORTED  NEGATIVE Final    Cannabinoid Scrn, Ur 10/12/2021 POSITIVE* NEGATIVE Final    Comment:       (Positive cutoff 50 ng/mL)                  Oxycodone Screen, Ur 10/12/2021 NEGATIVE  NEGATIVE Final    Comment:       (Positive cutoff 100 ng/mL)                  Methamphetamine, Urine 10/12/2021 NOT REPORTED  NEGATIVE Final    Tricyclic Antidepressants, Urine 10/12/2021 NOT REPORTED  NEGATIVE Final    MDMA, Urine 10/12/2021 NOT REPORTED  NEGATIVE Final    Buprenorphine Urine 10/12/2021 NOT REPORTED  NEGATIVE Final    Test Information 10/12/2021 Assay provides medical screening only. The absence of expected drug(s) and/or metabolite(s) may indicate diluted or adulterated urine, limitations of testing or timing of collection. Final    Comment: Testing for legal purposes should be confirmed by another method. To request confirmation   of test result, please call the lab within 7 days of sample submission.  Acetaminophen Level 10/12/2021 7* 10 - 30 ug/mL Final    Ethanol 10/12/2021 <10  <10 mg/dL Final    Ethanol percent 10/12/2021 <2.406  % Final    Salicylate Lvl 91/74/6660 <1* 3 - 10 mg/dL Final    Toxic Tricyclic Sc,Blood 44/69/4774 WRONG TEST ORDERED  NEGATIVE Final    Tricyclic Antidep,Urine 78/78/7767 NEGATIVE  NEGATIVE Final    Comment:       (Positive cutoff 1000 ng/mL)  Assay provides rapid clinical screening only. Presumptive positive results for legal   purposes should be confirmed by another method. To request confirmation, please call the   lab within 7 days of sample submission.       Valproic Acid Lvl 10/21/2021 71  50 - 125 ug/mL Final    Valproic Dose amount 10/21/2021 500 MG   Final    Valproic Date last dose 10/21/2021 34,188,159   Final    Valproic Time last dose 10/21/2021 2,105   Final    POC Glucose 10/22/2021 100  65 - 105 mg/dL Final    POC Glucose 10/25/2021 116* 65 - 105 mg/dL Final    Glucose 10/26/2021 86  70 - 99 mg/dL Final    BUN 10/26/2021 18  6 - 20 mg/dL Final    CREATININE 10/26/2021 0.80  0.50 - 0.90 mg/dL Final    Bun/Cre Ratio 10/26/2021 NOT REPORTED  9 - 20 Final    Calcium 10/26/2021 9.2  8.6 - 10.4 mg/dL Final    Sodium 10/26/2021 142  135 - 144 mmol/L Final    Potassium 10/26/2021 4.7  3.7 - 5.3 mmol/L Final    Chloride 10/26/2021 107  98 - 107 mmol/L Final    CO2 10/26/2021 26  20 - 31 mmol/L Final    Anion Gap 10/26/2021 9  9 - 17 mmol/L Final    GFR Non- 10/26/2021 >60  >60 mL/min Final    GFR  10/26/2021 >60  >60 mL/min Final    GFR Comment 10/26/2021        Final    Comment: Average GFR for 30-36 years old:   80 mL/min/1.73sq m  Chronic Kidney Disease:   <60 mL/min/1.73sq m  Kidney failure:   <15 mL/min/1.73sq m              eGFR calculated using average adult body mass. Additional eGFR calculator available at:        [employer]            GFR Staging 10/26/2021 NOT REPORTED   Final    POC Glucose 10/27/2021 107* 65 - 105 mg/dL Final    POC Glucose 10/29/2021 112* 65 - 105 mg/dL Final    Color, UA 10/30/2021 Yellow  Yellow Final    Turbidity UA 10/30/2021 Clear  Clear Final    Glucose, Ur 10/30/2021 NEGATIVE  NEGATIVE Final    Bilirubin Urine 10/30/2021 NEGATIVE  NEGATIVE Final    Ketones, Urine 10/30/2021 NEGATIVE  NEGATIVE Final    Specific [place], UA 10/30/2021 1.017  1.000 - 1.030 Final    Urine Hgb 10/30/2021 SMALL* NEGATIVE Final    pH, UA 10/30/2021 5.0  5.0 - 8.0 Final    Protein, UA 10/30/2021 NEGATIVE  NEGATIVE Final    Urobilinogen, Urine 10/30/2021 Normal  Normal Final    Nitrite, Urine 10/30/2021 NEGATIVE  NEGATIVE Final    Leukocyte Esterase, Urine 10/30/2021 SMALL* NEGATIVE Final    Urinalysis Comments 10/30/2021 NOT REPORTED   Final    - 10/30/2021        Final    WBC, UA 10/30/2021 2 TO 5  /HPF Final    RBC, UA 10/30/2021 2 TO 5  /HPF Final    Casts UA 10/30/2021 NOT REPORTED  /LPF Final    Crystals, UA 10/30/2021 NOT REPORTED  None /HPF Final    Epithelial Cells UA 10/30/2021 5 TO 10  /HPF Final    Renal Epithelial, UA 10/30/2021 NOT REPORTED  0 /HPF Final    Bacteria, UA 10/30/2021 FEW* None Final    Mucus, UA 10/30/2021 NOT REPORTED  None Final    Trichomonas, UA 10/30/2021 NOT REPORTED  None Final    Amorphous, UA 10/30/2021 NOT REPORTED  None Final    Other Observations UA 10/30/2021 NOT REPORTED  NOT REQ.  Final    Yeast, UA 10/30/2021 NOT REPORTED  None Final    POC Glucose 11/01/2021 125* 65 - 105 mg/dL Final         Reviewed patient's current plan of care and vital signs with nursing staff. · Elevated blood pressure trends noted. Consult to internal medicine ordered. Labs reviewed: [x] Yes  Last EKG in EMR reviewed: [x] Yes, scan on 11/16/2020.     Medications  Current Facility-Administered Medications: meperidine (DEMEROL) injection 12.5 mg, 12.5 mg, IntraVENous, Q5 Min PRN  HYDROmorphone (DILAUDID) injection 0.5 mg, 0.5 mg, IntraVENous, Q5 Min PRN  labetalol (NORMODYNE;TRANDATE) injection 5 mg, 5 mg, IntraVENous, Q10 Min PRN  lactated ringers infusion, , IntraVENous, Continuous  OLANZapine zydis (ZYPREXA) disintegrating tablet 30 mg, 30 mg, Oral, Nightly  benzocaine (ORAJEL) 10 % mucosal gel, , Mouth/Throat, PRN  lactated ringers infusion, , IntraVENous, Continuous  fluticasone (FLONASE) 50 MCG/ACT nasal spray 1 spray, 1 spray, Each Nostril, Daily  lactated ringers infusion, , IntraVENous, Continuous  sertraline (ZOLOFT) tablet 100 mg, 100 mg, Oral, Daily  prazosin (MINIPRESS) capsule 2 mg, 2 mg, Oral, Nightly  loperamide (IMODIUM) capsule 2 mg, 2 mg, Oral, 4x Daily PRN  ondansetron (ZOFRAN-ODT) disintegrating tablet 4 mg, 4 mg, Oral, Q8H PRN  pantoprazole (PROTONIX) tablet 40 mg, 40 mg, Oral, QAM AC  nicotine polacrilex (NICORETTE) gum 2 mg, 2 mg, Oral, PRN  albuterol sulfate  (90 Base) MCG/ACT inhaler 2 puff, 2 puff, Inhalation, Q6H PRN  apixaban (ELIQUIS) tablet 5 mg, 5 mg, Oral, BID  cetirizine (ZYRTEC) tablet 10 mg, 10 mg, Oral, Daily  melatonin tablet 3 mg, 3 mg, Oral, Nightly PRN  tiZANidine (ZANAFLEX) tablet 4 mg, 4 mg, Oral, Q8H PRN  acetaminophen (TYLENOL) tablet 650 mg, 650 mg, Oral, Q4H PRN  aluminum & magnesium hydroxide-simethicone (MAALOX) 200-200-20 MG/5ML suspension 30 mL, 30 mL, Oral, Q6H PRN  hydrOXYzine (ATARAX) tablet 50 mg, 50 mg, Oral, TID PRN  haloperidol (HALDOL) tablet 5 mg, 5 mg, Oral, Q6H PRN **AND** [DISCONTINUED] LORazepam (ATIVAN) tablet 2 mg, 2 mg, Oral, Q6H PRN  haloperidol lactate (HALDOL) injection 5 mg, 5 mg, IntraMUSCular, Q6H PRN **AND** [DISCONTINUED] LORazepam (ATIVAN) injection 2 mg, 2 mg, IntraMUSCular, Q6H PRN **AND** diphenhydrAMINE (BENADRYL) injection 50 mg, 50 mg, IntraMUSCular, Q6H PRN  polyethylene glycol (GLYCOLAX) packet 17 g, 17 g, Oral, Daily PRN  traZODone (DESYREL) tablet 50 mg, 50 mg, Oral, Nightly PRN    ASSESSMENT  Severe recurrent major depressive disorder with psychotic features ([facility] [address]         PLAN  Patient symptoms are: Modestly Improving. Continue current medication regimen. Continue ECT. Monitor need and frequency of PRN medications. Encourage participation in groups and milieu. New order: Consult to internal medicine: Medical management, elevated blood pressure trends noted, abnormal labs, abnormal UA. Attempt to develop insight. Psycho-education conducted. Supportive Therapy conducted. Probable discharge is to be determined by MD.   Follow-up daily while inpatient. Patient continues to be monitored in the inpatient psychiatric facility at [facility] for safety and stabilization. Patient continues to need, on a daily basis, active treatment furnished directly by or requiring the supervision of inpatient psychiatric personnel. Electronically signed by Koreen Skiff, APRN - CNP on 11/2/2021 at 3:21 PM    **This report has been created using voice recognition software. It may contain minor errors which are inherent in voice recognition technology. **  I independently saw and evaluated the patient. I reviewed the midlevel provider's documentation above. Any additional comments or changes to the midlevel provider's documentation are stated below otherwise agree with assessment. The patient continues to report an improvement in her mood. She is a little anxious about discharge as she is homeless.   She is also concerned about keeping up her improvement once ECT stops following discharge will stop she has been compliant with medications and reports no side effects. PLAN  Medications as noted above  Attempt to develop insight  Psycho-education conducted. Supportive Therapy conducted.   Probable discharge is 1-2 days  Follow-up daily while on inpatient unit    Electronically signed by Jaki Calhoun MD on 11/2/21 at 5:19 PM EDT

## 2021-11-03 ENCOUNTER — ANESTHESIA (OUTPATIENT)
Dept: POSTOP/PACU | Age: 41
End: 2021-11-03

## 2021-11-03 ENCOUNTER — APPOINTMENT (OUTPATIENT)
Dept: POSTOP/PACU | Age: 41
End: 2021-11-03
Payer: MEDICARE

## 2021-11-03 ENCOUNTER — ANESTHESIA EVENT (OUTPATIENT)
Dept: POSTOP/PACU | Age: 41
End: 2021-11-03

## 2021-11-03 VITALS
OXYGEN SATURATION: 96 % | DIASTOLIC BLOOD PRESSURE: 88 MMHG | WEIGHT: 230 LBS | TEMPERATURE: 98 F | SYSTOLIC BLOOD PRESSURE: 149 MMHG | BODY MASS INDEX: 36.96 KG/M2 | RESPIRATION RATE: 14 BRPM | HEIGHT: 66 IN | HEART RATE: 90 BPM

## 2021-11-03 VITALS — OXYGEN SATURATION: 93 %

## 2021-11-03 LAB — GLUCOSE BLD-MCNC: 100 MG/DL (ref 65–105)

## 2021-11-03 PROCEDURE — 6370000000 HC RX 637 (ALT 250 FOR IP): Performed by: NURSE PRACTITIONER

## 2021-11-03 PROCEDURE — 90870 ELECTROCONVULSIVE THERAPY: CPT

## 2021-11-03 PROCEDURE — 6370000000 HC RX 637 (ALT 250 FOR IP): Performed by: PSYCHIATRY & NEUROLOGY

## 2021-11-03 PROCEDURE — 7100000001 HC PACU RECOVERY - ADDTL 15 MIN

## 2021-11-03 PROCEDURE — 82947 ASSAY GLUCOSE BLOOD QUANT: CPT

## 2021-11-03 PROCEDURE — 3700000001 HC ADD 15 MINUTES (ANESTHESIA)

## 2021-11-03 PROCEDURE — 7100000000 HC PACU RECOVERY - FIRST 15 MIN

## 2021-11-03 PROCEDURE — 3700000000 HC ANESTHESIA ATTENDED CARE

## 2021-11-03 PROCEDURE — 2580000003 HC RX 258: Performed by: ANESTHESIOLOGY

## 2021-11-03 PROCEDURE — 2500000003 HC RX 250 WO HCPCS: Performed by: NURSE ANESTHETIST, CERTIFIED REGISTERED

## 2021-11-03 PROCEDURE — GZB2ZZZ ELECTROCONVULSIVE THERAPY, BILATERAL-SINGLE SEIZURE: ICD-10-PCS | Performed by: PSYCHIATRY & NEUROLOGY

## 2021-11-03 PROCEDURE — 6360000002 HC RX W HCPCS: Performed by: NURSE ANESTHETIST, CERTIFIED REGISTERED

## 2021-11-03 PROCEDURE — 90870 ELECTROCONVULSIVE THERAPY: CPT | Performed by: PSYCHIATRY & NEUROLOGY

## 2021-11-03 RX ORDER — SUCCINYLCHOLINE CHLORIDE 20 MG/ML
INJECTION INTRAMUSCULAR; INTRAVENOUS
Status: COMPLETED
Start: 2021-11-03 | End: 2021-11-03

## 2021-11-03 RX ORDER — PANTOPRAZOLE SODIUM 40 MG/1
40 TABLET, DELAYED RELEASE ORAL DAILY
Qty: 30 TABLET | Refills: 3 | Status: ON HOLD | OUTPATIENT
Start: 2021-11-03 | End: 2021-11-22 | Stop reason: HOSPADM

## 2021-11-03 RX ORDER — KETOROLAC TROMETHAMINE 30 MG/ML
INJECTION, SOLUTION INTRAMUSCULAR; INTRAVENOUS
Status: COMPLETED
Start: 2021-11-03 | End: 2021-11-03

## 2021-11-03 RX ORDER — ONDANSETRON 2 MG/ML
4 INJECTION INTRAMUSCULAR; INTRAVENOUS
Status: DISCONTINUED | OUTPATIENT
Start: 2021-11-03 | End: 2021-11-03 | Stop reason: HOSPADM

## 2021-11-03 RX ORDER — OXYCODONE HYDROCHLORIDE AND ACETAMINOPHEN 5; 325 MG/1; MG/1
2 TABLET ORAL PRN
Status: DISCONTINUED | OUTPATIENT
Start: 2021-11-03 | End: 2021-11-03 | Stop reason: HOSPADM

## 2021-11-03 RX ORDER — LANOLIN ALCOHOL/MO/W.PET/CERES
3 CREAM (GRAM) TOPICAL NIGHTLY PRN
Qty: 30 TABLET | Refills: 3 | Status: ON HOLD | OUTPATIENT
Start: 2021-11-03 | End: 2021-11-22 | Stop reason: SDUPTHER

## 2021-11-03 RX ORDER — TRAZODONE HYDROCHLORIDE 50 MG/1
50 TABLET ORAL NIGHTLY PRN
Qty: 30 TABLET | Refills: 0 | Status: ON HOLD | OUTPATIENT
Start: 2021-11-03 | End: 2021-11-22 | Stop reason: SDUPTHER

## 2021-11-03 RX ORDER — KETOROLAC TROMETHAMINE 30 MG/ML
INJECTION, SOLUTION INTRAMUSCULAR; INTRAVENOUS PRN
Status: DISCONTINUED | OUTPATIENT
Start: 2021-11-03 | End: 2021-11-03 | Stop reason: SDUPTHER

## 2021-11-03 RX ORDER — DIPHENHYDRAMINE HYDROCHLORIDE 50 MG/ML
12.5 INJECTION INTRAMUSCULAR; INTRAVENOUS
Status: DISCONTINUED | OUTPATIENT
Start: 2021-11-03 | End: 2021-11-03 | Stop reason: HOSPADM

## 2021-11-03 RX ORDER — OXYCODONE HYDROCHLORIDE AND ACETAMINOPHEN 5; 325 MG/1; MG/1
1 TABLET ORAL PRN
Status: DISCONTINUED | OUTPATIENT
Start: 2021-11-03 | End: 2021-11-03 | Stop reason: HOSPADM

## 2021-11-03 RX ORDER — PRAZOSIN HYDROCHLORIDE 2 MG/1
2 CAPSULE ORAL NIGHTLY
Qty: 30 CAPSULE | Refills: 3 | Status: ON HOLD | OUTPATIENT
Start: 2021-11-03 | End: 2021-11-22 | Stop reason: SDUPTHER

## 2021-11-03 RX ORDER — LISINOPRIL 2.5 MG/1
2.5 TABLET ORAL NIGHTLY
Qty: 30 TABLET | Refills: 3 | Status: ON HOLD | OUTPATIENT
Start: 2021-11-03 | End: 2021-11-22 | Stop reason: HOSPADM

## 2021-11-03 RX ORDER — SERTRALINE HYDROCHLORIDE 100 MG/1
100 TABLET, FILM COATED ORAL DAILY
Qty: 30 TABLET | Refills: 3 | Status: ON HOLD | OUTPATIENT
Start: 2021-11-04 | End: 2021-11-22 | Stop reason: SDUPTHER

## 2021-11-03 RX ORDER — LABETALOL HYDROCHLORIDE 5 MG/ML
5 INJECTION, SOLUTION INTRAVENOUS EVERY 10 MIN PRN
Status: DISCONTINUED | OUTPATIENT
Start: 2021-11-03 | End: 2021-11-03 | Stop reason: HOSPADM

## 2021-11-03 RX ORDER — SUCCINYLCHOLINE CHLORIDE 20 MG/ML
INJECTION INTRAMUSCULAR; INTRAVENOUS PRN
Status: DISCONTINUED | OUTPATIENT
Start: 2021-11-03 | End: 2021-11-03 | Stop reason: SDUPTHER

## 2021-11-03 RX ORDER — OLANZAPINE 15 MG/1
30 TABLET ORAL NIGHTLY
Qty: 60 TABLET | Refills: 0 | Status: ON HOLD | OUTPATIENT
Start: 2021-11-03 | End: 2021-11-22 | Stop reason: HOSPADM

## 2021-11-03 RX ORDER — SODIUM CHLORIDE, SODIUM LACTATE, POTASSIUM CHLORIDE, CALCIUM CHLORIDE 600; 310; 30; 20 MG/100ML; MG/100ML; MG/100ML; MG/100ML
INJECTION, SOLUTION INTRAVENOUS CONTINUOUS
Status: DISCONTINUED | OUTPATIENT
Start: 2021-11-03 | End: 2021-11-03 | Stop reason: HOSPADM

## 2021-11-03 RX ADMIN — HYDROXYZINE HYDROCHLORIDE 50 MG: 50 TABLET, FILM COATED ORAL at 08:39

## 2021-11-03 RX ADMIN — KETOROLAC TROMETHAMINE 30 MG: 30 INJECTION, SOLUTION INTRAMUSCULAR; INTRAVENOUS at 07:27

## 2021-11-03 RX ADMIN — ACETAMINOPHEN 650 MG: 325 TABLET ORAL at 09:57

## 2021-11-03 RX ADMIN — Medication 80 MG: at 07:27

## 2021-11-03 RX ADMIN — PANTOPRAZOLE SODIUM 40 MG: 40 TABLET, DELAYED RELEASE ORAL at 08:38

## 2021-11-03 RX ADMIN — SODIUM CHLORIDE, POTASSIUM CHLORIDE, SODIUM LACTATE AND CALCIUM CHLORIDE: 600; 310; 30; 20 INJECTION, SOLUTION INTRAVENOUS at 07:02

## 2021-11-03 RX ADMIN — CETIRIZINE HYDROCHLORIDE 10 MG: 10 TABLET, FILM COATED ORAL at 08:39

## 2021-11-03 RX ADMIN — FLUTICASONE PROPIONATE 1 SPRAY: 50 SPRAY, METERED NASAL at 08:38

## 2021-11-03 RX ADMIN — ACETAMINOPHEN 650 MG: 325 TABLET ORAL at 06:16

## 2021-11-03 RX ADMIN — APIXABAN 5 MG: 5 TABLET, FILM COATED ORAL at 08:39

## 2021-11-03 RX ADMIN — SUCCINYLCHOLINE CHLORIDE 80 MG: 20 INJECTION, SOLUTION INTRAMUSCULAR; INTRAVENOUS at 07:27

## 2021-11-03 RX ADMIN — SERTRALINE HYDROCHLORIDE 100 MG: 100 TABLET ORAL at 08:39

## 2021-11-03 ASSESSMENT — PAIN SCALES - GENERAL
PAINLEVEL_OUTOF10: 0
PAINLEVEL_OUTOF10: 0
PAINLEVEL_OUTOF10: 4
PAINLEVEL_OUTOF10: 6

## 2021-11-03 ASSESSMENT — PAIN - FUNCTIONAL ASSESSMENT: PAIN_FUNCTIONAL_ASSESSMENT: 0-10

## 2021-11-03 NOTE — ANESTHESIA PRE PROCEDURE
current outpatient medications on file.      Facility-Administered Medications Ordered in Other Visits   Medication Dose Route Frequency Provider Last Rate Last Admin    lactated ringers infusion   IntraVENous Continuous [place] MD Alannah        lisinopril (PRINIVIL;ZESTRIL) tablet 2.5 mg  2.5 mg Oral Nightly Girtha Meghanze, APRN - CNP   2.5 mg at 11/02/21 2114    meperidine (DEMEROL) injection 12.5 mg  12.5 mg IntraVENous Q5 Min PRN Bhupendra Sosa MD        HYDROmorphone (DILAUDID) injection 0.5 mg  0.5 mg IntraVENous Q5 Min PRN Bhupendra Sosa MD        labetalol (NORMODYNE;TRANDATE) injection 5 mg  5 mg IntraVENous Q10 Min PRN Bhupendra Sosa MD        lactated ringers infusion   IntraVENous Continuous Zaid Singleton MD   Stopped at 11/01/21 0820    OLANZapine zydis (ZYPREXA) disintegrating tablet 30 mg  30 mg Oral Nightly Troy Street MD   30 mg at 11/02/21 2043    benzocaine (ORAJEL) 10 % mucosal gel   Mouth/Throat PRN Troy Street MD   Given at 10/26/21 2123    lactated ringers infusion   IntraVENous Continuous [place] MD Alannah 100 mL/hr at 10/29/21 0709 Restarted at 10/29/21 0724    fluticasone (FLONASE) 50 MCG/ACT nasal spray 1 spray  1 spray Each Nostril Daily Tonio Ann MD   1 spray at 11/02/21 1206    lactated ringers infusion   IntraVENous Continuous Rex Carlson  mL/hr at 10/22/21 0733 Restarted at 10/22/21 0805    sertraline (ZOLOFT) tablet 100 mg  100 mg Oral Daily DAMARIS Chicas - CNP   100 mg at 11/02/21 1207    prazosin (MINIPRESS) capsule 2 mg  2 mg Oral Nightly Shyrl DAMARIS Mendez - CNP   2 mg at 11/02/21 2042    loperamide (IMODIUM) capsule 2 mg  2 mg Oral 4x Daily PRN Shanti Randolph MD   2 mg at 10/16/21 1945    ondansetron (ZOFRAN-ODT) disintegrating tablet 4 mg  4 mg Oral Q8H PRN Shanti Randolph MD   4 mg at 10/31/21 1851    pantoprazole (PROTONIX) tablet 40 mg  40 mg Oral QAM LADARIUS Mitchell APRN - [place]   40 mg at 11/02/21 0629    nicotine polacrilex (NICORETTE) gum 2 mg  2 mg Oral PRN Nick Mendoza MD   2 mg at 11/02/21 2043    albuterol sulfate  (90 Base) MCG/ACT inhaler 2 puff  2 puff Inhalation Q6H PRN Carolee Ebbs, APRN - CNP   2 puff at 10/30/21 2122    apixaban (ELIQUIS) tablet 5 mg  5 mg Oral BID Carolee Ebbs, APRN - CNP   5 mg at 11/02/21 2046    cetirizine (ZYRTEC) tablet 10 mg  10 mg Oral Daily Carolee Ebbs, APRN - CNP   10 mg at 11/02/21 1207    melatonin tablet 3 mg  3 mg Oral Nightly PRN Carolee Ebbs, APRN - CNP   3 mg at 11/02/21 2043    tiZANidine (ZANAFLEX) tablet 4 mg  4 mg Oral Q8H PRN Nick Mendoza MD   4 mg at 11/02/21 2043    acetaminophen (TYLENOL) tablet 650 mg  650 mg Oral Q4H PRN Carolee Ebbs, APRN - CNP   650 mg at 11/03/21 0616    aluminum & magnesium hydroxide-simethicone (MAALOX) 200-200-20 MG/5ML suspension 30 mL  30 mL Oral Q6H PRN Carolee Ebbs, APRN - CNP   30 mL at 10/31/21 1024    hydrOXYzine (ATARAX) tablet 50 mg  50 mg Oral TID PRN Carolee Ebbs, APRN - CNP   50 mg at 11/02/21 1938    haloperidol (HALDOL) tablet 5 mg  5 mg Oral Q6H PRN Carolee Ebbs, APRN - CNP   5 mg at 10/23/21 2303    haloperidol lactate (HALDOL) injection 5 mg  5 mg IntraMUSCular Q6H PRN Carolee Ebbs, APRN - CNP        And    diphenhydrAMINE (BENADRYL) injection 50 mg  50 mg IntraMUSCular Q6H PRN Carolee Ebbs, APRN - CNP        polyethylene glycol (GLYCOLAX) packet 17 g  17 g Oral Daily PRN Carolee Ebbs, APRN - CNP   17 g at 10/28/21 2039    traZODone (DESYREL) tablet 50 mg  50 mg Oral Nightly PRN Nick Mendoza MD   50 mg at 11/02/21 2043       Allergies:     Allergies   Allergen Reactions    Latex Anaphylaxis and Hives    Abilify [Aripiprazole] Other (See Comments)     agitation    Aspirin Shortness Of Breath    Geodon [Ziprasidone Hcl] Anaphylaxis    Lithium Anaphylaxis    Morphine Anaphylaxis and Hives    Phenobarbital Anaphylaxis    Doxycycline Hives    Seroquel [employer] Fumarate] Other (See Comments)     Suicidal    Thorazine [Chlorpromazine]     Tramadol Other (See Comments)     Epilepsy    Adhesive Tape Rash     Paper tape - causes blistering/rash  paper         Problem List:    Patient Active Problem List   Diagnosis Code    Hyponatremia E87.1    Acute cystitis without hematuria N30.00    Morbid obesity due to excess calories ([facility]) E66.01    PTSD (post-traumatic stress disorder) F43.10    Asthma J45.909    Endometrial cancer ([facility] C54.1    CKD stage G4/A1, GFR 15-29 and albumin creatinine ratio <30 mg/g ([facility]) N18.4    Fibromyalgia M79.7    Neuropathy G62.9    Seizures ([facility]) R56.9    Arthritis M19.90    DVT (deep venous thrombosis) ([facility]) I82.409    Bipolar disorder ([facility]) F31.9    SIADH (syndrome of inappropriate ADH production) ([facility] E22.2    Cyst of left kidney N28.1    Multifocal pneumonia J18.9    Adverse drug reaction T50.905A    Failure of outpatient treatment Z78.9    Intentional drug overdose ([facility] T50.902A    Suicide attempt ([facility] T14.91XA    Depression, acute F32. A    Suspected pulmonary embolism R09.89    Severe recurrent major depressive disorder with psychotic features ([facility]) F33.3    Alcohol abuse F10.10    Marijuana abuse F12.10    Depression with suicidal ideation F32. A, R45.851    Bipolar depression ([facility] F31.9       Past Medical History:        Diagnosis Date    Anxiety     Arthritis     Asthma     Bipolar disorder ([facility]     Bladder cancer ([facility]     Bone cancer ([facility]     Brain cancer ([facility]     Breast cancer ([facility]     Chronic kidney disease     stage 1    COPD (chronic obstructive pulmonary disease) ([facility])     Cyst of left kidney     Depression     Edema     legs/feet/hands    Endometrial cancer ([facility])     chemo    Fibromyalgia     GERD (gastroesophageal reflux disease)     H/O seasonal allergies     Headache(784.0)     History of blood transfusion     no reaction    Hx of blood clots     left leg    Hyponatremia Wt Readings from Last 3 Encounters:   10/12/21 230 lb (104.3 kg)   10/27/21 230 lb (104.3 kg)   11/14/20 181 lb (82.1 kg)     There is no height or weight on file to calculate BMI.    CBC:   Lab Results   Component Value Date    WBC 6.8 10/12/2021    RBC 4.45 10/12/2021    RBC 4.60 11/01/2016    HGB 13.5 10/12/2021    HCT 40.2 10/12/2021    MCV 90.3 10/12/2021    RDW 13.3 10/12/2021     10/12/2021     03/11/2012       CMP:   Lab Results   Component Value Date     10/26/2021    K 4.7 10/26/2021     10/26/2021    CO2 26 10/26/2021    BUN 18 10/26/2021    CREATININE 0.80 10/26/2021    GFRAA >60 10/26/2021    LABGLOM >60 10/26/2021    GLUCOSE 86 10/26/2021    GLUCOSE 128 11/01/2016    PROT 7.0 10/12/2021    CALCIUM 9.2 10/26/2021    BILITOT <0.15 10/12/2021    ALKPHOS 87 10/12/2021    AST 17 10/12/2021    ALT 14 10/12/2021       POC Tests:   Recent Labs     11/03/21  0610   POCGLU 100       Coags:   Lab Results   Component Value Date    PROTIME 11.6 11/13/2020    INR 0.9 11/13/2020    APTT 31.6 11/13/2020       HCG (If Applicable):   Lab Results   Component Value Date    PREGTESTUR NEGATIVE 11/10/2016    HCG NEGATIVE 12/01/2017        ABGs: No results found for: PHART, PO2ART, RAS5EBB, YRJ3MQJ, BEART, S5QTWFXD     Type & Screen (If Applicable):  No results found for: LABABO, LABRH    Drug/Infectious Status (If Applicable):  No results found for: HIV, HEPCAB    COVID-19 Screening (If Applicable):   Lab Results   Component Value Date    COVID19 Not Detected 10/12/2021           Anesthesia Evaluation  Patient summary reviewed and Nursing notes reviewed no history of anesthetic complications:   Airway: Mallampati: II  TM distance: >3 FB   Neck ROM: full   Dental: normal exam         Pulmonary:normal exam  breath sounds clear to auscultation  (+) COPD:  sleep apnea:  asthma:                            Cardiovascular:            Rhythm: regular  Rate: normal                    Neuro/Psych:   (+) seizures (no longer on meds):, neuromuscular disease:, headaches:, psychiatric history:depression/anxiety              ROS comment: PTSD  Fibromyalgia GI/Hepatic/Renal:   (+) GERD:, renal disease: CRI, morbid obesity          Endo/Other:    (+) : arthritis: OA., electrolyte abnormalities, . Abdominal:             Vascular:   + DVT, . Other Findings:               Anesthesia Plan      general     ASA 3       Induction: intravenous. MIPS: Prophylactic antiemetics administered. Anesthetic plan and risks discussed with patient. Plan discussed with CRNA.                   Josy Alcazar MD   11/3/2021
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
## 2021-11-03 NOTE — PLAN OF CARE
Problem: Depressive Behavior With or Without Suicide Precautions:  Goal: Ability to disclose and discuss suicidal ideas will improve  Description: Ability to disclose and discuss suicidal ideas will improve  11/3/2021 0521 by Magan Urbina RN  Outcome: Ongoing     Problem: Altered Mood, Psychotic Behavior:  Goal: Able to verbalize decrease in frequency and intensity of hallucinations  Description: Able to verbalize decrease in frequency and intensity of hallucinations  11/3/2021 0521 by Magan Urbina RN  Outcome: Ongoing   Patient displays improvement impulsive behaviors and reports discharge tomorrow. Patient reports feeling anxious about discharge. She is medications complaint and anticipated for ECT tomorrow morning. She is cooperative with staff and peers. Patient denies suicidal homicidal ideation and hallucinations this shift.

## 2021-11-03 NOTE — BH NOTE
Patient returns from ECT with staff, alert and oriented, anxious due to leaving today, BP taken, 181/84 P 84

## 2021-11-03 NOTE — ANESTHESIA POSTPROCEDURE EVALUATION
Department of Anesthesiology  Postprocedure Note    Patient: Wayne Miguel  MRN: [id number]  YOB: 1980  Date of evaluation: 11/3/2021  Time:  8:49 AM     Procedure Summary     Date: 11/03/21 Room / Location: [address] [place] [address] PACU    Anesthesia Start: 5690 Anesthesia Stop: 0740    Procedure: ECT W/ ANESTHESIA Diagnosis: Major depressive disorder, recurrent, severe with psychotic symptoms    Scheduled Providers:  Responsible Provider: Fransisca Pelayo MD    Anesthesia Type: general ASA Status: 3          Anesthesia Type: general    Sara Phase I: Sara Score: 10    Sara Phase II:      Last vitals: Reviewed and per EMR flowsheets.        Anesthesia Post Evaluation    Comments: POST- ANESTHESIA EVALUATION       Pt Name: Wayne Miguel  MRN: [id number]  YOB: 1980  Date of evaluation: 11/3/2021  Time:  8:49 AM      /63   Pulse 74   Temp 98.3 °F (36.8 °C)   Resp 16   Ht 5' 6\" (1.676 m)   Wt 230 lb (104.3 kg)   SpO2 99%   BMI 37.12 kg/m²      Consciousness Level  Awake  Cardiopulmonary Status  Stable  Pain Adequately Treated YES  Nausea / Vomiting  NO  Adequate Hydration  YES  Anesthesia Related Complications NONE      Electronically signed by Fransisca Pelayo MD on 11/3/2021 at 8:49 AM

## 2021-11-03 NOTE — BH NOTE
[facility]  Discharge Note    Pt discharged with followings belongings:   Dentures: None  Vision - Corrective Lenses: None  Hearing Aid: None  Jewelry: None  Body Piercings Removed: N/A  Clothing: None  Were All Patient Medications Collected?: Not Applicable  Other Valuables: None   Valuables sent home with patient.  Patient education on aftercare instructions: yes  Information faxed to [phone]  by staff  at 10:57 AM .Patient verbalize understanding of AVS:  yes  Status EXAM upon discharge:  Status and Exam  Normal: No  Facial Expression: Worried, Brightened, Elevated  Affect: Appropriate  Level of Consciousness: Alert  Mood:Normal: No  Mood: Anxious  Motor Activity:Normal: Yes  Motor Activity: Decreased  Interview Behavior: Cooperative  Preception: [place] to Person, Elwyn Heydi to Time, [place] to Place, [place] to Situation  Attention:Normal: No  Attention: Distractible  Thought Processes: Circumstantial  Thought Content:Normal: No  Thought Content: Preoccupations  Hallucinations: None  Delusions: No  Memory:Normal: No  Memory: Poor Recent  Insight and Judgment: No  Insight and Judgment: Poor Insight  Present Suicidal Ideation: No  Present Homicidal Ideation: No  Black and white cab to [facility]     Metabolic Screening:    Lab Results   Component Value Date    LABA1C 5.4 03/07/2017       Lab Results   Component Value Date    CHOL 226 (H) 03/07/2017     Lab Results   Component Value Date    TRIG 179 (H) 03/07/2017     Lab Results   Component Value Date    HDL 50 03/07/2017     No components found for: LDLCAL  No results found for: LABVLDL    Amy Cherylene Mane, LPN

## 2021-11-03 NOTE — BH NOTE
Patient given tobacco quitline number [phone] at this time, refusing to call at this time, states \" I just dont want to quit now\"- patient given information as to the dangers of long term tobacco use. Continue to reinforce the importance of tobacco cessation.

## 2021-11-03 NOTE — PROCEDURES
Bilateral ECT Procedure Report  Jaclyn Best   11/3/2021  1980         Attending:Melchor Ortiz MD  Preprocedure diagnosis: Major depressive disorder, recurrent, severe with psychotic symptoms (F33.3)  Postprocedure diagnosis: SAME AS PRE-PROCEDURE DIAGNOSIS  Treatment Number: This is treatment # 5  Patient Status: Inpatient   Type of ECT: Bilateral Brief Pulse  Medications  Preprocedure: Tylenol 650mg and Toradol 15mg  Anesthetic: Methohexital 80 mg  Paralytic: Succinylcholine 80 mg  Post procedure: none needed     Cuff placement: Left Lower Extremity    Thymatron Settings 1st Stimulus:     Pulse width: 1.0 ms   Frequency:  40 hz   % Power:   30 %   Static Impedance: 1590 ohms             Dynamic Impedance: 250 ohms             Motor Seizure Duration: 39 seconds  EEG Seizure Duration: 68 seconds                 The patient's ECT treatment was performed using Thymatron machine  . Timeout:  Time out was performed using two patient identifiers and confirmation of procedure. Patient Preparation: Preprocedure documentation, labs, H&P were all verified and reviewed. The patient was placed in supine position. EEG leads were placed for monitoring of seizure. Sabianist areas bilaterally cleaned and prepped. Pre-Tac solution was applied over stimulus electrode site. Thymapad's then applied to stimulus electrode site bilaterally with the center of the lead placed approximately 1 inch superior to the midpoint of line between canthus and the tragus bilaterally. The patient was pre-oxygenated. Procedure in detail: Medications were administered by anesthesia using intravenous access at the doses listed previously in this summary. Anesthetic administered and once confirmation the patient is anesthetized, the patient's blood pressure cuff on lower extremity was inflated to 100mmHg in excess of patient's blood pressure. At this time, the neuromuscular blockade was administered intravenously by anesthesia.   Upper extremity fasciculation were verified followed by lower extremity fasciculation. Once fasciculations terminated, confirmation of affective neuromuscular blockade demonstrated by absence of withdrawal reflex. Bite blocks were put in place. Static impedance was tested and within appropriate limits. Thymatron settings were confirmed with nursing staff. Staff was cleared from the patient and dose of ECT stimulus was delivered. Motor seizure activity  was observed at duration noted and terminated. EEG seizure activity was observed of duration noted that was confirmed via monitoring EEG and terminated with appropriate postictal suppression noted on EEG. Static impedance and dynamic impedance were documented. Tourniquet on  lower extremity was released and recovery procedures initiated. The patient was mask ventilated during the procedure with no significant drops in oxygenation saturation and tolerated the procedure well without any notable adverse effects. Condition: The patient was in stable condition with stable vital signs and able to follow commands when moved to recovery.

## 2021-11-03 NOTE — GROUP NOTE
Group Therapy Note    Date: 11/3/2021    Group Start Time: 0900  Group End Time: 6388  Group Topic: Community Meeting    REDD BHAMADEO Umaña        Group Therapy Note    Attendees: 5/16         Patient's Goal:  Discuss short term and long term goals    Notes:      Status After Intervention:  Improved    Participation Level:  Active Listener    Participation Quality: Appropriate      Speech:  normal      Thought Process/Content: Logical      Affective Functioning: Congruent      Mood: normal      Level of consciousness:  Alert      Response to Learning: Able to verbalize current knowledge/experience      Endings: None Reported    Modes of Intervention: Education      Discipline Responsible: [address] [employer]      Signature:  Jefry Umaña

## 2021-11-13 ENCOUNTER — HOSPITAL ENCOUNTER (INPATIENT)
Age: 41
LOS: 9 days | Discharge: HOME | DRG: 885 | End: 2021-11-22
Attending: PSYCHIATRY & NEUROLOGY | Admitting: PSYCHIATRY & NEUROLOGY
Payer: MEDICARE

## 2021-11-13 ENCOUNTER — HOSPITAL ENCOUNTER (EMERGENCY)
Age: 41
Discharge: PSYCHIATRIC OR SUBSTANCE USE FACILITY | End: 2021-11-13
Attending: EMERGENCY MEDICINE
Payer: MEDICARE

## 2021-11-13 VITALS
OXYGEN SATURATION: 96 % | SYSTOLIC BLOOD PRESSURE: 117 MMHG | TEMPERATURE: 98.2 F | RESPIRATION RATE: 16 BRPM | HEART RATE: 85 BPM | DIASTOLIC BLOOD PRESSURE: 76 MMHG

## 2021-11-13 DIAGNOSIS — R45.851 SUICIDAL IDEATION: Primary | ICD-10-CM

## 2021-11-13 LAB
-: ABNORMAL
ABSOLUTE EOS #: <0.03 K/UL (ref 0–0.44)
ABSOLUTE IMMATURE GRANULOCYTE: <0.03 K/UL (ref 0–0.3)
ABSOLUTE LYMPH #: 1.77 K/UL (ref 1.1–3.7)
ABSOLUTE MONO #: 0.38 K/UL (ref 0.1–1.2)
ACETAMINOPHEN LEVEL: <5 UG/ML (ref 10–30)
ALBUMIN SERPL-MCNC: 4.3 G/DL (ref 3.5–5.2)
ALBUMIN/GLOBULIN RATIO: 1.4 (ref 1–2.5)
ALP BLD-CCNC: 95 U/L (ref 35–104)
ALT SERPL-CCNC: 12 U/L (ref 5–33)
AMORPHOUS: ABNORMAL
AMPHETAMINE SCREEN URINE: POSITIVE
ANION GAP SERPL CALCULATED.3IONS-SCNC: 11 MMOL/L (ref 9–17)
AST SERPL-CCNC: 16 U/L
BACTERIA: ABNORMAL
BARBITURATE SCREEN URINE: NEGATIVE
BASOPHILS # BLD: 0 % (ref 0–2)
BASOPHILS ABSOLUTE: 0.03 K/UL (ref 0–0.2)
BENZODIAZEPINE SCREEN, URINE: NEGATIVE
BILIRUB SERPL-MCNC: 0.28 MG/DL (ref 0.3–1.2)
BILIRUBIN URINE: NEGATIVE
BILIRUBIN URINE: NEGATIVE
BUN BLDV-MCNC: 14 MG/DL (ref 6–20)
BUN/CREAT BLD: ABNORMAL (ref 9–20)
BUPRENORPHINE URINE: ABNORMAL
CALCIUM SERPL-MCNC: 9.4 MG/DL (ref 8.6–10.4)
CANNABINOID SCREEN URINE: POSITIVE
CASTS UA: ABNORMAL /LPF (ref 0–2)
CHLORIDE BLD-SCNC: 101 MMOL/L (ref 98–107)
CO2: 22 MMOL/L (ref 20–31)
COCAINE METABOLITE, URINE: POSITIVE
COLOR: YELLOW
COLOR: YELLOW
COMMENT UA: ABNORMAL
COMMENT UA: ABNORMAL
CREAT SERPL-MCNC: 0.74 MG/DL (ref 0.5–0.9)
CRYSTALS, UA: ABNORMAL /HPF
DIFFERENTIAL TYPE: ABNORMAL
EOSINOPHILS RELATIVE PERCENT: 0 % (ref 1–4)
EPITHELIAL CELLS UA: ABNORMAL /HPF (ref 0–5)
ETHANOL PERCENT: <0.01 %
ETHANOL: <10 MG/DL
GFR AFRICAN AMERICAN: >60 ML/MIN
GFR NON-AFRICAN AMERICAN: >60 ML/MIN
GFR SERPL CREATININE-BSD FRML MDRD: ABNORMAL ML/MIN/{1.73_M2}
GFR SERPL CREATININE-BSD FRML MDRD: ABNORMAL ML/MIN/{1.73_M2}
GLUCOSE BLD-MCNC: 101 MG/DL (ref 70–99)
GLUCOSE URINE: NEGATIVE
GLUCOSE URINE: NEGATIVE
HCT VFR BLD CALC: 40.5 % (ref 36.3–47.1)
HEMOGLOBIN: 13.7 G/DL (ref 11.9–15.1)
IMMATURE GRANULOCYTES: 0 %
KETONES, URINE: ABNORMAL
KETONES, URINE: ABNORMAL
LEUKOCYTE ESTERASE, URINE: NEGATIVE
LEUKOCYTE ESTERASE, URINE: NEGATIVE
LIPASE: 27 U/L (ref 13–60)
LYMPHOCYTES # BLD: 26 % (ref 24–43)
MCH RBC QN AUTO: 30.6 PG (ref 25.2–33.5)
MCHC RBC AUTO-ENTMCNC: 33.8 G/DL (ref 28.4–34.8)
MCV RBC AUTO: 90.4 FL (ref 82.6–102.9)
MDMA URINE: ABNORMAL
METHADONE SCREEN, URINE: NEGATIVE
METHAMPHETAMINE, URINE: ABNORMAL
MONOCYTES # BLD: 6 % (ref 3–12)
MUCUS: ABNORMAL
NITRITE, URINE: NEGATIVE
NITRITE, URINE: NEGATIVE
NRBC AUTOMATED: 0 PER 100 WBC
OPIATES, URINE: NEGATIVE
OTHER OBSERVATIONS UA: ABNORMAL
OXYCODONE SCREEN URINE: NEGATIVE
PDW BLD-RTO: 12.4 % (ref 11.8–14.4)
PH UA: 5.5 (ref 5–8)
PH UA: 6 (ref 5–8)
PHENCYCLIDINE, URINE: NEGATIVE
PLATELET # BLD: 437 K/UL (ref 138–453)
PLATELET ESTIMATE: ABNORMAL
PMV BLD AUTO: 8.8 FL (ref 8.1–13.5)
POTASSIUM SERPL-SCNC: 3.9 MMOL/L (ref 3.7–5.3)
PROPOXYPHENE, URINE: ABNORMAL
PROTEIN UA: NEGATIVE
PROTEIN UA: NEGATIVE
RBC # BLD: 4.48 M/UL (ref 3.95–5.11)
RBC # BLD: ABNORMAL 10*6/UL
RBC UA: ABNORMAL /HPF (ref 0–2)
RENAL EPITHELIAL, UA: ABNORMAL /HPF
SALICYLATE LEVEL: <1 MG/DL (ref 3–10)
SARS-COV-2, RAPID: NOT DETECTED
SEG NEUTROPHILS: 68 % (ref 36–65)
SEGMENTED NEUTROPHILS ABSOLUTE COUNT: 4.59 K/UL (ref 1.5–8.1)
SODIUM BLD-SCNC: 134 MMOL/L (ref 135–144)
SPECIFIC GRAVITY UA: 1.03 (ref 1–1.03)
SPECIFIC GRAVITY UA: 1.03 (ref 1–1.03)
SPECIMEN DESCRIPTION: NORMAL
TEST INFORMATION: ABNORMAL
TOTAL PROTEIN: 7.4 G/DL (ref 6.4–8.3)
TOXIC TRICYCLIC SC,BLOOD: NEGATIVE
TRICHOMONAS: ABNORMAL
TRICYCLIC ANTIDEPRESSANTS, UR: ABNORMAL
TURBIDITY: ABNORMAL
TURBIDITY: CLEAR
URINE HGB: NEGATIVE
URINE HGB: NEGATIVE
UROBILINOGEN, URINE: NORMAL
UROBILINOGEN, URINE: NORMAL
WBC # BLD: 6.8 K/UL (ref 3.5–11.3)
WBC # BLD: ABNORMAL 10*3/UL
WBC UA: ABNORMAL /HPF (ref 0–5)
YEAST: ABNORMAL

## 2021-11-13 PROCEDURE — 1240000000 HC EMOTIONAL WELLNESS R&B

## 2021-11-13 PROCEDURE — 87086 URINE CULTURE/COLONY COUNT: CPT

## 2021-11-13 PROCEDURE — 6370000000 HC RX 637 (ALT 250 FOR IP): Performed by: STUDENT IN AN ORGANIZED HEALTH CARE EDUCATION/TRAINING PROGRAM

## 2021-11-13 PROCEDURE — 90792 PSYCH DIAG EVAL W/MED SRVCS: CPT | Performed by: PSYCHIATRY & NEUROLOGY

## 2021-11-13 PROCEDURE — 87635 SARS-COV-2 COVID-19 AMP PRB: CPT

## 2021-11-13 PROCEDURE — 81001 URINALYSIS AUTO W/SCOPE: CPT

## 2021-11-13 PROCEDURE — 6360000002 HC RX W HCPCS: Performed by: STUDENT IN AN ORGANIZED HEALTH CARE EDUCATION/TRAINING PROGRAM

## 2021-11-13 PROCEDURE — 36415 COLL VENOUS BLD VENIPUNCTURE: CPT

## 2021-11-13 PROCEDURE — G0480 DRUG TEST DEF 1-7 CLASSES: HCPCS

## 2021-11-13 PROCEDURE — 80053 COMPREHEN METABOLIC PANEL: CPT

## 2021-11-13 PROCEDURE — 85025 COMPLETE CBC W/AUTO DIFF WBC: CPT

## 2021-11-13 PROCEDURE — 99285 EMERGENCY DEPT VISIT HI MDM: CPT

## 2021-11-13 PROCEDURE — 80307 DRUG TEST PRSMV CHEM ANLYZR: CPT

## 2021-11-13 PROCEDURE — 6370000000 HC RX 637 (ALT 250 FOR IP)

## 2021-11-13 PROCEDURE — 96374 THER/PROPH/DIAG INJ IV PUSH: CPT

## 2021-11-13 PROCEDURE — APPSS60 APP SPLIT SHARED TIME 46-60 MINUTES

## 2021-11-13 PROCEDURE — 2580000003 HC RX 258: Performed by: STUDENT IN AN ORGANIZED HEALTH CARE EDUCATION/TRAINING PROGRAM

## 2021-11-13 PROCEDURE — 80179 DRUG ASSAY SALICYLATE: CPT

## 2021-11-13 PROCEDURE — 81003 URINALYSIS AUTO W/O SCOPE: CPT

## 2021-11-13 PROCEDURE — 80143 DRUG ASSAY ACETAMINOPHEN: CPT

## 2021-11-13 PROCEDURE — 83690 ASSAY OF LIPASE: CPT

## 2021-11-13 RX ORDER — ONDANSETRON 2 MG/ML
4 INJECTION INTRAMUSCULAR; INTRAVENOUS ONCE
Status: COMPLETED | OUTPATIENT
Start: 2021-11-13 | End: 2021-11-13

## 2021-11-13 RX ORDER — PANTOPRAZOLE SODIUM 40 MG/1
40 TABLET, DELAYED RELEASE ORAL DAILY
Status: DISCONTINUED | OUTPATIENT
Start: 2021-11-13 | End: 2021-11-22 | Stop reason: HOSPADM

## 2021-11-13 RX ORDER — FLUTICASONE PROPIONATE 50 MCG
1 SPRAY, SUSPENSION (ML) NASAL DAILY
Status: DISCONTINUED | OUTPATIENT
Start: 2021-11-13 | End: 2021-11-22 | Stop reason: HOSPADM

## 2021-11-13 RX ORDER — NICOTINE 21 MG/24HR
1 PATCH, TRANSDERMAL 24 HOURS TRANSDERMAL DAILY
Status: DISCONTINUED | OUTPATIENT
Start: 2021-11-13 | End: 2021-11-17

## 2021-11-13 RX ORDER — PRAZOSIN HYDROCHLORIDE 1 MG/1
2 CAPSULE ORAL NIGHTLY
Status: DISCONTINUED | OUTPATIENT
Start: 2021-11-13 | End: 2021-11-22 | Stop reason: HOSPADM

## 2021-11-13 RX ORDER — ALBUTEROL SULFATE 90 UG/1
2 AEROSOL, METERED RESPIRATORY (INHALATION) EVERY 6 HOURS PRN
Status: DISCONTINUED | OUTPATIENT
Start: 2021-11-13 | End: 2021-11-22 | Stop reason: HOSPADM

## 2021-11-13 RX ORDER — SERTRALINE HYDROCHLORIDE 25 MG/1
25 TABLET, FILM COATED ORAL DAILY
Status: DISCONTINUED | OUTPATIENT
Start: 2021-11-13 | End: 2021-11-16

## 2021-11-13 RX ORDER — OLANZAPINE 5 MG/1
5 TABLET ORAL NIGHTLY
Status: DISCONTINUED | OUTPATIENT
Start: 2021-11-13 | End: 2021-11-16

## 2021-11-13 RX ORDER — POLYETHYLENE GLYCOL 3350 17 G/17G
17 POWDER, FOR SOLUTION ORAL DAILY PRN
Status: DISCONTINUED | OUTPATIENT
Start: 2021-11-13 | End: 2021-11-22 | Stop reason: HOSPADM

## 2021-11-13 RX ORDER — HYDROXYZINE 50 MG/1
50 TABLET, FILM COATED ORAL 3 TIMES DAILY PRN
Status: DISCONTINUED | OUTPATIENT
Start: 2021-11-13 | End: 2021-11-22 | Stop reason: HOSPADM

## 2021-11-13 RX ORDER — SODIUM CHLORIDE, SODIUM LACTATE, POTASSIUM CHLORIDE, AND CALCIUM CHLORIDE .6; .31; .03; .02 G/100ML; G/100ML; G/100ML; G/100ML
1000 INJECTION, SOLUTION INTRAVENOUS ONCE
Status: COMPLETED | OUTPATIENT
Start: 2021-11-13 | End: 2021-11-13

## 2021-11-13 RX ORDER — LISINOPRIL 5 MG/1
2.5 TABLET ORAL NIGHTLY
Status: DISCONTINUED | OUTPATIENT
Start: 2021-11-13 | End: 2021-11-19

## 2021-11-13 RX ORDER — CETIRIZINE HYDROCHLORIDE 10 MG/1
10 TABLET ORAL DAILY
Status: DISCONTINUED | OUTPATIENT
Start: 2021-11-13 | End: 2021-11-22 | Stop reason: HOSPADM

## 2021-11-13 RX ORDER — PANTOPRAZOLE SODIUM 40 MG/1
40 TABLET, DELAYED RELEASE ORAL ONCE
Status: COMPLETED | OUTPATIENT
Start: 2021-11-13 | End: 2021-11-13

## 2021-11-13 RX ORDER — LANOLIN ALCOHOL/MO/W.PET/CERES
3 CREAM (GRAM) TOPICAL NIGHTLY PRN
Status: DISCONTINUED | OUTPATIENT
Start: 2021-11-13 | End: 2021-11-22 | Stop reason: HOSPADM

## 2021-11-13 RX ORDER — TRAZODONE HYDROCHLORIDE 50 MG/1
50 TABLET ORAL NIGHTLY PRN
Status: DISCONTINUED | OUTPATIENT
Start: 2021-11-13 | End: 2021-11-22 | Stop reason: HOSPADM

## 2021-11-13 RX ORDER — ONDANSETRON 4 MG/1
8 TABLET, ORALLY DISINTEGRATING ORAL EVERY 8 HOURS PRN
Status: DISCONTINUED | OUTPATIENT
Start: 2021-11-13 | End: 2021-11-22 | Stop reason: HOSPADM

## 2021-11-13 RX ORDER — MAGNESIUM HYDROXIDE/ALUMINUM HYDROXICE/SIMETHICONE 120; 1200; 1200 MG/30ML; MG/30ML; MG/30ML
30 SUSPENSION ORAL EVERY 6 HOURS PRN
Status: DISCONTINUED | OUTPATIENT
Start: 2021-11-13 | End: 2021-11-22 | Stop reason: HOSPADM

## 2021-11-13 RX ORDER — ACETAMINOPHEN 325 MG/1
650 TABLET ORAL EVERY 4 HOURS PRN
Status: DISCONTINUED | OUTPATIENT
Start: 2021-11-13 | End: 2021-11-22 | Stop reason: HOSPADM

## 2021-11-13 RX ADMIN — APIXABAN 5 MG: 5 TABLET, FILM COATED ORAL at 12:30

## 2021-11-13 RX ADMIN — PRAZOSIN HYDROCHLORIDE 2 MG: 1 CAPSULE ORAL at 23:26

## 2021-11-13 RX ADMIN — PANTOPRAZOLE SODIUM 40 MG: 40 TABLET, DELAYED RELEASE ORAL at 12:30

## 2021-11-13 RX ADMIN — FLUTICASONE PROPIONATE 1 SPRAY: 50 SPRAY, METERED NASAL at 23:28

## 2021-11-13 RX ADMIN — ONDANSETRON 4 MG: 2 INJECTION, SOLUTION INTRAMUSCULAR; INTRAVENOUS at 12:30

## 2021-11-13 RX ADMIN — APIXABAN 5 MG: 5 TABLET, FILM COATED ORAL at 23:26

## 2021-11-13 RX ADMIN — OLANZAPINE 5 MG: 5 TABLET, FILM COATED ORAL at 23:26

## 2021-11-13 RX ADMIN — LISINOPRIL 2.5 MG: 5 TABLET ORAL at 23:27

## 2021-11-13 RX ADMIN — PANTOPRAZOLE SODIUM 40 MG: 40 TABLET, DELAYED RELEASE ORAL at 17:40

## 2021-11-13 RX ADMIN — Medication 3 MG: at 23:26

## 2021-11-13 RX ADMIN — SERTRALINE HYDROCHLORIDE 100 MG: 50 TABLET, FILM COATED ORAL at 12:30

## 2021-11-13 RX ADMIN — CETIRIZINE HYDROCHLORIDE 10 MG: 10 TABLET, FILM COATED ORAL at 17:40

## 2021-11-13 RX ADMIN — SODIUM CHLORIDE, POTASSIUM CHLORIDE, SODIUM LACTATE AND CALCIUM CHLORIDE 1000 ML: 600; 310; 30; 20 INJECTION, SOLUTION INTRAVENOUS at 12:32

## 2021-11-13 ASSESSMENT — ENCOUNTER SYMPTOMS
DIARRHEA: 1
SORE THROAT: 0
CONSTIPATION: 0
NAUSEA: 0
ABDOMINAL PAIN: 0
VOMITING: 0
TROUBLE SWALLOWING: 0
SHORTNESS OF BREATH: 0

## 2021-11-13 ASSESSMENT — SLEEP AND FATIGUE QUESTIONNAIRES
DO YOU USE A SLEEP AID: NO
DO YOU HAVE DIFFICULTY SLEEPING: YES
DIFFICULTY FALLING ASLEEP: YES
AVERAGE NUMBER OF SLEEP HOURS: 3
SLEEP PATTERN: DIFFICULTY FALLING ASLEEP;RESTLESSNESS
DIFFICULTY STAYING ASLEEP: YES
DIFFICULTY ARISING: NO
RESTFUL SLEEP: NO

## 2021-11-13 ASSESSMENT — PAIN SCALES - GENERAL: PAINLEVEL_OUTOF10: 6

## 2021-11-13 ASSESSMENT — PATIENT HEALTH QUESTIONNAIRE - PHQ9: SUM OF ALL RESPONSES TO PHQ QUESTIONS 1-9: 24

## 2021-11-13 ASSESSMENT — PAIN DESCRIPTION - PAIN TYPE: TYPE: CHRONIC PAIN

## 2021-11-13 ASSESSMENT — PAIN DESCRIPTION - LOCATION: LOCATION: GENERALIZED

## 2021-11-13 ASSESSMENT — LIFESTYLE VARIABLES: HISTORY_ALCOHOL_USE: YES

## 2021-11-13 NOTE — ED TRIAGE NOTES
Pt sts she is her because she is having SI and \"might be dehydrated\". Pt is alert ox3 and cooperative at this time.

## 2021-11-13 NOTE — ED NOTES
Reviewed pt case with on call psychiatrist who finds that pt meets criteria for inpatient psychiatric admission. Pt to be admitted to the [facility] under the care of Dr. Josephine Lewis with a diagnosis of depression with suicidal ideation.      OMAR Martinez  11/13/21 3274 16-Apr-2018
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
## 2021-11-13 NOTE — ED PROVIDER NOTES
[facility]     Emergency Department     Faculty Attestation    I performed a history and physical examination of the patient and discussed management with the resident. I have reviewed and agree with the residents findings including all diagnostic interpretations, and treatment plans as written at the time of my review. Any areas of disagreement are noted on the chart. I was personally present for the key portions of any procedures. I have documented in the chart those procedures where I was not present during the key portions. For Physician Assistant/ Nurse Practitioner cases/documentation I have personally evaluated this patient and have completed at least one if not all key elements of the E/M (history, physical exam, and MDM). Additional findings are as noted. This patient was evaluated in the Emergency Department for symptoms described in the history of present illness. The patient was evaluated in the context of the global COVID-19 pandemic, which necessitated consideration that the patient might be at risk for infection with the SARS-CoV-2 virus that causes COVID-19. Institutional protocols and algorithms that pertain to the evaluation of patients at risk for COVID-19 are in a state of rapid change based on information released by regulatory bodies including the CDC and federal and state organizations. These policies and algorithms were followed during the patient's care in the ED. Primary Care Physician: No primary care provider on file. History: This is a 46 y.o. female who presents to the Emergency Department with complaint of generalized complaints of body movements and aches for the last week. The patient states she been off of her psychiatric medication for last week and a half as well. The patient also complains of suicidal ideation. Physical:   oral temperature is 98.2 °F (36.8 °C).  Her blood pressure is 117/76 and her pulse is 85. Her respiration is 16 and oxygen saturation is 96%. Awake alert oriented heart regular rate and rhythm patient is no respiratory distress noted. Impression: Myalgia, suicidal ideation    Plan: [facility] labs, psychiatric consultation      (Please note that portions of this note were completed with a voice recognition program.  Efforts were made to edit the dictations but occasionally words are mis-transcribed.)    Yang Richardson.  Anita Sanchez MD, [facility]  Attending Emergency Medicine Physician        Roland Rinne, MD  11/13/21 [id number]

## 2021-11-13 NOTE — ED PROVIDER NOTES
[facility] HANDOFF       Handoff taken on the following patient from prior Attending Physician:  Pt Name: Marilia Bentley  PCP:  No primary care provider on file. Attestation  I was available and discussed any additional care issues that arose and coordinated the management plans with the resident(s) caring for the patient during my duty period. Any areas of disagreement with resident's documentation of care or procedures are noted on the chart. I was personally present for the key portions of any/all procedures during my duty period. I have documented in the chart those procedures where I was not present during the key portions.          CHIEF COMPLAINT       Chief Complaint   Patient presents with    Suicidal         CURRENT MEDICATIONS     Previous Medications  Discharge Medication List as of 11/13/2021  3:59 PM      CONTINUE these medications which have NOT CHANGED    Details   pantoprazole (PROTONIX) 40 MG tablet Take 1 tablet by mouth daily, Disp-30 tablet, R-3Normal      melatonin 3 MG TABS tablet Take 1 tablet by mouth nightly as needed (insomnia), Disp-30 tablet, R-3Normal      OLANZapine (ZYPREXA) 15 MG tablet Take 2 tablets by mouth nightly, Disp-60 tablet, R-0Normal      prazosin (MINIPRESS) 2 MG capsule Take 1 capsule by mouth nightly, Disp-30 capsule, R-3Normal      lisinopril (PRINIVIL;ZESTRIL) 2.5 MG tablet Take 1 tablet by mouth nightly, Disp-30 tablet, R-3Normal      apixaban (ELIQUIS) 5 MG TABS tablet Take 1 tablet by mouth 2 times daily, Disp-60 tablet, R-0Normal      sertraline (ZOLOFT) 100 MG tablet Take 1 tablet by mouth daily, Disp-30 tablet, R-3Normal      traZODone (DESYREL) 50 MG tablet Take 1 tablet by mouth nightly as needed for Sleep, Disp-30 tablet, R-0Normal      fluticasone (FLONASE) 50 MCG/ACT nasal spray 1 spray by Each Nostril route dailyHistorical Med      ondansetron (ZOFRAN-ODT) 8 MG TBDP disintegrating tablet Place 8 mg under the tongue every 8 hours as needed for Nausea or VomitingHistorical Med      albuterol sulfate HFA (VENTOLIN HFA) 108 (90 Base) MCG/ACT inhaler Inhale 2 puffs into the lungs every 6 hours as needed for WheezingHistorical Med      cetirizine (ZYRTEC) 10 MG tablet Take 10 mg by mouth dailyHistorical Med             Encounter Medications  Orders Placed This Encounter   Medications    pantoprazole (PROTONIX) tablet 40 mg    DISCONTD: apixaban (ELIQUIS) tablet 5 mg    DISCONTD: sertraline (ZOLOFT) tablet 100 mg    lactated ringers bolus    ondansetron (ZOFRAN) injection 4 mg       ALLERGIES     is allergic to latex, abilify [aripiprazole], aspirin, geodon [ziprasidone hcl], lithium, morphine, phenobarbital, doxycycline, seroquel [quetiapine fumarate], thorazine [chlorpromazine], tramadol, and adhesive tape.       RECENT VITALS:   Temp: 98.2 °F (36.8 °C),  Pulse: 85, Resp: 16, BP: 117/76    RADIOLOGY:   No orders to display       LABS:  Labs Reviewed   CBC WITH AUTO DIFFERENTIAL - Abnormal; Notable for the following components:       Result Value    Seg Neutrophils 68 (*)     Eosinophils % 0 (*)     All other components within normal limits   COMPREHENSIVE METABOLIC PANEL W/ REFLEX TO MG FOR LOW K - Abnormal; Notable for the following components:    Glucose 101 (*)     Sodium 134 (*)     Total Bilirubin 0.28 (*)     All other components within normal limits   TOX SCR, BLD, ED - Abnormal; Notable for the following components:    Acetaminophen Level <5 (*)     Salicylate Lvl <1 (*)     All other components within normal limits   URINE DRUG SCREEN - Abnormal; Notable for the following components:    Amphetamine Screen, Ur POSITIVE (*)     Cocaine Metabolite, Urine POSITIVE (*)     Cannabinoid Scrn, Ur POSITIVE (*)     All other components within normal limits   URINE RT REFLEX TO CULTURE - Abnormal; Notable for the following components:    Turbidity UA Cloudy (*)     Ketones, Urine SMALL (*)     All other components within normal limits URINALYSIS - Abnormal; Notable for the following components:    Ketones, Urine SMALL (*)     All other components within normal limits   MICROSCOPIC URINALYSIS - Abnormal; Notable for the following components:    Bacteria, UA FEW (*)     Mucus, UA 3+ (*)     Yeast, UA FEW (*)     All other components within normal limits   COVID-19, RAPID   CULTURE, URINE   LIPASE     History of depression, multiple ECT, now suicidal.  Awaiting transfer. PLAN/ TASKS OUTSTANDING     Awaiting bed availability and transfer      (Please note that portions of this note were completed with a voice recognition program.  Efforts were made to edit the dictations but occasionally words are mis-transcribed. )    Nick Guallpa MD,, MD, F.A.C.E.P.   Attending Emergency Physician        Nick Guallpa MD  11/13/21 4621
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
## 2021-11-13 NOTE — BH NOTE
Patient given tobacco quitline number 47229359116 at this time, refusing to call at this time, states \" I just dont want to quit now\"- patient given information as to the dangers of long term tobacco use. Continue to reinforce the importance of tobacco cessation.

## 2021-11-13 NOTE — ED NOTES
Provisional Diagnosis:    Depression with suicidal ideations    Psychosocial and Contextual Factors:     Homeless, recent substance use, relationship stressors, anniversary of the loss of her son. C-SSRS Summary:    Patient: X  Family:  Agency:    Present Suicidal Behavior:    Verbal: ideation with plan to overdose on medication    Attempt:  denied    Past Suicidal Behavior:    Verbal:  ideations    Attempt: reports an attempt in 2017, does not remember how she attempted    Self-Injurious/Self-Mutilation: denied      Protective Factors:    Does receive a monthly disability income, has health insurance, seeking help for mental health concerns, reports to having friends    Risk Factors:    Not currently linked with outpatient community mental health supports, not taking psychiatric medications, out of money for the month, homeless, previous suicide attempt, reports to not being able to contract for safety. Clinical Summary:    Patient is a 46year old  female that presents to the ED with suicidal ideations with plan to overdose on medications. Patient states that she was recently discharged from the [facility] on 11/3/21 and states her depression has been worsening over the course of the last 5 days. Patient reports that she is not taking and of her physical health or mental health medications and did not follow up with community mental health treatment as encouraged. She reports non follow through was as a result of not having any energy or motivation to do so and transportation barriers. Patient states that she is having difficulty sleeping, feels \"panicky\" with racing thoughts that cause her to [facility] awake. \"  She states that the anniversary of the death of her son is approaching and she recently ended a relationship with her significant other. Patient completed ECT treatments and reports memory issues on account of that intervention.   Patient reports to have a previous suicide attempts in 2017, unknown method. Patient states that she has a history of substance use concerns in regard to alcohol use. Patient reports that she was using alcohol and methamphetamines three days ago for a total of three days. She reports to not feeling well today, abdominal pain, and is unsure if she is experiencing withdraw symptoms. Patient additional reports to using medical marijuana for treatment of her anxiety and PTSD. Level of Care Disposition:    Will consult with on call provider for admission to the [facility] when patient is medically cleared.      OMAR Perry  11/13/21 [id number]
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
## 2021-11-13 NOTE — ED NOTES
[] Mushtaq    [] [address]    [x]  [address] [place] ASSESSMENT      Y  N     [x] [] In the past two weeks have you had thoughts of hurting yourself in any way? [x] [] In the past two weeks have you had thoughts that you would be better off dead? [] [x] Have you made a suicide attempt in the past two months? [] [x] Do you have a plan for hurting yourself or suicide? [] [x] Presence of hallucinations/voices related to hurting himself or herself or someone else. SUICIDE/SECURITY WATCH PRECAUTION CHECKLIST     Orders    [x]  Suicide/Security Watch Precautions initiated as checked below:   11/13/21 12:49 PM EST BH31/BH31A    [x] Notified physician:  Rhonda Morgan MD  11/13/21 12:49 PM EST    [x] Orders obtained as appropriate:     [x] 1:1 Observer     [] Psych Consult     [] Psych Consult    Name:  Date:  Time:    [x] 1:1 Observer, Notified by:  Eugene Fernandez RN    Contact Nurse Supervisor    [x] Remove all personal clothes from room and place in snap/paper gown/pants. Slipper only    [x] Remove all personal belongings from room and secured away from patient. Documentation    [x] Initiate Suicide/Security Watch Precaution Flow Sheet    [x] Initiate individualized Care Plan/Problem    [x] Document why precautions initiated on flow sheet (Initiate Nursing Care Plan/Problem)    [x] 1:1 Observer in place; instructions provided. Suicide precautions require observer be within arms length. [x] Nurse-Observer Communication Hand-off initiated by RN, reviewed with Observer. Subsequently used as Hand Off between Observers. [x] Initiate every 15 minute observations per observer as delegated by the RN.     [x] Initiate RN assessment and documentation    Environmental Scan  Search Criteria and Process: OPTIONAL, see Search Policy    [x] Reason for search:security    [] Nursing in presence of second person to search patient    [x] Patient notified of reason for body assessment and belongings search:     Persons present during search:security   Results of search and disposition:security       Searchers Name: security    These items or items similar should be removed from the room:   [] Chairs   [] Telephone   [] Trash cans and liners   [] Plastic utensils (order Patient Safety tray)   [] Empty or remove Sharps containers   [x] All personal clothing/belongings removed   [x] All unnecessary lead wires, electrical cords, draw cords, etc.   [] Flowers and plants   [] Double check for lighters, matches, razors, any glass items etc that can be used as weapons. Person completing Checklist: Ayan Marques RN       GENERAL INFORMATION     Y  N     [x] [] Has the patient been informed that they are on a watch and what that means? [x] [] Can the patient get out of Bed without nursing assistance? [x] [] Can the patient use the restroom without nursing assistance? [x] [] Can the patient walk the halls to [place] their legs? \"   [] [x] Does the patient have metal utensils? [x] [] Have the patient's belongings been placed out of control of the patient? [x] [] Have the patient and his/her belongings been checked for contraband? [x] [] Is the patient under any visitor restrictions? If Yes, explain:policy   [] [x] Is the patient under an alias? [facility] Name:   Authorized visitors (no more than two are to be on the list)   Name/Relationship:   Name/Relationship:     Name of Staff member that you  Received this information from? staff    General Description:    Trista Henry BH31/BH31A female 46 y.o. Admission weight:      Race: [x]  [] Black  []   []   [] [place] [] Other  Facial Hair:  [] Yes  [x] No  If yes, please describe: Identifying Marks (i.e. Visible tattoos, scars, etc... ):     NURSING CARE PLAN    Nursing Diagnosis: Risk of Self Directed Harm  [x] Actual  [] Potential  Date Started: 11/13/21      Etiological Factors: (related to)  [x] Expressed or implied suicidal ideation/behavior  [] Depression  [] Suicide attempt      [x] Low self-esteem  [] Hallucinations      [] Feeling of Hopelessness  [] Substance abuse or withdrawal    [] Dysfunctional family  [] Major traumatic event, eg., divorce, etc   [] Excessive stress/anxiety    11/13/21    Expected Outcomes    Patient will:   [x] Patient will remain safe for the duration of their stay   [x] Patient's environment will be safe, eg. Free of potential suicide weapons   [] Verbalize Recovery from suicidal episode and improvement in self-worth   [x] Discuss feeling that precipitated suicide attempt/thoughts/behavior   [] Will describe available resources for crisis prevention and management   [] Will verbalize positive coping skills     Nursing Intervention   [x] Assessment and Observations hourly   [x] Suicide Precautions implemented with patient, should be 1:1 observation   [x] Document observation [id number] and RN assessment hourly   [] Consult physician for:    [] Psychiatric consult    [] Pharmacological therapy    [] Other:    [x] Patient search completed by security   [x] Initiated appropriate safety protocols by removing from the patient's environment anything that could be used to inflict self injury, eg. Order safe tray, snap gown, etc   [x] Maintain open, warm, caring, non-judgmental attitude/manner towards patient   [] Discuss advantages and disadvantages of existing coping methods/skills   [x] Assist and educate patient with identifying present strengths and coping skills   [x] Keep patient informed regarding plan of care and provide clear concise explanations. Provide the patient/family education information as well as telephone numbers and other information about crisis centers, hot lines, and counselors.     Discharge Planning:   [] Referral  [] Groups [] Health agencies  [] Other:          Jose Sullivan RN  11/13/21 [id number]

## 2021-11-13 NOTE — ED NOTES
Pt to [facility], pt cooperative at this time.       Robert Arroyo RN  11/13/21 2639

## 2021-11-13 NOTE — ED NOTES
Requesting PO, kept NPO until testing has resulted and further orders.       Josefa Hayes RN  11/13/21 5860

## 2021-11-14 LAB
CULTURE: NORMAL
Lab: NORMAL
SPECIMEN DESCRIPTION: NORMAL

## 2021-11-14 PROCEDURE — 6370000000 HC RX 637 (ALT 250 FOR IP): Performed by: PSYCHIATRY & NEUROLOGY

## 2021-11-14 PROCEDURE — 6370000000 HC RX 637 (ALT 250 FOR IP): Performed by: INTERNAL MEDICINE

## 2021-11-14 PROCEDURE — 6370000000 HC RX 637 (ALT 250 FOR IP)

## 2021-11-14 PROCEDURE — 1240000000 HC EMOTIONAL WELLNESS R&B

## 2021-11-14 PROCEDURE — 99223 1ST HOSP IP/OBS HIGH 75: CPT | Performed by: INTERNAL MEDICINE

## 2021-11-14 RX ORDER — AMOXICILLIN AND CLAVULANATE POTASSIUM 500; 125 MG/1; MG/1
1 TABLET, FILM COATED ORAL EVERY 12 HOURS SCHEDULED
Status: DISCONTINUED | OUTPATIENT
Start: 2021-11-14 | End: 2021-11-22 | Stop reason: HOSPADM

## 2021-11-14 RX ADMIN — APIXABAN 5 MG: 5 TABLET, FILM COATED ORAL at 10:18

## 2021-11-14 RX ADMIN — FLUTICASONE PROPIONATE 1 SPRAY: 50 SPRAY, METERED NASAL at 10:18

## 2021-11-14 RX ADMIN — HYDROXYZINE HYDROCHLORIDE 50 MG: 50 TABLET, FILM COATED ORAL at 21:47

## 2021-11-14 RX ADMIN — CETIRIZINE HYDROCHLORIDE 10 MG: 10 TABLET, FILM COATED ORAL at 10:18

## 2021-11-14 RX ADMIN — HYDROXYZINE HYDROCHLORIDE 50 MG: 50 TABLET, FILM COATED ORAL at 10:22

## 2021-11-14 RX ADMIN — PANTOPRAZOLE SODIUM 40 MG: 40 TABLET, DELAYED RELEASE ORAL at 10:18

## 2021-11-14 RX ADMIN — AMOXICILLIN AND CLAVULANATE POTASSIUM 1 TABLET: 500; 125 TABLET, FILM COATED ORAL at 21:48

## 2021-11-14 RX ADMIN — LISINOPRIL 2.5 MG: 5 TABLET ORAL at 21:46

## 2021-11-14 RX ADMIN — PRAZOSIN HYDROCHLORIDE 2 MG: 1 CAPSULE ORAL at 21:46

## 2021-11-14 RX ADMIN — OLANZAPINE 5 MG: 5 TABLET, FILM COATED ORAL at 21:46

## 2021-11-14 RX ADMIN — TRAZODONE HYDROCHLORIDE 50 MG: 50 TABLET ORAL at 21:50

## 2021-11-14 RX ADMIN — APIXABAN 5 MG: 5 TABLET, FILM COATED ORAL at 21:48

## 2021-11-14 RX ADMIN — SERTRALINE HYDROCHLORIDE 25 MG: 25 TABLET ORAL at 10:18

## 2021-11-14 NOTE — GROUP NOTE
Group Therapy Note    Date: 11/14/2021    Group Start Time: 1000  Group End Time: 9330  Group Topic: Psychoeducation    [address], LSW    patient refused to attend psychoeducation group at 10a after encouragement from staff.   1:1 talk time provided as alternative to group session
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
## 2021-11-14 NOTE — PLAN OF CARE
Problem: Suicide risk  Goal: Provide patient with safe environment  Description: Provide patient with safe environment  Outcome: Ongoing     Problem: Falls - Risk of:  Goal: Will remain free from falls  Description: Will remain free from falls  Outcome: Ongoing      Pt denies suicidal ideations at this time. Pt agreed to seek staff at anytime she felt like any urges to harm self would arise. Safety checks maintained [id number]. Pt remains free from self harm this shift. Pt denies wanting to cause harm to self or others at this time. Pt encouraged to seek nursing staff at anytime if she felt at anger to themselves or others. Pt states understanding. Safety checks maintained [id number]    Pt aloof most evening, only out for needs, refused most assessments. Pt states she is tired this evening.

## 2021-11-14 NOTE — PLAN OF CARE
Problem: Altered Mood, Depressive Behavior:  Goal: Able to verbalize and/or display a decrease in depressive symptoms  Description: Able to verbalize and/or display a decrease in depressive symptoms  Outcome: Ongoing   1:1 with pt x ten minutes. Pt encouraged to attend unit programming and interact with peers and staff. Pt also encouraged to tend to hygiene and ADLs. Pt encouraged to discuss feelings with staff and feedback and reassurance provided. Pt admits to having thoughts of self harm. Agreeable to seeking out staff should feelings increase. Able to identify positive coping skills and plan for safety. Will cont to monitor q15 minutes for safety and provide support and reassurance as needed. Pt is seclusive to room for long intervals. Refused groups. Aloof of peers et staff. Compliant with medications.
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
## 2021-11-15 LAB
ABSOLUTE EOS #: 0 K/UL (ref 0–0.4)
ABSOLUTE IMMATURE GRANULOCYTE: NORMAL K/UL (ref 0–0.3)
ABSOLUTE LYMPH #: 1.8 K/UL (ref 1–4.8)
ABSOLUTE MONO #: 0.3 K/UL (ref 0.1–1.3)
BASOPHILS # BLD: 1 % (ref 0–2)
BASOPHILS ABSOLUTE: 0 K/UL (ref 0–0.2)
DIFFERENTIAL TYPE: NORMAL
EOSINOPHILS RELATIVE PERCENT: 0 % (ref 0–4)
HCT VFR BLD CALC: 38.1 % (ref 36–46)
HEMOGLOBIN: 13.4 G/DL (ref 12–16)
IMMATURE GRANULOCYTES: NORMAL %
LYMPHOCYTES # BLD: 37 % (ref 24–44)
MCH RBC QN AUTO: 30.9 PG (ref 26–34)
MCHC RBC AUTO-ENTMCNC: 35.1 G/DL (ref 31–37)
MCV RBC AUTO: 88.1 FL (ref 80–100)
MONOCYTES # BLD: 7 % (ref 1–7)
NRBC AUTOMATED: NORMAL PER 100 WBC
PDW BLD-RTO: 12.8 % (ref 11.5–14.9)
PLATELET # BLD: 387 K/UL (ref 150–450)
PLATELET ESTIMATE: NORMAL
PMV BLD AUTO: 6.7 FL (ref 6–12)
RBC # BLD: 4.33 M/UL (ref 4–5.2)
RBC # BLD: NORMAL 10*6/UL
SEG NEUTROPHILS: 55 % (ref 36–66)
SEGMENTED NEUTROPHILS ABSOLUTE COUNT: 2.7 K/UL (ref 1.3–9.1)
WBC # BLD: 4.8 K/UL (ref 3.5–11)
WBC # BLD: NORMAL 10*3/UL

## 2021-11-15 PROCEDURE — 99232 SBSQ HOSP IP/OBS MODERATE 35: CPT | Performed by: PSYCHIATRY & NEUROLOGY

## 2021-11-15 PROCEDURE — 85025 COMPLETE CBC W/AUTO DIFF WBC: CPT

## 2021-11-15 PROCEDURE — APPSS30 APP SPLIT SHARED TIME 16-30 MINUTES: Performed by: NURSE PRACTITIONER

## 2021-11-15 PROCEDURE — 6370000000 HC RX 637 (ALT 250 FOR IP): Performed by: PSYCHIATRY & NEUROLOGY

## 2021-11-15 PROCEDURE — 6370000000 HC RX 637 (ALT 250 FOR IP)

## 2021-11-15 PROCEDURE — 1240000000 HC EMOTIONAL WELLNESS R&B

## 2021-11-15 PROCEDURE — 6370000000 HC RX 637 (ALT 250 FOR IP): Performed by: INTERNAL MEDICINE

## 2021-11-15 PROCEDURE — 36415 COLL VENOUS BLD VENIPUNCTURE: CPT

## 2021-11-15 RX ADMIN — ACETAMINOPHEN 650 MG: 325 TABLET, FILM COATED ORAL at 20:36

## 2021-11-15 RX ADMIN — TRAZODONE HYDROCHLORIDE 50 MG: 50 TABLET ORAL at 20:34

## 2021-11-15 RX ADMIN — SERTRALINE HYDROCHLORIDE 25 MG: 25 TABLET ORAL at 10:05

## 2021-11-15 RX ADMIN — AMOXICILLIN AND CLAVULANATE POTASSIUM 1 TABLET: 500; 125 TABLET, FILM COATED ORAL at 10:04

## 2021-11-15 RX ADMIN — OLANZAPINE 5 MG: 5 TABLET, FILM COATED ORAL at 20:33

## 2021-11-15 RX ADMIN — APIXABAN 5 MG: 5 TABLET, FILM COATED ORAL at 20:34

## 2021-11-15 RX ADMIN — AMOXICILLIN AND CLAVULANATE POTASSIUM 1 TABLET: 500; 125 TABLET, FILM COATED ORAL at 20:36

## 2021-11-15 RX ADMIN — LISINOPRIL 2.5 MG: 5 TABLET ORAL at 20:33

## 2021-11-15 RX ADMIN — PRAZOSIN HYDROCHLORIDE 2 MG: 1 CAPSULE ORAL at 20:34

## 2021-11-15 RX ADMIN — Medication 3 MG: at 20:33

## 2021-11-15 RX ADMIN — FLUTICASONE PROPIONATE 1 SPRAY: 50 SPRAY, METERED NASAL at 10:04

## 2021-11-15 RX ADMIN — APIXABAN 5 MG: 5 TABLET, FILM COATED ORAL at 10:05

## 2021-11-15 RX ADMIN — PANTOPRAZOLE SODIUM 40 MG: 40 TABLET, DELAYED RELEASE ORAL at 10:05

## 2021-11-15 RX ADMIN — HYDROXYZINE HYDROCHLORIDE 50 MG: 50 TABLET, FILM COATED ORAL at 17:34

## 2021-11-15 RX ADMIN — HYDROXYZINE HYDROCHLORIDE 50 MG: 50 TABLET, FILM COATED ORAL at 10:04

## 2021-11-15 RX ADMIN — CETIRIZINE HYDROCHLORIDE 10 MG: 10 TABLET, FILM COATED ORAL at 10:05

## 2021-11-15 ASSESSMENT — PAIN SCALES - GENERAL
PAINLEVEL_OUTOF10: 3
PAINLEVEL_OUTOF10: 2

## 2021-11-15 ASSESSMENT — PAIN DESCRIPTION - LOCATION
LOCATION: EAR
LOCATION: EAR

## 2021-11-15 NOTE — GROUP NOTE
Group Therapy Note    Date: 11/15/2021    Group Start Time: 1000  Group End Time: 2403  Group Topic: Psychoeducation    [facility]    LILIAN Saldivar LSW        Group Therapy Note    patient refused to attend Psychoeducational group at 10:00 AM after encouragement from staff.               Signature:  LILIAN Saldivar LSW

## 2021-11-15 NOTE — GROUP NOTE
Group Therapy Note    Date: 11/15/2021    Group Start Time: 1100  Group End Time: 4348  Group Topic: Psychoeducation    [facility] Galdamez      Patient declined to attend self-expression group at 1100 despite encouragement from staff. 1:1 talk time offered by staff as alternative to group session.       Signature:  Tyree Sheth

## 2021-11-15 NOTE — PROGRESS NOTES
Daily Progress Note  11/15/2021    Patient Name: Wayne Miguel    CHIEF COMPLAINT: Depression with suicidal ideation with plan and intent to overdose         Emergency medications: None     Scheduled medications: Adherent    SUBJECTIVE:   Patient was seen for follow-up assessment in her room today. Nursing staff report patient has been isolative today and not spending much time in the day area or attending groups. She was resting on approach but responded to verbal stimuli. She briefly engaged in conversation with writer and then turned her head away. She described her mood today is \"okay\". She is endorsing suicidal ideation but denies having a current plan or intent. She stated \"I feel like it would be easier to not be alive\". She endorsed that she has been coming out for meals but skipped breakfast today. She then stated \"I am not doing any more ECT\" and she was ensured that that was not part of her current treatment plan. She endorses ongoing feelings of hopelessness and helplessness. She has not yet noticed any improvement in levels of depression. She was encouraged to attend groups and interact with her peers that she may find it beneficial to her mood. She was also encouraged to shower and attend to ADLs and hygiene needs. Patient agrees to contract for safety on the unit however at this time she is unable to contract for safety in the community.     Appetite:  [x] Normal/Adequate/Unchanged  [] Increased  [] Decreased      Sleep:       [] Normal/Adequate/Unchanged  [x] Fair  [] Poor      Group Attendance on Unit:   [] Yes  [] Selectively    [x] No    Medication Side Effects: Denies         Mental Status Exam  Level of consciousness: Alert and awake   Appearance: Appropriate attire for setting, resting in bed, with fair  grooming and hygiene   Behavior/Motor: Approachable, no psychomotor abnormalities   Attitude toward examiner: Cooperative, poor eye contact   Speech: spontaneous, normal rate and normal volume, depressed tone  Mood: Depressed   Affect: blunted  Thought processes: linear, goal directed and coherent   Thought content: Denies homicidal ideation  Suicidal Ideation: Endorses, no current plan or intent; contracts for safety    Delusions: not evident   Perceptual Disturbance: patient is not observed responding to internal stimuli  Cognition: Oriented to self, location, time, and situation  Memory: intact  Insight & Judgement: poor     Data   height is 5' 6\" (1.676 m) and weight is 230 lb (104.3 kg). Her oral temperature is 97 °F (36.1 °C). Her blood pressure is 112/82 and her pulse is 88. Her respiration is 14.    Labs:   Admission on 11/13/2021   Component Date Value Ref Range Status    WBC 11/15/2021 4.8  3.5 - 11.0 k/uL Final    RBC 11/15/2021 4.33  4.0 - 5.2 m/uL Final    Hemoglobin 11/15/2021 13.4  12.0 - 16.0 g/dL Final    Hematocrit 11/15/2021 38.1  36 - 46 % Final    MCV 11/15/2021 88.1  80 - 100 fL Final    MCH 11/15/2021 30.9  26 - 34 pg Final    MCHC 11/15/2021 35.1  31 - 37 g/dL Final    RDW 11/15/2021 12.8  11.5 - 14.9 % Final    Platelets 80/06/7898 387  150 - 450 k/uL Final    MPV 11/15/2021 6.7  6.0 - 12.0 fL Final    NRBC Automated 11/15/2021 NOT REPORTED  per 100 WBC Final    Differential Type 11/15/2021 NOT REPORTED   Final    Seg Neutrophils 11/15/2021 55  36 - 66 % Final    Lymphocytes 11/15/2021 37  24 - 44 % Final    Monocytes 11/15/2021 7  1 - 7 % Final    Eosinophils % 11/15/2021 0  0 - 4 % Final    Basophils 11/15/2021 1  0 - 2 % Final    Immature Granulocytes 11/15/2021 NOT REPORTED  0 % Final    Segs Absolute 11/15/2021 2.70  1.3 - 9.1 k/uL Final    Absolute Lymph # 11/15/2021 1.80  1.0 - 4.8 k/uL Final    Absolute Mono # 11/15/2021 0.30  0.1 - 1.3 k/uL Final    Absolute Eos # 11/15/2021 0.00  0.0 - 0.4 k/uL Final    Basophils Absolute 11/15/2021 0.00  0.0 - 0.2 k/uL Final    Absolute Immature Granulocyte 11/15/2021 NOT REPORTED  0.00 - 0.30 k/uL Final    WBC Morphology 11/15/2021 NOT REPORTED   Final    RBC Morphology 11/15/2021 NOT REPORTED   Final    Platelet Estimate 03/54/2575 NOT REPORTED   Final         Reviewed patient's current plan of care and vital signs with nursing staff. Labs reviewed: [x] Yes  Last EKG in EMR reviewed: [x] Yes    Medications  Current Facility-Administered Medications: amoxicillin-clavulanate (AUGMENTIN) 500-125 MG per tablet 1 tablet, 1 tablet, Oral, 2 times per day  nicotine (NICODERM CQ) 14 MG/24HR 1 patch, 1 patch, TransDERmal, Daily  acetaminophen (TYLENOL) tablet 650 mg, 650 mg, Oral, Q4H PRN  aluminum & magnesium hydroxide-simethicone (MAALOX) 200-200-20 MG/5ML suspension 30 mL, 30 mL, Oral, Q6H PRN  hydrOXYzine (ATARAX) tablet 50 mg, 50 mg, Oral, TID PRN  polyethylene glycol (GLYCOLAX) packet 17 g, 17 g, Oral, Daily PRN  traZODone (DESYREL) tablet 50 mg, 50 mg, Oral, Nightly PRN  albuterol sulfate  (90 Base) MCG/ACT inhaler 2 puff, 2 puff, Inhalation, Q6H PRN  apixaban (ELIQUIS) tablet 5 mg, 5 mg, Oral, BID  cetirizine (ZYRTEC) tablet 10 mg, 10 mg, Oral, Daily  fluticasone (FLONASE) 50 MCG/ACT nasal spray 1 spray, 1 spray, Each Nostril, Daily  lisinopril (PRINIVIL;ZESTRIL) tablet 2.5 mg, 2.5 mg, Oral, Nightly  melatonin tablet 3 mg, 3 mg, Oral, Nightly PRN  ondansetron (ZOFRAN-ODT) disintegrating tablet 8 mg, 8 mg, SubLINGual, Q8H PRN  pantoprazole (PROTONIX) tablet 40 mg, 40 mg, Oral, Daily  prazosin (MINIPRESS) capsule 2 mg, 2 mg, Oral, Nightly  OLANZapine (ZYPREXA) tablet 5 mg, 5 mg, Oral, Nightly  sertraline (ZOLOFT) tablet 25 mg, 25 mg, Oral, Daily    ASSESSMENT  Bipolar depression (HCC)         PLAN  Patient symptoms are: Remains Unstable  Continue current medication regimen  Monitor need and frequency of PRN medications  Encourage participation in groups and milieu  Attempt to develop insight  Psycho-education conducted  Supportive Therapy conducted  Probable discharge:  To be determined by attending physician  Follow-up daily while inpatient    Patient continues to be monitored in the inpatient psychiatric facility at [facility] for safety and stabilization. Patient continues to need, on a daily basis, active treatment furnished directly by or requiring the supervision of inpatient psychiatric personnel. Electronically signed by DAMARIS Quintana CNP on 11/15/2021 at 5:54 PM    **This report has been created using voice recognition software. It may contain minor errors which are inherent in voice recognition technology. **    I independently saw and evaluated the patient. I reviewed the nurse practitioners documentation above. Any additional comments or changes to the nurse practitioners documentation are stated below otherwise agree with assessment. Plan will be as follows:  Patient still withdrawn, depressed. Denying side effects to medication but requires prompting to engage in conversation. States she is \"crashing\" after intensive methamphetamine use. Discussed considering further titration pending observation of current meds and patient is in agreement  PLAN  Patient s symptoms   show no change  Observation on current medication for now  Attempt to develop insight  Psycho-education conducted. Supportive Therapy conducted.   Probable discharge is undetermined at this time  Follow-up daily while on inpatient unit

## 2021-11-15 NOTE — PLAN OF CARE
Problem: Falls - Risk of:  Goal: Absence of physical injury  Description: Absence of physical injury  Outcome: Ongoing      Pt denies suicidal ideations at this time. Pt agreed to seek staff at anytime she felt like any urges to harm self would arise. Safety checks maintained [id number]. Pt remains free from self harm this shift. Pt denies wanting to cause harm to self or others at this time. Pt encouraged to seek nursing staff at anytime if she felt at danger to themselves or others. Pt states understanding. Safety checks maintained [id number]  Aloof only out for needs this evening.  Compliant's of just being tires

## 2021-11-15 NOTE — PLAN OF CARE
Lakhwinder Scale Score: 22  BVC    Change in scores no Changes to plan of Care no    Status EXAM:   Status and Exam  Normal: No  Facial Expression: Flat  Affect: Blunt  Level of Consciousness: Alert  Mood:Normal: No  Mood: Depressed  Motor Activity:Normal: Yes  Motor Activity: Decreased  Interview Behavior: Cooperative  Preception: [place] to Person, Idelia Patron to Time, [place] to Place, [place] to Situation  Attention:Normal: No  Attention: Distractible  Thought Processes: Blocking  Thought Content:Normal: No  Thought Content: Poverty of Content  Hallucinations: None  Delusions: No  Memory:Normal: No  Memory: Poor Recent  Insight and Judgment: No  Insight and Judgment: Poor Insight  Present Suicidal Ideation: Yes  Present Homicidal Ideation: No    Daily Assessment Last Entry:   Daily Sleep (WDL): Within Defined Limits         Patient Currently in Pain: Yes       Patient Monitoring:  Frequency of Checks: 4 times per hour, close    Psychiatric Symptoms:   Depression Symptoms  Depression Symptoms: Loss of interest, Isolative     Ileana Symptoms  Ileana Symptoms: No problems reported or observed. Psychosis Symptoms  Delusion Type:  (KENYA)    Suicide Risk CSSR-S:  1) Within the past month, have you wished you were dead or wished you could go to sleep and not wake up? : Yes  2) Have you actually had any thoughts of killing yourself? : Yes  3) Have you been thinking about how you might kill yourself? : Yes  5) Have you started to work out or worked out the details of how to kill yourself?  Do you intend to carry out this plan? : Yes  6) Have you ever done anything, started to do anything, or prepared to do anything to end your life?: No  Change in Result  NA  Change in Plan of care  NA       EDUCATION:   EDUCATION:   Learner Progress Toward Treatment Goals: Reviewed results and recommendations of this team, Reviewed group plan and strategies, Reviewed signs, symptoms and risk of self harm and violent behavior, Reviewed goals and plan of care    Method:small group, individual verbal education    Outcome:verbalized by patient, but needs reinforcement to obtain goals    PATIENT GOALS:  Short term: Patient declined to attend   Long term:     PLAN/TREATMENT RECOMMENDATIONS UPDATE: continue with group therapies, increased socialization, continue planning for after discharge goals, continue with medication compliance    SHORT-TERM GOALS UPDATE:   Time frame for Short-Term Goals: 5-7 days    LONG-TERM GOALS UPDATE:   Time frame for Long-Term Goals: 6 months  Members Present in Team Meeting: See Signature Sheet    Debbi Guaman

## 2021-11-15 NOTE — GROUP NOTE
Group Therapy Note    Date: 11/15/2021    Group Start Time: 1330  Group End Time: 1400  Group Topic: Psychoeducation    CZ B[facility] MEAGHAN Galdamez      Patient declined to attend coping skills group at 1330 despite encouragement from staff. 1:1 talk time offered by staff as alternative to group session.         Signature:  Rose Mcallister

## 2021-11-15 NOTE — PLAN OF CARE
Problem: Falls - Risk of:  Goal: Will remain free from falls  Description: Will remain free from falls  11/15/2021 1532 by Natan Becker LPN  Outcome: Ongoing   Patient remains free from falls    Problem: Falls - Risk of:  Goal: Absence of physical injury  Description: Absence of physical injury  11/15/2021 1532 by Natan Becker LPN  Outcome: Ongoing   Patient remains free from injury

## 2021-11-16 PROCEDURE — 6370000000 HC RX 637 (ALT 250 FOR IP)

## 2021-11-16 PROCEDURE — 6370000000 HC RX 637 (ALT 250 FOR IP): Performed by: PSYCHIATRY & NEUROLOGY

## 2021-11-16 PROCEDURE — 1240000000 HC EMOTIONAL WELLNESS R&B

## 2021-11-16 PROCEDURE — 99232 SBSQ HOSP IP/OBS MODERATE 35: CPT | Performed by: INTERNAL MEDICINE

## 2021-11-16 PROCEDURE — APPSS30 APP SPLIT SHARED TIME 16-30 MINUTES: Performed by: PSYCHIATRY & NEUROLOGY

## 2021-11-16 PROCEDURE — 99232 SBSQ HOSP IP/OBS MODERATE 35: CPT | Performed by: PSYCHIATRY & NEUROLOGY

## 2021-11-16 PROCEDURE — 6370000000 HC RX 637 (ALT 250 FOR IP): Performed by: INTERNAL MEDICINE

## 2021-11-16 PROCEDURE — 90833 PSYTX W PT W E/M 30 MIN: CPT | Performed by: PSYCHIATRY & NEUROLOGY

## 2021-11-16 RX ORDER — OLANZAPINE 7.5 MG/1
7.5 TABLET ORAL NIGHTLY
Status: DISCONTINUED | OUTPATIENT
Start: 2021-11-16 | End: 2021-11-19

## 2021-11-16 RX ADMIN — CETIRIZINE HYDROCHLORIDE 10 MG: 10 TABLET, FILM COATED ORAL at 09:32

## 2021-11-16 RX ADMIN — TRAZODONE HYDROCHLORIDE 50 MG: 50 TABLET ORAL at 20:40

## 2021-11-16 RX ADMIN — SERTRALINE HYDROCHLORIDE 25 MG: 25 TABLET ORAL at 09:32

## 2021-11-16 RX ADMIN — OLANZAPINE 7.5 MG: 7.5 TABLET, FILM COATED ORAL at 20:40

## 2021-11-16 RX ADMIN — PRAZOSIN HYDROCHLORIDE 2 MG: 1 CAPSULE ORAL at 20:40

## 2021-11-16 RX ADMIN — Medication 3 MG: at 20:40

## 2021-11-16 RX ADMIN — FLUTICASONE PROPIONATE 1 SPRAY: 50 SPRAY, METERED NASAL at 09:33

## 2021-11-16 RX ADMIN — AMOXICILLIN AND CLAVULANATE POTASSIUM 1 TABLET: 500; 125 TABLET, FILM COATED ORAL at 09:31

## 2021-11-16 RX ADMIN — HYDROXYZINE HYDROCHLORIDE 50 MG: 50 TABLET, FILM COATED ORAL at 09:37

## 2021-11-16 RX ADMIN — APIXABAN 5 MG: 5 TABLET, FILM COATED ORAL at 20:38

## 2021-11-16 RX ADMIN — PANTOPRAZOLE SODIUM 40 MG: 40 TABLET, DELAYED RELEASE ORAL at 09:32

## 2021-11-16 RX ADMIN — APIXABAN 5 MG: 5 TABLET, FILM COATED ORAL at 09:31

## 2021-11-16 RX ADMIN — LISINOPRIL 2.5 MG: 5 TABLET ORAL at 20:42

## 2021-11-16 RX ADMIN — AMOXICILLIN AND CLAVULANATE POTASSIUM 1 TABLET: 500; 125 TABLET, FILM COATED ORAL at 20:39

## 2021-11-16 RX ADMIN — HYDROXYZINE HYDROCHLORIDE 50 MG: 50 TABLET, FILM COATED ORAL at 15:39

## 2021-11-16 RX ADMIN — HYDROXYZINE HYDROCHLORIDE 50 MG: 50 TABLET, FILM COATED ORAL at 20:39

## 2021-11-16 NOTE — PLAN OF CARE
Problem: Suicide risk  Goal: Provide patient with safe environment  Description: Provide patient with safe environment       Problem: Altered Mood, Depressive Behavior:  Goal: Able to verbalize and/or display a decrease in depressive symptoms  Description: Able to verbalize and/or display a decrease in depressive symptoms     Pt denies suicidal ideations at this time. Pt agreed to seek staff at anytime she felt like any urges to harm self would arise. Safety checks maintained [id number]. Pt remains free from self harm this shift. Pt denies wanting to cause harm to self or others at this time. Pt encouraged to seek nursing staff at anytime if she felt at danger to themselves or others. Pt states understanding. Safety checks maintained [id number]    Pt out for short periods this evening. Medication compliant and behavioral controlled. Denies SI/HI.  Depression and anxiety continue

## 2021-11-16 NOTE — GROUP NOTE
Group Therapy Note    Date: 11/16/2021    Group Start Time: 1100  Group End Time: 1150  Group Topic: Recreational    [address], [place]        Group Therapy Note    Attendees: 8/20    patient refused to attend communication skills group at [phone] after encouragement from staff. 1:1 talk time provided as alternative to group session.

## 2021-11-16 NOTE — CARE COORDINATION
Fer spoke with patient this date regarding her discharge plans. Pt states she was last d/c to [facility], stayed \"for 1 day\" left that program and went to [facility], pt states she left the [facility] and now cannot return for 30 days. Pt states her plan is to return to [place] at the beginning of December when she has money for bus ticket. SW encouraged pt to call [phone], also offered assistance in calling to do housing screen, pt states she is able to do this independently, Sw called [facility] to verify pt return date, staff states pt may return to program 12/8/2021 as she left 11/8/2021.

## 2021-11-16 NOTE — GROUP NOTE
Group Therapy Note    Date: 11/16/2021    Group Start Time: 1330  Group End Time: 5147  Group Topic: Cognitive Skills    RUST LUISANA Ordonez        Group Therapy Note    Attendees: 9/19           Patient's Goal:  Understanding the benefits of aromatherapy    Status After Intervention:  Improved    Participation Level:  Active Listener and Interactive    Participation Quality: Appropriate, Attentive and Sharing      Speech:  normal      Thought Process/Content: Logical      Affective Functioning: Congruent      Mood: euphoric      Level of consciousness:  Alert, Oriented x4 and Attentive      Response to Learning: Able to verbalize current knowledge/experience, Able to verbalize/acknowledge new learning and Able to retain information    Modes of Intervention: Education, Support, Socialization, Exploration, Clarifying, Problem-solving and Activity      Discipline Responsible: Psychoeducational Specialist      Signature:  Mine Baker

## 2021-11-16 NOTE — GROUP NOTE
Group Therapy Note    Date: 11/16/2021    Group Start Time: 1000  Group End Time: 0678  Group Topic: Psychotherapy    [place] AMADO Jimenez; AMADO Carreno        Group Therapy Note    Attendees: 7/20         patient refused to attend psychotherapy group at [place] [address] after encouragement from staff.   1:1 talk time provided as alternative to group session    Signature:  AMADO Carreno

## 2021-11-17 LAB
ABSOLUTE EOS #: 0 K/UL (ref 0–0.4)
ABSOLUTE IMMATURE GRANULOCYTE: NORMAL K/UL (ref 0–0.3)
ABSOLUTE LYMPH #: 1.9 K/UL (ref 1–4.8)
ABSOLUTE MONO #: 0.4 K/UL (ref 0.1–1.3)
BASOPHILS # BLD: 1 % (ref 0–2)
BASOPHILS ABSOLUTE: 0 K/UL (ref 0–0.2)
DIFFERENTIAL TYPE: NORMAL
EOSINOPHILS RELATIVE PERCENT: 0 % (ref 0–4)
HCT VFR BLD CALC: 38.6 % (ref 36–46)
HEMOGLOBIN: 13.3 G/DL (ref 12–16)
IMMATURE GRANULOCYTES: NORMAL %
LYMPHOCYTES # BLD: 36 % (ref 24–44)
MCH RBC QN AUTO: 30.7 PG (ref 26–34)
MCHC RBC AUTO-ENTMCNC: 34.6 G/DL (ref 31–37)
MCV RBC AUTO: 88.8 FL (ref 80–100)
MONOCYTES # BLD: 7 % (ref 1–7)
NRBC AUTOMATED: NORMAL PER 100 WBC
PDW BLD-RTO: 12.9 % (ref 11.5–14.9)
PLATELET # BLD: 394 K/UL (ref 150–450)
PLATELET ESTIMATE: NORMAL
PMV BLD AUTO: 6.9 FL (ref 6–12)
RBC # BLD: 4.34 M/UL (ref 4–5.2)
RBC # BLD: NORMAL 10*6/UL
SEG NEUTROPHILS: 56 % (ref 36–66)
SEGMENTED NEUTROPHILS ABSOLUTE COUNT: 3.1 K/UL (ref 1.3–9.1)
WBC # BLD: 5.4 K/UL (ref 3.5–11)
WBC # BLD: NORMAL 10*3/UL

## 2021-11-17 PROCEDURE — 6370000000 HC RX 637 (ALT 250 FOR IP): Performed by: PSYCHIATRY & NEUROLOGY

## 2021-11-17 PROCEDURE — 1240000000 HC EMOTIONAL WELLNESS R&B

## 2021-11-17 PROCEDURE — 36415 COLL VENOUS BLD VENIPUNCTURE: CPT

## 2021-11-17 PROCEDURE — 99232 SBSQ HOSP IP/OBS MODERATE 35: CPT | Performed by: PSYCHIATRY & NEUROLOGY

## 2021-11-17 PROCEDURE — 6370000000 HC RX 637 (ALT 250 FOR IP): Performed by: INTERNAL MEDICINE

## 2021-11-17 PROCEDURE — 85025 COMPLETE CBC W/AUTO DIFF WBC: CPT

## 2021-11-17 PROCEDURE — 99232 SBSQ HOSP IP/OBS MODERATE 35: CPT | Performed by: INTERNAL MEDICINE

## 2021-11-17 PROCEDURE — 6370000000 HC RX 637 (ALT 250 FOR IP)

## 2021-11-17 RX ADMIN — APIXABAN 5 MG: 5 TABLET, FILM COATED ORAL at 21:51

## 2021-11-17 RX ADMIN — SERTRALINE HYDROCHLORIDE 50 MG: 50 TABLET ORAL at 10:43

## 2021-11-17 RX ADMIN — FLUTICASONE PROPIONATE 1 SPRAY: 50 SPRAY, METERED NASAL at 10:43

## 2021-11-17 RX ADMIN — PRAZOSIN HYDROCHLORIDE 2 MG: 1 CAPSULE ORAL at 21:51

## 2021-11-17 RX ADMIN — APIXABAN 5 MG: 5 TABLET, FILM COATED ORAL at 10:43

## 2021-11-17 RX ADMIN — CETIRIZINE HYDROCHLORIDE 10 MG: 10 TABLET, FILM COATED ORAL at 10:44

## 2021-11-17 RX ADMIN — ACETAMINOPHEN 650 MG: 325 TABLET, FILM COATED ORAL at 15:32

## 2021-11-17 RX ADMIN — LISINOPRIL 2.5 MG: 5 TABLET ORAL at 21:52

## 2021-11-17 RX ADMIN — HYDROXYZINE HYDROCHLORIDE 50 MG: 50 TABLET, FILM COATED ORAL at 16:10

## 2021-11-17 RX ADMIN — Medication 3 MG: at 21:51

## 2021-11-17 RX ADMIN — AMOXICILLIN AND CLAVULANATE POTASSIUM 1 TABLET: 500; 125 TABLET, FILM COATED ORAL at 10:43

## 2021-11-17 RX ADMIN — OLANZAPINE 7.5 MG: 7.5 TABLET, FILM COATED ORAL at 21:52

## 2021-11-17 RX ADMIN — HYDROXYZINE HYDROCHLORIDE 50 MG: 50 TABLET, FILM COATED ORAL at 10:44

## 2021-11-17 RX ADMIN — AMOXICILLIN AND CLAVULANATE POTASSIUM 1 TABLET: 500; 125 TABLET, FILM COATED ORAL at 21:52

## 2021-11-17 RX ADMIN — PANTOPRAZOLE SODIUM 40 MG: 40 TABLET, DELAYED RELEASE ORAL at 10:43

## 2021-11-17 RX ADMIN — TRAZODONE HYDROCHLORIDE 50 MG: 50 TABLET ORAL at 21:51

## 2021-11-17 ASSESSMENT — PAIN SCALES - GENERAL
PAINLEVEL_OUTOF10: 1
PAINLEVEL_OUTOF10: 3
PAINLEVEL_OUTOF10: 0
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
## 2021-11-17 NOTE — FLOWSHEET NOTE
*Patient participated in [employer] [facility]        11/17/21 [address] provided to: Patient   Referral/Consult From: Rounding   Continue Visiting   (11/17/21)   Complexity of Encounter Moderate   Length of Encounter 30 minutes   Spiritual Assessment Completed Yes   Spiritual/Pentecostalism   Type Spiritual support   Assessment Calm;  Approachable   Intervention Active listening; Prayer   Outcome Receptive; Engaged in conversation; Expressed gratitude

## 2021-11-17 NOTE — GROUP NOTE
Group Therapy Note    Date: 11/17/2021    Group Start Time: 0900  Group End Time: 0930  Group Topic: Community Meeting    [facility]    Patient refused to attend community meeting and goal setting group at 0900 after encouragement from staff. 1:1 talk time offered by staff as alternative to group session.

## 2021-11-17 NOTE — PLAN OF CARE
Problem: Suicide risk  Goal: Provide patient with safe environment  Description: Provide patient with safe environment  Outcome: Ongoing  Note: Staff provides patient  with a safe and therapeutic environment. Problem: Altered Mood, Depressive Behavior:  Goal: Able to verbalize and/or display a decrease in depressive symptoms  Description: Able to verbalize and/or display a decrease in depressive symptoms  11/16/2021 2338 by Buffy Washington RN  Outcome: Ongoing  Note: Patient is out in day room social with peers. She is friendly and cooperative with staff. She is medication compliant in behavior control. She reports depression and anxiety. She reports fleeting suicidal thoughts but contracts for safety while on unit. Patient uses phone to call supports during this shift. Staff maintains Q 15 minute safety checks.

## 2021-11-17 NOTE — PLAN OF CARE
Problem: Suicide risk  Goal: Provide patient with safe environment  Description: Provide patient with safe environment  11/17/2021 1325 by Joel Garcia RN  Outcome: Ongoing  Patient is provided with a safe environment. 11/16/2021 2338 by Arlene Nolen RN  Outcome: Ongoing  Note: Staff provides patient  with a safe and therapeutic environment. Problem: Falls - Risk of:  Goal: Will remain free from falls  Description: Will remain free from falls  11/17/2021 1325 by Joel Garcia RN  Outcome: Ongoing  Patient has remained free of falls, gait steady. 11/16/2021 2338 by Arlene Nolen RN  Outcome: Ongoing  Note: Patient remains free from falls. Goal: Absence of physical injury  Description: Absence of physical injury  11/17/2021 1325 by Joel Garcia RN  Outcome: Ongoing  No injury noted. 11/16/2021 2338 by Arlene Nolen RN  Outcome: Ongoing  Note: Patient remains absent of physical injury. Problem: Pain:  Goal: Pain level will decrease  Description: Pain level will decrease  Outcome: Ongoing  Patient has not complained of pain today. Goal: Control of acute pain  Description: Control of acute pain  Outcome: Ongoing  Goal: Control of chronic pain  Description: Control of chronic pain  Outcome: Ongoing     Problem: Altered Mood, Depressive Behavior:  Goal: Able to verbalize and/or display a decrease in depressive symptoms  Description: Able to verbalize and/or display a decrease in depressive symptoms  11/17/2021 1325 by Joel Garcia RN  Outcome: Ongoing  Patient is seclusive to room and bed so far today. Patient stated she did not sleep well last night and had panic attacks throughout the night. Patient was out for lunch but aloof of peers and staff. Patient is irritable upon approach and refused to attend groups. Patient took all medications as prescribed, but continues to admit to depression and anxiety.   11/16/2021 2338 by Arlene Nolen RN  Outcome: Ongoing  Note: Patient is out in day room social with peers. She is friendly and cooperative with staff. She is medication compliant in behavior control. She reports depression and anxiety. She reports fleeting suicidal thoughts but contracts for safety while on unit. Patient uses phone to call supports during this shift. Staff maintains Q 15 minute safety checks. Goal: Absence of self-harm  Description: Absence of self-harm  Outcome: Ongoing  No self harm noted. Problem: Tobacco Use:  Goal: Inpatient tobacco use cessation counseling participation  Description: Inpatient tobacco use cessation counseling participation  Outcome: Ongoing  Patient refused nicotine patch to assist in smoking cessation.

## 2021-11-17 NOTE — GROUP NOTE
Group Therapy Note    Date: 11/17/2021    Group Start Time: 1100  Group End Time: 6087  Group Topic: Recreational    [address], [address]        Group Therapy Note    Attendees: 9/18      patient refused to attend leisure and recreation group at [phone] after encouragement from staff. 1:1 talk time provided as alternative to group session.

## 2021-11-17 NOTE — GROUP NOTE
Group Therapy Note    Date: 11/17/2021    Group Start Time: 1320  Group End Time: 1400  Group Topic: Cognitive Skills    LUISANA Brantley        Group Therapy Note    Attendees: 4/18         Patient's Goal:  Identifying triggers to anxiety      Status After Intervention:  Improved    Participation Level:  Active Listener and Interactive    Participation Quality: Appropriate, Attentive, Sharing and Supportive      Speech:  normal      Thought Process/Content: Logical      Affective Functioning: Congruent      Mood: euphoric      Level of consciousness:  Alert, Oriented x4 and Attentive      Response to Learning: Able to verbalize current knowledge/experience, Able to verbalize/acknowledge new learning, Able to retain information and Capable of insight      Modes of Intervention: Education, Support, Socialization, Exploration, Clarifying, Problem-solving and Activity      Discipline Responsible: Psychoeducational Specialist      Signature:  Ilya Tadeo
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
## 2021-11-18 LAB
ABSOLUTE EOS #: 0 K/UL (ref 0–0.4)
ABSOLUTE IMMATURE GRANULOCYTE: NORMAL K/UL (ref 0–0.3)
ABSOLUTE LYMPH #: 2.06 K/UL (ref 1–4.8)
ABSOLUTE MONO #: 0.24 K/UL (ref 0.1–1.3)
BASOPHILS # BLD: 0 % (ref 0–2)
BASOPHILS ABSOLUTE: 0 K/UL (ref 0–0.2)
DIFFERENTIAL TYPE: NORMAL
EOSINOPHILS RELATIVE PERCENT: 0 % (ref 0–4)
HCT VFR BLD CALC: 41.5 % (ref 36–46)
HEMOGLOBIN: 14.4 G/DL (ref 12–16)
IMMATURE GRANULOCYTES: NORMAL %
LYMPHOCYTES # BLD: 43 % (ref 24–44)
MCH RBC QN AUTO: 31.1 PG (ref 26–34)
MCHC RBC AUTO-ENTMCNC: 34.7 G/DL (ref 31–37)
MCV RBC AUTO: 89.7 FL (ref 80–100)
MONOCYTES # BLD: 5 % (ref 1–7)
MORPHOLOGY: NORMAL
NRBC AUTOMATED: NORMAL PER 100 WBC
PDW BLD-RTO: 13.3 % (ref 11.5–14.9)
PLATELET # BLD: 396 K/UL (ref 150–450)
PLATELET ESTIMATE: NORMAL
PMV BLD AUTO: 7.1 FL (ref 6–12)
RBC # BLD: 4.62 M/UL (ref 4–5.2)
RBC # BLD: NORMAL 10*6/UL
SEG NEUTROPHILS: 52 % (ref 36–66)
SEGMENTED NEUTROPHILS ABSOLUTE COUNT: 2.5 K/UL (ref 1.3–9.1)
WBC # BLD: 4.8 K/UL (ref 3.5–11)
WBC # BLD: NORMAL 10*3/UL

## 2021-11-18 PROCEDURE — 36415 COLL VENOUS BLD VENIPUNCTURE: CPT

## 2021-11-18 PROCEDURE — 6370000000 HC RX 637 (ALT 250 FOR IP): Performed by: PSYCHIATRY & NEUROLOGY

## 2021-11-18 PROCEDURE — APPSS30 APP SPLIT SHARED TIME 16-30 MINUTES: Performed by: PSYCHIATRY & NEUROLOGY

## 2021-11-18 PROCEDURE — 6370000000 HC RX 637 (ALT 250 FOR IP)

## 2021-11-18 PROCEDURE — 99232 SBSQ HOSP IP/OBS MODERATE 35: CPT | Performed by: INTERNAL MEDICINE

## 2021-11-18 PROCEDURE — 90833 PSYTX W PT W E/M 30 MIN: CPT | Performed by: PSYCHIATRY & NEUROLOGY

## 2021-11-18 PROCEDURE — 99232 SBSQ HOSP IP/OBS MODERATE 35: CPT | Performed by: PSYCHIATRY & NEUROLOGY

## 2021-11-18 PROCEDURE — 1240000000 HC EMOTIONAL WELLNESS R&B

## 2021-11-18 PROCEDURE — 85025 COMPLETE CBC W/AUTO DIFF WBC: CPT

## 2021-11-18 PROCEDURE — 6370000000 HC RX 637 (ALT 250 FOR IP): Performed by: INTERNAL MEDICINE

## 2021-11-18 RX ORDER — LOPERAMIDE HYDROCHLORIDE 2 MG/1
2 CAPSULE ORAL 4 TIMES DAILY PRN
Status: DISPENSED | OUTPATIENT
Start: 2021-11-18 | End: 2021-11-19

## 2021-11-18 RX ADMIN — Medication 3 MG: at 21:03

## 2021-11-18 RX ADMIN — CETIRIZINE HYDROCHLORIDE 10 MG: 10 TABLET, FILM COATED ORAL at 10:11

## 2021-11-18 RX ADMIN — PANTOPRAZOLE SODIUM 40 MG: 40 TABLET, DELAYED RELEASE ORAL at 10:11

## 2021-11-18 RX ADMIN — ACETAMINOPHEN 650 MG: 325 TABLET, FILM COATED ORAL at 19:28

## 2021-11-18 RX ADMIN — FLUTICASONE PROPIONATE 1 SPRAY: 50 SPRAY, METERED NASAL at 10:12

## 2021-11-18 RX ADMIN — AMOXICILLIN AND CLAVULANATE POTASSIUM 1 TABLET: 500; 125 TABLET, FILM COATED ORAL at 11:41

## 2021-11-18 RX ADMIN — SERTRALINE HYDROCHLORIDE 50 MG: 50 TABLET ORAL at 10:11

## 2021-11-18 RX ADMIN — HYDROXYZINE HYDROCHLORIDE 50 MG: 50 TABLET, FILM COATED ORAL at 21:03

## 2021-11-18 RX ADMIN — LOPERAMIDE HYDROCHLORIDE 2 MG: 2 CAPSULE ORAL at 12:05

## 2021-11-18 RX ADMIN — TRAZODONE HYDROCHLORIDE 50 MG: 50 TABLET ORAL at 21:03

## 2021-11-18 RX ADMIN — OLANZAPINE 7.5 MG: 7.5 TABLET, FILM COATED ORAL at 21:03

## 2021-11-18 RX ADMIN — APIXABAN 5 MG: 5 TABLET, FILM COATED ORAL at 21:04

## 2021-11-18 RX ADMIN — PRAZOSIN HYDROCHLORIDE 2 MG: 1 CAPSULE ORAL at 21:02

## 2021-11-18 RX ADMIN — APIXABAN 5 MG: 5 TABLET, FILM COATED ORAL at 10:11

## 2021-11-18 RX ADMIN — AMOXICILLIN AND CLAVULANATE POTASSIUM 1 TABLET: 500; 125 TABLET, FILM COATED ORAL at 21:04

## 2021-11-18 RX ADMIN — HYDROXYZINE HYDROCHLORIDE 50 MG: 50 TABLET, FILM COATED ORAL at 10:10

## 2021-11-18 ASSESSMENT — PAIN DESCRIPTION - LOCATION: LOCATION: HEAD;EAR

## 2021-11-18 ASSESSMENT — PAIN SCALES - GENERAL
PAINLEVEL_OUTOF10: 0
PAINLEVEL_OUTOF10: 2
PAINLEVEL_OUTOF10: 0

## 2021-11-18 ASSESSMENT — PAIN DESCRIPTION - PAIN TYPE: TYPE: ACUTE PAIN

## 2021-11-18 ASSESSMENT — PAIN DESCRIPTION - DESCRIPTORS: DESCRIPTORS: DISCOMFORT;HEADACHE

## 2021-11-18 NOTE — PLAN OF CARE
Problem: Suicide risk  Goal: Provide patient with safe environment  Description: Provide patient with safe environment  11/18/2021 1102 by Jb Fulton RN  Outcome: Ongoing  Patient currently feels safe. Patient has remained safe since admission. Doors are locked at all times and q15 minute checks are in place for patient safety. Problem: Falls - Risk of:  Goal: Will remain free from falls  Description: Will remain free from falls  11/18/2021 1102 by Jb Fulton RN  Outcome: Ongoing  Patient has remained free from falls since admission. Problem: Falls - Risk of:  Goal: Absence of physical injury  Description: Absence of physical injury  11/17/2021 2248 by Michael Cobian LPN  Outcome: Ongoing  Note: Patient is free of self harm at this time. Patient agrees to seek out staff if thoughts to harm self arise. Staff will provide support and reassurance as needed. Safety checks maintained every 15 minutes. Patient has remained free from physical injury since admission. 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 Patient reported no pain to the students who took vitals right before writer entered room. Patient educated on proper use of PRN medication. Problem: Altered Mood, Depressive Behavior:  Goal: Able to verbalize and/or display a decrease in depressive symptoms  Description: Able to verbalize and/or display a decrease in depressive symptoms  11/18/2021 1102 by Jb Fulton RN  Outcome: Ongoing  Patient states that they feel slightly depressed today. Patient reports a decrease in depressive symptoms since admission. Patient is mainly isolative too room and continues to show depressive symptoms.   Patient is evasive during interview and keeps attempting to go back to sleep. Writer offered one on one talk time with patient if they need it. Problem: Altered Mood, Depressive Behavior:  Goal: Absence of self-harm  Description: Absence of self-harm  11/18/2021 1102 by Alessandro Potter RN  Outcome: Ongoing  Patient has remained free from self-harm during admission. Patient does not feel the need to harm themselves. If these types of thoughts arise, patient will notify a staff member. Q15 Minute checks in place for patient safety. Problem: Tobacco Use:  Goal: Inpatient tobacco use cessation counseling participation  Description: Inpatient tobacco use cessation counseling participation  11/18/2021 1102 by Alessandro Potter RN  Outcome: Ongoing  Patient is not interested in tobacco cessation at this time.

## 2021-11-18 NOTE — GROUP NOTE
Group Therapy Note    Date: 11/18/2021    Group Start Time: 1430  Group End Time: 3814  Group Topic: Community Meeting    LUISANA Stephens    Group Therapy Note    Attendees: 11    Patient's Goal:  Pt will identify ways to advocate and raise awareness for mental health    Notes:  Pt attended group and participated    Status After Intervention:  Improved    Participation Level:  Active Listener and Interactive    Participation Quality: Appropriate, Attentive, Sharing and Supportive      Speech:  normal      Thought Process/Content: Logical  Linear      Affective Functioning: Congruent      Mood: euthymic      Level of consciousness:  Alert, Oriented x4 and Attentive      Response to Learning: Able to verbalize current knowledge/experience, Able to verbalize/acknowledge new learning, Able to retain information, Capable of insight, Able to change behavior and Progressing to goal      Endings: None Reported    Modes of Intervention: Education, Support, Socialization, Exploration and Limit-setting      Discipline Responsible: Behavorial Health Tech      Signature:  Terrie Guerrero, [address]

## 2021-11-18 NOTE — PROGRESS NOTES
Daily Progress Note  Solomon Jimenez MD  11/17/2021  CHIEF COMPLAINT: Depression with suicidal ideation    Reviewed patient's current plan of care and vital signs with nursing staff. Sleep:  5 hours last night  Attending groups: No: Isolates to room    SUBJECTIVE:    Patient reports very poor sleep. Discussed the importance of sleep hygiene and encouraged the patient to be up and active and participating in groups during the day. Patient just experienced increased olanzapine and Zoloft last night and this morning. Discussed monitoring for now with consideration for further increase pending observation. Still suicidal.  Still endorsing extreme depression. Able to contract for safety on the unit but unable to contemplate safety off the unit. Mental Status Exam  Level of consciousness:  Within normal limits  Appearance: Hospital attire, seated in chair, with good grooming and hygiene   Behavior/Motor: No abnormalities noted  Attitude toward examiner:  Cooperative, attentive, good eye contact  Speech:  spontaneous, normal rate, normal volume and well articulated  Mood: Depressed  Affect: Flat and withdrawn  Thought processes:  linear, goal directed and coherent  Thought content:  denies homicidal ideation  Suicidal Ideation: Endorses suicidal ideation  Delusions:  no evidence of delusions  Perceptual Disturbance:  denies any perceptual disturbance  Cognition:  Oriented to self, location, time, and situation  Memory: age appropriate  Insight & Judgement: improving  Medication side effects:  denies       Data   height is 5' 6\" (1.676 m) and weight is 230 lb (104.3 kg). Her temporal temperature is 96.8 °F (36 °C). Her blood pressure is 86/40 (abnormal) and her pulse is 60. Her respiration is 14.    Labs:   Admission on 11/13/2021   Component Date Value Ref Range Status    WBC 11/15/2021 4.8  3.5 - 11.0 k/uL Final    RBC 11/15/2021 4.33  4.0 - 5.2 m/uL Final    Hemoglobin 11/15/2021 13.4  12.0 - 16.0 g/dL Final  Hematocrit 11/15/2021 38.1  36 - 46 % Final    MCV 11/15/2021 88.1  80 - 100 fL Final    MCH 11/15/2021 30.9  26 - 34 pg Final    MCHC 11/15/2021 35.1  31 - 37 g/dL Final    RDW 11/15/2021 12.8  11.5 - 14.9 % Final    Platelets 92/83/1715 387  150 - 450 k/uL Final    MPV 11/15/2021 6.7  6.0 - 12.0 fL Final    NRBC Automated 11/15/2021 NOT REPORTED  per 100 WBC Final    Differential Type 11/15/2021 NOT REPORTED   Final    Seg Neutrophils 11/15/2021 55  36 - 66 % Final    Lymphocytes 11/15/2021 37  24 - 44 % Final    Monocytes 11/15/2021 7  1 - 7 % Final    Eosinophils % 11/15/2021 0  0 - 4 % Final    Basophils 11/15/2021 1  0 - 2 % Final    Immature Granulocytes 11/15/2021 NOT REPORTED  0 % Final    Segs Absolute 11/15/2021 2.70  1.3 - 9.1 k/uL Final    Absolute Lymph # 11/15/2021 1.80  1.0 - 4.8 k/uL Final    Absolute Mono # 11/15/2021 0.30  0.1 - 1.3 k/uL Final    Absolute Eos # 11/15/2021 0.00  0.0 - 0.4 k/uL Final    Basophils Absolute 11/15/2021 0.00  0.0 - 0.2 k/uL Final    Absolute Immature Granulocyte 11/15/2021 NOT REPORTED  0.00 - 0.30 k/uL Final    WBC Morphology 11/15/2021 NOT REPORTED   Final    RBC Morphology 11/15/2021 NOT REPORTED   Final    Platelet Estimate 01/59/3353 NOT REPORTED   Final    WBC 11/17/2021 5.4  3.5 - 11.0 k/uL Final    RBC 11/17/2021 4.34  4.0 - 5.2 m/uL Final    Hemoglobin 11/17/2021 13.3  12.0 - 16.0 g/dL Final    Hematocrit 11/17/2021 38.6  36 - 46 % Final    MCV 11/17/2021 88.8  80 - 100 fL Final    MCH 11/17/2021 30.7  26 - 34 pg Final    MCHC 11/17/2021 34.6  31 - 37 g/dL Final    RDW 11/17/2021 12.9  11.5 - 14.9 % Final    Platelets 60/95/4195 394  150 - 450 k/uL Final    MPV 11/17/2021 6.9  6.0 - 12.0 fL Final    NRBC Automated 11/17/2021 NOT REPORTED  per 100 WBC Final    Differential Type 11/17/2021 NOT REPORTED   Final    Seg Neutrophils 11/17/2021 56  36 - 66 % Final    Lymphocytes 11/17/2021 36  24 - 44 % Final    Monocytes Nightly    ASSESSMENT  Bipolar depression ([facility]     PLAN  Patient s symptoms   show no change  Observe on recently adjusted medication with likely adjustments tomorrow if no further benefit  Attempt to develop insight  Psycho-education conducted. Supportive Therapy conducted. Probable discharge is undetermined at this time  Follow-up daily while in the inpatient unit        Electronically signed by Tasha Birch MD on 11/17/21 at 7:30 PM EST    **This report has been created using voice recognition software. It may contain minor errors which are inherent in voice recognition technology. **

## 2021-11-18 NOTE — PLAN OF CARE
Problem: Suicide risk  Goal: Provide patient with safe environment  Description: Provide patient with safe environment  11/17/2021 2248 by Hai Camejo LPN  Outcome: Ongoing  Note: Patient provided safe environment within [facility]. Will continue to monitor and provide q15 min safety checks. Problem: Falls - Risk of:  Goal: Will remain free from falls  Description: Will remain free from falls  11/17/2021 2248 by Hai Camejo LPN  Outcome: Ongoing  Note: Patient remains free of falls and verbalizes understanding of individual fall risks. Patient wearing non skid footwear and encouraged to seek out staff for any assistance needed. Problem: Falls - Risk of:  Goal: Absence of physical injury  Description: Absence of physical injury  11/17/2021 2248 by Hai Camejo LPN  Outcome: Ongoing  Note: Patient is free of self harm at this time. Patient agrees to seek out staff if thoughts to harm self arise. Staff will provide support and reassurance as needed. Safety checks maintained every 15 minutes. Problem: Pain:  Goal: Pain level will decrease  Description: Pain level will decrease  11/17/2021 2248 by Hai Camejo LPN  Outcome: Ongoing  Note: Patient is satisfied with pain management. Patient educated and agrees to alert staff if pain occurs. Problem: Pain:  Goal: Control of acute pain  Description: Control of acute pain  11/17/2021 2248 by Hai Camejo LPN  Outcome: Ongoing     Problem: Pain:  Goal: Control of chronic pain  Description: Control of chronic pain  11/17/2021 2248 by Hai Camejo LPN  Outcome: Ongoing     Problem: Altered Mood, Depressive Behavior:  Goal: Able to verbalize and/or display a decrease in depressive symptoms  Description: Able to verbalize and/or display a decrease in depressive symptoms  11/17/2021 2248 by Hai Camejo LPN  Outcome: Ongoing  Note: Patient admits to some depression at this time. Patient states depression is getting better.  Patient admits to suicidal ideations but contracts for safety stating she feels safe here. Patient has lots of anxiety about discharge. Patient denies hallucinations this shift. Patient agrees to come to staff if needed. Patient monitored every 15 minutes with environental safety checks. Problem: Altered Mood, Depressive Behavior:  Goal: Absence of self-harm  Description: Absence of self-harm  11/17/2021 2248 by Kenneth Boswell LPN  Outcome: Ongoing  Note: Patient admits to fleeting thoughts of self harm but no plan and states she feels safe here and contracts for safety. Patient is absent of self harm at this time. Patient educated and agrees to come to staff if needed. Patient monitored every 15 minutes with environmental safety checks. Problem: Tobacco Use:  Goal: Inpatient tobacco use cessation counseling participation  Description: Inpatient tobacco use cessation counseling participation  11/17/2021 2248 by Kenneth Boswell LPN  Outcome: Ongoing  Note: Patient given tobacco quitline number 33719055907 at this time, refusing to call at this time, states \" I just dont want to quit now\"- patient given information as to the dangers of long term tobacco use. Continue to reinforce the importance of tobacco cessation.

## 2021-11-18 NOTE — GROUP NOTE
Group Therapy Note    Date: 11/18/2021    Group Start Time: 1050  Group End Time: 1200  Group Topic: Recreational    [address], [place]        Group Therapy Note    Attendees: 5/16    patient refused to attend problem solving  group at [phone] after encouragement from staff. 1:1 talk time provided as alternative to group session.
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
## 2021-11-18 NOTE — GROUP NOTE
Group Therapy Note    Date: 11/18/2021    Group Start Time: 0900  Group End Time: 2299  Group Topic: Community Meeting    [facility]    Patient refused to attend community meeting and goal setting group at 0900 after encouragement from staff. 1:1 talk time offered by staff as alternative to group session.

## 2021-11-18 NOTE — PROGRESS NOTES
Daily Progress Note  11/18/2021    Patient Name: Dave Perry    CHIEF COMPLAINT: Depression with suicidal ideation. SUBJECTIVE:      Medication Adherence: Patient has been medication compliant today. Emergency Medications: Patient has not required any emergency medications today. Appetite: Patient reports adequate. Sleep: Patient reports adequate and restorative. Patient is seen today for a follow up assessment. Patient endorses depression and anxiety today. She endorses fleeting suicidal ideation without intent or plans. She denies homicidal ideation, intent, or plans. She contracts for safety on the unit. Patient denies auditory hallucinations, visual hallucinations, paranoia, or delusions. No medication side effects reported at the time of assessment. When asked about medical concerns, patient reports diarrhea. Writer had previously ordered Imodium 2 mg, oral, 4 times daily as needed, for diarrhea, for 24 hours. Patient reports her diarrhea is improving. Patient was requesting Gatorade. Writer spoke with nursing staff regarding patient's request and nursing staff stated they are working on setting limits. Writer encouraged patient to attend group on the unit. At this time, the patient is not appropriate for a lower level of care. There is risk of decompensation and patient warrants further hospitalization for safety and stabilization. Group Attendance on Unit:   [] Yes  [x] Selectively    [] No         Mental Status Exam  Level of consciousness: Alert and awake. Appearance: Appropriate attire for setting, seated in chair, with good  grooming and hygiene. Behavior/Motor: Approachable, no psychomotor abnormalities. Attitude toward examiner: Cooperative, attentive, good eye contact. Speech: Normal rate, normal volume, normal tone. Mood:  Patient reports \"better\". Affect: Flat. Thought processes: Linear and coherent.    Thought content: Denies homicidal ideation. Suicidal Ideation: Fleeting suicidal ideations, without current plan or intent, contracts for safety on the unit. Delusions: No evidence of delusions. Denies paranoia. Perceptual Disturbance: Patient does not appear to be responding to internal stimuli. Denies auditory hallucinations. Denies visual hallucinations. Cognition: Oriented to self, location, time, and situation. Memory: Age appropriate. Insight & Judgement: Poor. Data   height is 5' 6\" (1.676 m) and weight is 230 lb (104.3 kg). Her temporal temperature is 96.9 °F (36.1 °C). Her blood pressure is 98/43 (abnormal) and her pulse is 61. Her respiration is 14.    Labs:   Admission on 11/13/2021   Component Date Value Ref Range Status    WBC 11/15/2021 4.8  3.5 - 11.0 k/uL Final    RBC 11/15/2021 4.33  4.0 - 5.2 m/uL Final    Hemoglobin 11/15/2021 13.4  12.0 - 16.0 g/dL Final    Hematocrit 11/15/2021 38.1  36 - 46 % Final    MCV 11/15/2021 88.1  80 - 100 fL Final    MCH 11/15/2021 30.9  26 - 34 pg Final    MCHC 11/15/2021 35.1  31 - 37 g/dL Final    RDW 11/15/2021 12.8  11.5 - 14.9 % Final    Platelets 37/41/0691 387  150 - 450 k/uL Final    MPV 11/15/2021 6.7  6.0 - 12.0 fL Final    NRBC Automated 11/15/2021 NOT REPORTED  per 100 WBC Final    Differential Type 11/15/2021 NOT REPORTED   Final    Seg Neutrophils 11/15/2021 55  36 - 66 % Final    Lymphocytes 11/15/2021 37  24 - 44 % Final    Monocytes 11/15/2021 7  1 - 7 % Final    Eosinophils % 11/15/2021 0  0 - 4 % Final    Basophils 11/15/2021 1  0 - 2 % Final    Immature Granulocytes 11/15/2021 NOT REPORTED  0 % Final    Segs Absolute 11/15/2021 2.70  1.3 - 9.1 k/uL Final    Absolute Lymph # 11/15/2021 1.80  1.0 - 4.8 k/uL Final    Absolute Mono # 11/15/2021 0.30  0.1 - 1.3 k/uL Final    Absolute Eos # 11/15/2021 0.00  0.0 - 0.4 k/uL Final    Basophils Absolute 11/15/2021 0.00  0.0 - 0.2 k/uL Final    Absolute Immature Granulocyte 11/15/2021 NOT REPORTED  0.00 - 0.30 k/uL Final    WBC Morphology 11/15/2021 NOT REPORTED   Final    RBC Morphology 11/15/2021 NOT REPORTED   Final    Platelet Estimate 41/86/0372 NOT REPORTED   Final    WBC 11/17/2021 5.4  3.5 - 11.0 k/uL Final    RBC 11/17/2021 4.34  4.0 - 5.2 m/uL Final    Hemoglobin 11/17/2021 13.3  12.0 - 16.0 g/dL Final    Hematocrit 11/17/2021 38.6  36 - 46 % Final    MCV 11/17/2021 88.8  80 - 100 fL Final    MCH 11/17/2021 30.7  26 - 34 pg Final    MCHC 11/17/2021 34.6  31 - 37 g/dL Final    RDW 11/17/2021 12.9  11.5 - 14.9 % Final    Platelets 15/17/1287 394  150 - 450 k/uL Final    MPV 11/17/2021 6.9  6.0 - 12.0 fL Final    NRBC Automated 11/17/2021 NOT REPORTED  per 100 WBC Final    Differential Type 11/17/2021 NOT REPORTED   Final    Seg Neutrophils 11/17/2021 56  36 - 66 % Final    Lymphocytes 11/17/2021 36  24 - 44 % Final    Monocytes 11/17/2021 7  1 - 7 % Final    Eosinophils % 11/17/2021 0  0 - 4 % Final    Basophils 11/17/2021 1  0 - 2 % Final    Immature Granulocytes 11/17/2021 NOT REPORTED  0 % Final    Segs Absolute 11/17/2021 3.10  1.3 - 9.1 k/uL Final    Absolute Lymph # 11/17/2021 1.90  1.0 - 4.8 k/uL Final    Absolute Mono # 11/17/2021 0.40  0.1 - 1.3 k/uL Final    Absolute Eos # 11/17/2021 0.00  0.0 - 0.4 k/uL Final    Basophils Absolute 11/17/2021 0.00  0.0 - 0.2 k/uL Final    Absolute Immature Granulocyte 11/17/2021 NOT REPORTED  0.00 - 0.30 k/uL Final    WBC Morphology 11/17/2021 NOT REPORTED   Final    RBC Morphology 11/17/2021 NOT REPORTED   Final    Platelet Estimate 18/00/3491 NOT REPORTED   Final    WBC 11/18/2021 4.8  3.5 - 11.0 k/uL Final    RBC 11/18/2021 4.62  4.0 - 5.2 m/uL Final    Hemoglobin 11/18/2021 14.4  12.0 - 16.0 g/dL Final    Hematocrit 11/18/2021 41.5  36 - 46 % Final    MCV 11/18/2021 89.7  80 - 100 fL Final    MCH 11/18/2021 31.1  26 - 34 pg Final    MCHC 11/18/2021 34.7  31 - 37 g/dL Final    RDW 11/18/2021 13.3  11.5 - 14.9 % Final    Platelets 24/58/0611 396  150 - 450 k/uL Final    MPV 11/18/2021 7.1  6.0 - 12.0 fL Final    NRBC Automated 11/18/2021 NOT REPORTED  per 100 WBC Final    Differential Type 11/18/2021 NOT REPORTED   Final    Immature Granulocytes 11/18/2021 NOT REPORTED  0 % Final    Absolute Immature Granulocyte 11/18/2021 NOT REPORTED  0.00 - 0.30 k/uL Final    WBC Morphology 11/18/2021 NOT REPORTED   Final    RBC Morphology 11/18/2021 NOT REPORTED   Final    Platelet Estimate 25/64/3153 NOT REPORTED   Final    Seg Neutrophils 11/18/2021 52  36 - 66 % Final    Lymphocytes 11/18/2021 43  24 - 44 % Final    Monocytes 11/18/2021 5  1 - 7 % Final    Eosinophils % 11/18/2021 0  0 - 4 % Final    Basophils 11/18/2021 0  0 - 2 % Final    Segs Absolute 11/18/2021 2.50  1.3 - 9.1 k/uL Final    Absolute Lymph # 11/18/2021 2.06  1.0 - 4.8 k/uL Final    Absolute Mono # 11/18/2021 0.24  0.1 - 1.3 k/uL Final    Absolute Eos # 11/18/2021 0.00  0.0 - 0.4 k/uL Final    Basophils Absolute 11/18/2021 0.00  0.0 - 0.2 k/uL Final    Morphology 11/18/2021 Normal   Final         Reviewed patient's current plan of care and vital signs with nursing staff. · Blood pressure of 98/43 noted. An order to retake vital signs ordered.     Labs reviewed: [x] Yes  Last EKG in EMR reviewed: [x] Yes    Medications  Current Facility-Administered Medications: loperamide (IMODIUM) capsule 2 mg, 2 mg, Oral, 4x Daily PRN  OLANZapine (ZYPREXA) tablet 7.5 mg, 7.5 mg, Oral, Nightly  sertraline (ZOLOFT) tablet 50 mg, 50 mg, Oral, Daily  amoxicillin-clavulanate (AUGMENTIN) 500-125 MG per tablet 1 tablet, 1 tablet, Oral, 2 times per day  acetaminophen (TYLENOL) tablet 650 mg, 650 mg, Oral, Q4H PRN  aluminum & magnesium hydroxide-simethicone (MAALOX) 200-200-20 MG/5ML suspension 30 mL, 30 mL, Oral, Q6H PRN  hydrOXYzine (ATARAX) tablet 50 mg, 50 mg, Oral, TID PRN  polyethylene glycol (GLYCOLAX) packet 17 g, 17 g, Oral, Daily PRN  traZODone (DESYREL) tablet 50 mg, 50 mg, Oral, Nightly PRN  albuterol sulfate  (90 Base) MCG/ACT inhaler 2 puff, 2 puff, Inhalation, Q6H PRN  apixaban (ELIQUIS) tablet 5 mg, 5 mg, Oral, BID  cetirizine (ZYRTEC) tablet 10 mg, 10 mg, Oral, Daily  fluticasone (FLONASE) 50 MCG/ACT nasal spray 1 spray, 1 spray, Each Nostril, Daily  lisinopril (PRINIVIL;ZESTRIL) tablet 2.5 mg, 2.5 mg, Oral, Nightly  melatonin tablet 3 mg, 3 mg, Oral, Nightly PRN  ondansetron (ZOFRAN-ODT) disintegrating tablet 8 mg, 8 mg, SubLINGual, Q8H PRN  pantoprazole (PROTONIX) tablet 40 mg, 40 mg, Oral, Daily  prazosin (MINIPRESS) capsule 2 mg, 2 mg, Oral, Nightly    ASSESSMENT  Bipolar depression (HCC)         PLAN  Patient symptoms are: Minimally improving. Continue current medication regimen. Monitor need and frequency of PRN medications. Patient is reporting diarrhea. New order: Imodium capsule 2 mg, oral, 4 times daily as needed for diarrhea, for 24 hours. Continue to monitor. If unresolved, consider consulting internal medicine regarding reported diarrhea. An order to retake vital signs ordered. Encourage participation in groups and milieu. Attempt to develop insight. Psycho-education conducted. Supportive Therapy conducted. Probable discharge is to be determined by MD.   Follow-up daily while inpatient. Patient continues to be monitored in the inpatient psychiatric facility at [facility] for safety and stabilization. Patient continues to need, on a daily basis, active treatment furnished directly by or requiring the supervision of inpatient psychiatric personnel. Electronically signed by DAMARIS Castro CNP on 11/18/2021 at 5:00 PM    **This report has been created using voice recognition software. It may contain minor errors which are inherent in voice recognition technology. **      Addendum: Retake of vital signs reviewed and are noted to show improvement.     Electronically signed by Paul Barragan CNP on 11/18/2021 at 5:12 PM.    I independently saw and evaluated the patient. I reviewed the nurse practitioners documentation above. Any additional comments or changes to the nurse practitioners documentation are stated below otherwise agree with assessment. Plan will be as follows:  Spent 30 minutes with the patient, of that greater than 16 minutes spent in supportive psychotherapy. Patient does appear to be dragging her feet. Reporting prominent depressive symptoms. Staff splitting behaviors. Patient does get defensive and confrontational when gently confronted with these behaviors. Able to redirect. Agreeable to increase Zoloft  PLAN  Patient s symptoms   minimally improved  Increase Zoloft to 75 mg daily  Attempt to develop insight  Psycho-education conducted. Supportive Therapy conducted.   Probable discharge is next week  Follow-up daily while on inpatient unit

## 2021-11-18 NOTE — GROUP NOTE
Group Therapy Note    Date: 11/17/2021    Group Start Time: 2030  Group End Time: 2115  Group Topic: Wrap-Up    [facility]        Group Therapy Note    Attendees: 10/14 patients for positive/gratitude wrapup group        Good appropriate participation    Signature:  Romana Show

## 2021-11-19 PROCEDURE — 6370000000 HC RX 637 (ALT 250 FOR IP): Performed by: PSYCHIATRY & NEUROLOGY

## 2021-11-19 PROCEDURE — APPSS30 APP SPLIT SHARED TIME 16-30 MINUTES: Performed by: NURSE PRACTITIONER

## 2021-11-19 PROCEDURE — 1240000000 HC EMOTIONAL WELLNESS R&B

## 2021-11-19 PROCEDURE — 6370000000 HC RX 637 (ALT 250 FOR IP): Performed by: INTERNAL MEDICINE

## 2021-11-19 PROCEDURE — 90833 PSYTX W PT W E/M 30 MIN: CPT | Performed by: PSYCHIATRY & NEUROLOGY

## 2021-11-19 PROCEDURE — 99233 SBSQ HOSP IP/OBS HIGH 50: CPT | Performed by: PSYCHIATRY & NEUROLOGY

## 2021-11-19 PROCEDURE — 99232 SBSQ HOSP IP/OBS MODERATE 35: CPT | Performed by: INTERNAL MEDICINE

## 2021-11-19 PROCEDURE — 6370000000 HC RX 637 (ALT 250 FOR IP)

## 2021-11-19 RX ORDER — LOPERAMIDE HYDROCHLORIDE 2 MG/1
2 CAPSULE ORAL 3 TIMES DAILY PRN
Status: DISCONTINUED | OUTPATIENT
Start: 2021-11-19 | End: 2021-11-22 | Stop reason: HOSPADM

## 2021-11-19 RX ORDER — PALIPERIDONE 3 MG/1
3 TABLET, EXTENDED RELEASE ORAL DAILY
Status: DISCONTINUED | OUTPATIENT
Start: 2021-11-19 | End: 2021-11-22 | Stop reason: HOSPADM

## 2021-11-19 RX ADMIN — FLUTICASONE PROPIONATE 1 SPRAY: 50 SPRAY, METERED NASAL at 10:50

## 2021-11-19 RX ADMIN — PALIPERIDONE 3 MG: 3 TABLET, EXTENDED RELEASE ORAL at 12:05

## 2021-11-19 RX ADMIN — PRAZOSIN HYDROCHLORIDE 2 MG: 1 CAPSULE ORAL at 21:12

## 2021-11-19 RX ADMIN — AMOXICILLIN AND CLAVULANATE POTASSIUM 1 TABLET: 500; 125 TABLET, FILM COATED ORAL at 10:49

## 2021-11-19 RX ADMIN — HYDROXYZINE HYDROCHLORIDE 50 MG: 50 TABLET, FILM COATED ORAL at 21:12

## 2021-11-19 RX ADMIN — Medication 3 MG: at 21:12

## 2021-11-19 RX ADMIN — ACETAMINOPHEN 650 MG: 325 TABLET, FILM COATED ORAL at 20:06

## 2021-11-19 RX ADMIN — AMOXICILLIN AND CLAVULANATE POTASSIUM 1 TABLET: 500; 125 TABLET, FILM COATED ORAL at 21:12

## 2021-11-19 RX ADMIN — SERTRALINE HYDROCHLORIDE 50 MG: 50 TABLET ORAL at 10:49

## 2021-11-19 RX ADMIN — APIXABAN 5 MG: 5 TABLET, FILM COATED ORAL at 21:12

## 2021-11-19 RX ADMIN — ACETAMINOPHEN 650 MG: 325 TABLET, FILM COATED ORAL at 13:23

## 2021-11-19 RX ADMIN — LOPERAMIDE HYDROCHLORIDE 2 MG: 2 CAPSULE ORAL at 12:05

## 2021-11-19 RX ADMIN — TRAZODONE HYDROCHLORIDE 50 MG: 50 TABLET ORAL at 21:12

## 2021-11-19 RX ADMIN — CETIRIZINE HYDROCHLORIDE 10 MG: 10 TABLET, FILM COATED ORAL at 10:51

## 2021-11-19 RX ADMIN — APIXABAN 5 MG: 5 TABLET, FILM COATED ORAL at 10:49

## 2021-11-19 RX ADMIN — PANTOPRAZOLE SODIUM 40 MG: 40 TABLET, DELAYED RELEASE ORAL at 10:49

## 2021-11-19 RX ADMIN — HYDROXYZINE HYDROCHLORIDE 50 MG: 50 TABLET, FILM COATED ORAL at 13:42

## 2021-11-19 ASSESSMENT — PAIN SCALES - GENERAL
PAINLEVEL_OUTOF10: 5
PAINLEVEL_OUTOF10: 3
PAINLEVEL_OUTOF10: 0
PAINLEVEL_OUTOF10: 0

## 2021-11-19 ASSESSMENT — PAIN DESCRIPTION - LOCATION: LOCATION: HEAD

## 2021-11-19 ASSESSMENT — PAIN DESCRIPTION - PAIN TYPE: TYPE: ACUTE PAIN

## 2021-11-19 ASSESSMENT — PAIN DESCRIPTION - DESCRIPTORS: DESCRIPTORS: HEADACHE

## 2021-11-19 NOTE — GROUP NOTE
Group Therapy Note    Date: 11/19/2021    Group Start Time: 1430  Group End Time: 1500  Group Topic: Group Therapy    [address] LPN        Group Therapy Note    Attendees: 8/15           Patient's Goal:  Find new coping skill    Status After Intervention:  Improved    Participation Level: Interactive and Monopolizing    Participation Quality: Attentive and Intrusive      Speech:  loud      Thought Process/Content: Logical      Affective Functioning: Congruent      Mood: elevated      Level of consciousness:  Alert      Response to Learning: Able to verbalize current knowledge/experience and Capable of insight      Endings: None Reported    Modes of Intervention: Education      Discipline Responsible: Licensed Practical Nurse      Signature:  Mayra Almaraz LPN
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
## 2021-11-19 NOTE — PLAN OF CARE
Problem: Falls - Risk of:  Goal: Will remain free from falls  Description: Will remain free from falls  11/19/2021 1254 by Unique Boggs RN  Outcome: Ongoing     Problem: Falls - Risk of:  Goal: Absence of physical injury  Description: Absence of physical injury  Outcome: Ongoing     Patient absent of falls. Patient absent of physical injury. Patient denies depression. Patient absent of self harm but admits to fleeting SI with no active plan. Patient denies hallucinations. Will continue to monitor and offer support as needed.

## 2021-11-19 NOTE — GROUP NOTE
Group Therapy Note    Date: 11/19/2021    Group Start Time: 1000  Group End Time: 1100  Group Topic: Psychotherapy    [facility]    LILIAN Escobar, [facility]        Group Therapy Note    Patient declined to attend psychotherapy group at 10 am despite encouragement by staff. 1:1 was offered as an alternative.             Signature:  LILIAN Escobar, [place]

## 2021-11-19 NOTE — GROUP NOTE
Group Therapy Note    Date: 11/18/2021    Group Start Time: 2000  Group End Time: 2025  Group Topic: Wrap-Up    REDD Mccartney        Group Therapy Note    Attendees: 7/14             Status After Intervention:  Improved    Participation Level:  Active Listener    Participation Quality: Appropriate      Speech:  normal      Thought Process/Content: Logical      Affective Functioning: Congruent      Mood: elevated      Level of consciousness:  Alert      Response to Learning: Able to verbalize current knowledge/experience      Endings: None Reported    Modes of Intervention: Socialization      Discipline Responsible: Behavorial PassportParking Tech      Signature:  Maximino Vences

## 2021-11-19 NOTE — PROGRESS NOTES
Daily Progress Note  11/19/2021    Patient Name: Lakisha Perez    CHIEF COMPLAINT: Depression with suicidal ideation. SUBJECTIVE:      Medication Adherence: Adherent    Emergency Medications: None    Patient was seen for follow-up assessment in her room today. She was seated on her bed and agreeable to conversation. She describes suicidal ideation is fleeting today and states \"it comes and goes\". She reports that symptoms of depression remain and rates it a 7/10, 10 being the worst. She reports that anxiety has improved and that yesterday she was feeling somewhat restless but this has improved today. Patient is complaining of low blood pressure and is questioning why she is taking lisinopril. This has since been discontinued. She is denying any other side effects to medication. Patient reports that she has been requesting Gatorade from staff because she is \"withdrawing\". Patient was encouraged to drink plenty of water. Patient continues to endorse that her plan is to take a bus to [place] after discharge. She reports that she is not experiencing auditory or visual hallucinations. She makes no delusional or paranoid statements during conversation. Patient agrees to contract for safety on the unit. Appetite: Adequate    Sleep: Adequate    Group Attendance on Unit:   [] Yes  [x] Selectively    [] No         Mental Status Exam  Level of consciousness: Alert and awake. Appearance: Appropriate attire for setting, seated on bed, with fair grooming and hygiene. Behavior/Motor: Approachable, no psychomotor abnormalities. Attitude toward examiner: Cooperative, attentive, good eye contact. Speech: Normal rate, normal volume, normal tone. Mood: Euthymic  Affect: Appropriate  Thought processes: Linear and coherent. Thought content: Denies homicidal ideation. Suicidal Ideation: Fleeting suicidal ideations, without current plan or intent, contracts for safety on the unit.    Delusions: No evidence of delusions. Denies paranoia. Perceptual Disturbance: Patient does not appear to be responding to internal stimuli. Denies auditory hallucinations. Denies visual hallucinations. Cognition: Oriented to self, location, time, and situation. Memory: Age appropriate. Insight & Judgement: Poor. Data   height is 5' 6\" (1.676 m) and weight is 230 lb (104.3 kg). Her oral temperature is 96.9 °F (36.1 °C). Her blood pressure is 124/81 and her pulse is 69. Her respiration is 14.    Labs:   Admission on 11/13/2021   Component Date Value Ref Range Status    WBC 11/15/2021 4.8  3.5 - 11.0 k/uL Final    RBC 11/15/2021 4.33  4.0 - 5.2 m/uL Final    Hemoglobin 11/15/2021 13.4  12.0 - 16.0 g/dL Final    Hematocrit 11/15/2021 38.1  36 - 46 % Final    MCV 11/15/2021 88.1  80 - 100 fL Final    MCH 11/15/2021 30.9  26 - 34 pg Final    MCHC 11/15/2021 35.1  31 - 37 g/dL Final    RDW 11/15/2021 12.8  11.5 - 14.9 % Final    Platelets 31/06/3240 387  150 - 450 k/uL Final    MPV 11/15/2021 6.7  6.0 - 12.0 fL Final    NRBC Automated 11/15/2021 NOT REPORTED  per 100 WBC Final    Differential Type 11/15/2021 NOT REPORTED   Final    Seg Neutrophils 11/15/2021 55  36 - 66 % Final    Lymphocytes 11/15/2021 37  24 - 44 % Final    Monocytes 11/15/2021 7  1 - 7 % Final    Eosinophils % 11/15/2021 0  0 - 4 % Final    Basophils 11/15/2021 1  0 - 2 % Final    Immature Granulocytes 11/15/2021 NOT REPORTED  0 % Final    Segs Absolute 11/15/2021 2.70  1.3 - 9.1 k/uL Final    Absolute Lymph # 11/15/2021 1.80  1.0 - 4.8 k/uL Final    Absolute Mono # 11/15/2021 0.30  0.1 - 1.3 k/uL Final    Absolute Eos # 11/15/2021 0.00  0.0 - 0.4 k/uL Final    Basophils Absolute 11/15/2021 0.00  0.0 - 0.2 k/uL Final    Absolute Immature Granulocyte 11/15/2021 NOT REPORTED  0.00 - 0.30 k/uL Final    WBC Morphology 11/15/2021 NOT REPORTED   Final    RBC Morphology 11/15/2021 NOT REPORTED   Final    Platelet Estimate 97/22/2416 NOT REPORTED Final    WBC 11/17/2021 5.4  3.5 - 11.0 k/uL Final    RBC 11/17/2021 4.34  4.0 - 5.2 m/uL Final    Hemoglobin 11/17/2021 13.3  12.0 - 16.0 g/dL Final    Hematocrit 11/17/2021 38.6  36 - 46 % Final    MCV 11/17/2021 88.8  80 - 100 fL Final    MCH 11/17/2021 30.7  26 - 34 pg Final    MCHC 11/17/2021 34.6  31 - 37 g/dL Final    RDW 11/17/2021 12.9  11.5 - 14.9 % Final    Platelets 21/98/1217 394  150 - 450 k/uL Final    MPV 11/17/2021 6.9  6.0 - 12.0 fL Final    NRBC Automated 11/17/2021 NOT REPORTED  per 100 WBC Final    Differential Type 11/17/2021 NOT REPORTED   Final    Seg Neutrophils 11/17/2021 56  36 - 66 % Final    Lymphocytes 11/17/2021 36  24 - 44 % Final    Monocytes 11/17/2021 7  1 - 7 % Final    Eosinophils % 11/17/2021 0  0 - 4 % Final    Basophils 11/17/2021 1  0 - 2 % Final    Immature Granulocytes 11/17/2021 NOT REPORTED  0 % Final    Segs Absolute 11/17/2021 3.10  1.3 - 9.1 k/uL Final    Absolute Lymph # 11/17/2021 1.90  1.0 - 4.8 k/uL Final    Absolute Mono # 11/17/2021 0.40  0.1 - 1.3 k/uL Final    Absolute Eos # 11/17/2021 0.00  0.0 - 0.4 k/uL Final    Basophils Absolute 11/17/2021 0.00  0.0 - 0.2 k/uL Final    Absolute Immature Granulocyte 11/17/2021 NOT REPORTED  0.00 - 0.30 k/uL Final    WBC Morphology 11/17/2021 NOT REPORTED   Final    RBC Morphology 11/17/2021 NOT REPORTED   Final    Platelet Estimate 61/17/8038 NOT REPORTED   Final    WBC 11/18/2021 4.8  3.5 - 11.0 k/uL Final    RBC 11/18/2021 4.62  4.0 - 5.2 m/uL Final    Hemoglobin 11/18/2021 14.4  12.0 - 16.0 g/dL Final    Hematocrit 11/18/2021 41.5  36 - 46 % Final    MCV 11/18/2021 89.7  80 - 100 fL Final    MCH 11/18/2021 31.1  26 - 34 pg Final    MCHC 11/18/2021 34.7  31 - 37 g/dL Final    RDW 11/18/2021 13.3  11.5 - 14.9 % Final    Platelets 57/43/0346 396  150 - 450 k/uL Final    MPV 11/18/2021 7.1  6.0 - 12.0 fL Final    NRBC Automated 11/18/2021 NOT REPORTED  per 100 WBC Final    Differential Type 11/18/2021 NOT REPORTED   Final    Immature Granulocytes 11/18/2021 NOT REPORTED  0 % Final    Absolute Immature Granulocyte 11/18/2021 NOT REPORTED  0.00 - 0.30 k/uL Final    WBC Morphology 11/18/2021 NOT REPORTED   Final    RBC Morphology 11/18/2021 NOT REPORTED   Final    Platelet Estimate 25/22/7795 NOT REPORTED   Final    Seg Neutrophils 11/18/2021 52  36 - 66 % Final    Lymphocytes 11/18/2021 43  24 - 44 % Final    Monocytes 11/18/2021 5  1 - 7 % Final    Eosinophils % 11/18/2021 0  0 - 4 % Final    Basophils 11/18/2021 0  0 - 2 % Final    Segs Absolute 11/18/2021 2.50  1.3 - 9.1 k/uL Final    Absolute Lymph # 11/18/2021 2.06  1.0 - 4.8 k/uL Final    Absolute Mono # 11/18/2021 0.24  0.1 - 1.3 k/uL Final    Absolute Eos # 11/18/2021 0.00  0.0 - 0.4 k/uL Final    Basophils Absolute 11/18/2021 0.00  0.0 - 0.2 k/uL Final    Morphology 11/18/2021 Normal   Final         Reviewed patient's current plan of care and vital signs with nursing staff. · Blood pressure of 98/43 noted. An order to retake vital signs ordered.     Labs reviewed: [x] Yes  Last EKG in EMR reviewed: [x] Yes    Medications  Current Facility-Administered Medications: loperamide (IMODIUM) capsule 2 mg, 2 mg, Oral, TID PRN  paliperidone (INVEGA) extended release tablet 3 mg, 3 mg, Oral, Daily  sertraline (ZOLOFT) tablet 50 mg, 50 mg, Oral, Daily  amoxicillin-clavulanate (AUGMENTIN) 500-125 MG per tablet 1 tablet, 1 tablet, Oral, 2 times per day  acetaminophen (TYLENOL) tablet 650 mg, 650 mg, Oral, Q4H PRN  aluminum & magnesium hydroxide-simethicone (MAALOX) 200-200-20 MG/5ML suspension 30 mL, 30 mL, Oral, Q6H PRN  hydrOXYzine (ATARAX) tablet 50 mg, 50 mg, Oral, TID PRN  polyethylene glycol (GLYCOLAX) packet 17 g, 17 g, Oral, Daily PRN  traZODone (DESYREL) tablet 50 mg, 50 mg, Oral, Nightly PRN  albuterol sulfate  (90 Base) MCG/ACT inhaler 2 puff, 2 puff, Inhalation, Q6H PRN  apixaban (ELIQUIS) tablet 5 mg, 5 mg, Oral, BID  cetirizine (ZYRTEC) tablet 10 mg, 10 mg, Oral, Daily  fluticasone (FLONASE) 50 MCG/ACT nasal spray 1 spray, 1 spray, Each Nostril, Daily  melatonin tablet 3 mg, 3 mg, Oral, Nightly PRN  ondansetron (ZOFRAN-ODT) disintegrating tablet 8 mg, 8 mg, SubLINGual, Q8H PRN  pantoprazole (PROTONIX) tablet 40 mg, 40 mg, Oral, Daily  prazosin (MINIPRESS) capsule 2 mg, 2 mg, Oral, Nightly    ASSESSMENT  Bipolar depression (HCC)         PLAN  Patient symptoms are: Modestly improving  Continue current medication regimen. Monitor need and frequency of PRN medications. Encourage participation in groups and milieu. Attempt to develop insight. Psycho-education conducted. Supportive Therapy conducted. Probable discharge is to be determined by MD.   Follow-up daily while inpatient. Patient continues to be monitored in the inpatient psychiatric facility at [facility] for safety and stabilization. Patient continues to need, on a daily basis, active treatment furnished directly by or requiring the supervision of inpatient psychiatric personnel. Electronically signed by DAMARIS Perez CNP on 11/19/2021 at 4:02 PM    **This report has been created using voice recognition software. It may contain minor errors which are inherent in voice recognition technology. **    I independently saw and evaluated the patient. I reviewed the nurse practitioners documentation above. Any additional comments or changes to the nurse practitioners documentation are stated below otherwise agree with assessment. Plan will be as follows:  Patient was reporting lightheadedness and dizziness. We discussed possible interaction of olanzapine and prazosin in her lightheadedness. Mutually agreed to discontinue olanzapine and start Invega 3 mg daily. Per the internal medicine consultant for bilateral ear pain. Also discontinued patient's 2.5 mg lisinopril secondary to low blood pressure. Will monitor.   Spent 30 minutes with the patient, of that greater than 16 minutes was spent in supportive psychotherapy. Patient reports mild improvement in suicidal thoughts. PLAN  Patient s symptoms   are improving  Discontinue olanzapine  Start Invega 3 mg daily  Discontinue lisinopril 2.5 mg  Internal medicine consult for bilateral ear pain  Attempt to develop insight  Psycho-education conducted. Supportive Therapy conducted.   Probable discharge is next week  Follow-up daily while on inpatient unit

## 2021-11-19 NOTE — PLAN OF CARE
Problem: Suicide risk  Goal: Provide patient with safe environment  Description: Provide patient with safe environment  Outcome: Ongoing     Patient is brightened and social, attended group, compliant with meds but refused blood pressure med requesting to discuss with provider. Pt reports feeling of anxiety and fleeting thoughts of self harm but denies any current thoughts. Pt is agreeable to seeking out should thoughts of self harm arise. Safe environment maintained. Pt remains free of falls and verbalizes understanding of individual fall risks. Pt wearing non skid footwear and encouraged to seek out staff for any assistance needed. Q15 minute checks for safety cont per unit policy. Will cont to monitor for safety and provide support and reassurance as needed.       Problem: Falls - Risk of:  Goal: Will remain free from falls  Description: Will remain free from falls  Outcome: Ongoing     Problem: Pain:  Goal: Pain level will decrease  Description: Pain level will decrease  Outcome: Ongoing     Problem: Altered Mood, Depressive Behavior:  Goal: Able to verbalize and/or display a decrease in depressive symptoms  Description: Able to verbalize and/or display a decrease in depressive symptoms  Outcome: Ongoing     Problem: Altered Mood, Depressive Behavior:  Goal: Absence of self-harm  Description: Absence of self-harm  Outcome: Ongoing

## 2021-11-19 NOTE — GROUP NOTE
Group Therapy Note    Date: 11/19/2021    Group Start Time: 1100  Group End Time: 1200  Group Topic: Recreational    [facility] Kraft        Group Therapy Note    Attendees: 6/16         Patient's Goal:  Creative expression    Status After Intervention:  Improved    Participation Level:  Active Listener and Interactive    Participation Quality: Appropriate, Attentive, Sharing and Supportive      Speech:  normal      Thought Process/Content: Logical      Affective Functioning: Congruent      Mood: euphoric      Level of consciousness:  Alert, Oriented x4 and Attentive      Response to Learning: Able to verbalize current knowledge/experience, Able to verbalize/acknowledge new learning, Able to retain information and Capable of insight    Modes of Intervention: Support, Socialization, Exploration, Clarifying, Problem-solving and Activity      Discipline Responsible: Psychoeducational Specialist      Signature:  Jhony Lee, [address]

## 2021-11-20 PROCEDURE — 99232 SBSQ HOSP IP/OBS MODERATE 35: CPT | Performed by: NURSE PRACTITIONER

## 2021-11-20 PROCEDURE — 1240000000 HC EMOTIONAL WELLNESS R&B

## 2021-11-20 PROCEDURE — 99231 SBSQ HOSP IP/OBS SF/LOW 25: CPT | Performed by: INTERNAL MEDICINE

## 2021-11-20 PROCEDURE — 6370000000 HC RX 637 (ALT 250 FOR IP): Performed by: PSYCHIATRY & NEUROLOGY

## 2021-11-20 PROCEDURE — 6370000000 HC RX 637 (ALT 250 FOR IP): Performed by: INTERNAL MEDICINE

## 2021-11-20 PROCEDURE — 6370000000 HC RX 637 (ALT 250 FOR IP)

## 2021-11-20 RX ADMIN — LOPERAMIDE HYDROCHLORIDE 2 MG: 2 CAPSULE ORAL at 22:33

## 2021-11-20 RX ADMIN — PANTOPRAZOLE SODIUM 40 MG: 40 TABLET, DELAYED RELEASE ORAL at 08:14

## 2021-11-20 RX ADMIN — AMOXICILLIN AND CLAVULANATE POTASSIUM 1 TABLET: 500; 125 TABLET, FILM COATED ORAL at 08:15

## 2021-11-20 RX ADMIN — HYDROXYZINE HYDROCHLORIDE 50 MG: 50 TABLET, FILM COATED ORAL at 07:33

## 2021-11-20 RX ADMIN — APIXABAN 5 MG: 5 TABLET, FILM COATED ORAL at 21:34

## 2021-11-20 RX ADMIN — SERTRALINE HYDROCHLORIDE 50 MG: 50 TABLET ORAL at 08:14

## 2021-11-20 RX ADMIN — TRAZODONE HYDROCHLORIDE 50 MG: 50 TABLET ORAL at 21:34

## 2021-11-20 RX ADMIN — PRAZOSIN HYDROCHLORIDE 2 MG: 1 CAPSULE ORAL at 21:34

## 2021-11-20 RX ADMIN — FLUTICASONE PROPIONATE 1 SPRAY: 50 SPRAY, METERED NASAL at 08:15

## 2021-11-20 RX ADMIN — ACETAMINOPHEN 650 MG: 325 TABLET, FILM COATED ORAL at 14:54

## 2021-11-20 RX ADMIN — AMOXICILLIN AND CLAVULANATE POTASSIUM 1 TABLET: 500; 125 TABLET, FILM COATED ORAL at 21:34

## 2021-11-20 RX ADMIN — CETIRIZINE HYDROCHLORIDE 10 MG: 10 TABLET, FILM COATED ORAL at 08:14

## 2021-11-20 RX ADMIN — HYDROXYZINE HYDROCHLORIDE 50 MG: 50 TABLET, FILM COATED ORAL at 20:12

## 2021-11-20 RX ADMIN — Medication 3 MG: at 21:34

## 2021-11-20 RX ADMIN — PALIPERIDONE 3 MG: 3 TABLET, EXTENDED RELEASE ORAL at 08:14

## 2021-11-20 RX ADMIN — APIXABAN 5 MG: 5 TABLET, FILM COATED ORAL at 08:15

## 2021-11-20 ASSESSMENT — PAIN SCALES - GENERAL
PAINLEVEL_OUTOF10: 3
PAINLEVEL_OUTOF10: 0
PAINLEVEL_OUTOF10: 0
PAINLEVEL_OUTOF10: 8

## 2021-11-20 ASSESSMENT — PAIN DESCRIPTION - PAIN TYPE: TYPE: ACUTE PAIN

## 2021-11-20 NOTE — CARE COORDINATION
Pt approached SW to discuss not having a bus ticket to travel on Monday. Pt stated \"I don't know what I am supposed to do, I was told to tell my  and you all would tell the NP. But I don't have a place to discharge to because I left [facility] [employer] and [address] I left [facility] because no one told me the rules and that I couldn't smoke there. \" SW encouraged pt to keep looking for a place to discharge to.

## 2021-11-20 NOTE — PLAN OF CARE
Problem: Falls - Risk of:  Goal: Will remain free from falls  Description: Will remain free from falls  Outcome: Ongoing     Patient is brightened and social with peers, attended group, medication compliant. Pt approaches staff frequently with attention seeking behaviors, but is cooperative and redirectable. Pt denies current thoughts of self harm, reports fleeting thoughts but denies a plan and is agreeable to seeking out should thoughts of self harm arise. Safe environment maintained. Q15 minute checks for safety cont per unit policy. Pt remains free of falls and verbalizes understanding of individual fall risks. Pt wearing non skid footwear and encouraged to seek out staff for any assistance needed. Will cont to monitor for safety and provide support and reassurance as needed.     Problem: Altered Mood, Depressive Behavior:  Goal: Able to verbalize and/or display a decrease in depressive symptoms  Description: Able to verbalize and/or display a decrease in depressive symptoms  Outcome: Ongoing     Problem: Altered Mood, Depressive Behavior:  Goal: Absence of self-harm  Description: Absence of self-harm  Outcome: Ongoing

## 2021-11-20 NOTE — PLAN OF CARE
Problem: Altered Mood, Depressive Behavior:  Goal: Able to verbalize and/or display a decrease in depressive symptoms  Description: Able to verbalize and/or display a decrease in depressive symptoms  11/20/2021 0844 by LUISANA Keane  Outcome: Ongoing  Note: Patient admits to depression and anxiety at this time. Patient states she had a nightmare and is experiencing chest pain related to anxiety. Patient denies suicidal or homicidal ideation. Patient denies hallucinations. Patient is social with peers, attending group and in day area playing games. Patient cares for ADL's appropriately. Patient is medication compliant. Problem: Altered Mood, Depressive Behavior:  Goal: Absence of self-harm  Description: Absence of self-harm  11/20/2021 0844 by LUISANA Keane  Outcome: Ongoing  Note: Patient is free from self harm. Patient denies plan or intent to harm self and agrees to seek out staff if thoughts should arise.

## 2021-11-20 NOTE — GROUP NOTE
Group Therapy Note    Date: 11/20/2021    Group Start Time: 1003  Group End Time: 9801  Group Topic: Psychoeducation    REDD HO    LILIAN Lo, AMADO        Group Therapy Note    Attendees: 9/17         Patient's Goal:    Increase interpersonal relationship skills utilizing talk therapy    Status After Intervention:  Improved    Participation Level:  Active Listener and Monopolizing    Participation Quality: Appropriate, Attentive and Intrusive      Speech:  normal      Thought Process/Content: Logical      Affective Functioning: Congruent      Mood: euthymic      Level of consciousness:  Alert and Attentive      Response to Learning: Able to verbalize current knowledge/experience      Endings: None Reported    Modes of Intervention: Education and Socialization      Discipline Responsible: /Counselor      Signature:  LILIAN Lo, AMADO

## 2021-11-20 NOTE — GROUP NOTE
Group Therapy Note    Date: 11/19/2021    Group Start Time: 2000  Group End Time: 2034  Group Topic: Wrap-Up    REDD Stanton [address] [facility] Lillian        Group Therapy Note    Attendees: 8/16         Status After Intervention:      Participation Level:  Active Listener    Participation Quality: Appropriate      Speech:  normal      Thought Process/Content: Logical      Affective Functioning: Congruent      Mood: elevated      Level of consciousness:  Alert      Response to Learning: Able to verbalize current knowledge/experience      Endings: None Reported    Modes of Intervention: Support and Socialization      Discipline Responsible: Behavorial Health Tech      Signature:  Maximino [address] Alexey Snyder

## 2021-11-20 NOTE — GROUP NOTE
Group Therapy Note    Date: 11/20/2021    Group Start Time: 1400  Group End Time: 1450  Group Topic: Psychoeducation    REDD HARDING    Olga Galdamez        Group Therapy Note    Attendees: 11/17       Patient's Goal:  To improve patient relaxation and knowledge of healthy coping skills     Notes:  Patient was pleasant and appropriate throughout the session     Status After Intervention:  Improved    Participation Level:  Active Listener and Interactive    Participation Quality: Appropriate, Attentive, Sharing and Supportive      Speech:  normal      Thought Process/Content: Logical      Affective Functioning: Congruent      Mood: euthymic      Level of consciousness:  Alert, Oriented x4 and Attentive      Response to Learning: Able to verbalize current knowledge/experience, Able to verbalize/acknowledge new learning, Capable of insight and Progressing to goal      Endings: None Reported    Modes of Intervention: Education, Support, Socialization, Exploration, Clarifying and Problem-solving      Discipline Responsible: Psychoeducational Specialist      Signature:  Junior Velásquez

## 2021-11-20 NOTE — GROUP NOTE
Group Therapy Note    Date: 11/20/2021    Group Start Time: 0900  Group End Time: 0930  Group Topic: Community Meeting    LUISANA Pelaez    Group Therapy Note    Attendees: 7    Patient's Goal:  Pt will identify daily goal and demonstrate understanding of unit guidelines and schedule    Notes:  Pt attended group and participated    Status After Intervention:  Improved    Participation Level:  Active Listener and Interactive    Participation Quality: Appropriate, Attentive and Sharing      Speech:  normal      Thought Process/Content: Logical  Linear      Affective Functioning: Constricted/Restricted      Mood: Anxious      Level of consciousness:  Alert, Oriented x4 and Attentive      Response to Learning: Able to verbalize current knowledge/experience, Able to verbalize/acknowledge new learning, Able to retain information, Capable of insight, Able to change behavior and Progressing to goal      Endings: None Reported    Modes of Intervention: Education, Support, Socialization, Exploration and Limit-setting      Discipline Responsible: Behavioral Health Tech      Signature:  Izabela Norris

## 2021-11-20 NOTE — PROGRESS NOTES
Daily Progress Note  11/20/2021    Patient Name: Wayne Miguel    CHIEF COMPLAINT: Depression with suicidal ideation. SUBJECTIVE:      Medication Adherence: Adherent    Emergency Medications: None    Patient was seen for follow-up assessment in her room today. Nursing staff reports she has been medication compliant and behaviorally controlled although she is frequently attention seeking. She was noticeably brightened in her mood and more pleasant. She is endorsing significant levels of anticipatory anxiety related to pending discharge and whether or not she will be able to get a bus ticket back to [place]. She is worried that if she is unable to get a ticket by time of discharge she will have to stay at a shelter, which she does not want to do. She is denying current auditory and visual hallucinations. She reports that suicidal ideation remains fleeting and she has no current plan or intent to harm herself. She states that symptoms of depression are improving and she is feeling somewhat less helpless and hopeless. She is looking forward to seeing her fiancé again. She continues to attend groups regularly and her interactions with peers have been appropriate. Patient agrees to contract for safety on the unit. Appetite: Adequate    Sleep: Adequate    Group Attendance on Unit:   [] Yes  [x] Selectively    [] No         Mental Status Exam  Level of consciousness: Alert and awake. Appearance: Appropriate attire for setting, seated on bed, with fair grooming and hygiene. Behavior/Motor: Approachable, no psychomotor abnormalities. Attitude toward examiner: Cooperative, attentive, good eye contact. Speech: Normal rate, normal volume, normal tone, somewhat hyperverbal  Mood: Euthymic  Affect: Appropriate  Thought processes: Linear and coherent. Thought content: Denies homicidal ideation.   Suicidal Ideation: Fleeting suicidal ideations, without current plan or intent, contracts for safety on the unit.   Delusions: No evidence of delusions. Denies paranoia. Perceptual Disturbance: Patient does not appear to be responding to internal stimuli. Denies auditory hallucinations. Denies visual hallucinations. Cognition: Oriented to self, location, time, and situation. Memory: Age appropriate. Insight & Judgement: Poor. Data   height is 5' 6\" (1.676 m) and weight is 230 lb (104.3 kg). Her temporal temperature is 97.3 °F (36.3 °C). Her blood pressure is 134/72 and her pulse is 83. Her respiration is 14.    Labs:   Admission on 11/13/2021   Component Date Value Ref Range Status    WBC 11/15/2021 4.8  3.5 - 11.0 k/uL Final    RBC 11/15/2021 4.33  4.0 - 5.2 m/uL Final    Hemoglobin 11/15/2021 13.4  12.0 - 16.0 g/dL Final    Hematocrit 11/15/2021 38.1  36 - 46 % Final    MCV 11/15/2021 88.1  80 - 100 fL Final    MCH 11/15/2021 30.9  26 - 34 pg Final    MCHC 11/15/2021 35.1  31 - 37 g/dL Final    RDW 11/15/2021 12.8  11.5 - 14.9 % Final    Platelets 67/94/7673 387  150 - 450 k/uL Final    MPV 11/15/2021 6.7  6.0 - 12.0 fL Final    NRBC Automated 11/15/2021 NOT REPORTED  per 100 WBC Final    Differential Type 11/15/2021 NOT REPORTED   Final    Seg Neutrophils 11/15/2021 55  36 - 66 % Final    Lymphocytes 11/15/2021 37  24 - 44 % Final    Monocytes 11/15/2021 7  1 - 7 % Final    Eosinophils % 11/15/2021 0  0 - 4 % Final    Basophils 11/15/2021 1  0 - 2 % Final    Immature Granulocytes 11/15/2021 NOT REPORTED  0 % Final    Segs Absolute 11/15/2021 2.70  1.3 - 9.1 k/uL Final    Absolute Lymph # 11/15/2021 1.80  1.0 - 4.8 k/uL Final    Absolute Mono # 11/15/2021 0.30  0.1 - 1.3 k/uL Final    Absolute Eos # 11/15/2021 0.00  0.0 - 0.4 k/uL Final    Basophils Absolute 11/15/2021 0.00  0.0 - 0.2 k/uL Final    Absolute Immature Granulocyte 11/15/2021 NOT REPORTED  0.00 - 0.30 k/uL Final    WBC Morphology 11/15/2021 NOT REPORTED   Final    RBC Morphology 11/15/2021 NOT REPORTED   Final    Platelet Estimate 11/15/2021 NOT REPORTED   Final    WBC 11/17/2021 5.4  3.5 - 11.0 k/uL Final    RBC 11/17/2021 4.34  4.0 - 5.2 m/uL Final    Hemoglobin 11/17/2021 13.3  12.0 - 16.0 g/dL Final    Hematocrit 11/17/2021 38.6  36 - 46 % Final    MCV 11/17/2021 88.8  80 - 100 fL Final    MCH 11/17/2021 30.7  26 - 34 pg Final    MCHC 11/17/2021 34.6  31 - 37 g/dL Final    RDW 11/17/2021 12.9  11.5 - 14.9 % Final    Platelets 92/68/4873 394  150 - 450 k/uL Final    MPV 11/17/2021 6.9  6.0 - 12.0 fL Final    NRBC Automated 11/17/2021 NOT REPORTED  per 100 WBC Final    Differential Type 11/17/2021 NOT REPORTED   Final    Seg Neutrophils 11/17/2021 56  36 - 66 % Final    Lymphocytes 11/17/2021 36  24 - 44 % Final    Monocytes 11/17/2021 7  1 - 7 % Final    Eosinophils % 11/17/2021 0  0 - 4 % Final    Basophils 11/17/2021 1  0 - 2 % Final    Immature Granulocytes 11/17/2021 NOT REPORTED  0 % Final    Segs Absolute 11/17/2021 3.10  1.3 - 9.1 k/uL Final    Absolute Lymph # 11/17/2021 1.90  1.0 - 4.8 k/uL Final    Absolute Mono # 11/17/2021 0.40  0.1 - 1.3 k/uL Final    Absolute Eos # 11/17/2021 0.00  0.0 - 0.4 k/uL Final    Basophils Absolute 11/17/2021 0.00  0.0 - 0.2 k/uL Final    Absolute Immature Granulocyte 11/17/2021 NOT REPORTED  0.00 - 0.30 k/uL Final    WBC Morphology 11/17/2021 NOT REPORTED   Final    RBC Morphology 11/17/2021 NOT REPORTED   Final    Platelet Estimate 36/77/1656 NOT REPORTED   Final    WBC 11/18/2021 4.8  3.5 - 11.0 k/uL Final    RBC 11/18/2021 4.62  4.0 - 5.2 m/uL Final    Hemoglobin 11/18/2021 14.4  12.0 - 16.0 g/dL Final    Hematocrit 11/18/2021 41.5  36 - 46 % Final    MCV 11/18/2021 89.7  80 - 100 fL Final    MCH 11/18/2021 31.1  26 - 34 pg Final    MCHC 11/18/2021 34.7  31 - 37 g/dL Final    RDW 11/18/2021 13.3  11.5 - 14.9 % Final    Platelets 03/51/5142 396  150 - 450 k/uL Final    MPV 11/18/2021 7.1  6.0 - 12.0 fL Final    NRBC Automated 11/18/2021 NOT REPORTED per 100 WBC Final    Differential Type 11/18/2021 NOT REPORTED   Final    Immature Granulocytes 11/18/2021 NOT REPORTED  0 % Final    Absolute Immature Granulocyte 11/18/2021 NOT REPORTED  0.00 - 0.30 k/uL Final    WBC Morphology 11/18/2021 NOT REPORTED   Final    RBC Morphology 11/18/2021 NOT REPORTED   Final    Platelet Estimate 79/72/0635 NOT REPORTED   Final    Seg Neutrophils 11/18/2021 52  36 - 66 % Final    Lymphocytes 11/18/2021 43  24 - 44 % Final    Monocytes 11/18/2021 5  1 - 7 % Final    Eosinophils % 11/18/2021 0  0 - 4 % Final    Basophils 11/18/2021 0  0 - 2 % Final    Segs Absolute 11/18/2021 2.50  1.3 - 9.1 k/uL Final    Absolute Lymph # 11/18/2021 2.06  1.0 - 4.8 k/uL Final    Absolute Mono # 11/18/2021 0.24  0.1 - 1.3 k/uL Final    Absolute Eos # 11/18/2021 0.00  0.0 - 0.4 k/uL Final    Basophils Absolute 11/18/2021 0.00  0.0 - 0.2 k/uL Final    Morphology 11/18/2021 Normal   Final         Reviewed patient's current plan of care and vital signs with nursing staff. · Blood pressure of 98/43 noted. An order to retake vital signs ordered.     Labs reviewed: [x] Yes  Last EKG in EMR reviewed: [x] Yes    Medications  Current Facility-Administered Medications: loperamide (IMODIUM) capsule 2 mg, 2 mg, Oral, TID PRN  paliperidone (INVEGA) extended release tablet 3 mg, 3 mg, Oral, Daily  sertraline (ZOLOFT) tablet 50 mg, 50 mg, Oral, Daily  amoxicillin-clavulanate (AUGMENTIN) 500-125 MG per tablet 1 tablet, 1 tablet, Oral, 2 times per day  acetaminophen (TYLENOL) tablet 650 mg, 650 mg, Oral, Q4H PRN  aluminum & magnesium hydroxide-simethicone (MAALOX) 200-200-20 MG/5ML suspension 30 mL, 30 mL, Oral, Q6H PRN  hydrOXYzine (ATARAX) tablet 50 mg, 50 mg, Oral, TID PRN  polyethylene glycol (GLYCOLAX) packet 17 g, 17 g, Oral, Daily PRN  traZODone (DESYREL) tablet 50 mg, 50 mg, Oral, Nightly PRN  albuterol sulfate  (90 Base) MCG/ACT inhaler 2 puff, 2 puff, Inhalation, Q6H PRN  apixaban (ELIQUIS) tablet 5 mg, 5 mg, Oral, BID  cetirizine (ZYRTEC) tablet 10 mg, 10 mg, Oral, Daily  fluticasone (FLONASE) 50 MCG/ACT nasal spray 1 spray, 1 spray, Each Nostril, Daily  melatonin tablet 3 mg, 3 mg, Oral, Nightly PRN  ondansetron (ZOFRAN-ODT) disintegrating tablet 8 mg, 8 mg, SubLINGual, Q8H PRN  pantoprazole (PROTONIX) tablet 40 mg, 40 mg, Oral, Daily  prazosin (MINIPRESS) capsule 2 mg, 2 mg, Oral, Nightly    ASSESSMENT  Bipolar depression (HCC)         PLAN  Patient symptoms are: Improving  Titrate Zoloft to 75 mg by mouth daily  Monitor need and frequency of PRN medications. Encourage participation in groups and milieu. Attempt to develop insight. Psycho-education conducted. Supportive Therapy conducted. Probable discharge is to be determined by MD.   Follow-up daily while inpatient. Patient continues to be monitored in the inpatient psychiatric facility at [facility] for safety and stabilization. Patient continues to need, on a daily basis, active treatment furnished directly by or requiring the supervision of inpatient psychiatric personnel. Electronically signed by DAMARIS Lucero CNP on 11/20/2021 at 2:45 PM    **This report has been created using voice recognition software. It may contain minor errors which are inherent in voice recognition technology. **
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
## 2021-11-21 PROCEDURE — 6370000000 HC RX 637 (ALT 250 FOR IP): Performed by: PSYCHIATRY & NEUROLOGY

## 2021-11-21 PROCEDURE — 6370000000 HC RX 637 (ALT 250 FOR IP)

## 2021-11-21 PROCEDURE — 6370000000 HC RX 637 (ALT 250 FOR IP): Performed by: NURSE PRACTITIONER

## 2021-11-21 PROCEDURE — 1240000000 HC EMOTIONAL WELLNESS R&B

## 2021-11-21 PROCEDURE — 6370000000 HC RX 637 (ALT 250 FOR IP): Performed by: INTERNAL MEDICINE

## 2021-11-21 PROCEDURE — 99231 SBSQ HOSP IP/OBS SF/LOW 25: CPT | Performed by: NURSE PRACTITIONER

## 2021-11-21 RX ADMIN — HYDROXYZINE HYDROCHLORIDE 50 MG: 50 TABLET, FILM COATED ORAL at 11:42

## 2021-11-21 RX ADMIN — SERTRALINE HYDROCHLORIDE 75 MG: 50 TABLET ORAL at 08:12

## 2021-11-21 RX ADMIN — TRAZODONE HYDROCHLORIDE 50 MG: 50 TABLET ORAL at 20:51

## 2021-11-21 RX ADMIN — HYDROXYZINE HYDROCHLORIDE 50 MG: 50 TABLET, FILM COATED ORAL at 19:19

## 2021-11-21 RX ADMIN — PANTOPRAZOLE SODIUM 40 MG: 40 TABLET, DELAYED RELEASE ORAL at 08:12

## 2021-11-21 RX ADMIN — Medication 3 MG: at 20:51

## 2021-11-21 RX ADMIN — CETIRIZINE HYDROCHLORIDE 10 MG: 10 TABLET, FILM COATED ORAL at 08:12

## 2021-11-21 RX ADMIN — APIXABAN 5 MG: 5 TABLET, FILM COATED ORAL at 20:51

## 2021-11-21 RX ADMIN — ACETAMINOPHEN 650 MG: 325 TABLET, FILM COATED ORAL at 11:42

## 2021-11-21 RX ADMIN — AMOXICILLIN AND CLAVULANATE POTASSIUM 1 TABLET: 500; 125 TABLET, FILM COATED ORAL at 20:51

## 2021-11-21 RX ADMIN — LOPERAMIDE HYDROCHLORIDE 2 MG: 2 CAPSULE ORAL at 08:11

## 2021-11-21 RX ADMIN — PALIPERIDONE 3 MG: 3 TABLET, EXTENDED RELEASE ORAL at 08:12

## 2021-11-21 RX ADMIN — LOPERAMIDE HYDROCHLORIDE 2 MG: 2 CAPSULE ORAL at 15:26

## 2021-11-21 RX ADMIN — PRAZOSIN HYDROCHLORIDE 2 MG: 1 CAPSULE ORAL at 20:50

## 2021-11-21 RX ADMIN — FLUTICASONE PROPIONATE 1 SPRAY: 50 SPRAY, METERED NASAL at 08:11

## 2021-11-21 RX ADMIN — APIXABAN 5 MG: 5 TABLET, FILM COATED ORAL at 08:12

## 2021-11-21 RX ADMIN — AMOXICILLIN AND CLAVULANATE POTASSIUM 1 TABLET: 500; 125 TABLET, FILM COATED ORAL at 08:12

## 2021-11-21 ASSESSMENT — PAIN DESCRIPTION - LOCATION: LOCATION: GENERALIZED

## 2021-11-21 ASSESSMENT — PAIN SCALES - GENERAL
PAINLEVEL_OUTOF10: 0
PAINLEVEL_OUTOF10: 6
PAINLEVEL_OUTOF10: 3
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
## 2021-11-21 NOTE — PLAN OF CARE
Problem: Altered Mood, Depressive Behavior:  Goal: Able to verbalize and/or display a decrease in depressive symptoms  Description: Able to verbalize and/or display a decrease in depressive symptoms  11/21/2021 0828 by LUISANA Castro  Outcome: Ongoing  Note: Patient denies hallucinations, suicidal and homicidal ideations and depression. Patient admits to mild anxiety and verbalizes if thoughts occur she will seek help from staff. Patient reports sleeping and eating well. Patient is encouraged to perform grooming and ADLs. Patient is encouraged to attend unit programming and socialize with peers in milieu. Patient is cooperative and pleasant upon approach. Patient is medication compliant and behavioral controlled. Patient safety maintained and 15 minute checks continues. Problem: Altered Mood, Depressive Behavior:  Goal: Absence of self-harm  Description: Absence of self-harm  Outcome: Ongoing  Note: Patient is free from self harm and denies thoughts or intent to harm self at this time.

## 2021-11-21 NOTE — PROGRESS NOTES
Daily Progress Note  11/21/2021    Patient Name: Michael Lu    CHIEF COMPLAINT: Depression with suicidal ideation. SUBJECTIVE:      Medication Adherence: Adherent    Emergency Medications: None    Patient was seen for follow-up assessment in the day area today. Patient was observed to be interacting with peers on the unit and is occasionally loud and boisterous while socializing. She reports being in a good mood because her bus to get to [facility] came through. She is requesting that writer notify physician she needs to be discharged by 6 PM Monday night in order to catch her bus. She also would like her discharge medications to be filled at the hospital.  She expresses feeling nervous about living with her boyfriend because they have not lived together yet. She reports symptoms of depression and anxiety have greatly improved and she is denying any current thoughts of self-harm. She is denying homicidal ideation and is not experiencing any auditory or visual hallucinations. She makes no delusional or paranoid statements during conversation. Patient agrees to contract for safety on the unit. Appetite: Adequate    Sleep: Adequate    Group Attendance on Unit:   [x] Yes  [] Selectively    [] No         Mental Status Exam  Level of consciousness: Alert and awake. Appearance: Appropriate attire for setting, seated in chair, with fair grooming and hygiene. Behavior/Motor: Approachable, no psychomotor abnormalities. Attitude toward examiner: Cooperative, attentive, good eye contact. Speech: Normal rate, normal volume, normal tone, somewhat hyperverbal  Mood: Euthymic, boisterous  Affect: Appropriate  Thought processes: Linear and coherent. Thought content: Denies homicidal ideation. Suicidal Ideation: Improved, no plan, contracts for safety on unit  Delusions: No evidence of delusions. Denies paranoia. Perceptual Disturbance: Patient does not appear to be responding to internal stimuli. Denies auditory hallucinations. Denies visual hallucinations. Cognition: Oriented to self, location, time, and situation. Memory: Age appropriate. Insight & Judgement: Poor. Data   height is 5' 6\" (1.676 m) and weight is 230 lb (104.3 kg). Her temporal temperature is 97.1 °F (36.2 °C). Her blood pressure is 152/81 (abnormal) and her pulse is 60. Her respiration is 14.    Labs:   Admission on 11/13/2021   Component Date Value Ref Range Status    WBC 11/15/2021 4.8  3.5 - 11.0 k/uL Final    RBC 11/15/2021 4.33  4.0 - 5.2 m/uL Final    Hemoglobin 11/15/2021 13.4  12.0 - 16.0 g/dL Final    Hematocrit 11/15/2021 38.1  36 - 46 % Final    MCV 11/15/2021 88.1  80 - 100 fL Final    MCH 11/15/2021 30.9  26 - 34 pg Final    MCHC 11/15/2021 35.1  31 - 37 g/dL Final    RDW 11/15/2021 12.8  11.5 - 14.9 % Final    Platelets 33/51/1207 387  150 - 450 k/uL Final    MPV 11/15/2021 6.7  6.0 - 12.0 fL Final    NRBC Automated 11/15/2021 NOT REPORTED  per 100 WBC Final    Differential Type 11/15/2021 NOT REPORTED   Final    Seg Neutrophils 11/15/2021 55  36 - 66 % Final    Lymphocytes 11/15/2021 37  24 - 44 % Final    Monocytes 11/15/2021 7  1 - 7 % Final    Eosinophils % 11/15/2021 0  0 - 4 % Final    Basophils 11/15/2021 1  0 - 2 % Final    Immature Granulocytes 11/15/2021 NOT REPORTED  0 % Final    Segs Absolute 11/15/2021 2.70  1.3 - 9.1 k/uL Final    Absolute Lymph # 11/15/2021 1.80  1.0 - 4.8 k/uL Final    Absolute Mono # 11/15/2021 0.30  0.1 - 1.3 k/uL Final    Absolute Eos # 11/15/2021 0.00  0.0 - 0.4 k/uL Final    Basophils Absolute 11/15/2021 0.00  0.0 - 0.2 k/uL Final    Absolute Immature Granulocyte 11/15/2021 NOT REPORTED  0.00 - 0.30 k/uL Final    WBC Morphology 11/15/2021 NOT REPORTED   Final    RBC Morphology 11/15/2021 NOT REPORTED   Final    Platelet Estimate 49/46/1133 NOT REPORTED   Final    WBC 11/17/2021 5.4  3.5 - 11.0 k/uL Final    RBC 11/17/2021 4.34  4.0 - 5.2 m/uL Final    Hemoglobin 11/17/2021 13.3  12.0 - 16.0 g/dL Final    Hematocrit 11/17/2021 38.6  36 - 46 % Final    MCV 11/17/2021 88.8  80 - 100 fL Final    MCH 11/17/2021 30.7  26 - 34 pg Final    MCHC 11/17/2021 34.6  31 - 37 g/dL Final    RDW 11/17/2021 12.9  11.5 - 14.9 % Final    Platelets 24/78/8562 394  150 - 450 k/uL Final    MPV 11/17/2021 6.9  6.0 - 12.0 fL Final    NRBC Automated 11/17/2021 NOT REPORTED  per 100 WBC Final    Differential Type 11/17/2021 NOT REPORTED   Final    Seg Neutrophils 11/17/2021 56  36 - 66 % Final    Lymphocytes 11/17/2021 36  24 - 44 % Final    Monocytes 11/17/2021 7  1 - 7 % Final    Eosinophils % 11/17/2021 0  0 - 4 % Final    Basophils 11/17/2021 1  0 - 2 % Final    Immature Granulocytes 11/17/2021 NOT REPORTED  0 % Final    Segs Absolute 11/17/2021 3.10  1.3 - 9.1 k/uL Final    Absolute Lymph # 11/17/2021 1.90  1.0 - 4.8 k/uL Final    Absolute Mono # 11/17/2021 0.40  0.1 - 1.3 k/uL Final    Absolute Eos # 11/17/2021 0.00  0.0 - 0.4 k/uL Final    Basophils Absolute 11/17/2021 0.00  0.0 - 0.2 k/uL Final    Absolute Immature Granulocyte 11/17/2021 NOT REPORTED  0.00 - 0.30 k/uL Final    WBC Morphology 11/17/2021 NOT REPORTED   Final    RBC Morphology 11/17/2021 NOT REPORTED   Final    Platelet Estimate 43/02/9643 NOT REPORTED   Final    WBC 11/18/2021 4.8  3.5 - 11.0 k/uL Final    RBC 11/18/2021 4.62  4.0 - 5.2 m/uL Final    Hemoglobin 11/18/2021 14.4  12.0 - 16.0 g/dL Final    Hematocrit 11/18/2021 41.5  36 - 46 % Final    MCV 11/18/2021 89.7  80 - 100 fL Final    MCH 11/18/2021 31.1  26 - 34 pg Final    MCHC 11/18/2021 34.7  31 - 37 g/dL Final    RDW 11/18/2021 13.3  11.5 - 14.9 % Final    Platelets 20/29/7397 396  150 - 450 k/uL Final    MPV 11/18/2021 7.1  6.0 - 12.0 fL Final    NRBC Automated 11/18/2021 NOT REPORTED  per 100 WBC Final    Differential Type 11/18/2021 NOT REPORTED   Final    Immature Granulocytes 11/18/2021 NOT REPORTED  0 % Final    1 spray, 1 spray, Each Nostril, Daily  melatonin tablet 3 mg, 3 mg, Oral, Nightly PRN  ondansetron (ZOFRAN-ODT) disintegrating tablet 8 mg, 8 mg, SubLINGual, Q8H PRN  pantoprazole (PROTONIX) tablet 40 mg, 40 mg, Oral, Daily  prazosin (MINIPRESS) capsule 2 mg, 2 mg, Oral, Nightly    ASSESSMENT  Bipolar depression (HCC)         PLAN  Patient symptoms are: Improving  Continue current medication regimen  Monitor need and frequency of PRN medications. Encourage participation in groups and milieu. Attempt to develop insight. Psycho-education conducted. Supportive Therapy conducted. Probable discharge is: Tomorrow  Follow-up daily while inpatient. Patient continues to be monitored in the inpatient psychiatric facility at [facility] for safety and stabilization. Patient continues to need, on a daily basis, active treatment furnished directly by or requiring the supervision of inpatient psychiatric personnel. Electronically signed by DAMARIS Gautam CNP on 11/21/2021 at 5:54 PM    **This report has been created using voice recognition software. It may contain minor errors which are inherent in voice recognition technology. **

## 2021-11-21 NOTE — GROUP NOTE
Group Therapy Note    Date: 11/21/2021    Group Start Time: 1400  Group End Time: 6320  Group Topic: Psychoeducation    HOUSTON JaureguiS    Group Therapy Note    Attendees: 13    Patient's Goal:  Pt will identify improved benefits of music for coping. Notes:  Pt attended group and participated    Status After Intervention:  Improved    Participation Level:  Active Listener and Interactive    Participation Quality: Appropriate, Attentive, Sharing and Supportive      Speech:  normal      Thought Process/Content: Logical  Linear      Affective Functioning: Congruent      Mood: euthymic      Level of consciousness:  Alert, Oriented x4 and Attentive      Response to Learning: Able to verbalize current knowledge/experience, Able to verbalize/acknowledge new learning, Able to retain information, Capable of insight, Able to change behavior and Progressing to goal      Endings: None Reported    Modes of Intervention: Education, Support, Socialization and Exploration      Discipline Responsible: Behavorial Health Tech      Signature:  Taylor Cho, [address]

## 2021-11-21 NOTE — GROUP NOTE
Group Therapy Note    Date: 11/21/2021    Group Start Time: 0900  Group End Time: 0930  Group Topic: Community Meeting    LUISANA Mejia    Group Therapy Note    Attendees: 8    Patient's Goal:  Pt will identify daily goal and demonstrate understanding of unit schedule and guidelines. Notes:  Patient attended group and participated. Status After Intervention:  Improved    Participation Level:  Active Listener and Interactive    Participation Quality: Appropriate, Attentive, Sharing and Supportive      Speech:  normal      Thought Process/Content: Logical  Linear      Affective Functioning: Congruent      Mood: euthymic      Level of consciousness:  Alert, Oriented x4 and Attentive      Response to Learning: Able to verbalize current knowledge/experience, Able to verbalize/acknowledge new learning, Able to retain information, Capable of insight, Able to change behavior and Progressing to goal      Endings: None Reported    Modes of Intervention: Education, Support, Socialization, Exploration and Limit-setting      Discipline Responsible: Behavorial Health Tech      Signature:  Erin Brunner, [address]

## 2021-11-21 NOTE — PLAN OF CARE
Problem: Altered Mood, Depressive Behavior:  Goal: Able to verbalize and/or display a decrease in depressive symptoms  Description: Able to verbalize and/or display a decrease in depressive symptoms  Outcome: Ongoing     Patient is brightened, social, attended group, out in day room playing games with peers, and medication compliant. Pt reports improvement in mood and symptoms, still displaying exaggerated attention-seeking behaviors. Pt expressing excitement related to discharge plans and success of obtaining a bus ticket for transportation to her destination. Pt denies thoughts of self harm and is agreeable to seeking out should thoughts of self harm arise. Safe environment maintained. Q15 minute checks for safety cont per unit policy. Will cont to monitor for safety and provide support and reassurance as needed.

## 2021-11-21 NOTE — GROUP NOTE
Group Therapy Note    Date: 11/21/2021    Group Start Time: 1005  Group End Time: 1050  Group Topic: Psychoeducation    REDD HO    LILIAN Corado, LSW        Group Therapy Note    Attendees: 10/19         Patient's Goal:  Increase interpersonal relationship skills while discussing Strengths and how strengths can help with achieving goals. Status After Intervention:  Improved    Participation Level:  Active Listener, Interactive and Monopolizing    Participation Quality: Attentive and Intrusive      Speech:  normal      Thought Process/Content: Logical      Affective Functioning: Congruent      Mood: euthymic      Level of consciousness:  Alert and Oriented x4      Response to Learning: Able to verbalize current knowledge/experience      Endings: None Reported    Modes of Intervention: Socialization and Exploration      Discipline Responsible: /Counselor      Signature:  Seun Hernandez, LILIAN, LSW

## 2021-11-21 NOTE — GROUP NOTE
Group Therapy Note    Date: 11/20/2021    Group Start Time: 2030  Group End Time: 2100  Group Topic: Wrap-Up    [facility]        Group Therapy Note    Attendees: 10/18 for \"time capsule\" goal group     Good participation and appropriate goals      Signature:  Lora Castillo

## 2021-11-22 VITALS
BODY MASS INDEX: 36.96 KG/M2 | TEMPERATURE: 98.3 F | HEIGHT: 66 IN | DIASTOLIC BLOOD PRESSURE: 60 MMHG | RESPIRATION RATE: 14 BRPM | HEART RATE: 80 BPM | WEIGHT: 230 LBS | SYSTOLIC BLOOD PRESSURE: 136 MMHG

## 2021-11-22 PROCEDURE — 6360000002 HC RX W HCPCS: Performed by: PSYCHIATRY & NEUROLOGY

## 2021-11-22 PROCEDURE — 6370000000 HC RX 637 (ALT 250 FOR IP)

## 2021-11-22 PROCEDURE — 6370000000 HC RX 637 (ALT 250 FOR IP): Performed by: PSYCHIATRY & NEUROLOGY

## 2021-11-22 PROCEDURE — 6370000000 HC RX 637 (ALT 250 FOR IP): Performed by: INTERNAL MEDICINE

## 2021-11-22 PROCEDURE — 6370000000 HC RX 637 (ALT 250 FOR IP): Performed by: NURSE PRACTITIONER

## 2021-11-22 PROCEDURE — G0008 ADMIN INFLUENZA VIRUS VAC: HCPCS | Performed by: PSYCHIATRY & NEUROLOGY

## 2021-11-22 PROCEDURE — 99239 HOSP IP/OBS DSCHRG MGMT >30: CPT | Performed by: PSYCHIATRY & NEUROLOGY

## 2021-11-22 PROCEDURE — 90686 IIV4 VACC NO PRSV 0.5 ML IM: CPT | Performed by: PSYCHIATRY & NEUROLOGY

## 2021-11-22 RX ORDER — TRAZODONE HYDROCHLORIDE 50 MG/1
50 TABLET ORAL NIGHTLY PRN
Qty: 30 TABLET | Refills: 0 | Status: SHIPPED | OUTPATIENT
Start: 2021-11-22

## 2021-11-22 RX ORDER — FLUTICASONE PROPIONATE 50 MCG
1 SPRAY, SUSPENSION (ML) NASAL DAILY
Qty: 1 EACH | Refills: 0 | Status: SHIPPED | OUTPATIENT
Start: 2021-11-22

## 2021-11-22 RX ORDER — PRAZOSIN HYDROCHLORIDE 2 MG/1
2 CAPSULE ORAL NIGHTLY
Qty: 30 CAPSULE | Refills: 0 | Status: SHIPPED | OUTPATIENT
Start: 2021-11-22

## 2021-11-22 RX ORDER — HYDROXYZINE 50 MG/1
50 TABLET, FILM COATED ORAL 3 TIMES DAILY PRN
Qty: 60 TABLET | Refills: 0 | Status: SHIPPED | OUTPATIENT
Start: 2021-11-22 | End: 2021-12-12

## 2021-11-22 RX ORDER — ALBUTEROL SULFATE 90 UG/1
2 AEROSOL, METERED RESPIRATORY (INHALATION) EVERY 6 HOURS PRN
Qty: 18 G | Refills: 0 | Status: SHIPPED | OUTPATIENT
Start: 2021-11-22

## 2021-11-22 RX ORDER — SERTRALINE HYDROCHLORIDE 100 MG/1
100 TABLET, FILM COATED ORAL DAILY
Qty: 30 TABLET | Refills: 0 | Status: SHIPPED | OUTPATIENT
Start: 2021-11-22

## 2021-11-22 RX ORDER — PANTOPRAZOLE SODIUM 40 MG/1
40 TABLET, DELAYED RELEASE ORAL DAILY
Qty: 30 TABLET | Refills: 0 | Status: SHIPPED | OUTPATIENT
Start: 2021-11-23

## 2021-11-22 RX ORDER — PALIPERIDONE 3 MG/1
3 TABLET, EXTENDED RELEASE ORAL DAILY
Qty: 30 TABLET | Refills: 3 | Status: SHIPPED | OUTPATIENT
Start: 2021-11-23

## 2021-11-22 RX ORDER — LANOLIN ALCOHOL/MO/W.PET/CERES
3 CREAM (GRAM) TOPICAL NIGHTLY PRN
Qty: 30 TABLET | Refills: 0 | Status: SHIPPED | OUTPATIENT
Start: 2021-11-22

## 2021-11-22 RX ORDER — CETIRIZINE HYDROCHLORIDE 10 MG/1
10 TABLET ORAL DAILY
Qty: 30 TABLET | Refills: 0 | Status: SHIPPED | OUTPATIENT
Start: 2021-11-22

## 2021-11-22 RX ORDER — AMOXICILLIN AND CLAVULANATE POTASSIUM 500; 125 MG/1; MG/1
1 TABLET, FILM COATED ORAL EVERY 12 HOURS SCHEDULED
Qty: 4 TABLET | Refills: 0 | Status: SHIPPED | OUTPATIENT
Start: 2021-11-22 | End: 2021-11-24

## 2021-11-22 RX ADMIN — LOPERAMIDE HYDROCHLORIDE 2 MG: 2 CAPSULE ORAL at 08:51

## 2021-11-22 RX ADMIN — ACETAMINOPHEN 650 MG: 325 TABLET, FILM COATED ORAL at 08:24

## 2021-11-22 RX ADMIN — HYDROXYZINE HYDROCHLORIDE 50 MG: 50 TABLET, FILM COATED ORAL at 08:24

## 2021-11-22 RX ADMIN — APIXABAN 5 MG: 5 TABLET, FILM COATED ORAL at 08:24

## 2021-11-22 RX ADMIN — FLUTICASONE PROPIONATE 1 SPRAY: 50 SPRAY, METERED NASAL at 08:23

## 2021-11-22 RX ADMIN — SERTRALINE HYDROCHLORIDE 75 MG: 50 TABLET ORAL at 08:24

## 2021-11-22 RX ADMIN — CETIRIZINE HYDROCHLORIDE 10 MG: 10 TABLET, FILM COATED ORAL at 08:24

## 2021-11-22 RX ADMIN — PALIPERIDONE 3 MG: 3 TABLET, EXTENDED RELEASE ORAL at 08:24

## 2021-11-22 RX ADMIN — PANTOPRAZOLE SODIUM 40 MG: 40 TABLET, DELAYED RELEASE ORAL at 08:24

## 2021-11-22 RX ADMIN — INFLUENZA A VIRUS A/VICTORIA/2570/2019 IVR-215 (H1N1) ANTIGEN (PROPIOLACTONE INACTIVATED), INFLUENZA A VIRUS A/CAMBODIA/E0826360/2020 IVR-224 (H3N2) ANTIGEN (PROPIOLACTONE INACTIVATED), INFLUENZA B VIRUS B/VICTORIA/705/2018 BVR-11 ANTIGEN (PROPIOLACTONE INACTIVATED), INFLUENZA B VIRUS B/PHUKET/3073/2013 BVR-1B ANTIGEN (PROPIOLACTONE INACTIVATED) 0.5 ML: 15; 15; 15; 15 INJECTION, SUSPENSION INTRAMUSCULAR at 14:02

## 2021-11-22 RX ADMIN — AMOXICILLIN AND CLAVULANATE POTASSIUM 1 TABLET: 500; 125 TABLET, FILM COATED ORAL at 08:23

## 2021-11-22 ASSESSMENT — PAIN SCALES - GENERAL: PAINLEVEL_OUTOF10: 4

## 2021-11-22 NOTE — GROUP NOTE
Group Therapy Note    Date: 11/22/2021    Group Start Time: 1330  Group End Time: 6446  Group Topic: Recreational    ST[facility] PAP[facility] Mccoy, CTRS        Group Therapy Note    Attendees: 9/20         Patient's Goal:  Leisure awareness    Status After Intervention:  Improved    Participation Level:  Active Listener and Interactive    Participation Quality: Appropriate, Attentive, Sharing and Supportive      Speech:  normal      Thought Process/Content: Logical      Affective Functioning: Congruent      Mood: euphoric      Level of consciousness:  Alert, Oriented x4 and Attentive      Response to Learning: Able to verbalize current knowledge/experience, Able to verbalize/acknowledge new learning and Able to retain information    Modes of Intervention: Education, Support, Socialization, Exploration, Clarifying and Problem-solving      Discipline Responsible: Psychoeducational Specialist      Signature:  Gabriela Mac

## 2021-11-22 NOTE — GROUP NOTE
Group Therapy Note    Date: 11/22/2021    Group Start Time: 0945  Group End Time: 1000  Group Topic: Group Therapy    [facility]    Sumeet Higuera LPN        GOAL SETTING GROUP    Attendees: 13/22         Patient's Goal:  Relax and be patient    Status After Intervention:  Improved    Participation Level: Interactive    Participation Quality: Attentive and Intrusive      Speech:  pressured      Thought Process/Content: Logical      Affective Functioning: Congruent      Mood: anxious      Level of consciousness:  Alert      Response to Learning: Capable of insight      Endings: None Reported    Modes of Intervention: Education and Activity      Discipline Responsible: Licensed Practical Nurse      Signature:  Sumeet Higuera LPN

## 2021-11-22 NOTE — BH NOTE
[facility]  Discharge Note    Pt discharged with followings belongings:   Dentures: None  Vision - Corrective Lenses: None  Hearing Aid: None  Jewelry: Earrings  Body Piercings Removed: N/A  Clothing: Footwear, Pants, Shirt, Sweater, Socks  Were All Patient Medications Collected?: Not Applicable  Other Valuables: Cell phone, Money (Comment), Purse, [address] sent home with patient or returned to patient. Patient education on aftercare instructions: yes  Information faxed to facility by nurse  at 2:23 PM .Patient verbalize understanding of AVS:  yes. Status EXAM upon discharge:  Status and Exam  Normal: Yes  Facial Expression: Brightened  Affect: Appropriate  Level of Consciousness: Alert  Mood:Normal: No  Mood: Anxious  Motor Activity:Normal: Yes  Motor Activity: Decreased  Interview Behavior: Cooperative  Preception: [place] to Person, [place] to Time, [place] to Place, [place] to Situation  Attention:Normal: Yes  Attention: Distractible  Thought Processes: Circumstantial  Thought Content:Normal: Yes  Thought Content: Preoccupations  Hallucinations: None  Delusions: No  Memory:Normal: Yes  Memory: Poor Recent  Insight and Judgment: No  Insight and Judgment: Poor Judgment  Present Suicidal Ideation: No  Present Homicidal Ideation: No      Metabolic Screening:    Lab Results   Component Value Date    LABA1C 5.4 03/07/2017       Lab Results   Component Value Date    CHOL 226 (H) 03/07/2017     Lab Results   Component Value Date    TRIG 179 (H) 03/07/2017     Lab Results   Component Value Date    HDL 50 03/07/2017     No components found for: LDLCAL  No results found for: LABVLDL     Patient discharged with all belongings via black and white cab to train station.      Alicia Collins RN

## 2021-11-22 NOTE — PROGRESS NOTES
CLINICAL PHARMACY NOTE: MEDS TO BEDS    Total # of Prescriptions Filled: 9   The following medications were delivered to the patient:  · Prazosin HCL 2mg  · Trazodone HCL 50mg  · Sertraline HCL 100mg  · Albuterol MDI  · Amoxicillin-Pot Clavul 500/125  · Hydroxyzine HCL 50mg  · Paliperidone ER 3mg  · Pantoprazole Sodium 40mg  · Eliquis 5mg    Additional Documentation:  Delivered Medication to [place] [id number] for [employer] (14 days supply) on file    27 Day Voucher authorized for [employer] authorized per C.S.     OTC Items Not Vouchered / Profiled Rx's   - Flonase   - Zyrtec   - Melatonin

## 2021-11-22 NOTE — GROUP NOTE
Group Therapy Note    Date: 11/22/2021    Group Start Time: 1100  Group End Time: 2409  Group Topic: Recreational    [facility] P[facility] [place], CTRS        Group Therapy Note    Attendees:8/22         Patient's Goal:  Improving self esteem    Status After Intervention:  Improved    Participation Level:  Active Listener and Interactive    Participation Quality: Appropriate, Attentive, Sharing and Supportive      Speech:  normal      Thought Process/Content: Logical      Affective Functioning: Congruent      Mood: euphoric      Level of consciousness:  Alert, Oriented x4 and Attentive      Response to Learning: Able to verbalize current knowledge/experience, Able to verbalize/acknowledge new learning and Able to retain information    Modes of Intervention: Education, Support, Socialization, Exploration, Clarifying and Problem-solving      Discipline Responsible: Psychoeducational Specialist      Signature:  Gabriela Mac
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
## 2021-11-22 NOTE — BH NOTE
Patient given tobacco quitline number 43951610277 at this time, refusing to call at this time, states \" I just dont want to quit now\"- patient given information as to the dangers of long term tobacco use. Continue to reinforce the importance of tobacco cessation.

## 2021-11-22 NOTE — GROUP NOTE
Group Therapy Note    Date: 11/22/2021    Group Start Time: 1000  Group End Time: 0716  Group Topic: Psychotherapy    REDD BHAMADO Link        Group Therapy Note    Attendees: 9/22         Patient's Goal:  Expression of feeling     Notes:  Therapeutic worksheets provided and discussed     Status After Intervention:  Unchanged    Participation Level: Monopolizing    Participation Quality: Appropriate and Attentive      Speech:  normal      Thought Process/Content: Perseverating      Affective Functioning: Exaggerated      Mood: euthymic      Level of consciousness:  Alert and Oriented x4      Response to Learning: Progressing to goal      Endings: None Reported    Modes of Intervention: Education and Support      Discipline Responsible: /Counselor      Signature:  AMADO Olivo

## 2021-11-22 NOTE — DISCHARGE INSTR - COC
Continuity of Care Form    Patient Name: Leslie Riggs   :  1980  MRN:  [id number]    Admit date:  2021  Discharge date:  ***    Code Status Order: Full Code   Advance Directives:      Admitting Physician:  Chad Boyce MD  PCP: No primary care provider on file. Discharging Nurse: [facility] Unit/Room#: 0130/0130-01  Discharging Unit Phone Number: ***    Emergency Contact:   No emergency contact information on file. Past Surgical History:  Past Surgical History:   Procedure Laterality Date    BLADDER SURGERY      several    BREAST LUMPECTOMY Bilateral     CYSTOSCOPY      HYSTERECTOMY      SACRAL NERVE STIMULATOR LEAD PLACEMENT N/A 2017    SACRAL NERVE STIMULATOR IMPLANT STAGE 1- OFFICE NOTIFYING REP, C-ARM performed by Tom Welsh MD at [address] N/A 2017    SACRAL NERVE STIMULATOR IMPLANT STAGE 2 performed by Tom Welsh MD at [address] N/A 2017    LEAD AND GENERATOR REMOVAL, STAGE 1 AND 2 INTERSTIM, C-ARM   NSA= GENERAL VS MAC, OFFICE NOTIFIED REP.  performed by Tom Welsh MD at [address] [place] [address]      from thigh to chin    DANILO AND BSO  ,     TONSILLECTOMY AND ADENOIDECTOMY         Immunization History:   Immunization History   Administered Date(s) Administered    COVID-19, [place], Primary or Immunocompromised, PF, 100mcg/0.5mL 2021, 2021    Influenza Vaccine, unspecified formulation 10/26/2016    Tdap (Boostrix, Adacel) 2017       Active Problems:  Patient Active Problem List   Diagnosis Code    Hyponatremia E87.1    Acute cystitis without hematuria N30.00    Morbid obesity due to excess calories ([facility]) E66.01    PTSD (post-traumatic stress disorder) F43.10    Asthma J45.909    Endometrial cancer ([facility] C54.1    CKD stage G4/A1, GFR 15-29 and albumin creatinine ratio <30 mg/g ([facility]) N18.4    Fibromyalgia M79.7    Neuropathy G62.9    Seizures ([facility] R56.9    Arthritis M19.90    DVT (deep venous thrombosis) ([facility]) I82.409    Bipolar disorder ([facility]) F31.9    SIADH (syndrome of inappropriate ADH production) ([facility]) E22.2    Cyst of left kidney N28.1    Multifocal pneumonia J18.9    Adverse drug reaction T50.905A    Failure of outpatient treatment Z78.9    Intentional drug overdose ([address] T50.902A    Suicide attempt ([facility] T14.91XA    Depression, acute F32. A    Suspected pulmonary embolism R09.89    Severe recurrent major depressive disorder with psychotic features ([facility]) F33.3    Alcohol abuse F10.10    Marijuana abuse F12.10    Depression with suicidal ideation F32. A, R45.851    Bipolar depression ([facility] F31.9       Isolation/Infection:   Isolation            No Isolation          Patient Infection Status       Infection Onset Added Last Indicated Last Indicated By Review Planned Expiration Resolved Resolved By    None active    Resolved    C-diff Rule Out 21 C DIFF TOXIN/ANTIGEN (Ordered)   21 Almas Damon RN    Order      C-diff Rule Out 20 C DIFF TOXIN/ANTIGEN (Ordered)   10/13/21 Krystal Granado RN            Nurse Assessment:  Last Vital Signs: /60   Pulse 80   Temp 98.3 °F (36.8 °C) (Oral)   Resp 14   Ht 5' 6\" (1.676 m)   Wt 230 lb (104.3 kg)   BMI 37.12 kg/m²     Last documented pain score (0-10 scale): Pain Level: 4  Last Weight:   Wt Readings from Last 1 Encounters:   21 230 lb (104.3 kg)     Mental Status:  {IP PT MENTAL STATUS:21963}    IV Access:  {[facility] IV ACCESS:319919277}    Nursing Mobility/ADLs:  Walking   {[facility] DME MOHW:002804721}  Transfer  {[facility] DME VEXN:172769221}  Bathing  {[facility] DME QYGW:124762677}  Dressing  {[facility] DME GKOP:017186919}  Toileting  {[facility] DME CEBW:574911622}  Feeding  {[facility] DME JJMY:810315296}  Med Admin  {[facility]:538481763}  Med Delivery   {[facility] MED Delivery:751483334}    Wound Care Documentation and Therapy:        Elimination:  Continence: Bowel: {YES / EJ:35712}  Bladder: {YES / SL:37393}  Urinary Catheter: {Urinary Catheter:509459690}   Colostomy/Ileostomy/Ileal Conduit: {YES / EB:66910}       Date of Last BM: ***  No intake or output data in the 24 hours ending 21 1044  No intake/output data recorded.     Safety Concerns:     [address] [facility] Safety Concerns:082388603}    Impairments/Disabilities:      [address] [facility] Impairments/Disabilities:495554571}    Nutrition Therapy:  Current Nutrition Therapy:   [facility] Diet List:195410716}    Routes of Feeding: {CHP DME Other Feedings:401245885}  Liquids: {Slp liquid thickness:42876}  Daily Fluid Restriction: {CHP DME Yes amt example:388985141}  Last Modified Barium Swallow with Video (Video Swallowing Test): {Done Not Done PKMD:443979010}    Treatments at the Time of Hospital Discharge:   Respiratory Treatments: ***  Oxygen Therapy:  {Therapy; copd oxygen:02521}  Ventilator:    {[facility] Vent KOJL:625573261}    Rehab Therapies: {THERAPEUTIC INTERVENTION:2977733335}  Weight Bearing Status/Restrictions: [address] [place] [address] Weight Bearin}  Other Medical Equipment (for information only, NOT a DME order):  {EQUIPMENT:766449777}  Other Treatments: ***    Patient's personal belongings (please select all that are sent with patient):  {P DME Belongings:735902045}    RN SIGNATURE:  {Esignature:254747948}    CASE MANAGEMENT/SOCIAL WORK SECTION    Inpatient Status Date: ***    Readmission Risk Assessment Score:  Readmission Risk              Risk of Unplanned Readmission:  27           Discharging to Facility/ Agency   Name:   Address:  Phone:  Fax:    Dialysis Facility (if applicable)   Name:  Address:  Dialysis Schedule:  Phone:  Fax:    / signature: {Esignature:709924292}    PHYSICIAN SECTION    Prognosis: {Prognosis:1629571229}    Condition at Discharge: [address] Patient Condition:053413531}    Rehab Potential (if transferring to Rehab): {Prognosis:2113862784}    Recommended Labs or Other Treatments After Discharge: ***    Physician Certification: I certify the above information and transfer of Nohemi Duverney  is necessary for the continuing treatment of the diagnosis listed and that she requires {Admit to Appropriate Level of Care:20791} for {GREATER/LESS:050750129} 30 days.      Update Admission H&P: {CHP DME Changes in [facility]:214995577}    PHYSICIAN SIGNATURE:  {Esignature:305289477}

## 2021-11-22 NOTE — PLAN OF CARE
Problem: Altered Mood, Depressive Behavior:  Goal: Able to verbalize and/or display a decrease in depressive symptoms  Description: Able to verbalize and/or display a decrease in depressive symptoms  Outcome: Ongoing     Problem: Altered Mood, Depressive Behavior:  Goal: Absence of self-harm  Description: Absence of self-harm  Outcome: Ongoing     Patient continues to display attention-seeking behaviors. Pt reports situational anxiety with improvement in depressive symptoms, reports readiness and anticipation for discharge. Pt denies thoughts of self harm and is agreeable to seeking out should thoughts of self harm arise. Safe environment maintained. Q15 minute checks for safety cont per unit policy. Will cont to monitor for safety and provide support and reassurance as needed.

## 2023-08-22 NOTE — FLOWSHEET NOTE
(Pt) Palak Hand asked for a  visit as we passed in the [place] on her way from ED to PCU (wheelchair by nurse). Room has been very busy with staff will try back.      11/13/20 1959   Encounter Summary   Services provided to: Patient   Referral/Consult From: Other disciplines   Support System Unknown   Continue Visiting   (11/13/2020)   Complexity of Encounter Moderate   Length of Encounter 15 minutes   Routine   Type Initial   Assessment Anxious   Intervention Nurtured hope   Outcome Comfort Intermediate Repair And Graft Additional Text (Will Appearing After The Standard Complex Repair Text): The intermediate repair was not sufficient to completely close the primary defect. The remaining additional defect was repaired with the graft mentioned below.

## 2024-06-13 NOTE — PROGRESS NOTES
Daily Progress Note  10/25/2021    Patient Name: Charly Williamson    CHIEF COMPLAINT: Suicidal ideation         SUBJECTIVE:    Patient is seen today for a follow up assessment bedside. Patient is compliant with scheduled medications. She has not received emergency medications in the past 24 hours. Smitha Dotson was evaluated today following ECT. She reports that the Toradol did help with the jaw pain though she continues to complain of headache. She reports that she went to group shortly after ECT this morning but is otherwise been tired and isolating to her room. She reports that she had a good appetite and was able to eat status post ECT. She continues to endorse depression though overall is beginning to feel better since starting ECT. She is hopeful that she will get accepted into a sober living facility for successful discharge. She reports that she was supposed to hear back from [address] today. Appetite:  [x] Normal/Adequate/Unchanged  [] Increased  [] Decreased      Sleep:       [] Normal/Adequate/Unchanged  [x] Fair  [] Poor      Group Attendance on Unit:   [] Yes  [x] Selectively    [] No    Medication Side Effects:  Patient denies any medication side effects at the time of assessment. Mental Status Exam  Level of consciousness: Alert and awake. Appearance: Appropriate attire for setting, resting in bed, with fair  grooming and hygiene. Behavior/Motor: Approachable, tired  Attitude toward examiner: Cooperative, somewhat attentive, fair eye contact. Speech: Latent, normal rate, volume, and tone  Mood: \"Tired\"  Affect: Mood congruent  Thought processes: Linear and coherent. Thought content: Denies homicidal ideation. Suicidal Ideation: Improved, no plan or intent  Delusions: No evidence of delusions. Denies paranoia. Perceptual Disturbance: Patient does not appear to be responding to internal stimuli. Denies auditory hallucinations. Denies visual hallucinations.    Cognition: Oriented to self, location, time, and situation. Memory: Intact. Insight & Judgement: Poor. Data   height is 5' 6\" (1.676 m) and weight is 230 lb (104.3 kg). Her temperature is 98 °F (36.7 °C). Her blood pressure is 143/88 (abnormal) and her pulse is 79. Her respiration is 16 and oxygen saturation is 96%.    Labs:   Admission on 10/12/2021   Component Date Value Ref Range Status    WBC 10/12/2021 6.8  3.5 - 11.0 k/uL Final    RBC 10/12/2021 4.45  4.0 - 5.2 m/uL Final    Hemoglobin 10/12/2021 13.5  12.0 - 16.0 g/dL Final    Hematocrit 10/12/2021 40.2  36 - 46 % Final    MCV 10/12/2021 90.3  80 - 100 fL Final    MCH 10/12/2021 30.4  26 - 34 pg Final    MCHC 10/12/2021 33.6  31 - 37 g/dL Final    RDW 10/12/2021 13.3  11.5 - 14.9 % Final    Platelets 17/71/2432 271  150 - 450 k/uL Final    MPV 10/12/2021 6.8  6.0 - 12.0 fL Final    NRBC Automated 10/12/2021 NOT REPORTED  per 100 WBC Final    Differential Type 10/12/2021 NOT REPORTED   Final    Immature Granulocytes 10/12/2021 NOT REPORTED  0 % Final    Absolute Immature Granulocyte 10/12/2021 NOT REPORTED  0.00 - 0.30 k/uL Final    WBC Morphology 10/12/2021 NOT REPORTED   Final    RBC Morphology 10/12/2021 NOT REPORTED   Final    Platelet Estimate 05/77/0511 NOT REPORTED   Final    Seg Neutrophils 10/12/2021 48  36 - 66 % Final    Lymphocytes 10/12/2021 47* 24 - 44 % Final    Monocytes 10/12/2021 5  1 - 7 % Final    Eosinophils % 10/12/2021 0  0 - 4 % Final    Basophils 10/12/2021 0  0 - 2 % Final    Segs Absolute 10/12/2021 3.26  1.3 - 9.1 k/uL Final    Absolute Lymph # 10/12/2021 3.20  1.0 - 4.8 k/uL Final    Absolute Mono # 10/12/2021 0.34  0.1 - 1.3 k/uL Final    Absolute Eos # 10/12/2021 0.00  0.0 - 0.4 k/uL Final    Basophils Absolute 10/12/2021 0.00  0.0 - 0.2 k/uL Final    Morphology 10/12/2021 Normal   Final    Glucose 10/12/2021 101* 70 - 99 mg/dL Final    BUN 10/12/2021 23* 6 - 20 mg/dL Final    CREATININE 10/12/2021 0.80 0.50 - 0.90 mg/dL Final    Bun/Cre Ratio 10/12/2021 NOT REPORTED  9 - 20 Final    Calcium 10/12/2021 9.1  8.6 - 10.4 mg/dL Final    Sodium 10/12/2021 138  135 - 144 mmol/L Final    Potassium 10/12/2021 4.4  3.7 - 5.3 mmol/L Final    Chloride 10/12/2021 104  98 - 107 mmol/L Final    CO2 10/12/2021 24  20 - 31 mmol/L Final    Anion Gap 10/12/2021 10  9 - 17 mmol/L Final    Alkaline Phosphatase 10/12/2021 87  35 - 104 U/L Final    ALT 10/12/2021 14  5 - 33 U/L Final    AST 10/12/2021 17  <32 U/L Final    Total Bilirubin 10/12/2021 <0.15* 0.3 - 1.2 mg/dL Final    Total Protein 10/12/2021 7.0  6.4 - 8.3 g/dL Final    Albumin 10/12/2021 4.2  3.5 - 5.2 g/dL Final    Albumin/Globulin Ratio 10/12/2021 NOT REPORTED  1.0 - 2.5 Final    GFR Non- 10/12/2021 >60  >60 mL/min Final    GFR  10/12/2021 >60  >60 mL/min Final    GFR Comment 10/12/2021        Final    Comment: Average GFR for 30-36 years old:   80 mL/min/1.73sq m  Chronic Kidney Disease:   <60 mL/min/1.73sq m  Kidney failure:   <15 mL/min/1.73sq m              eGFR calculated using average adult body mass. Additional eGFR calculator available at:        [employer]            GFR Staging 10/12/2021 NOT REPORTED   Final    TSH 10/12/2021 1.55  0.30 - 5.00 mIU/L Final    Specimen Description 10/12/2021 . NASOPHARYNGEAL SWAB   Final    SARS-CoV-2, Rapid 10/12/2021 Not Detected  Not Detected Final    Comment:       Rapid NAAT:  The specimen is NEGATIVE for SARS-CoV-2, the novel coronavirus associated with   COVID-19. The ID NOW COVID-19 assay is designed to detect the virus that causes COVID-19 in patients   with signs and symptoms of infection who are suspected of COVID-19. An individual without symptoms of COVID-19 and who is not shedding SARS-CoV-2 virus would   expect to have a negative (not detected) result in this assay.   Negative results should be treated as presumptive and, if inconsistent with clinical signs   and symptoms or necessary for patient management,  should be tested with an alternative molecular assay. Negative results do not preclude   SARS-CoV-2 infection and   should not be used as the sole basis for patient management decisions. Fact sheet for Healthcare Providers: Elaine.tayla  Fact sheet for Patients: Elaine.tayla          Methodology: Isothermal Nucleic Acid Amplification      Color, UA 10/12/2021 Yellow  Yellow Final    Turbidity UA 10/12/2021 Clear  Clear Final    Glucose, Ur 10/12/2021 NEGATIVE  NEGATIVE Final    Bilirubin Urine 10/12/2021 NEGATIVE  NEGATIVE Final    Ketones, Urine 10/12/2021 NEGATIVE  NEGATIVE Final    Specific Magness, UA 10/12/2021 1.027  1.000 - 1.030 Final    Urine Hgb 10/12/2021 NEGATIVE  NEGATIVE Final    pH, UA 10/12/2021 6.5  5.0 - 8.0 Final    Protein, UA 10/12/2021 NEGATIVE  NEGATIVE Final    Urobilinogen, Urine 10/12/2021 Normal  Normal Final    Nitrite, Urine 10/12/2021 NEGATIVE  NEGATIVE Final    Leukocyte Esterase, Urine 10/12/2021 NEGATIVE  NEGATIVE Final    Urinalysis Comments 10/12/2021 Microscopic exam not performed based on chemical results unless requested in original order.    Final    Amphetamine Screen, Ur 10/12/2021 NEGATIVE  NEGATIVE Final    Comment:       (Positive cutoff 1000 ng/mL)                  Barbiturate Screen, Ur 10/12/2021 NEGATIVE  NEGATIVE Final    Comment:       (Positive cutoff 200 ng/mL)                  Benzodiazepine Screen, Urine 10/12/2021 NEGATIVE  NEGATIVE Final    Comment:       (Positive cutoff 200 ng/mL)                  Cocaine Metabolite, Urine 10/12/2021 NEGATIVE  NEGATIVE Final    Comment:       (Positive cutoff 300 ng/mL)                  Methadone Screen, Urine 10/12/2021 NEGATIVE  NEGATIVE Final    Comment:       (Positive cutoff 300 ng/mL)                  Opiates, Urine 10/12/2021 NEGATIVE  NEGATIVE Final    Comment:       (Positive cutoff 300 ng/mL)                  Phencyclidine, Urine 10/12/2021 NEGATIVE  NEGATIVE Final    Comment:       (Positive cutoff 25 ng/mL)                  Propoxyphene, Urine 10/12/2021 NOT REPORTED  NEGATIVE Final    Cannabinoid Scrn, Ur 10/12/2021 POSITIVE* NEGATIVE Final    Comment:       (Positive cutoff 50 ng/mL)                  Oxycodone Screen, Ur 10/12/2021 NEGATIVE  NEGATIVE Final    Comment:       (Positive cutoff 100 ng/mL)                  Methamphetamine, Urine 10/12/2021 NOT REPORTED  NEGATIVE Final    Tricyclic Antidepressants, Urine 10/12/2021 NOT REPORTED  NEGATIVE Final    MDMA, Urine 10/12/2021 NOT REPORTED  NEGATIVE Final    Buprenorphine Urine 10/12/2021 NOT REPORTED  NEGATIVE Final    Test Information 10/12/2021 Assay provides medical screening only. The absence of expected drug(s) and/or metabolite(s) may indicate diluted or adulterated urine, limitations of testing or timing of collection. Final    Comment: Testing for legal purposes should be confirmed by another method. To request confirmation   of test result, please call the lab within 7 days of sample submission.  Acetaminophen Level 10/12/2021 7* 10 - 30 ug/mL Final    Ethanol 10/12/2021 <10  <10 mg/dL Final    Ethanol percent 10/12/2021 <5.942  % Final    Salicylate Lvl 91/19/4249 <1* 3 - 10 mg/dL Final    Toxic Tricyclic Sc,Blood 75/56/4142 WRONG TEST ORDERED  NEGATIVE Final    Tricyclic Antidep,Urine 81/50/0620 NEGATIVE  NEGATIVE Final    Comment:       (Positive cutoff 1000 ng/mL)  Assay provides rapid clinical screening only. Presumptive positive results for legal   purposes should be confirmed by another method. To request confirmation, please call the   lab within 7 days of sample submission.       Valproic Acid Lvl 10/21/2021 71  50 - 125 ug/mL Final    Valproic Dose amount 10/21/2021 500 MG   Final    Valproic Date last dose 10/21/2021 81,382,602   Final    Valproic Time last dose 10/21/2021 2,105   Final    POC Glucose 10/22/2021 100  65 - 105 mg/dL Final    POC Glucose 10/25/2021 116* 65 - 105 mg/dL Final         Reviewed patient's current plan of care and vital signs with nursing staff.     Labs reviewed: [x] Yes    Medications  Current Facility-Administered Medications: lactated ringers infusion, , IntraVENous, Continuous  fluticasone (FLONASE) 50 MCG/ACT nasal spray 1 spray, 1 spray, Each Nostril, Daily  lactated ringers infusion, , IntraVENous, Continuous  amoxicillin-clavulanate (AUGMENTIN) 500-125 MG per tablet 1 tablet, 1 tablet, Oral, 2 times per day  OLANZapine zydis (ZYPREXA) disintegrating tablet 25 mg, 25 mg, Oral, Nightly  sertraline (ZOLOFT) tablet 100 mg, 100 mg, Oral, Daily  prazosin (MINIPRESS) capsule 2 mg, 2 mg, Oral, Nightly  loperamide (IMODIUM) capsule 2 mg, 2 mg, Oral, 4x Daily PRN  ondansetron (ZOFRAN-ODT) disintegrating tablet 4 mg, 4 mg, Oral, Q8H PRN  pantoprazole (PROTONIX) tablet 40 mg, 40 mg, Oral, QAM AC  nicotine polacrilex (NICORETTE) gum 2 mg, 2 mg, Oral, PRN  albuterol sulfate  (90 Base) MCG/ACT inhaler 2 puff, 2 puff, Inhalation, Q6H PRN  apixaban (ELIQUIS) tablet 5 mg, 5 mg, Oral, BID  cetirizine (ZYRTEC) tablet 10 mg, 10 mg, Oral, Daily  melatonin tablet 3 mg, 3 mg, Oral, Nightly PRN  tiZANidine (ZANAFLEX) tablet 4 mg, 4 mg, Oral, Q8H PRN  acetaminophen (TYLENOL) tablet 650 mg, 650 mg, Oral, Q4H PRN  aluminum & magnesium hydroxide-simethicone (MAALOX) 200-200-20 MG/5ML suspension 30 mL, 30 mL, Oral, Q6H PRN  hydrOXYzine (ATARAX) tablet 50 mg, 50 mg, Oral, TID PRN  haloperidol (HALDOL) tablet 5 mg, 5 mg, Oral, Q6H PRN **AND** [DISCONTINUED] LORazepam (ATIVAN) tablet 2 mg, 2 mg, Oral, Q6H PRN  haloperidol lactate (HALDOL) injection 5 mg, 5 mg, IntraMUSCular, Q6H PRN **AND** [DISCONTINUED] LORazepam (ATIVAN) injection 2 mg, 2 mg, IntraMUSCular, Q6H PRN **AND** diphenhydrAMINE (BENADRYL) injection 50 mg, 50 mg, IntraMUSCular, Q6H PRN  polyethylene glycol (GLYCOLAX) packet 17 g, 17 g, Oral, Daily PRN  traZODone (DESYREL) tablet 50 mg, 50 mg, Oral, Nightly PRN    ASSESSMENT  Severe recurrent major depressive disorder with psychotic features ([facility] [address]         PLAN  Patient symptoms are: Improving  Continue current medication regimen. Continue index course of ECT, 3 times a week while inpatient  CMP scheduled for tomorrow morning  Internal medicine following for otitis media  Monitor need and frequency of PRN medications. Encourage participation in groups and milieu. Attempt to develop insight. Psycho-education conducted. Supportive Therapy conducted. Probable discharge is to be determined by MD.   Follow-up daily while inpatient. Patient continues to be monitored in the inpatient psychiatric facility at [facility] for safety and stabilization. Patient continues to need, on a daily basis, active treatment furnished directly by or requiring the supervision of inpatient psychiatric personnel. Electronically signed by DAMARIS Sampson CNP on 10/25/2021 at 4:18 PM    **This report has been created using voice recognition software. It may contain minor errors which are inherent in voice recognition technology. **    I independently saw and evaluated the patient. I reviewed the nurse practioner's documentation above. Any additional comments or changes to the    documentation are stated below otherwise agree with assessment. She was seen at bedside. Sanna Garcia was wearing a mask over her eyes. Sanna Garcia is reporting a headache after having ECT.  The patient states that she will have difficulty continuing ECT as an outpatient.  She reports slight benefit from ECT.  Her mood remains depressed and her anxiety remains very high                    PLAN  Medications as noted above  Attempt to develop insight  Psycho-education conducted. Supportive Therapy conducted.   Probable discharge is 3-9 days  Follow-up daily while on inpatient unit    Electronically signed by Tra Lovell MD on 10/25/21 at 9:49 PM EDT 27-Jun-2024

## (undated) DEVICE — 3M™ STERI-STRIP™ COMPOUND BENZOIN TINCTURE 40 BAGS/CARTON 4 CARTONS/CASE C1544: Brand: 3M™ STERI-STRIP™

## (undated) DEVICE — 3M™ IOBAN™ 2 ANTIMICROBIAL INCISE DRAPE 6650EZ: Brand: IOBAN™ 2

## (undated) DEVICE — GAUZE,SPONGE,FLUFF,6"X6.75",STRL,5/TRAY: Brand: MEDLINE

## (undated) DEVICE — Z DISCONTINUED USE 2640139 NEEDLE SPNL L5IN FORAMEN FOR SACR NRV STIM INTERSTIM

## (undated) DEVICE — ADHESIVE SKIN CLSR 0.7ML TOP DERMBND ADV

## (undated) DEVICE — GLOVE SURG SZ 65 THK91MIL LTX FREE SYN POLYISOPRENE

## (undated) DEVICE — SUTURE PROL SZ 0 L30IN NONABSORBABLE BLU L36MM CT-1 1/2 CIR 8424H

## (undated) DEVICE — GLOVE ORANGE PI 7 1/2   MSG9075

## (undated) DEVICE — Z DISCONTINUED BY MEDLINE USE 2711682 TRAY SKIN PREP DRY W/ PREM GLV

## (undated) DEVICE — 3M™ STERI-STRIP™ REINFORCED ADHESIVE SKIN CLOSURES, R1547, 1/2 IN X 4 IN (12 MM X 100 MM), 6 STRIPS/ENVELOPE: Brand: 3M™ STERI-STRIP™

## (undated) DEVICE — 1000 S-DRAPE TOWEL DRAPE 10/BX: Brand: STERI-DRAPE™

## (undated) DEVICE — DRAPE CAM W7XL46IN FOR CLS SYS CAM

## (undated) DEVICE — C-ARMOR C-ARM EQUIPMENT COVERS CLEAR STERILE UNIVERSAL FIT 12 PER CASE: Brand: C-ARMOR

## (undated) DEVICE — GOWN,AURORA,NONREINFORCED,LARGE: Brand: MEDLINE

## (undated) DEVICE — INTRODUCER NEUROSTIMULATOR LD FOR URIN CTRL INTERSTIM

## (undated) DEVICE — SVMMC PEDS/UROLOGY MINOR PACK: Brand: MEDLINE INDUSTRIES, INC.

## (undated) DEVICE — GLOVE SURG SZ 6 THK91MIL LTX FREE SYN POLYISOPRENE ANTI

## (undated) DEVICE — NEUROSTIMULATOR EXT SM LTWT SGL BTTN H2O RESIST WIRELESS

## (undated) DEVICE — SYRINGE BLB 2 PC STER 50

## (undated) DEVICE — Z CONVERTED USE 2162213 APPLICATOR PREP 4OZ CHG 4% BACTOSHIELD USE FOR HND WSH AND

## (undated) DEVICE — ELECTRODE PT RET AD L9FT HI MOIST COND ADH HYDRGEL CORDED

## (undated) DEVICE — CONTAINER,SPECIMEN,OR STERILE,4OZ: Brand: MEDLINE

## (undated) DEVICE — SUTURE VIC + SH-13-0 27IN  VCP311H

## (undated) DEVICE — SUTURE ABSRB BRAID COAT VLT SH 3-0 27IN VCRL J311H

## (undated) DEVICE — GLOVE ORANGE PI 7   MSG9070

## (undated) DEVICE — 3M™ TEGADERM™ TRANSPARENT FILM DRESSING FRAME STYLE, 1626W, 4 IN X 4-3/4 IN (10 CM X 12 CM), 50/CT 4CT/CASE: Brand: 3M™ TEGADERM™

## (undated) DEVICE — STRIP,CLOSURE,WOUND,MEDI-STRIP,1/2X4: Brand: MEDLINE

## (undated) DEVICE — SUTURE MCRYL SZ 4-0 L18IN ABSRB UD L16MM PC-3 3/8 CIR PRIM Y845G

## (undated) DEVICE — DRAPE C ARM UNIV W41XL74IN CLR PLAS XR VELC CLSR POLY STRP

## (undated) DEVICE — GARMENT COMPR XL BARIATRIC FOR 28IN CALF FLOTRN TRIO

## (undated) DEVICE — SYRINGE IRRIG 60ML SFT PLIABLE BLB EZ TO GRP 1 HND USE W/

## (undated) DEVICE — CABLE NEUROSTIMULATOR TWST LOK GLOB INTERSTIM